# Patient Record
Sex: FEMALE | Race: WHITE | NOT HISPANIC OR LATINO | Employment: OTHER | URBAN - METROPOLITAN AREA
[De-identification: names, ages, dates, MRNs, and addresses within clinical notes are randomized per-mention and may not be internally consistent; named-entity substitution may affect disease eponyms.]

---

## 2017-01-26 DIAGNOSIS — F41.9 ANXIETY: Primary | ICD-10-CM

## 2017-01-26 DIAGNOSIS — F33.1 MAJOR DEPRESSIVE DISORDER, RECURRENT EPISODE, MODERATE (H): ICD-10-CM

## 2017-01-26 NOTE — TELEPHONE ENCOUNTER
venlafaxine (EFFEXOR-XR) 150 MG 24 hr capsule    Last Written Prescription Date: 10/21/16  Last Fill Quantity: 90, # refills: 0  Last Office Visit with [facility], [facility] or [facility] prescribing provider: 6/27/16        BP Readings from Last 3 Encounters:   08/26/16 110/60   07/20/16 132/90   06/27/16 106/70     Pulse: (for Fetzima)  CREATININE   Date Value Ref Range Status   02/18/2016 0.84 0.52 - 1.04 mg/dL Final   ]    Last PHQ-9 score on record=   PHQ-9 SCORE 6/27/2016   Total Score -   Total Score MyChart -   Total Score 2

## 2017-01-31 ENCOUNTER — MYC MEDICAL ADVICE (OUTPATIENT)
Dept: FAMILY MEDICINE | Facility: CLINIC | Age: 57
End: 2017-01-31

## 2017-01-31 DIAGNOSIS — F41.9 ANXIETY: ICD-10-CM

## 2017-02-01 RX ORDER — VENLAFAXINE HYDROCHLORIDE 150 MG/1
150 CAPSULE, EXTENDED RELEASE ORAL DAILY
Qty: 30 CAPSULE | Refills: 0 | Status: SHIPPED | OUTPATIENT
Start: 2017-02-01 | End: 2017-02-01

## 2017-02-01 RX ORDER — VENLAFAXINE HYDROCHLORIDE 150 MG/1
150 CAPSULE, EXTENDED RELEASE ORAL DAILY
Qty: 90 CAPSULE | Refills: 0 | Status: SHIPPED | OUTPATIENT
Start: 2017-02-01 | End: 2017-02-06

## 2017-02-01 NOTE — TELEPHONE ENCOUNTER
See 1/31/17 MyChart encounter.    Short supply requested.    Routing refill request to provider for review/approval because:  -overdue for PHQ-9 and depression/anxiety follow up visit.      Patient does have appt scheduled on 2/6/17 with PCP.  Dr. Solis-Please sign if agree.  Thank you!  YEHUDA BarajasN, RN

## 2017-02-01 NOTE — TELEPHONE ENCOUNTER
Routing refill request to provider for review/approval because:  -overdue for PHQ-9 and depression/anxiety follow up visit    Patient requesting short supply while awaiting mail order delivery.  Routing to PCP.    Dr. Solis-Please sign if agree.    Thank you!  YEHUDA BarajasN, RN      venlafaxine (EFFEXOR-XR) 150 MG 24 hr capsule    Last Written Prescription Date: 10/21/16  Last Fill Quantity: 90, # refills: 0  Last Office Visit with [facility], [facility] or [facility] prescribing provider: 6/27/16 with Dr. Solis   Next 5 appointments (look out 90 days)     Feb 06, 2017  5:40 PM   MyChart Long with Veronica Solis MD   [facility]    [address] [place] [id number] [phone]                   BP Readings from Last 3 Encounters:   08/26/16 110/60   07/20/16 132/90   06/27/16 106/70     Pulse: (for Fetzima)  CREATININE   Date Value Ref Range Status   02/18/2016 0.84 0.52 - 1.04 mg/dL Final   ]    Last PHQ-9 score on record=   PHQ-9 SCORE 6/27/2016   Total Score -   Total Score MyChart -   Total Score 2

## 2017-02-06 ENCOUNTER — OFFICE VISIT (OUTPATIENT)
Dept: FAMILY MEDICINE | Facility: CLINIC | Age: 57
End: 2017-02-06
Payer: COMMERCIAL

## 2017-02-06 ENCOUNTER — RADIANT APPOINTMENT (OUTPATIENT)
Dept: GENERAL RADIOLOGY | Facility: CLINIC | Age: 57
End: 2017-02-06
Attending: FAMILY MEDICINE
Payer: COMMERCIAL

## 2017-02-06 VITALS
DIASTOLIC BLOOD PRESSURE: 88 MMHG | SYSTOLIC BLOOD PRESSURE: 126 MMHG | OXYGEN SATURATION: 100 % | BODY MASS INDEX: 30.54 KG/M2 | HEART RATE: 84 BPM | TEMPERATURE: 98.6 F | WEIGHT: 167 LBS | RESPIRATION RATE: 16 BRPM

## 2017-02-06 DIAGNOSIS — R05.9 COUGH: ICD-10-CM

## 2017-02-06 DIAGNOSIS — F41.9 ANXIETY: ICD-10-CM

## 2017-02-06 DIAGNOSIS — Z12.11 SCREEN FOR COLON CANCER: ICD-10-CM

## 2017-02-06 DIAGNOSIS — F33.41 RECURRENT MAJOR DEPRESSIVE DISORDER, IN PARTIAL REMISSION (H): Primary | ICD-10-CM

## 2017-02-06 DIAGNOSIS — N95.0 POSTMENOPAUSAL BLEEDING: ICD-10-CM

## 2017-02-06 PROCEDURE — 99214 OFFICE O/P EST MOD 30 MIN: CPT | Performed by: FAMILY MEDICINE

## 2017-02-06 PROCEDURE — 71020 XR CHEST 2 VW: CPT

## 2017-02-06 RX ORDER — BUPROPION HYDROCHLORIDE 150 MG/1
150 TABLET ORAL EVERY MORNING
Qty: 90 TABLET | Refills: 1 | Status: SHIPPED | OUTPATIENT
Start: 2017-02-06 | End: 2017-04-24

## 2017-02-06 RX ORDER — VENLAFAXINE HYDROCHLORIDE 150 MG/1
150 CAPSULE, EXTENDED RELEASE ORAL DAILY
Qty: 90 CAPSULE | Refills: 1 | Status: SHIPPED | OUTPATIENT
Start: 2017-02-06 | End: 2017-04-24

## 2017-02-06 ASSESSMENT — ANXIETY QUESTIONNAIRES
GAD7 TOTAL SCORE: 1
1. FEELING NERVOUS, ANXIOUS, OR ON EDGE: NOT AT ALL
2. NOT BEING ABLE TO STOP OR CONTROL WORRYING: NOT AT ALL
7. FEELING AFRAID AS IF SOMETHING AWFUL MIGHT HAPPEN: NOT AT ALL
3. WORRYING TOO MUCH ABOUT DIFFERENT THINGS: NOT AT ALL
5. BEING SO RESTLESS THAT IT IS HARD TO SIT STILL: NOT AT ALL
6. BECOMING EASILY ANNOYED OR IRRITABLE: SEVERAL DAYS

## 2017-02-06 ASSESSMENT — PATIENT HEALTH QUESTIONNAIRE - PHQ9: 5. POOR APPETITE OR OVEREATING: NOT AT ALL

## 2017-02-06 NOTE — MR AVS SNAPSHOT
After Visit Summary   2/6/2017    Karlee Emery    MRN: [id number]           Patient Information     Date Of Birth          1960        Visit Information        Provider Department      2/6/2017 5:40 PM Veronica Solis MD [facility]        Today's Diagnoses     Recurrent major depressive disorder, in partial remission (H)    -  1     Anxiety         Cough         Screen for colon cancer           Care Instructions    Schedule pelvic ultrasound.  Call [facility] at [phone] ([facility] [address] [address]    Call [facility] appointment line [phone] to schedule mammogram.            Follow-ups after your visit        Future tests that were ordered for you today     Open Future Orders        Priority Expected Expires Ordered    Fecal colorectal cancer screen (FIT) Routine 2/27/2017 5/1/2017 2/6/2017            Who to contact     If you have questions or need follow up information about today's clinic visit or your schedule please contact [facility] directly at [phone].  Normal or non-critical lab and imaging results will be communicated to you by "[employer]"hart, letter or phone within 4 business days after the clinic has received the results. If you do not hear from us within 7 days, please contact the clinic through [employer] or phone. If you have a critical or abnormal lab result, we will notify you by phone as soon as possible.  Submit refill requests through [employer] or call your pharmacy and they will forward the refill request to us. Please allow 3 business days for your refill to be completed.          Additional Information About Your Visit        "[employer]hart Information     [employer] gives you secure access to your electronic health record. If you see a primary care provider, you can also send messages to your care team and make appointments. If you have questions, please call your  primary care clinic.  If you do not have a primary care provider, please call [phone] and they will assist you.        Care EveryWhere ID     This is your Care EveryWhere ID. This could be used by other organizations to access your [facility] medical records  [id number]        Your Vitals Were     Pulse Temperature Respirations Pulse Oximetry Last Period       84 98.6  F (37  C) (Oral) 16 100% 03/15/2013 (Exact Date)        Blood Pressure from Last 3 Encounters:   02/06/17 126/88   08/26/16 110/60   07/20/16 132/90    Weight from Last 3 Encounters:   02/06/17 167 lb (75.751 kg)   08/26/16 160 lb (72.576 kg)   07/20/16 160 lb (72.576 kg)                 Where to get your medicines      These medications were sent to [facility] - [place] - [address] [facility] [address]  [address] [facility] [address]     Phone:  [phone]    - buPROPion 150 MG 24 hr tablet  - venlafaxine 150 MG 24 hr capsule       Primary Care Provider Office Phone # Fax #    Veronica Solis [phone]       [facility] [address]  [facility] [address]        Thank you!     Thank you for choosing [facility]  for your care. Our goal is always to provide you with excellent care. Hearing back from our patients is one way we can continue to improve our services. Please take a few minutes to complete the written survey that you may receive in the mail after your visit with us. Thank you!             Your Updated Medication List - Protect others around you: Learn how to safely use, store and throw away your medicines at www.disposemymeds.org.          This list is accurate as of: 2/6/17  6:38 PM.  Always use your most recent med list.                   Brand Name Dispense Instructions for use    acetaminophen 325 MG tablet    TYLENOL    100 tablet    Take 2 tablets by mouth every 4 hours as needed for other (mild pain).       aspirin 81 MG tablet      Take  by  mouth daily. ([facility])       buPROPion 150 MG 24 hr tablet    WELLBUTRIN XL    90 tablet    Take 1 tablet (150 mg) by mouth every morning       CALCIUM + D PO      Take 1 tablet by mouth daily.       loratadine 10 MG tablet    CLARITIN    90 tablet    Take 1 tablet by mouth daily.       MULTIVITAMIN TABS   OR      1 TABLET DAILY       * valACYclovir 500 MG tablet    VALTREX    90 tablet    Take 1 tablet (500 mg) by mouth daily       * valACYclovir 1000 mg tablet    VALTREX    21 tablet    Take 1 tablet (1,000 mg) by mouth 3 times daily       venlafaxine 150 MG 24 hr capsule    EFFEXOR-XR    90 capsule    Take 1 capsule (150 mg) by mouth daily       * Notice:  This list has 2 medication(s) that are the same as other medications prescribed for you. Read the directions carefully, and ask your doctor or other care provider to review them with you.

## 2017-02-06 NOTE — PROGRESS NOTES
SUBJECTIVE:                                                    Karlee Emery is a 56 year old female who presents to clinic today for the following health issues:     Depression and Anxiety Follow-Up    Status since last visit: Improved     Other associated symptoms: dry mouth, cough x months - side effects?    Complicating factors:     Significant life event: No     Current substance abuse: None    PHQ-9 SCORE 5/23/2016 6/27/2016 2/6/2017   Total Score - - -   Total Score MyChart - - Incomplete   Total Score 12 2 8     DANIEL-7 SCORE 5/4/2016 5/23/2016 6/27/2016   Total Score - - -   Total Score 1 2 0        PHQ-9  English      PHQ-9   Any Language     GAD7     Vaginal Spotting  Had vaginal spotting x 3 days.  Associated with breast tenderness, pelvic cramping.  Had endometrial cells on pap last summer with subsequent pelvic ultrasound and endometrial biopsy.    6.6 mm stripe on ultrasound.  Endo biopsy was normal.  Ultrasound did also show right ovarian simple cyst; she was advised to repeat ultrasound in 1-2 cycles but did not.    Cough  Dry, nagging, chronic cough since at least last summer.  Worse when she's talking a lot.    No associated symptoms.      Amount of exercise or physical activity: 3 days/week for an average of 60 minutes    Problems taking medications regularly: No    Medication side effects: dry mouth, cough x months, night sweats    Diet: regular (no restrictions)        Problem list and histories reviewed & adjusted, as indicated.  Additional history: as documented    BP Readings from Last 3 Encounters:   02/06/17 144/90   08/26/16 110/60   07/20/16 132/90    Wt Readings from Last 3 Encounters:   02/06/17 167 lb (75.751 kg)   08/26/16 160 lb (72.576 kg)   07/20/16 160 lb (72.576 kg)             ROS:  RESP:  NEGATIVE for shortness of breath  CV:  NEGATIVE for chest pain    GI: NEGATIVE for heartburn  ENT: NEGATIVE for post-nasal drainage     OBJECTIVE:                                                     /88 mmHg  Pulse 84  Temp(Src) 98.6  F (37  C) (Oral)  Resp 16  Wt 167 lb (75.751 kg)  SpO2 100%  LMP 03/15/2013 (Exact Date)  Body mass index is 30.54 kg/(m^2).  GEN:  no apparent distress  LUNGS:  normal respiratory effort, and lungs clear to auscultation bilaterally - no rales, rhonchi or wheezes  CV: regular rate and rhythm, normal S1 S2, no S3 or S4 and no murmur, click or rub  PSYCH:  Appearance/Behavior: patient is appropriately and casually dressed.  Speech:  normal  rate, rhythm, and tone.  Thought Form: organized.  Mood/Affect: slightly depressed/congruent.  Insight: good.     CXR: within normal limits by my interpretation      ASSESSMENT/PLAN:                                                        1. Recurrent major depressive disorder, in partial remission (H)  Continue present management.  She plans to join [facility] family support group.  - venlafaxine (EFFEXOR-XR) 150 MG 24 hr capsule; Take 1 capsule (150 mg) by mouth daily  Dispense: 90 capsule; Refill: 1  - buPROPion (WELLBUTRIN XL) 150 MG 24 hr tablet; Take 1 tablet (150 mg) by mouth every morning  Dispense: 90 tablet; Refill: 1    2. Anxiety  - venlafaxine (EFFEXOR-XR) 150 MG 24 hr capsule; Take 1 capsule (150 mg) by mouth daily  Dispense: 90 capsule; Refill: 1    3. Cough  Unclear etiology/diagnosis with uncertain prognosis requiring further workup with CXR.  No obvious etiology by history.    - XR Chest 2 Views; Future    4. Screen for colon cancer  - Fecal colorectal cancer screen (FIT); Future    5. Postmenopausal bleeding  Recommended pelvic ultrasound.  She already has standing order for this.    Patient Instructions   Schedule pelvic ultrasound.  Call [facility] at [phone] ([facility] - [address]).    Call [facility] appointment line [phone] to schedule mammogram.        FUTURE APPOINTMENTS:       - Follow-up visit in 6  months.    Veronica Solis MD  [facility]

## 2017-02-06 NOTE — NURSING NOTE
"Chief Complaint   Patient presents with     Depression     Anxiety       Initial /90 mmHg  Pulse 84  Temp(Src) 98.6  F (37  C) (Oral)  Resp 16  Wt 167 lb (75.751 kg)  SpO2 100%  LMP 03/15/2013 (Exact Date) Estimated body mass index is 30.54 kg/(m^2) as calculated from the following:    Height as of 8/26/16: 5' 2\" (1.575 m).    Weight as of this encounter: 167 lb (75.751 kg).  Medication Reconciliation: complete       Nikki Abbott MA      "

## 2017-02-07 ASSESSMENT — ANXIETY QUESTIONNAIRES: GAD7 TOTAL SCORE: 1

## 2017-02-07 ASSESSMENT — PATIENT HEALTH QUESTIONNAIRE - PHQ9: SUM OF ALL RESPONSES TO PHQ QUESTIONS 1-9: 8

## 2017-02-07 NOTE — PATIENT INSTRUCTIONS
Schedule pelvic ultrasound.  Call [facility] at [phone] ([facility] - [address]).    Call [facility] appointment line [phone] to schedule mammogram.

## 2017-02-15 ENCOUNTER — RADIANT APPOINTMENT (OUTPATIENT)
Dept: ULTRASOUND IMAGING | Facility: CLINIC | Age: 57
End: 2017-02-15
Attending: OBSTETRICS & GYNECOLOGY
Payer: COMMERCIAL

## 2017-02-15 ENCOUNTER — RADIANT APPOINTMENT (OUTPATIENT)
Dept: MAMMOGRAPHY | Facility: CLINIC | Age: 57
End: 2017-02-15
Attending: FAMILY MEDICINE
Payer: COMMERCIAL

## 2017-02-15 DIAGNOSIS — Z12.31 VISIT FOR SCREENING MAMMOGRAM: ICD-10-CM

## 2017-02-15 DIAGNOSIS — N83.201 CYST OF RIGHT OVARY: ICD-10-CM

## 2017-02-15 PROCEDURE — 76830 TRANSVAGINAL US NON-OB: CPT | Performed by: OBSTETRICS & GYNECOLOGY

## 2017-02-15 PROCEDURE — G0202 SCR MAMMO BI INCL CAD: HCPCS

## 2017-03-01 DIAGNOSIS — A60.00 HERPES SIMPLEX INFECTION OF GENITOURINARY SYSTEM: Primary | ICD-10-CM

## 2017-03-01 NOTE — TELEPHONE ENCOUNTER
VALACYCLOVIR HCL 500MG TABS       Last Written Prescription Date: 2/29/16  Last Fill Quantity: 90, # refills: 3  Last Office Visit with [facility] or [facility] prescribing provider: 2/6/17        Creatinine   Date Value Ref Range Status   02/18/2016 0.84 0.52 - 1.04 mg/dL Final

## 2017-03-03 RX ORDER — VALACYCLOVIR HYDROCHLORIDE 500 MG/1
TABLET, FILM COATED ORAL
Qty: 90 TABLET | Refills: 0 | Status: SHIPPED | OUTPATIENT
Start: 2017-03-03 | End: 2017-04-24

## 2017-03-03 NOTE — TELEPHONE ENCOUNTER
Routing refill request to provider for review/approval because:  Labs not current:  creatinine    Lab ordered.    Routing to PCP and Reception.    Dr. Solis-Please sign if agree.    Reception-Please call patient to schedule non-fasting lab appt for monitoring lab for Valtrex medication.    Thank you!  GABRIELLA Duarte, YEHUDAN, RN

## 2017-03-06 DIAGNOSIS — A60.00 HERPES SIMPLEX INFECTION OF GENITOURINARY SYSTEM: ICD-10-CM

## 2017-03-06 LAB
CREAT SERPL-MCNC: 0.94 MG/DL (ref 0.52–1.04)
GFR SERPL CREATININE-BSD FRML MDRD: 61 ML/MIN/1.7M2

## 2017-03-06 PROCEDURE — 82565 ASSAY OF CREATININE: CPT | Performed by: FAMILY MEDICINE

## 2017-03-06 PROCEDURE — 36415 COLL VENOUS BLD VENIPUNCTURE: CPT | Performed by: FAMILY MEDICINE

## 2017-03-12 PROCEDURE — 82274 ASSAY TEST FOR BLOOD FECAL: CPT | Performed by: FAMILY MEDICINE

## 2017-03-14 DIAGNOSIS — Z12.11 SCREEN FOR COLON CANCER: ICD-10-CM

## 2017-03-14 LAB — HEMOCCULT STL QL IA: NEGATIVE

## 2017-03-15 NOTE — PROGRESS NOTES
Job Desir,  Thanks for completing the FIT stool test for colon cancer screening!  Your stool test is negative for microscopic (hidden) blood.  This test should be repeated annually.     Veronica Solis MD

## 2017-04-24 ENCOUNTER — MYC MEDICAL ADVICE (OUTPATIENT)
Dept: FAMILY MEDICINE | Facility: CLINIC | Age: 57
End: 2017-04-24

## 2017-04-24 DIAGNOSIS — F33.41 RECURRENT MAJOR DEPRESSIVE DISORDER, IN PARTIAL REMISSION (H): ICD-10-CM

## 2017-04-24 DIAGNOSIS — A60.00 HERPES SIMPLEX INFECTION OF GENITOURINARY SYSTEM: ICD-10-CM

## 2017-04-24 DIAGNOSIS — F41.9 ANXIETY: ICD-10-CM

## 2017-04-24 RX ORDER — BUPROPION HYDROCHLORIDE 150 MG/1
150 TABLET ORAL EVERY MORNING
Qty: 14 TABLET | Refills: 0 | Status: SHIPPED | OUTPATIENT
Start: 2017-04-24 | End: 2017-07-21

## 2017-04-24 RX ORDER — VENLAFAXINE HYDROCHLORIDE 150 MG/1
150 CAPSULE, EXTENDED RELEASE ORAL DAILY
Qty: 14 CAPSULE | Refills: 0 | Status: SHIPPED | OUTPATIENT
Start: 2017-04-24 | End: 2017-07-21

## 2017-04-24 RX ORDER — VALACYCLOVIR HYDROCHLORIDE 500 MG/1
500 TABLET, FILM COATED ORAL DAILY
Qty: 14 TABLET | Refills: 0 | Status: SHIPPED | OUTPATIENT
Start: 2017-04-24 | End: 2017-05-03
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
## 2017-04-24 NOTE — TELEPHONE ENCOUNTER
Routing to PCP.    Dr. Solis-Please review.  Will update when patient clarifies pharmacy.    Thank you!  YEHUDA BarajasN, RN
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
## 2017-05-03 DIAGNOSIS — A60.00 HERPES SIMPLEX INFECTION OF GENITOURINARY SYSTEM: ICD-10-CM

## 2017-05-03 RX ORDER — VALACYCLOVIR HYDROCHLORIDE 500 MG/1
TABLET, FILM COATED ORAL
Qty: 90 TABLET | Refills: 2 | Status: SHIPPED | OUTPATIENT
Start: 2017-05-03 | End: 2018-01-09

## 2017-05-03 NOTE — TELEPHONE ENCOUNTER
Medication Detail      Disp Refills Start End ELADIO   valACYclovir (VALTREX) 500 MG tablet 14 tablet 0 4/24/2017  No   Sig: Take 1 tablet (500 mg) by mouth daily       Last Office Visit with [facility] or [facility] prescribing provider: 2/6/17        Creatinine   Date Value Ref Range Status   03/06/2017 0.94 0.52 - 1.04 mg/dL Final

## 2017-05-04 NOTE — TELEPHONE ENCOUNTER
Prescription approved per [facility] Refill Protocol.    Signed Prescriptions:                        Disp   Refills    valACYclovir (VALTREX) 500 MG tablet       90 tab*2        Sig: TAKE ONE TABLET BY MOUTH EVERY DAY  Authorizing Provider: HUMA WELSH  Ordering User: MARK PRATT      Closing encounter - no further actions needed at this time    Mark Pratt RN

## 2017-07-19 DIAGNOSIS — F33.41 RECURRENT MAJOR DEPRESSIVE DISORDER, IN PARTIAL REMISSION (H): ICD-10-CM

## 2017-07-19 DIAGNOSIS — F41.9 ANXIETY: ICD-10-CM

## 2017-07-19 NOTE — TELEPHONE ENCOUNTER
Medication Detail      Disp Refills Start End ELADIO   buPROPion (WELLBUTRIN XL) 150 MG 24 hr tablet 14 tablet 0 4/24/2017  No   Sig: Take 1 tablet (150 mg) by mouth every morning       Last Office Visit with [facility], [facility] or [facility] prescribing provider:  2/6/17        Last PHQ-9 score on record=   PHQ-9 SCORE 2/6/2017   Total Score -   Total Score MyChart Incomplete   Total Score 8       Lab Results   Component Value Date    AST 28 11/21/2011     Lab Results   Component Value Date    ALT 24 11/21/2011

## 2017-07-21 ENCOUNTER — MYC MEDICAL ADVICE (OUTPATIENT)
Dept: FAMILY MEDICINE | Facility: CLINIC | Age: 57
End: 2017-07-21

## 2017-07-21 RX ORDER — BUPROPION HYDROCHLORIDE 150 MG/1
TABLET ORAL
Qty: 90 TABLET | Refills: 1 | Status: SHIPPED | OUTPATIENT
Start: 2017-07-21 | End: 2018-01-09

## 2017-07-21 RX ORDER — VENLAFAXINE HYDROCHLORIDE 150 MG/1
150 CAPSULE, EXTENDED RELEASE ORAL DAILY
Qty: 90 CAPSULE | Refills: 1 | Status: SHIPPED | OUTPATIENT
Start: 2017-07-21 | End: 2018-04-01

## 2017-07-21 ASSESSMENT — ANXIETY QUESTIONNAIRES
1. FEELING NERVOUS, ANXIOUS, OR ON EDGE: NOT AT ALL
7. FEELING AFRAID AS IF SOMETHING AWFUL MIGHT HAPPEN: NOT AT ALL
3. WORRYING TOO MUCH ABOUT DIFFERENT THINGS: NOT AT ALL
2. NOT BEING ABLE TO STOP OR CONTROL WORRYING: NOT AT ALL
7. FEELING AFRAID AS IF SOMETHING AWFUL MIGHT HAPPEN: NOT AT ALL
GAD7 TOTAL SCORE: 0
4. TROUBLE RELAXING: NOT AT ALL
5. BEING SO RESTLESS THAT IT IS HARD TO SIT STILL: NOT AT ALL
GAD7 TOTAL SCORE: 0
6. BECOMING EASILY ANNOYED OR IRRITABLE: NOT AT ALL
GAD7 TOTAL SCORE: 0

## 2017-07-21 ASSESSMENT — PATIENT HEALTH QUESTIONNAIRE - PHQ9
SUM OF ALL RESPONSES TO PHQ QUESTIONS 1-9: 4
SUM OF ALL RESPONSES TO PHQ QUESTIONS 1-9: 4
10. IF YOU CHECKED OFF ANY PROBLEMS, HOW DIFFICULT HAVE THESE PROBLEMS MADE IT FOR YOU TO DO YOUR WORK, TAKE CARE OF THINGS AT HOME, OR GET ALONG WITH OTHER PEOPLE: SOMEWHAT DIFFICULT

## 2017-07-22 ASSESSMENT — ANXIETY QUESTIONNAIRES: GAD7 TOTAL SCORE: 0

## 2017-07-22 ASSESSMENT — PATIENT HEALTH QUESTIONNAIRE - PHQ9: SUM OF ALL RESPONSES TO PHQ QUESTIONS 1-9: 4

## 2017-07-24 NOTE — TELEPHONE ENCOUNTER
Dr. Solis-Please review.    Thank you!  YEHUDA BarajasN, RN      DANIEL-7 SCORE 6/27/2016 2/6/2017 7/21/2017   Total Score - - -   Total Score - - 0 (minimal anxiety)   Total Score 0 1 0       PHQ-9 SCORE 2/6/2017 2/6/2017 7/21/2017   Total Score - - -   Total Score MyChart - Incomplete 4 (Minimal depression)   Total Score 8 8 4

## 2017-10-24 ENCOUNTER — MYC MEDICAL ADVICE (OUTPATIENT)
Dept: FAMILY MEDICINE | Facility: CLINIC | Age: 57
End: 2017-10-24

## 2017-10-24 DIAGNOSIS — R32 URINARY INCONTINENCE, UNSPECIFIED TYPE: Primary | ICD-10-CM

## 2017-10-24 NOTE — TELEPHONE ENCOUNTER
Per muzu tv message below, pt is requesting an Order to see a female Urologist for urinary incontinence.    Violet Dobbs, HEMANT Referral Rep

## 2017-10-24 NOTE — TELEPHONE ENCOUNTER
Do you know of a female urologist in the system?  Not sure what office to pend for a female provider as requested.  Meli Garcia RN

## 2017-11-02 ENCOUNTER — PRE VISIT (OUTPATIENT)
Dept: UROLOGY | Facility: CLINIC | Age: 57
End: 2017-11-02

## 2017-11-02 NOTE — TELEPHONE ENCOUNTER
Called patient for any outside records no options to leave VM.. Some records are in [facility].. cdk

## 2018-01-03 ENCOUNTER — PRE VISIT (OUTPATIENT)
Dept: UROLOGY | Facility: CLINIC | Age: 58
End: 2018-01-03

## 2018-01-03 NOTE — TELEPHONE ENCOUNTER
Patient with incontinence coming in for a consult. Chart reviewed and pt was referred by Dr. Solis. Pt will need a dip/pvr.

## 2018-01-04 ASSESSMENT — ENCOUNTER SYMPTOMS
ALTERED TEMPERATURE REGULATION: 1
SORE THROAT: 1
INCREASED ENERGY: 0
WEAKNESS: 0
COUGH DISTURBING SLEEP: 1
WHEEZING: 0
SPEECH CHANGE: 0
STIFFNESS: 1
SINUS PAIN: 1
MYALGIAS: 1
MUSCLE WEAKNESS: 0
HEMATURIA: 0
HOARSE VOICE: 1
SPUTUM PRODUCTION: 1
EYE IRRITATION: 1
HOT FLASHES: 1
POSTURAL DYSPNEA: 0
DYSURIA: 0
LOSS OF CONSCIOUSNESS: 0
POLYPHAGIA: 0
FEVER: 0
SINUS CONGESTION: 1
EYE WATERING: 1
DYSPNEA ON EXERTION: 0
TINGLING: 1
WEIGHT LOSS: 0
ARTHRALGIAS: 1
BACK PAIN: 1
SEIZURES: 0
TROUBLE SWALLOWING: 0
NECK PAIN: 1
HEMOPTYSIS: 0
HALLUCINATIONS: 0
NECK MASS: 0
CHILLS: 0
SMELL DISTURBANCE: 0
DISTURBANCES IN COORDINATION: 0
HEADACHES: 1
NUMBNESS: 1
JOINT SWELLING: 0
EYE REDNESS: 0
WEIGHT GAIN: 1
SHORTNESS OF BREATH: 0
FLANK PAIN: 0
MUSCLE CRAMPS: 0
FATIGUE: 1
COUGH: 1
DIZZINESS: 1
DOUBLE VISION: 0
SNORES LOUDLY: 1
PARALYSIS: 0
POLYDIPSIA: 0
NIGHT SWEATS: 1
DIFFICULTY URINATING: 1
DECREASED LIBIDO: 0
EYE PAIN: 0
MEMORY LOSS: 0
TREMORS: 0
DECREASED APPETITE: 0
TASTE DISTURBANCE: 0

## 2018-01-07 ENCOUNTER — HEALTH MAINTENANCE LETTER (OUTPATIENT)
Age: 58
End: 2018-01-07

## 2018-01-09 DIAGNOSIS — A60.00 HERPES SIMPLEX INFECTION OF GENITOURINARY SYSTEM: ICD-10-CM

## 2018-01-09 DIAGNOSIS — F33.41 RECURRENT MAJOR DEPRESSIVE DISORDER, IN PARTIAL REMISSION (H): ICD-10-CM

## 2018-01-10 NOTE — TELEPHONE ENCOUNTER
"Requested Prescriptions   Pending Prescriptions Disp Refills     buPROPion (WELLBUTRIN XL) 150 MG 24 hr tablet [Pharmacy Med Name: BUPROPION HCL ER (XL) 150MG TB24]  Last Written Prescription Date:  7/21/2017  Last Fill Quantity: 90 tablet,  # refills: 1   Last Office Visit with [facility] or [facility] prescribing provider:  2/6/2017   Future Office Visit:      90 tablet 1     Sig: TAKE ONE TABLET BY MOUTH EVERY MORNING    SSRIs Protocol Failed    1/9/2018  7:38 PM       Failed - Recent (6 mo) or future visit with authorizing provider's specialty    Patient had office visit in the last 6 months or has a visit in the next 30 days with authorizing provider.  See \"Patient Info\" tab in inbasket, or \"Choose Columns\" in Meds & Orders section of the refill encounter.          Passed - PHQ-9 score less than 5 in past 6 months    The PHQ-9 criteria is meant to fail. It requires a PHQ-9 score review     PHQ-9 SCORE 2/6/2017 2/6/2017 7/21/2017   Total Score - - -   Total Score MyChart - Incomplete 4 (Minimal depression)   Total Score 8 8 4     DANIEL-7 SCORE 6/27/2016 2/6/2017 7/21/2017   Total Score - - -   Total Score - - 0 (minimal anxiety)   Total Score 0 1 0       Passed - Medication is Bupropion    If the medication is Bupropion (Wellbutrin), and the patient is taking for smoking cessation; OK to refill.         Passed - Patient is age 18 or older       Passed - No active pregnancy on record       Passed - No positive pregnancy test in last 12 months     ________________________________________________________________________________       valACYclovir (VALTREX) 500 MG tablet [Pharmacy Med Name: VALACYCLOVIR HCL 500MG TABS]  Last Written Prescription Date:  5/3/2017  Last Fill Quantity: 90 tablet,  # refills: 2   Last Office Visit with [facility] or [facility] prescribing provider:  2/6/2017   Future Office Visit:    90 tablet 2     Sig: TAKE ONE TABLET BY MOUTH EVERY DAY    Antivirals for Herpes Protocol Passed    1/9/2018  7:38 " "PM       Passed - Patient is age 12 or older       Passed - Recent or future visit with authorizing provider's specialty    Patient had office visit in the last year or has a visit in the next 30 days with authorizing provider.  See \"Patient Info\" tab in inbasket, or \"Choose Columns\" in Meds & Orders section of the refill encounter.          Passed - Normal serum creatinine on file in past 12 months    Recent Labs   Lab Test  03/06/17   0835   CR  0.94               "

## 2018-01-11 ENCOUNTER — OFFICE VISIT (OUTPATIENT)
Dept: UROLOGY | Facility: CLINIC | Age: 58
End: 2018-01-11
Payer: COMMERCIAL

## 2018-01-11 VITALS
SYSTOLIC BLOOD PRESSURE: 129 MMHG | DIASTOLIC BLOOD PRESSURE: 79 MMHG | WEIGHT: 175.3 LBS | BODY MASS INDEX: 32.26 KG/M2 | HEIGHT: 62 IN | HEART RATE: 82 BPM

## 2018-01-11 DIAGNOSIS — M62.89 PELVIC FLOOR DYSFUNCTION: ICD-10-CM

## 2018-01-11 DIAGNOSIS — M79.18 MYALGIA OF PELVIC FLOOR: ICD-10-CM

## 2018-01-11 DIAGNOSIS — N39.46 MIXED INCONTINENCE: Primary | ICD-10-CM

## 2018-01-11 LAB
APPEARANCE UR: CLEAR
BILIRUB UR QL: NORMAL
COLOR UR: YELLOW
GLUCOSE URINE: NORMAL MG/DL
HGB UR QL: NORMAL
KETONES UR QL: NORMAL MG/DL
LEUKOCYTE ESTERASE URINE: NORMAL
NITRITE UR QL STRIP: NORMAL
PH UR STRIP: 5 PH (ref 5–7)
PROTEIN ALBUMIN URINE: NORMAL MG/DL
SOURCE: NORMAL
SP GR UR STRIP: 1.03 (ref 1–1.03)
UROBILINOGEN UR QL STRIP: 0.2 EU/DL (ref 0.2–1)

## 2018-01-11 RX ORDER — BUPROPION HYDROCHLORIDE 150 MG/1
TABLET ORAL
Qty: 90 TABLET | Refills: 0 | Status: SHIPPED | OUTPATIENT
Start: 2018-01-11 | End: 2018-04-01

## 2018-01-11 RX ORDER — VALACYCLOVIR HYDROCHLORIDE 500 MG/1
TABLET, FILM COATED ORAL
Qty: 90 TABLET | Refills: 0 | Status: SHIPPED | OUTPATIENT
Start: 2018-01-11 | End: 2018-04-01

## 2018-01-11 ASSESSMENT — PAIN SCALES - GENERAL: PAINLEVEL: NO PAIN (0)

## 2018-01-11 NOTE — NURSING NOTE
"Chief Complaint   Patient presents with     Incontinence     consult       Blood pressure 129/79, pulse 82, height 1.575 m (5' 2\"), weight 79.5 kg (175 lb 4.8 oz), last menstrual period 03/15/2013, not currently breastfeeding. Body mass index is 32.06 kg/(m^2).    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       loratadine (CLARITIN) 10 MG tablet Take 1 tablet by mouth daily. 90 tablet 0     aspirin 81 MG tablet Take  by mouth daily. ([facility])  3     acetaminophen (TYLENOL) 325 MG tablet Take 2 tablets by mouth every 4 hours as needed for other (mild pain). 100 tablet 0     MULTIVITAMIN TABS   OR 1 TABLET DAILY         Social History   Substance Use Topics     Smoking status: Former Smoker     Quit date: 1/1/1980     Smokeless tobacco: Not on file     Alcohol use 0.0 oz/week     0 Standard drinks or equivalent per week      Comment: 2 per week PRICSILA Grimaldo  1/11/2018  9:26 AM       "

## 2018-01-11 NOTE — MR AVS SNAPSHOT
After Visit Summary   1/11/2018    Karlee Emery    MRN: [id number]           Patient Information     Date Of Birth          1960        Visit Information        Provider Department      1/11/2018 9:15 AM Miryam Herrera MD [facility]        Care Instructions    Please return to see me for us to look in your bladder (cystoscopy)    Websites with free information:    American Urogynecologic Society patient website: www.[email or web address]    Total Control Program: www.totalcontrolprogram.com    Please see one of the dedicated pelvic floor physical therapist ([employer] [facility] [phone])    It was a pleasure meeting with you today.  Thank you for allowing me and my team the privilege of caring for you today.  YOU are the reason we are here, and I truly hope we provided you with the excellent service you deserve.  Please let us know if there is anything else we can do for you so that we can be sure you are leaving completely satisfied with your care experience.              Follow-ups after your visit        Your next 10 appointments already scheduled     Feb 15, 2018  7:30 AM CST   (Arrive by 7:15 AM)   Cystoscopy with Miryam Herrera MD   [facility] ([facility])    [address] [place] [id number] [phone]              Who to contact     Please call your clinic at [phone] to:    Ask questions about your health    Make or cancel appointments    Discuss your medicines    Learn about your test results    Speak to your doctor   If you have compliments or concerns about an experience at your clinic, or if you wish to file a complaint, please contact [facility] Patient Relations at [phone] or email us at [email or web address]         Additional Information  "About Your Visit        [employer] Information     [employer] gives you secure access to your electronic health record. If you see a primary care provider, you can also send messages to your care team and make appointments. If you have questions, please call your primary care clinic.  If you do not have a primary care provider, please call [phone] and they will assist you.      [employer] is an electronic gateway that provides easy, online access to your medical records. With [employer], you can request a clinic appointment, read your test results, renew a prescription or communicate with your care team.     To access your existing account, please contact your [facility] or call [phone] for assistance.        Care EveryWhere ID     This is your Care EveryWhere ID. This could be used by other organizations to access your [facility] medical records  [id number]        Your Vitals Were     Pulse Height Last Period BMI (Body Mass Index)          82 1.575 m (5' 2\") 03/15/2013 (Exact Date) 32.06 kg/m2         Blood Pressure from Last 3 Encounters:   01/11/18 129/79   02/06/17 126/88   08/26/16 110/60    Weight from Last 3 Encounters:   01/11/18 79.5 kg (175 lb 4.8 oz)   02/06/17 75.8 kg (167 lb)   08/26/16 72.6 kg (160 lb)              We Performed the Following     Urinalysis chemical screen POCT        Primary Care Provider Office Phone # Fax #    Veronica Solis [phone]       [address]  [facility] [address]        Equal Access to Services     NICOLA BILLY : Hadii jose roberto ku hadasho Soomaali, waaxda luqadaha, qaybta kaalmada adeegyada, jose zacarias. So [facility] [phone].    ATENCIÓN: Si habla español, tiene a lopez disposición servicios gratuitos de asistencia lingüística. Llame al [phone].    We comply with applicable federal civil rights laws and [place] laws. We do not discriminate on the basis of race, color, national origin, age, " disability, sex, sexual orientation, or gender identity.            Thank you!     Thank you for choosing [facility] UROLOGY AND [facility] FOR PROSTATE AND UROLOGIC CANCERS  for your care. Our goal is always to provide you with excellent care. Hearing back from our patients is one way we can continue to improve our services. Please take a few minutes to complete the written survey that you may receive in the mail after your visit with us. Thank you!             Your Updated Medication List - Protect others around you: Learn how to safely use, store and throw away your medicines at www.disposemymeds.org.          This list is accurate as of: 1/11/18 10:18 AM.  Always use your most recent med list.                   Brand Name Dispense Instructions for use Diagnosis    acetaminophen 325 MG tablet    TYLENOL    100 tablet    Take 2 tablets by mouth every 4 hours as needed for other (mild pain).    Hyperlipidemia LDL goal <100, Type 2 diabetes, HbA1C goal < 8% (H), Moderate major depression (H), Hypertension goal BP (blood pressure) < 140/90, Other genital herpes       aspirin 81 MG tablet      Take  by mouth daily. (FMG)    Type 2 diabetes, HbA1C goal < 8% (H)       buPROPion 150 MG 24 hr tablet    WELLBUTRIN XL    90 tablet    TAKE ONE TABLET BY MOUTH EVERY MORNING    Recurrent major depressive disorder, in partial remission (H)       CALCIUM + D PO      Take 1 tablet by mouth daily.        loratadine 10 MG tablet    CLARITIN    90 tablet    Take 1 tablet by mouth daily.    Seasonal allergic rhinitis       MULTIVITAMIN TABS   OR      1 TABLET DAILY        valACYclovir 500 MG tablet    VALTREX    90 tablet    TAKE ONE TABLET BY MOUTH EVERY DAY    Herpes simplex infection of genitourinary system       venlafaxine 150 MG 24 hr capsule    EFFEXOR-XR    90 capsule    Take 1 capsule (150 mg) by mouth daily    Anxiety, Recurrent major depressive disorder, in partial remission (H)

## 2018-01-11 NOTE — PROGRESS NOTES
Urology Consult    Referring provider/PCP  Veronica Solis MD  [address] [place] [address]    Name:  Karlee Emery  MRN:  [id number]  Age/: 57 year old, 1960    CC: Mixed urinary incontinence    HPI: Karlee Emery is a(n) 57 year old FEMALE with a past medical history notable for hypertension, hyperlipidemia, diabetes, depression, as well as genital herpes who presents for evaluation of urinary incontinence.  Patient notes that her incontinence has been present for 24 years.  She notes that her symptoms started after the birth of her daughter.  Her daughter was 10 pounds.  She endorses a separation of her pubic symphysis as well as her SI joints.  Subsequent to this she has had a rectocele and cystocele.  Her daughter was her second of 2 children.  Both were delivered vaginally    Since that time she has had a combination of both stress and urge urinary incontinence.  She underwent a pubovaginal sling with the advantage polypropylene sling system at [facility] in 2010.  She notes that for 1 year she had complete resolution of symptoms, but then her symptoms again started worsening.  She does endorse some significant weight loss since this surgery.  She notes that she has times where she is unable to make it to the bathroom without leaking urine.  She also notes a history of stress urinary incontinence.  An example she gives is exercising.  She does exercise, she will completely saturate a heavy duty pad.  At other times when she is more sedentary, she will use a single pad throughout the day.  Additionally, she endorses some post void urination while standing or wiping.  She notes that this is more than simply dribbling she will have a lot of urine running down her leg.    She mentions that she hardly notices her cystocele.  She does notice her rectocele, and has to take fiber supplements.  Occasionally she has to reduce her rectocele manually to have bowel movements.  She  has had no other  surgeries.  She has no history of urinary tract infections and has had no hematuria.    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    Screen annually for signs and symptoms of Adult T cell leukemia-lymphoma (BRANDEN) or USUJ-I-diknjzdacn myelopathy (HAM), also known as tropical spastic paraparesis (TSP): body aches, fevers, night sweats, loss of appetite or weight      Hyperlipidemia      Hypertension      Other abnormal glucose      Other genital herpes     On suppressive therapy       Past Surgical History:  Past Surgical History:   Procedure Laterality Date     C APPENDECTOMY  1997     C NONSPECIFIC PROCEDURE  1986    TMJ     C TMJ RECONSTRUCTION       EXCISE MASS LOWER EXTREMITY  3/28/2012    Procedure:EXCISE MASS LOWER EXTREMITY; Left Ankle Mass Excision ; Surgeon:KATTY HENSLEY; Location:[facility]     SLING BLADDER SUSPENSION WITH FASCIA ROSALEE         Allergies:     Allergies   Allergen Reactions     Sulfa Drugs Nausea and Vomiting and Hives       Medications:    Current Outpatient Prescriptions on File Prior to Visit:  buPROPion (WELLBUTRIN XL) 150 MG 24 hr tablet TAKE ONE TABLET BY MOUTH EVERY MORNING   venlafaxine (EFFEXOR-XR) 150 MG 24 hr capsule Take 1 capsule (150 mg) by mouth daily   valACYclovir (VALTREX) 500 MG tablet TAKE ONE TABLET BY MOUTH EVERY DAY   Calcium Carbonate-Vitamin D (CALCIUM + D PO) Take 1 tablet by mouth daily.   loratadine (CLARITIN) 10 MG tablet Take 1 tablet by mouth daily.   aspirin 81 MG tablet Take  by mouth daily. (FMG)   acetaminophen (TYLENOL) 325 MG tablet Take 2 tablets by mouth every 4  hours as needed for other (mild pain).   MULTIVITAMIN TABS   OR 1 TABLET DAILY     No current facility-administered medications on file prior to visit.     Social History:  Social History     Social History     Marital status: Single     Spouse name: N/A     Number of children: N/A     Years of education: N/A     Occupational History     Not on file.     Social History Main Topics     Smoking status: Former Smoker     Quit date: 1/1/1980     Smokeless tobacco: Not on file     Alcohol use 0.0 oz/week     0 Standard drinks or equivalent per week      Comment: 2 per week avg     Drug use: No     Sexual activity: Yes     Partners: Female     Other Topics Concern      Service No     Blood Transfusions Yes     1984     Caffeine Concern No     Sleep Concern No     Stress Concern No     Weight Concern Yes     trying to loose     Exercise Yes     3 x wk     Seat Belt Yes     Self-Exams No     Parent/Sibling W/ Cabg, Mi Or Angioplasty Before 65f 55m? No     Social History Narrative    Caffeine intake/servings daily - 4    Calcium intake/servings daily - 2    Exercise 2 times weekly - describe walking, yoga, wii fit    Sunscreen used - Yes    Seatbelts used - Yes    Guns stored in the home - No    Self Breast Exam - No    Pap test up to date -  Yes    Eye exam up to date -  No    Dental exam up to date -  Yes    DEXA scan up to date -  No    Flex Sig/Colonoscopy up to date -  No    Mammography up to date -  No    Immunizations reviewed and up to date - Yes and TD today    Abuse: Current or Past (Physical, Sexual or Emotional) - No    Do you feel safe in your environment - Yes    Do you cope well with stress - Yes    Do you suffer from insomnia - No    Last updated by: Jacqui Hastings  2/25/2009    Jacqui Hastings M.A.                       Family History:  Family History   Problem Relation Age of Onset     HEART DISEASE Father      blockage     Lipids Father      Neurologic Disorder Father      migraines      GASTROINTESTINAL DISEASE Father      ulcers     DIABETES Father      CANCER Father      lung     CEREBROVASCULAR DISEASE Maternal Grandmother      DIABETES Maternal Grandmother      CEREBROVASCULAR DISEASE Maternal Grandfather      CEREBROVASCULAR DISEASE Paternal Grandmother      CEREBROVASCULAR DISEASE Paternal Grandfather      Thyroid Disease Sister      Thyroid Disease Sister      DIABETES Mother      DIABETES Sister      Schizophrenia Son        ROS:  Answers for HPI/ROS submitted by the patient on 1/4/2018   General Symptoms: Yes  Skin Symptoms: No  HENT Symptoms: Yes  EYE SYMPTOMS: Yes  HEART SYMPTOMS: No  LUNG SYMPTOMS: Yes  INTESTINAL SYMPTOMS: No  URINARY SYMPTOMS: Yes  GYNECOLOGIC SYMPTOMS: Yes  BREAST SYMPTOMS: No  SKELETAL SYMPTOMS: Yes  BLOOD SYMPTOMS: No  NERVOUS SYSTEM SYMPTOMS: Yes  MENTAL HEALTH SYMPTOMS: No  Fever: No  Loss of appetite: No  Weight loss: No  Weight gain: Yes  Fatigue: Yes  Night sweats: Yes  Chills: No  Increased stress: No  Excessive hunger: No  Excessive thirst: No  Feeling hot or cold when others believe the temperature is normal: Yes  Loss of height: No  Post-operative complications: No  Surgical site pain: No  Hallucinations: No  Change in or Loss of Energy: No  Hyperactivity: No  Confusion: No  Ear pain: Yes  Ear discharge: Yes  Hearing loss: No  Tinnitus: No  Nosebleeds: No  Congestion: Yes  Sinus pain: Yes  Trouble swallowing: No   Voice hoarseness: Yes  Mouth sores: No  Sore throat: Yes  Tooth pain: No  Gum tenderness: No  Bleeding gums: No  Change in taste: No  Change in sense of smell: No  Dry mouth: No  Hearing aid used: No  Neck lump: No  Eye pain: No  Vision loss: No  Dry eyes: Yes  Watery eyes: Yes  Eye bulging: No  Double vision: No  Flashing of lights: No  Spots: Yes  Floaters: Yes  Redness: No  Crossed eyes: No  Tunnel Vision: No  Yellowing of eyes: No  Eye irritation: Yes  Cough: Yes  Sputum or phlegm: Yes  Coughing up blood: No  Difficulty breating or shortness  "of breath: No  Snoring: Yes  Wheezing: No  Difficulty breathing on exertion: No  Nighttime Cough: Yes  Difficulty breathing when lying flat: No  Trouble holding urine or incontinence: Yes  Pain or burning: No  Trouble starting or stopping: Yes  Increased frequency of urination: No  Blood in urine: No  Decreased frequency of urination: No  Frequent nighttime urination: No  Flank pain: No  Difficulty emptying bladder: Yes  Back pain: Yes  Muscle aches: Yes  Neck pain: Yes  Swollen joints: No  Joint pain: Yes  Bone pain: No  Muscle cramps: No  Muscle weakness: No  Joint stiffness: Yes  Bone fracture: No  Trouble with coordination: No  Dizziness or trouble with balance: Yes  Fainting or black-out spells: No  Memory loss: No  Headache: Yes  Seizures: No  Speech problems: No  Tingling: Yes  Tremor: No  Weakness: No  Difficulty walking: No  Paralysis: No  Numbness: Yes  Bleeding or spotting between periods: No  Heavy or painful periods: No  Irregular periods: No  Vaginal discharge: No  Hot flashes: Yes  Vaginal dryness: No  Genital ulcers: No  Reduced libido: No  Painful intercourse: No  Difficulty with sexual arousal: No  Post-menopausal bleeding: No    Exam:  /79  Pulse 82  Ht 1.575 m (5' 2\")  Wt 79.5 kg (175 lb 4.8 oz)  LMP 03/15/2013 (Exact Date)  BMI 32.06 kg/m2  General: Alert, interactive, & in NAD; pleasant  Resp: Breathing is non-labored on room air   Cardiac: Extremities warm  Abdomen: Soft, non-tender, nondistended.  : Normal external genitalia.  Negative ESST.  She has stage I support on supine strain.  She has myofascial tenderness of the pelvic floor.  Extremities: No LE edema or obvious joint abnormalities  Skin: Warm and dry, no jaundice or rash    Assessment and Plan: Karlee Emery is a(n) 57 year old female with myofascial tenderness of the pelvic floor, pelvic floor dysfunction as well as mixed urinary incontinence s/p pubovaginal sling done in 2010.     - Return to clinic for " cystourethroscopy to evaluate for any mesh erosion next available   - Discussed the benefits of physical therapy for her pelvic floor dysfunction; patient would like to proceed   - Return to clinic in four months to evaluate results following dedicated pelvic floor physical therapy   - Would consider urodynamics prior to consideration of any surgical intervention    This patient's exam findings, labs, and imaging discussed with urology staff surgeon Dr. Herrera, who developed the treatment plan and who saw the patient with me.    Kuldeep Balderas PGY4    Addendum:    The patient was seen and evaluated with the resident.  The plan was formulated in conjunction with me and I agree with the above note with changes made as necessary.    Mixed incontinence after pubovaginal sling so need cystoscopy to evaluate for any mesh erosion.  Pelvic floor dysfunction, recommend pelvic floor therapy.  Discussed how this works and she is agreeable.  If desires more invasive intervention would need to do urodynamics prior.    35 minutes were spent with the patient today, >50% in counseling and coordination of care    Miryam Herrera MD MPH   of Urology

## 2018-01-11 NOTE — PATIENT INSTRUCTIONS
Please return to see me for us to look in your bladder (cystoscopy)    Websites with free information:    American Urogynecologic Society patient website: www.[email or web address]    Total Control Program: www.totalcontrolprogram.com    Please see one of the dedicated pelvic floor physical therapist ([facility] [phone])    It was a pleasure meeting with you today.  Thank you for allowing me and my team the privilege of caring for you today.  YOU are the reason we are here, and I truly hope we provided you with the excellent service you deserve.  Please let us know if there is anything else we can do for you so that we can be sure you are leaving completely satisfied with your care experience.

## 2018-01-11 NOTE — LETTER
2018       RE: Karlee Emery  [address]  [facility] [address]     Dear Colleague,    Thank you for referring your patient, Karlee Emery, to the [facility] UROLOGY AND INST FOR PROSTATE AND UROLOGIC CANCERS at [facility]. Please see a copy of my visit note below.    Urology Consult    Referring provider/PCP  Veronica Solis MD  [address] [place] [address]    Name:  Karlee Emery  MRN:  [id number]  Age/: 57 year old, 1960    CC: Mixed urinary incontinence    HPI: Karlee Emery is a(n) 57 year old FEMALE with a past medical history notable for hypertension, hyperlipidemia, diabetes, depression, as well as genital herpes who presents for evaluation of urinary incontinence.  Patient notes that her incontinence has been present for 24 years.  She notes that her symptoms started after the birth of her daughter.  Her daughter was 10 pounds.  She endorses a separation of her pubic symphysis as well as her SI joints.  Subsequent to this she has had a rectocele and cystocele.  Her daughter was her second of 2 children.  Both were delivered vaginally    Since that time she has had a combination of both stress and urge urinary incontinence.  She underwent a pubovaginal sling with the advantage polypropylene sling system at [facility] in 2010.  She notes that for 1 year she had complete resolution of symptoms, but then her symptoms again started worsening.  She does endorse some significant weight loss since this surgery.  She notes that she has times where she is unable to make it to the bathroom without leaking urine.  She also notes a history of stress urinary incontinence.  An example she gives is exercising.  She does exercise, she will completely saturate a heavy duty pad.  At other times when she is more sedentary, she will use a single pad throughout the day.  Additionally, she endorses some post void urination while  standing or wiping.  She notes that this is more than simply dribbling she will have a lot of urine running down her leg.    She mentions that she hardly notices her cystocele.  She does notice her rectocele, and has to take fiber supplements.  Occasionally she has to reduce her rectocele manually to have bowel movements.  She has had no other  surgeries.  She has no history of urinary tract infections and has had no hematuria.    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    Screen annually for signs and symptoms of Adult T cell leukemia-lymphoma (BRANDEN) or MENL-F-npoctcwpck myelopathy (HAM), also known as tropical spastic paraparesis (TSP): body aches, fevers, night sweats, loss of appetite or weight      Hyperlipidemia      Hypertension      Other abnormal glucose      Other genital herpes     On suppressive therapy       Past Surgical History:  Past Surgical History:   Procedure Laterality Date     C APPENDECTOMY  1997     C NONSPECIFIC PROCEDURE  1986    TMJ     C TMJ RECONSTRUCTION       EXCISE MASS LOWER EXTREMITY  3/28/2012    Procedure:EXCISE MASS LOWER EXTREMITY; Left Ankle Mass Excision ; Surgeon:KATTY HENSLEY; Location: OR     SLING BLADDER SUSPENSION WITH FASCIA ROSALEE         Allergies:     Allergies   Allergen Reactions     Sulfa Drugs Nausea and Vomiting and Hives       Medications:    Current Outpatient Prescriptions on File Prior to Visit:  buPROPion (WELLBUTRIN XL) 150 MG 24 hr tablet TAKE ONE TABLET BY MOUTH EVERY MORNING   venlafaxine (EFFEXOR-XR) 150 MG 24 hr capsule Take 1 capsule (150 mg) by mouth daily    valACYclovir (VALTREX) 500 MG tablet TAKE ONE TABLET BY MOUTH EVERY DAY   Calcium Carbonate-Vitamin D (CALCIUM + D PO) Take 1 tablet by mouth daily.   loratadine (CLARITIN) 10 MG tablet Take 1 tablet by mouth daily.   aspirin 81 MG tablet Take  by mouth daily. (FMG)   acetaminophen (TYLENOL) 325 MG tablet Take 2 tablets by mouth every 4 hours as needed for other (mild pain).   MULTIVITAMIN TABS   OR 1 TABLET DAILY     No current facility-administered medications on file prior to visit.     Social History:  Social History     Social History     Marital status: Single     Spouse name: N/A     Number of children: N/A     Years of education: N/A     Occupational History     Not on file.     Social History Main Topics     Smoking status: Former Smoker     Quit date: 1/1/1980     Smokeless tobacco: Not on file     Alcohol use 0.0 oz/week     0 Standard drinks or equivalent per week      Comment: 2 per week avg     Drug use: No     Sexual activity: Yes     Partners: Female     Other Topics Concern      Service No     Blood Transfusions Yes     1984     Caffeine Concern No     Sleep Concern No     Stress Concern No     Weight Concern Yes     trying to loose     Exercise Yes     3 x wk     Seat Belt Yes     Self-Exams No     Parent/Sibling W/ Cabg, Mi Or Angioplasty Before 65f 55m? No     Social History Narrative    Caffeine intake/servings daily - 4    Calcium intake/servings daily - 2    Exercise 2 times weekly - describe walking, yoga, wii fit    Sunscreen used - Yes    Seatbelts used - Yes    Guns stored in the home - No    Self Breast Exam - No    Pap test up to date -  Yes    Eye exam up to date -  No    Dental exam up to date -  Yes    DEXA scan up to date -  No    Flex Sig/Colonoscopy up to date -  No    Mammography up to date -  No    Immunizations reviewed and up to date - Yes and TD today    Abuse: Current or Past (Physical, Sexual or Emotional) - No    Do you feel safe in your environment - Yes    Do  "you cope well with stress - Yes    Do you suffer from insomnia - No    Last updated by: Jacqui Hastings  2/25/2009    Jacqui Hastings M.A.                       Family History:  Family History   Problem Relation Age of Onset     HEART DISEASE Father      blockage     Lipids Father      Neurologic Disorder Father      migraines     GASTROINTESTINAL DISEASE Father      ulcers     DIABETES Father      CANCER Father      lung     CEREBROVASCULAR DISEASE Maternal Grandmother      DIABETES Maternal Grandmother      CEREBROVASCULAR DISEASE Maternal Grandfather      CEREBROVASCULAR DISEASE Paternal Grandmother      CEREBROVASCULAR DISEASE Paternal Grandfather      Thyroid Disease Sister      Thyroid Disease Sister      DIABETES Mother      DIABETES Sister      Schizophrenia Son        ROS:    Exam:  /79  Pulse 82  Ht 1.575 m (5' 2\")  Wt 79.5 kg (175 lb 4.8 oz)  LMP 03/15/2013 (Exact Date)  BMI 32.06 kg/m2  General: Alert, interactive, & in NAD; pleasant  Resp: Breathing is non-labored on room air   Cardiac: Extremities warm  Abdomen: Soft, non-tender, nondistended.  : Normal external genitalia.  Negative ESST.  She has stage I support on supine strain.  She has myofascial tenderness of the pelvic floor.  Extremities: No LE edema or obvious joint abnormalities  Skin: Warm and dry, no jaundice or rash    Assessment and Plan: Karlee Emery is a(n) 57 year old female with myofascial tenderness of the pelvic floor, pelvic floor dysfunction as well as mixed urinary incontinence s/p pubovaginal sling done in 2010.     - Return to clinic for cystourethroscopy to evaluate for any mesh erosion next available   - Discussed the benefits of physical therapy for her pelvic floor dysfunction; patient would like to proceed   - Return to clinic in four months to evaluate results following dedicated pelvic floor physical therapy   - Would consider urodynamics prior to consideration of any surgical intervention    This " patient's exam findings, labs, and imaging discussed with urology staff surgeon Dr. Herrera, who developed the treatment plan and who saw the patient with me.    Kuldeep Balderas PGY4    Addendum:    The patient was seen and evaluated with the resident.  The plan was formulated in conjunction with me and I agree with the above note with changes made as necessary.    Mixed incontinence after pubovaginal sling so need cystoscopy to evaluate for any mesh erosion.  Pelvic floor dysfunction, recommend pelvic floor therapy.  Discussed how this works and she is agreeable.  If desires more invasive intervention would need to do urodynamics prior.    35 minutes were spent with the patient today, >50% in counseling and coordination of care      Again, thank you for allowing me to participate in the care of your patient.      Sincerely,    Miryam Herrera MD

## 2018-01-12 NOTE — TELEPHONE ENCOUNTER
Prescription approved per [facility] [facility].    Signed Prescriptions:                        Disp   Refills    buPROPion (WELLBUTRIN XL) 150 MG 24 hr tab*90 tab*0        Sig: TAKE ONE TABLET BY MOUTH EVERY MORNING  Authorizing Provider: HUMA WELSH  Ordering User: MARK PRATT    valACYclovir (VALTREX) 500 MG tablet       90 tab*0        Sig: TAKE ONE TABLET BY MOUTH EVERY DAY  Authorizing Provider: HUMA WELSH  Ordering User: MARK PRATT      Closing encounter - no further actions needed at this time    Mark Pratt RN

## 2018-01-23 ENCOUNTER — THERAPY VISIT (OUTPATIENT)
Dept: PHYSICAL THERAPY | Facility: CLINIC | Age: 58
End: 2018-01-23
Payer: COMMERCIAL

## 2018-01-23 DIAGNOSIS — M79.18 MYALGIA OF PELVIC FLOOR: ICD-10-CM

## 2018-01-23 DIAGNOSIS — N39.46 MIXED INCONTINENCE: Primary | ICD-10-CM

## 2018-01-23 DIAGNOSIS — M99.05 SOMATIC DYSFUNCTION OF PELVIC REGION: ICD-10-CM

## 2018-01-23 PROCEDURE — 97530 THERAPEUTIC ACTIVITIES: CPT | Mod: GP | Performed by: PHYSICAL THERAPIST

## 2018-01-23 PROCEDURE — 97112 NEUROMUSCULAR REEDUCATION: CPT | Mod: GP | Performed by: PHYSICAL THERAPIST

## 2018-01-23 PROCEDURE — 97161 PT EVAL LOW COMPLEX 20 MIN: CPT | Mod: GP | Performed by: PHYSICAL THERAPIST

## 2018-01-23 NOTE — PROGRESS NOTES
[facility]  System  Physical Exam  General   ROS        Physical Therapy Initial Examination/Evaluation January 23, 2018   Karlee Emery is a 57 year old female referred to physical therapy by Dr. Herrera for treatment of Mixed incontinence, myalgia of pelvic floor, pelvic floor dysfunction with Precautions/Restrictions/MD instructions No bri. Core strenghthening   Therapist Assessment:   Clinical Impression: Pt presents to [facility] with primary complaint of urinary incontinence.  Per clinical examination, pt demonstrates weakness in core and proximal muscles.  Pt demonstrates good strength and neuromuscular control in pelvic floor.  Pt will benefit from skilled physical therapy for core stabilization program as well as education on proper body mechanics for elimination to eliminate strain with dx of prolapse.      Subjective: Pt's youngest child is 24 years old and she  had complications during delivery. Pt has rectocele & cystocele dx as well.  Pt had mesh placed in 2010. She feels this worked for awhile and then gradually worsened. Incontinence has been present since delivery of daughter, but recently worsening with both stress and urge incontinence.  Pt also reports there are times she does not feel like she empties her bladder. Other times, she does feel like she empties but when she gets up from the toilet she has urine running down her legs.   DOI/onset: 1/11/2018   Mechanism of injury: Childbirth    DOS NA   Related PMH: menopausal, numbness/tingling, R hip pain, broken tailbone  Previous treatment: PT   Imaging: cystoscopy scheduled for 2/15/2018   Chief Complaint: Incontinence   Pain: Sometimes does have pain on R side; was more when she was ovulating; unable to rate pain   Described as: dull, tender to touch Alleviated by: deep massage  Exacerbated by: a lot of walking  Progression of symptoms since initial onset: worsening (more the urge incontinence) Time of day when pain is  worse: if drinks diet coke (activity related)  Sleepin-1 times to urinate at night   Social history: engaged to female parner; 2 kids daughter is 24 and son is 28   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 Yes    Do you leak with cough,sneeze, jumping, running?Yes   Any other activities that cause leaking? Yes; getting out of bed, lifting  Do you have triggers that make you feel you can't wait to go to the bathroom? Yes   what are they: getting home, feels strong urge to void.  Type of pad and number used per day?  2; will use thicker pad during exercise classes   When you leak what is the amount? Small-Large; large during Donny    How long can you delay the need to urinate? Depends.   How many times do you get up to urinate at night? 0-1    Can you stop the flow of urine when on the toilet? Sometimes  Is the volume of urine passed usually: average. (8sec rule=  250ml with average bladder storing  400-600ml)    Do you strain to pass urine? No  Do you have a slow or hesitant urinary stream? No  Do you have difficulty initiating the urine stream? No  Is urination painful?  No    How many bladder infections have you had in last 12 months? 0    Fluid intake(one glass is 8oz or one cup) 6 glasses/day, 1.5 cups caffinated glasses/day  0 alcohol glasses/day (issues with alcohol problem in the past)    Bowel habits:  Frequency of bowel movements? 1 times a week  Consistancy of stool? Soft formed-hard  Do you ignore the urge to defecate? No  Do you strain to pass stool? Yes, sometimes; feels rectocele contributes to this    Pelvic Pain:  Do you have any  pelvic pain with intercourse, exams, use of tampons? No  Is initial penetration during intercourse painful? No  Is deeper penetration painful? No  Do you use lubricant?   No       Given birth? Yes Any complications? 2nd child 10#; fractured pubic bone, damaged sacroiliac ligaments, cystocele, rectocele  # of vaginal delieveries?2   # of C-sections? 0  # of episiotomies? 0  Are you sexually active?Yes  Have you ever been worried for your physical safety? No  Any abdominal or pelvic surgeries? Yes; mesh sling procedure, apendicitis  Are you having any regular exercise? Yes  Have you practiced the PF(kegel) exercises for 4 or more weeks? No, not recently  Changed diet lately? No    OBSERVATION  Lumbar Posture: Slight anterior pelvic tilt  Introitus: loose      ROM  Single leg standing: Able to hold 30s bilat  Lumbar ROM: WNL in all directions    MUSCLE PERFORMANCE    Baseline PF tone:WNL; started hyper but hypo following some time given for relaxation   PF Tone with cough: bulge; more cystocele than rectocele  Valsalva: bulge  PF Response quality: normal  PF Power: Center: 4   Endurance: Maximum contraction in seconds: >10 seconds  # of endurance contractions before fatigue: NT  Quick contraction repetitions prior to fatigue: >10.  Specificity/accessory muscles: abdominals, glutes  Prolapse: Cyctocele/Rectocele/Uterine grade: 2-3    MMT 1/23/2018 Left Right   Hip Flexion  4/5 4/5   Hip Abduction 4/5 3+/5   Hip Extension  4+/5 4+/5         PALPATION: 1.5 finger-width diastasis above umbilicus; 1.0 finger-width below  Some reproduction of back pain on the R with levator ani palpation on the R side    Therapeutic Activity (15 min): Today's session consisted of education regarding pelvic floor muscle anatomy, normal bladder function, urge suppression techniques and/or relaxation techniques as indicated, and instruction in how to complete a bladder diary for assessment next visit.  Pt was instructed in the pathoanatomy of  the pelvic floor utilizing pelvic model.  We discussed what pelvic floor physical therapy is, components of exam, and typical patient progression.  Pt was told that she was in control of exam progression, and if at any time was uncomfortable and wished to discontinue we could. Discussed toileting techniques to limit strain with use of squatty potty. Also, initiated education on breathing mechanics.     NMR (25 mins)  Biofeedback unit used to provide visualization of PF activation in multiple body positions including supine, sidelying, and sitting. Education provided on the importance of pelvic floor tone and how decreased or increased tone can affect symptoms of pain or incontinence.. Initiated education on the importance of core strength. Discussed activation of TA and performed contraction in both supine and sitting working up to 20-30s holds. Discussed contraction of PF muscles prior to stressful activities. Also discussed knack while in the car upon return home to assess symptoms of urgency. Pt provided with HEP.        Assessment/Plan:    Patient is a 57 year old female with pelvic complaints.    Patient has the following significant findings with corresponding treatment plan.                Diagnosis 1:  Mixed Incontinence, Myalgia of Pelvic floor, Pelvic Floor Dysfunction   Pain -  manual therapy, education and home program  Decreased strength - therapeutic exercise, therapeutic activities and home program  Impaired muscle performance - biofeedback, neuro re-education and home program  Decreased function - therapeutic activities and home program    Therapy Evaluation Codes:   1) History comprised of:   Personal factors that impact the plan of care:      Age, Past/current experiences and Time since onset of symptoms.    Comorbidity factors that impact the plan of care are:      Depression, Menopausal, Numbness/tingling and Overweight.     Medications impacting care: Anti-depressant and Valtrex for  herpes.  2) Examination of Body Systems comprised of:   Body structures and functions that impact the plan of care:      Lumbar spine and Pelvis.   Activity limitations that impact the plan of care are:      Jumping, Lifting, Sports, Walking, Frequency, Stress incontinence, Urgency and Urge incontinence.  3) Clinical presentation characteristics are:   Stable/Uncomplicated.  4) Decision-Making    Low complexity using standardized patient assessment instrument and/or measureable assessment of functional outcome.  Cumulative Therapy Evaluation is: Low complexity.    Previous and current functional limitations:  (See Goal Flow Sheet for this information)    Short term and Long term goals: (See Goal Flow Sheet for this information)     Communication ability:  Patient appears to be able to clearly communicate and understand verbal and written communication and follow directions correctly.  Treatment Explanation - The following has been discussed with the patient:   RX ordered/plan of care  Anticipated outcomes  Possible risks and side effects  This patient would benefit from PT intervention to resume normal activities.   Rehab potential is good.    Frequency:  1 X week, once daily  Duration:  for 8 weeks  Discharge Plan:  Achieve all LTG.  Independent in home treatment program.  Reach maximal therapeutic benefit.    Please refer to the daily flowsheet for treatment today, total treatment time and time spent performing 1:1 timed codes.

## 2018-01-23 NOTE — MR AVS SNAPSHOT
After Visit Summary   1/23/2018    Karlee Emery    MRN: [id number]           Patient Information     Date Of Birth          1960        Visit Information        Provider Department      1/23/2018 8:50 AM Nisa Winston, PT [facility] [place]        Today's Diagnoses     Mixed incontinence    -  1    Somatic dysfunction of pelvic region        Myalgia of pelvic floor           Follow-ups after your visit        Your next 10 appointments already scheduled     Jan 30, 2018  8:50 AM CST   PABLO For Women Only with Nisa Winston PT   [facility])    [address] [place] [id number] [phone]            Feb 06, 2018  8:50 AM CST   PABLO For Women Only with Nisa Winston PT   [facility] ( [facility])    [address] [place] [id number] [phone]            Feb 15, 2018  7:30 AM CST   (Arrive by 7:15 AM)   Cystoscopy with Miryam Herrera MD   [facility] and Inst for Prostate and Urologic Cancers ([facility])    [address]  4th Floor  [facility] [id number] [phone]              Who to contact     If you have questions or need follow up information about today's clinic visit or your schedule please contact [facility] directly at [phone].  Normal or non-critical lab and imaging results will be communicated to you by MyChart, letter or phone within 4 business days after the clinic has received the results. If you do not hear from us within 7 days, please contact the clinic through MyChart or phone. If you have a critical or abnormal lab result, we will notify you by phone as soon as possible.  Submit refill requests through [employer] or call your pharmacy and they will forward the refill request to us. Please  allow 3 business days for your refill to be completed.          Additional Information About Your Visit        MyChart Information     "[employer]hart gives you secure access to your electronic health record. If you see a primary care provider, you can also send messages to your care team and make appointments. If you have questions, please call your primary care clinic.  If you do not have a primary care provider, please call [phone] and they will assist you.        Care EveryWhere ID     This is your Care EveryWhere ID. This could be used by other organizations to access your [place] medical records  [id number]        Your Vitals Were     Last Period                   03/15/2013 (Exact Date)            Blood Pressure from Last 3 Encounters:   01/11/18 129/79   02/06/17 126/88   08/26/16 110/60    Weight from Last 3 Encounters:   01/11/18 79.5 kg (175 lb 4.8 oz)   02/06/17 75.8 kg (167 lb)   08/26/16 72.6 kg (160 lb)              We Performed the Following     HC PT EVAL, LOW COMPLEXITY     PABLO INITIAL EVAL REPORT     NEUROMUSCULAR RE-EDUCATION     THERAPEUTIC ACTIVITIES        Primary Care Provider Office Phone # Fax #    Veronica Solis [phone]       [address]  [facility] [address]        Equal Access to Services     NICOLA BILLY : Hadii jose roberto ku hadasho Soivanaali, waaxda luqadaha, qaybta kaalmada adeegyada, jose zacarias. So [facility] [phone].    ATENCIÓN: Si habla español, tiene a lopez disposición servicios gratuitos de asistencia lingüística. Llame al [phone].    We comply with applicable federal civil rights laws and [place] laws. We do not discriminate on the basis of race, color, national origin, age, disability, sex, sexual orientation, or gender identity.            Thank you!     Thank you for choosing [facility] [place]  for your care. Our goal is always to provide you with excellent care. Hearing back from our  patients is one way we can continue to improve our services. Please take a few minutes to complete the written survey that you may receive in the mail after your visit with us. Thank you!             Your Updated Medication List - Protect others around you: Learn how to safely use, store and throw away your medicines at www.disposemymeds.org.          This list is accurate as of: 1/23/18 10:58 AM.  Always use your most recent med list.                   Brand Name Dispense Instructions for use Diagnosis    acetaminophen 325 MG tablet    TYLENOL    100 tablet    Take 2 tablets by mouth every 4 hours as needed for other (mild pain).    Hyperlipidemia LDL goal <100, Type 2 diabetes, HbA1C goal < 8% (H), Moderate major depression (H), Hypertension goal BP (blood pressure) < 140/90, Other genital herpes       aspirin 81 MG tablet      Take  by mouth daily. (FMG)    Type 2 diabetes, HbA1C goal < 8% (H)       buPROPion 150 MG 24 hr tablet    WELLBUTRIN XL    90 tablet    TAKE ONE TABLET BY MOUTH EVERY MORNING    Recurrent major depressive disorder, in partial remission (H)       CALCIUM + D PO      Take 1 tablet by mouth daily.        loratadine 10 MG tablet    CLARITIN    90 tablet    Take 1 tablet by mouth daily.    Seasonal allergic rhinitis       MULTIVITAMIN TABS   OR      1 TABLET DAILY        valACYclovir 500 MG tablet    VALTREX    90 tablet    TAKE ONE TABLET BY MOUTH EVERY DAY    Herpes simplex infection of genitourinary system       venlafaxine 150 MG 24 hr capsule    EFFEXOR-XR    90 capsule    Take 1 capsule (150 mg) by mouth daily    Anxiety, Recurrent major depressive disorder, in partial remission (H)

## 2018-01-30 ENCOUNTER — PRE VISIT (OUTPATIENT)
Dept: UROLOGY | Facility: CLINIC | Age: 58
End: 2018-01-30

## 2018-01-30 ENCOUNTER — THERAPY VISIT (OUTPATIENT)
Dept: PHYSICAL THERAPY | Facility: CLINIC | Age: 58
End: 2018-01-30
Payer: COMMERCIAL

## 2018-01-30 DIAGNOSIS — M79.18 MYALGIA OF PELVIC FLOOR: ICD-10-CM

## 2018-01-30 DIAGNOSIS — N39.46 MIXED INCONTINENCE: Primary | ICD-10-CM

## 2018-01-30 PROCEDURE — 97112 NEUROMUSCULAR REEDUCATION: CPT | Mod: GP | Performed by: PHYSICAL THERAPIST

## 2018-01-30 PROCEDURE — 97110 THERAPEUTIC EXERCISES: CPT | Mod: GP | Performed by: PHYSICAL THERAPIST

## 2018-01-30 NOTE — TELEPHONE ENCOUNTER
Pt coming in for a cystoscopy to evaluate the bladder for mesh erosion. Chart reviewed. All records available. No need for a call.

## 2018-02-06 ENCOUNTER — THERAPY VISIT (OUTPATIENT)
Dept: PHYSICAL THERAPY | Facility: CLINIC | Age: 58
End: 2018-02-06
Payer: COMMERCIAL

## 2018-02-06 DIAGNOSIS — M99.05 SOMATIC DYSFUNCTION OF PELVIC REGION: ICD-10-CM

## 2018-02-06 DIAGNOSIS — N39.46 MIXED INCONTINENCE: Primary | ICD-10-CM

## 2018-02-06 DIAGNOSIS — M79.18 MYALGIA OF PELVIC FLOOR: ICD-10-CM

## 2018-02-06 PROCEDURE — 97110 THERAPEUTIC EXERCISES: CPT | Mod: GP | Performed by: PHYSICAL THERAPIST

## 2018-02-06 PROCEDURE — 97140 MANUAL THERAPY 1/> REGIONS: CPT | Mod: GP | Performed by: PHYSICAL THERAPIST

## 2018-02-13 ENCOUNTER — THERAPY VISIT (OUTPATIENT)
Dept: PHYSICAL THERAPY | Facility: CLINIC | Age: 58
End: 2018-02-13
Payer: COMMERCIAL

## 2018-02-13 DIAGNOSIS — N39.46 MIXED INCONTINENCE: ICD-10-CM

## 2018-02-13 DIAGNOSIS — M79.18 MYALGIA OF PELVIC FLOOR: ICD-10-CM

## 2018-02-13 DIAGNOSIS — M99.05 SOMATIC DYSFUNCTION OF PELVIC REGION: Primary | ICD-10-CM

## 2018-02-13 PROCEDURE — 97140 MANUAL THERAPY 1/> REGIONS: CPT | Mod: GP | Performed by: PHYSICAL THERAPIST

## 2018-02-13 PROCEDURE — 97112 NEUROMUSCULAR REEDUCATION: CPT | Mod: GP | Performed by: PHYSICAL THERAPIST

## 2018-02-13 PROCEDURE — 97530 THERAPEUTIC ACTIVITIES: CPT | Mod: GP | Performed by: PHYSICAL THERAPIST

## 2018-02-13 PROCEDURE — 97035 APP MDLTY 1+ULTRASOUND EA 15: CPT | Mod: GP | Performed by: PHYSICAL THERAPIST

## 2018-02-16 ENCOUNTER — OFFICE VISIT (OUTPATIENT)
Dept: UROLOGY | Facility: CLINIC | Age: 58
End: 2018-02-16
Payer: COMMERCIAL

## 2018-02-16 VITALS
SYSTOLIC BLOOD PRESSURE: 130 MMHG | HEIGHT: 62 IN | BODY MASS INDEX: 31.28 KG/M2 | DIASTOLIC BLOOD PRESSURE: 85 MMHG | WEIGHT: 170 LBS | HEART RATE: 65 BPM

## 2018-02-16 DIAGNOSIS — N32.9 LESION OF BLADDER: ICD-10-CM

## 2018-02-16 DIAGNOSIS — Z96.0 HISTORY OF PUBOVAGINAL SLING: Primary | ICD-10-CM

## 2018-02-16 DIAGNOSIS — N39.46 MIXED INCONTINENCE: ICD-10-CM

## 2018-02-16 DIAGNOSIS — M62.89 PELVIC FLOOR DYSFUNCTION: ICD-10-CM

## 2018-02-16 DIAGNOSIS — M79.18 MYALGIA OF PELVIC FLOOR: ICD-10-CM

## 2018-02-16 LAB
APPEARANCE UR: CLEAR
BILIRUB UR QL: NORMAL
COLOR UR: YELLOW
GLUCOSE URINE: NORMAL MG/DL
HGB UR QL: NORMAL
KETONES UR QL: NORMAL MG/DL
LEUKOCYTE ESTERASE URINE: NORMAL
NITRITE UR QL STRIP: NORMAL
PH UR STRIP: 5.5 PH (ref 5–7)
PROTEIN ALBUMIN URINE: NORMAL MG/DL
SOURCE: NORMAL
SP GR UR STRIP: 1.02 (ref 1–1.03)
UROBILINOGEN UR QL STRIP: 0.2 EU/DL (ref 0.2–1)

## 2018-02-16 ASSESSMENT — PAIN SCALES - GENERAL
PAINLEVEL: NO PAIN (0)
PAINLEVEL: NO PAIN (0)

## 2018-02-16 NOTE — NURSING NOTE
"Chief Complaint   Patient presents with     RECHECK     Cystoscopy to check for mesh erosion       Initial Ht 1.575 m (5' 2\")  Wt 77.1 kg (170 lb)  LMP 03/15/2013 (Exact Date)  BMI 31.09 kg/m2 Estimated body mass index is 31.09 kg/(m^2) as calculated from the following:    Height as of this encounter: 1.575 m (5' 2\").    Weight as of this encounter: 77.1 kg (170 lb).  Medication Reconciliation: complete     PRISCILA Larios    "

## 2018-02-16 NOTE — PROGRESS NOTES
"February 16, 2018    Return visit    Patient returns today for follow up for cystoscopy to assess for mesh erosion as she has mixed incontinence in setting of prior pubovaginal sling. She denies any changes in her health since last visit aside from starting pelvic floor therapy which she feels is helping    /85  Pulse 65  Ht 1.575 m (5' 2\")  Wt 77.1 kg (170 lb)  LMP 03/15/2013 (Exact Date)  BMI 31.09 kg/m2  She is comfortable, in no distress, non-labored breathing.  Abdomen is soft, non-tender, non-distended.  Normal external female genitalia.  Negative CST.      Cystoscopy Note: After informed consent was obtained patient was prepped and draped in the standard fashion.  The flexible cystoscope was inserted into a normal appearing urethral meatus.  The urothelium was carefully examined and there were was some evidence of small areas of what appeared to be squamous metaplasia in the trigone near her right UO.  There were no obvious tumors, masses, stones, foreign bodies, or other urothelial abnormalities noted.  Bilateral ureteral orifices were noted in the normal orthotopic position and both effluxed clear urine.  The cystoscope was retroflexed and the bladder neck was unremarkable.  The urethra was carefully examined upon removing the cystoscope and was unermarkable.  Patient tolerated the procedure without complications noted.      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

## 2018-02-16 NOTE — LETTER
"2/16/2018       RE: Karlee Emery  [address]  [facility] [address]     Dear Colleague,    Thank you for referring your patient, Karlee Emery, to the [facility] UROLOGY AND INST FOR PROSTATE AND UROLOGIC CANCERS at [facility]. Please see a copy of my visit note below.    February 16, 2018    Return visit    Patient returns today for follow up for cystoscopy to assess for mesh erosion as she has mixed incontinence in setting of prior pubovaginal sling. She denies any changes in her health since last visit aside from starting pelvic floor therapy which she feels is helping    /85  Pulse 65  Ht 1.575 m (5' 2\")  Wt 77.1 kg (170 lb)  LMP 03/15/2013 (Exact Date)  BMI 31.09 kg/m2  She is comfortable, in no distress, non-labored breathing.  Abdomen is soft, non-tender, non-distended.  Normal external female genitalia.  Negative CST.      Cystoscopy Note: After informed consent was obtained patient was prepped and draped in the standard fashion.  The flexible cystoscope was inserted into a normal appearing urethral meatus.  The urothelium was carefully examined and there were was some evidence of small areas of what appeared to be squamous metaplasia in the trigone near her right UO.  There were no obvious tumors, masses, stones, foreign bodies, or other urothelial abnormalities noted.  Bilateral ureteral orifices were noted in the normal orthotopic position and both effluxed clear urine.  The cystoscope was retroflexed and the bladder neck was unremarkable.  The urethra was carefully examined upon removing the cystoscope and was unermarkable.  Patient tolerated the procedure without complications noted.      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

## 2018-02-16 NOTE — MR AVS SNAPSHOT
"              After Visit Summary   2/16/2018    Karlee Emery    MRN: [id number]           Patient Information     Date Of Birth          1960        Visit Information        Provider Department      2/16/2018 7:30 AM Miryam Herrera MD [facility] for Prostate and Urologic Cancers        Today's Diagnoses     History of pubovaginal sling    -  1    Mixed incontinence        Lesion of bladder          Care Instructions    Continue pelvic floor therapy    We will let you know the results of your urine test    Please return to see us in 3-4 months, possible cystoscopy, sooner if needed    It was a pleasure meeting with you today.  Thank you for allowing me and my team the privilege of caring for you today.  YOU are the reason we are here, and I truly hope we provided you with the excellent service you deserve.  Please let us know if there is anything else we can do for you so that we can be sure you are leaving completely satisfied with your care experience.      AFTER YOUR CYSTOSCOPY        You have just completed a cystoscopy, or \"cysto\", which allowed your physician to learn more about your bladder (or to remove a stent placed after surgery). We suggest that you continue to avoid caffeine, fruit juice, and alcohol for the next 24 hours, however, you are encouraged to return to your normal activities.         A few things that are considered normal after your cystoscopy:     * Small amount of bleeding (or spotting) that clears within the next 24 hours     * Slight burning sensation with urination     * Sensation to of needing to avoid more frequently     * The feeling of \"air\" in your urine     * Mild discomfort that is relieved with Tylenol        Please contact our office promptly if you:     * Develop a fever above 101 degrees     * Are unable to urinate     * Develop bright red blood that does not stop     * Severe pain or swelling         Please contact our office with any " concerns or questions @ [phone]          Follow-ups after your visit        Your next 10 appointments already scheduled     Feb 20, 2018  8:50 AM CST   PABLO For Women Only with Nisa Winston PT   [facility] ([facility])    [address]  [address]  [facility] [id number] [phone]            Mar 06, 2018  8:50 AM CST   PABLO For Women Only with Nisa Winston PT   [facility] ([facility])    [address] [place] [id number] [phone]            May 17, 2018  8:45 AM CDT   (Arrive by 8:30 AM)   Cystoscopy with Miryam Herrera MD   [facility] for Prostate and Urologic Cancers ([facility])    [address]  [address] [facility] [address] [phone]              Who to contact     Please call your clinic at [phone] to:    Ask questions about your health    Make or cancel appointments    Discuss your medicines    Learn about your test results    Speak to your doctor            Additional Information About Your Visit        [employer] Information     [employer] gives you secure access to your electronic health record. If you see a primary care provider, you can also send messages to your care team and make appointments. If you have questions, please call your primary care clinic.  If you do not have a primary care provider, please call [phone] and they will assist you.      [employer] is an electronic gateway that provides easy, online access to your medical records. With [employer], you can request a clinic appointment, read your test results, renew a prescription or communicate with your care team.     To access your existing account, please contact your [facility] or call [phone] for assistance.        Care EveryWhere ID     This is your Care EveryWhere ID. This could be used by other  "organizations to access your [place] medical records  [id number]        Your Vitals Were     Pulse Height Last Period BMI (Body Mass Index)          65 1.575 m (5' 2\") 03/15/2013 (Exact Date) 31.09 kg/m2         Blood Pressure from Last 3 Encounters:   02/16/18 130/85   01/11/18 129/79   02/06/17 126/88    Weight from Last 3 Encounters:   02/16/18 77.1 kg (170 lb)   01/11/18 79.5 kg (175 lb 4.8 oz)   02/06/17 75.8 kg (167 lb)              We Performed the Following     CYSTOURETHROSCOPY     Cytology non gyn     Urinalysis chemical screen POCT        Primary Care Provider Office Phone # Fax #    Veronica Solis [phone] [address]  [facility] [address]        Equal Access to Services     [facility]: Hadii jose roberto kiran hadasho Sokalin, waaxda luqadaha, qaybta kaalmada adeegyada, jose craig . So [facility] [phone].    ATENCIÓN: Si habla español, tiene a lopez disposición servicios gratuitos de asistencia lingüística. Llame al [phone].    We comply with applicable federal civil rights laws and [place] laws. We do not discriminate on the basis of race, color, national origin, age, disability, sex, sexual orientation, or gender identity.            Thank you!     Thank you for choosing [facility] UROLOGY AND [facility] FOR PROSTATE AND UROLOGIC CANCERS  for your care. Our goal is always to provide you with excellent care. Hearing back from our patients is one way we can continue to improve our services. Please take a few minutes to complete the written survey that you may receive in the mail after your visit with us. Thank you!             Your Updated Medication List - Protect others around you: Learn how to safely use, store and throw away your medicines at www.disposemymeds.org.          This list is accurate as of 2/16/18  8:14 AM.  Always use your most recent med list.                   Brand Name Dispense Instructions for use Diagnosis    acetaminophen 325 " MG tablet    TYLENOL    100 tablet    Take 2 tablets by mouth every 4 hours as needed for other (mild pain).    Hyperlipidemia LDL goal <100, Type 2 diabetes, HbA1C goal < 8% (H), Moderate major depression (H), Hypertension goal BP (blood pressure) < 140/90, Other genital herpes       aspirin 81 MG tablet      Take  by mouth daily. (FMG)    Type 2 diabetes, HbA1C goal < 8% (H)       buPROPion 150 MG 24 hr tablet    WELLBUTRIN XL    90 tablet    TAKE ONE TABLET BY MOUTH EVERY MORNING    Recurrent major depressive disorder, in partial remission (H)       CALCIUM + D PO      Take 1 tablet by mouth daily.        loratadine 10 MG tablet    CLARITIN    90 tablet    Take 1 tablet by mouth daily.    Seasonal allergic rhinitis       MULTIVITAMIN TABS   OR      1 TABLET DAILY        valACYclovir 500 MG tablet    VALTREX    90 tablet    TAKE ONE TABLET BY MOUTH EVERY DAY    Herpes simplex infection of genitourinary system       venlafaxine 150 MG 24 hr capsule    EFFEXOR-XR    90 capsule    Take 1 capsule (150 mg) by mouth daily    Anxiety, Recurrent major depressive disorder, in partial remission (H)

## 2018-02-16 NOTE — NURSING NOTE
Invasive Procedure Safety Checklist:    Procedure: Cystoscopy    Action: Complete sections and checkboxes as appropriate.    Pre-procedure:  1. Patient ID Verified with 2 identifiers ([place] and  or MRN) : YES    2. Procedure and site verified with patient/designee (when able) : YES    3. Accurate consent documentation in medical record : YES    4. H&P (or appropriate assessment) documented in medical record : NO  H&P must be up to 30 days prior to procedure an updated within 24 hours of                 Procedure as applicable.     5. Relevant diagnostic and radiology test results appropriately labeled and displayed as applicable : NO    6. Blood products, implants, devices, and/or special equipment available for the procedure as applicable : NO    7. Procedure site(s) marked with provider initials [Exclusions: ] : NO    8. Marking not required. Reason : Yes  Procedure does not require site marking    Time Out:     Time-Out performed immediately prior to starting procedure, including verbal and active participation of all team members addressing: YES    1. Correct patient identity.  2. Confirmed that the correct side and site are marked.  3. An accurate procedure to be done.  4. Agreement on the procedure to be done.  5. Correct patient position.  6. Relevant images and results are properly labeled and appropriately displayed.  7. The need to administer antibiotics or fluids for irrigation purposes during the procedure as applicable.  8. Safety precautions based on patient history or medication use.    During Procedure: Verification of correct person, site, and procedure occurs any time the responsibility for care of the patient is transferred to another member of the care team.    PRISCILA Larios

## 2018-02-16 NOTE — PATIENT INSTRUCTIONS
"Continue pelvic floor therapy    We will let you know the results of your urine test    Please return to see us in 3-4 months, possible cystoscopy, sooner if needed    It was a pleasure meeting with you today.  Thank you for allowing me and my team the privilege of caring for you today.  YOU are the reason we are here, and I truly hope we provided you with the excellent service you deserve.  Please let us know if there is anything else we can do for you so that we can be sure you are leaving completely satisfied with your care experience.      AFTER YOUR CYSTOSCOPY        You have just completed a cystoscopy, or \"cysto\", which allowed your physician to learn more about your bladder (or to remove a stent placed after surgery). We suggest that you continue to avoid caffeine, fruit juice, and alcohol for the next 24 hours, however, you are encouraged to return to your normal activities.         A few things that are considered normal after your cystoscopy:     * Small amount of bleeding (or spotting) that clears within the next 24 hours     * Slight burning sensation with urination     * Sensation to of needing to avoid more frequently     * The feeling of \"air\" in your urine     * Mild discomfort that is relieved with Tylenol        Please contact our office promptly if you:     * Develop a fever above 101 degrees     * Are unable to urinate     * Develop bright red blood that does not stop     * Severe pain or swelling         Please contact our office with any concerns or questions @ [phone]  "

## 2018-02-19 LAB — COPATH REPORT: NORMAL

## 2018-03-06 ENCOUNTER — THERAPY VISIT (OUTPATIENT)
Dept: PHYSICAL THERAPY | Facility: CLINIC | Age: 58
End: 2018-03-06
Payer: COMMERCIAL

## 2018-03-06 DIAGNOSIS — N39.46 MIXED INCONTINENCE: ICD-10-CM

## 2018-03-06 DIAGNOSIS — M79.18 MYALGIA OF PELVIC FLOOR: ICD-10-CM

## 2018-03-06 DIAGNOSIS — M99.05 SOMATIC DYSFUNCTION OF PELVIC REGION: ICD-10-CM

## 2018-03-06 PROCEDURE — 97112 NEUROMUSCULAR REEDUCATION: CPT | Mod: GP | Performed by: PHYSICAL THERAPIST

## 2018-03-06 NOTE — PROGRESS NOTES
hospitals  System  Physical Exam  General   ROS    PROGRESS  REPORT    Progress reporting period is from 1/23/2018 to 3/6/2018.       SUBJECTIVE  Subjective: Pt reports progress has plateaued.Exercising is still the biggest issue with incontinence. She feels that urgency with getting to the bathroom has improved. US did help hip pain for a couple of days but pt is planning to return to [place] to have this looked at.     Current pain level is: 0/10    Initial Pain level: 0/10.   Changes in function:  Yes (See Goal flowsheet attached for changes in current functional level)  Adverse reaction to treatment or activity: None    OBJECTIVE    Objective: Improved control of PF noted this session. Initiated PF contractions and pt trialled in standing/squatting positions. Dimitriack during periods of urgency     ASSESSMENT/PLAN  Updated problem list and treatment plan: Diagnosis 1:  Mixed Incontinence  Decreased strength - therapeutic exercise, therapeutic activities and home program  Impaired muscle performance - biofeedback, neuro re-education and home program  Decreased function - therapeutic activities and home program  STG/LTGs have been met or progress has been made towards goals:  Yes (See Goal flow sheet completed today.)  Assessment of Progress: The patient's condition is improving.  Self Management Plans:  Patient has been instructed in a home treatment program.  I have re-evaluated this patient and find that the nature, scope, duration and intensity of the therapy is appropriate for the medical condition of the patient.  Karlee continues to require the following intervention to meet STG and LTG's:  PT    Recommendations:  This patient would benefit from continued therapy.     Frequency:  1 X/2 weeks, once daily  Duration:  for 6 weeks        Please refer to the daily flowsheet for treatment today, total treatment time and time spent performing 1:1 timed codes.

## 2018-03-06 NOTE — MR AVS SNAPSHOT
After Visit Summary   3/6/2018    Karlee Emery    MRN: [id number]           Patient Information     Date Of Birth          1960        Visit Information        Provider Department      3/6/2018 8:50 AM Nisa Winston PT [facility]        Today's Diagnoses     Myalgia of pelvic floor        Mixed incontinence        Somatic dysfunction of pelvic region           Follow-ups after your visit        Your next 10 appointments already scheduled     Mar 20, 2018 11:30 AM CDT   PABLO For Women Only with Nisa Winston PT   [facility] ([facility])    [address] [place] [id number] [phone]            May 17, 2018  8:45 AM CDT   (Arrive by 8:30 AM)   Cystoscopy with Miryam Herrera MD   [facility] ([facility] [place])    [address]  [address] [facility] [address] [phone]              Who to contact     If you have questions or need follow up information about today's clinic visit or your schedule please contact [facility] directly at [phone].  Normal or non-critical lab and imaging results will be communicated to you by MyChart, letter or phone within 4 business days after the clinic has received the results. If you do not hear from us within 7 days, please contact the clinic through [employer] or phone. If you have a critical or abnormal lab result, we will notify you by phone as soon as possible.  Submit refill requests through [employer] or call your pharmacy and they will forward the refill request to us. Please allow 3 business days for your refill to be completed.          Additional Information About Your Visit        [employer] Information     [employer] gives you secure access to your electronic health record. If you see a primary care provider, you can also send messages  to your care team and make appointments. If you have questions, please call your primary care clinic.  If you do not have a primary care provider, please call [phone] and they will assist you.        Care EveryWhere ID     This is your Care EveryWhere ID. This could be used by other organizations to access your [facility] medical records  [id number]        Your Vitals Were     Last Period                   03/15/2013 (Exact Date)            Blood Pressure from Last 3 Encounters:   02/16/18 130/85   01/11/18 129/79   02/06/17 126/88    Weight from Last 3 Encounters:   02/16/18 77.1 kg (170 lb)   01/11/18 79.5 kg (175 lb 4.8 oz)   02/06/17 75.8 kg (167 lb)              We Performed the Following     PABLO PROGRESS NOTES REPORT     NEUROMUSCULAR RE-EDUCATION        Primary Care Provider Office Phone # Fax #    Veronica Solis [phone]       [address]  [facility] [address]        Equal Access to Services     NICOLA BILLY : Hadii aad ku hadasho Soomaali, waaxda luqadaha, qaybta kaalmada adeegyada, waxay idiin hayjorgen roxana craig . [facility] [phone].    ATENCIÓN: Si habla español, tiene a lopez disposición servicios gratuitos de asistencia lingüística. [facility] [phone].    We comply with applicable federal civil rights laws and [place] laws. We do not discriminate on the basis of race, color, national origin, age, disability, sex, sexual orientation, or gender identity.            Thank you!     Thank you for choosing [facility] [place]  for your care. Our goal is always to provide you with excellent care. Hearing back from our patients is one way we can continue to improve our services. Please take a few minutes to complete the written survey that you may receive in the mail after your visit with us. Thank you!             Your Updated Medication List - Protect others around you: Learn how to safely use, store and throw away your medicines at  www.disposemymeds.org.          This list is accurate as of 3/6/18  9:50 AM.  Always use your most recent med list.                   Brand Name Dispense Instructions for use Diagnosis    acetaminophen 325 MG tablet    TYLENOL    100 tablet    Take 2 tablets by mouth every 4 hours as needed for other (mild pain).    Hyperlipidemia LDL goal <100, Type 2 diabetes, HbA1C goal < 8% (H), Moderate major depression (H), Hypertension goal BP (blood pressure) < 140/90, Other genital herpes       aspirin 81 MG tablet      Take  by mouth daily. (FMG)    Type 2 diabetes, HbA1C goal < 8% (H)       buPROPion 150 MG 24 hr tablet    WELLBUTRIN XL    90 tablet    TAKE ONE TABLET BY MOUTH EVERY MORNING    Recurrent major depressive disorder, in partial remission (H)       CALCIUM + D PO      Take 1 tablet by mouth daily.        loratadine 10 MG tablet    CLARITIN    90 tablet    Take 1 tablet by mouth daily.    Seasonal allergic rhinitis       MULTIVITAMIN TABS   OR      1 TABLET DAILY        valACYclovir 500 MG tablet    VALTREX    90 tablet    TAKE ONE TABLET BY MOUTH EVERY DAY    Herpes simplex infection of genitourinary system       venlafaxine 150 MG 24 hr capsule    EFFEXOR-XR    90 capsule    Take 1 capsule (150 mg) by mouth daily    Anxiety, Recurrent major depressive disorder, in partial remission (H)

## 2018-04-01 DIAGNOSIS — F33.41 RECURRENT MAJOR DEPRESSIVE DISORDER, IN PARTIAL REMISSION (H): ICD-10-CM

## 2018-04-01 DIAGNOSIS — F41.9 ANXIETY: ICD-10-CM

## 2018-04-01 DIAGNOSIS — A60.00 HERPES SIMPLEX INFECTION OF GENITOURINARY SYSTEM: ICD-10-CM

## 2018-04-02 NOTE — TELEPHONE ENCOUNTER
"Requested Prescriptions   Pending Prescriptions Disp Refills     valACYclovir (VALTREX) 500 MG tablet [Pharmacy Med Name: VALACYCLOVIR HCL 500MG TABS]  Last Written Prescription Date:  1/11/2018  Last Fill Quantity: 90 tablet,  # refills: 0   Last Office Visit: 2/6/2017   Future Office Visit:      90 tablet 0     Sig: TAKE ONE TABLET BY MOUTH EVERY DAY    Antivirals for Herpes Protocol Failed    4/1/2018 12:04 PM       Failed - Recent (12 mo) or future (30 days) visit within the authorizing provider's specialty    Patient had office visit in the last 12 months or has a visit in the next 30 days with authorizing provider or within the authorizing provider's specialty.  See \"Patient Info\" tab in inbasket, or \"Choose Columns\" in Meds & Orders section of the refill encounter.           Failed - Normal serum creatinine on file in past 12 months    Recent Labs   Lab Test  03/06/17   0835   CR  0.94          Passed - Patient is age 12 or older     _________________________________________________________________________________________________________________________       venlafaxine (EFFEXOR-XR) 150 MG 24 hr capsule [Pharmacy Med Name: VENLAFAXINE HCL ER 150MG CP24]  Last Written Prescription Date:  7/21/17  Last Fill Quantity: 90 capsule,  # refills: 1   Last Office Visit: 2/6/2017   Future Office Visit:      90 capsule 1     Sig: TAKE ONE CAPSULE BY MOUTH EVERY DAY    Serotonin-Norepinephrine Reuptake Inhibitors  Failed    4/1/2018 12:04 PM       Failed - PHQ-9 score of less than 5 in past 6 months    Please review last PHQ-9 score.   PHQ-9 SCORE 2/6/2017 2/6/2017 7/21/2017   Total Score - - -   Total Score MyChart - Incomplete 4 (Minimal depression)   Total Score 8 8 4     DANIEL-7 SCORE 6/27/201627/2016 2/6/2017 7/21/2017   Total Score - - -   Total Score - - 0 (minimal anxiety)   Total Score 0 1 0          Failed - Normal serum creatinine on file in past 12 months    Recent Labs   Lab Test  03/06/17   0835   CR  0.94          " "Failed - Recent (6 mo) or future (30 days) visit within the authorizing provider's specialty    Patient had office visit in the last 6 months or has a visit in the next 30 days with authorizing provider or within the authorizing provider's specialty.  See \"Patient Info\" tab in inbasket, or \"Choose Columns\" in Meds & Orders section of the refill encounter.           Passed - Blood pressure under 140/90 in past 12 months    BP Readings from Last 3 Encounters:   02/16/18 130/85   01/11/18 129/79   02/06/17 126/88          Passed - Patient is age 18 or older       Passed - No active pregnancy on record       Passed - No positive pregnancy test in past 12 months     _________________________________________________________________________________________________________________________       buPROPion (WELLBUTRIN XL) 150 MG 24 hr tablet [Pharmacy Med Name: BUPROPION HCL ER (XL) 150MG TB24]  Last Written Prescription Date:  1/11/2018  Last Fill Quantity: 90 tablet,  # refills: 0   Last Office Visit: 2/6/2017   Future Office Visit:      90 tablet 0     Sig: TAKE ONE TABLET BY MOUTH EVERY MORNING    SSRIs Protocol Failed    4/1/2018 12:04 PM       Failed - PHQ-9 score less than 5 in past 6 months    Please review last PHQ-9 score.   PHQ-9 SCORE 2/6/2017 2/6/2017 7/21/2017   Total Score - - -   Total Score MyChart - Incomplete 4 (Minimal depression)   Total Score 8 8 4     DANIEL-7 SCORE 6/27/2016 2/6/2017 7/21/2017   Total Score - - -   Total Score - - 0 (minimal anxiety)   Total Score 0 1 0          Failed - Recent (6 mo) or future (30 days) visit within the authorizing provider's specialty    Patient had office visit in the last 6 months or has a visit in the next 30 days with authorizing provider or within the authorizing provider's specialty.  See \"Patient Info\" tab in inbasket, or \"Choose Columns\" in Meds & Orders section of the refill encounter.           Passed - Medication is Bupropion    If the medication is Bupropion " (Wellbutrin), and the patient is taking for smoking cessation; OK to refill.         Passed - Patient is age 18 or older       Passed - No active pregnancy on record       Passed - No positive pregnancy test in last 12 months

## 2018-04-05 RX ORDER — VALACYCLOVIR HYDROCHLORIDE 500 MG/1
TABLET, FILM COATED ORAL
Qty: 90 TABLET | Refills: 0 | Status: SHIPPED | OUTPATIENT
Start: 2018-04-05 | End: 2018-07-04

## 2018-04-05 RX ORDER — BUPROPION HYDROCHLORIDE 150 MG/1
TABLET ORAL
Qty: 90 TABLET | Refills: 0 | Status: SHIPPED | OUTPATIENT
Start: 2018-04-05 | End: 2018-07-04

## 2018-04-05 RX ORDER — VENLAFAXINE HYDROCHLORIDE 150 MG/1
CAPSULE, EXTENDED RELEASE ORAL
Qty: 90 CAPSULE | Refills: 1 | Status: SHIPPED | OUTPATIENT
Start: 2018-04-05 | End: 2018-07-18

## 2018-04-05 NOTE — TELEPHONE ENCOUNTER
Routing refill request to provider for review/approval because:  Labs not current:  Creat  A break in medication - Effexor- (3 months)   Patient needs to be seen because it has been more than 1 year since last office visit.

## 2018-05-31 ENCOUNTER — TRANSFERRED RECORDS (OUTPATIENT)
Dept: HEALTH INFORMATION MANAGEMENT | Facility: CLINIC | Age: 58
End: 2018-05-31

## 2018-07-11 ENCOUNTER — MYC MEDICAL ADVICE (OUTPATIENT)
Dept: FAMILY MEDICINE | Facility: CLINIC | Age: 58
End: 2018-07-11

## 2018-07-11 DIAGNOSIS — F33.41 RECURRENT MAJOR DEPRESSIVE DISORDER, IN PARTIAL REMISSION (H): ICD-10-CM

## 2018-07-11 DIAGNOSIS — A60.00 HERPES SIMPLEX INFECTION OF GENITOURINARY SYSTEM: ICD-10-CM

## 2018-07-11 ASSESSMENT — PATIENT HEALTH QUESTIONNAIRE - PHQ9
SUM OF ALL RESPONSES TO PHQ QUESTIONS 1-9: 7
SUM OF ALL RESPONSES TO PHQ QUESTIONS 1-9: 7
10. IF YOU CHECKED OFF ANY PROBLEMS, HOW DIFFICULT HAVE THESE PROBLEMS MADE IT FOR YOU TO DO YOUR WORK, TAKE CARE OF THINGS AT HOME, OR GET ALONG WITH OTHER PEOPLE: VERY DIFFICULT

## 2018-07-12 ASSESSMENT — PATIENT HEALTH QUESTIONNAIRE - PHQ9: SUM OF ALL RESPONSES TO PHQ QUESTIONS 1-9: 7

## 2018-07-14 NOTE — TELEPHONE ENCOUNTER
"Requested Prescriptions   Pending Prescriptions Disp Refills     valACYclovir (VALTREX) 500 MG tablet  Last Written Prescription Date:  7/11/18  Last Fill Quantity: 30 tablet,  # refills: 0   Last Office Visit: 2/6/2017   Future Office Visit:    Next 5 appointments (look out 90 days)     Jul 18, 2018 11:40 AM CDT   Office Visit with Veronica Solis MD   [facility] ([facility])    [address] [place] [address] [phone]                  90 tablet 0     Sig: Take 1 tablet (500 mg) by mouth daily    Antivirals for Herpes Protocol Failed    7/11/2018  3:49 PM       Failed - Recent (12 mo) or future (30 days) visit within the authorizing provider's specialty    Patient had office visit in the last 12 months or has a visit in the next 30 days with authorizing provider or within the authorizing provider's specialty.  See \"Patient Info\" tab in inbasket, or \"Choose Columns\" in Meds & Orders section of the refill encounter.           Failed - Normal serum creatinine on file in past 12 months    Recent Labs   Lab Test  03/06/17   0835   CR  0.94            Passed - Patient is age 12 or older   _________________________________________________________________________________________________________________________       buPROPion (WELLBUTRIN XL) 150 MG 24 hr tablet  Last Written Prescription Date:  7/11/18  Last Fill Quantity: 30 tablet,  # refills: 0   Last Office Visit: 2/6/2017   Future Office Visit:    Next 5 appointments (look out 90 days)     Jul 18, 2018 11:40 AM CDT   Office Visit with Veronica Solis MD   [facility] ([facility])    [address] [place] [address] [phone]                  90 tablet 0     Sig: Take 1 tablet (150 mg) by mouth every morning    SSRIs Protocol Failed    7/11/2018  3:49 PM       Failed - PHQ-9 score less than 5 in past 6 months    Please review last PHQ-9 score.   PHQ-9 SCORE " "2/6/2017 7/21/2017 7/11/2018   Total Score - - -   Total Score MyChart Incomplete 4 (Minimal depression) 7 (Mild depression)   Total Score 8 4 7     DANIEL-7 SCORE 6/27/2016 2/6/2017 7/21/2017   Total Score - - -   Total Score - - 0 (minimal anxiety)   Total Score 0 1 0          Failed - Recent (6 mo) or future (30 days) visit within the authorizing provider's specialty    Patient had office visit in the last 6 months or has a visit in the next 30 days with authorizing provider or within the authorizing provider's specialty.  See \"Patient Info\" tab in inbasket, or \"Choose Columns\" in Meds & Orders section of the refill encounter.           Passed - Medication is Bupropion    If the medication is Bupropion (Wellbutrin), and the patient is taking for smoking cessation; OK to refill.         Passed - Patient is age 18 or older       Passed - No active pregnancy on record       Passed - No positive pregnancy test in last 12 months          "

## 2018-07-15 ENCOUNTER — TELEPHONE (OUTPATIENT)
Dept: FAMILY MEDICINE | Facility: CLINIC | Age: 58
End: 2018-07-15

## 2018-07-15 NOTE — PROGRESS NOTES
SUBJECTIVE:  Karlee Emery, a 57 year old female, is here to discuss the following issues:     Depression and Anxiety Follow-Up    She continues to take Effexor and wellbutrin with no problems or noted side effects.    Status since last visit: Worsened a bit but feels this is situational.    Other associated symptoms:None    Significant life event: Yes-  Got  recently (in [place]), adult son still lives with her due to mental health issues (psychosis) - he fell and broke his hip which triggered psychotic episode.  Her new wife is supportive.  She's also dealing with plantar fasciitis which has limited her ability to exercise ([place]).    PHQ-9 2/6/2017 7/21/2017 7/11/2018   Total Score 8 4 7   Q9: Suicide Ideation Not at all Not at all Not at all     DANIEL-7 SCORE 6/27/2016 2/6/2017 7/21/2017   Total Score - - -   Total Score - - 0 (minimal anxiety)   Total Score 0 1 0       Problem list and histories reviewed & updated, as indicated.  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)     Ovarian cyst, right     Endometrial thickening on ultra sound     Mixed incontinence     Somatic dysfunction of pelvic region     Myalgia of pelvic floor       BP Readings from Last 3 Encounters:   07/18/18 134/78   02/16/18 130/85   01/11/18 129/79    Wt Readings from Last 3 Encounters:   07/18/18 178 lb 8 oz (81 kg)   02/16/18 170 lb (77.1 kg)   01/11/18 175 lb 4.8 oz (79.5 kg)                  Recent Labs   Lab Test  07/18/18   1147  03/06/17   0835  06/27/16   1905  06/27/16   0959  02/18/16   1553  10/24/14   1249  02/11/14   0941  05/07/13   1111   03/08/12   0958  11/21/11   0948   10/22/10   1013   08/06/10   0802   A1C  6.1*   --   5.8   --    --   6.0  " 5.9   --    < >  6.0  6.1*   --    --    < >   --    LDL   --    --    --   174*   --    --   145*   --    --   93  101   < >   --    --   120   HDL   --    --    --   73   --    --   73   --    --   77  81   --    --    --   62   TRIG   --    --    --   60   --    --   41   --    --   70  79   --    --    --   110   ALT   --    --    --    --    --    --    --    --    --    --   24   --   34   --   25   CR   --   0.94   --    --   0.84  0.92  0.85   --    --   0.84  0.92   --   0.94   < >   --    GFRESTIMATED   --   61   --    --   69  63  69   --    --   71  64   --   63   < >   --    GFRESTBLACK   --   74   --    --   84  76  84   --    --   86  78   --   76   < >   --    POTASSIUM   --    --    --    --   4.7  4.3  4.6   --    --   4.6  4.8   < >  4.5   < >   --    TSH   --    --    --    --    --    --    --   1.73   --    --   4.18   --    --    < >   --     < > = values in this interval not displayed.        ROS:  CONST: NEGATIVE for fevers, chills, fatigue  NEURO: NEGATIVE for headaches, dizziness  EYES: NEGATIVE for change in vision   ENT/M: NEGATIVE for change in hearing, dental problems   RESP: NEGATIVE for shortness of breath, cough   CV: NEGATIVE for chest pain, palpitations, peripheral edema  GI: NEGATIVE for nausea, abdominal pain, change in bowel habits  : NEGATIVE for change in urinary habits  INTEG/SKIN: NEGATIVE for rashes, new or changing moles  MS: NEGATIVE for significant arthralgias or myalgias  ENDO: NEGATIVE for change in weight or appetite  PSYCH: NEGATIVE for change in mood    OBJECTIVE:    /78  Pulse 68  Temp 98.7  F (37.1  C) (Oral)  Resp 14  Ht 5' 1\" (1.549 m)  Wt 178 lb 8 oz (81 kg)  LMP 03/15/2013 (Exact Date)  SpO2 99%  BMI 33.73 kg/m2  GEN:  no apparent distress  EYES: PERRL, conjunctivae and sclerae clear  LUNGS:  normal respiratory effort, and lungs clear to auscultation bilaterally - no rales, rhonchi or wheezes  CV: regular rate and rhythm, normal S1 S2, no S3 " or S4, no murmur, click or rub and bilateral lower extremities without edema  PSYCH:  Appearance/Behavior: patient is appropriately and casually dressed.  Speech:  normal  rate, rhythm, and tone.  Mood/Affect: Bright/congruent.  Insight: good       Results for orders placed or performed in visit on 07/18/18   Hemoglobin A1c   Result Value Ref Range    Hemoglobin A1C 6.1 (H) 0 - 5.6 %       ASSESSMENT/PLAN:  1. Recurrent major depressive disorder, in partial remission (H)  2. Anxiety  Overall quite stable given her life stressors.  Discussed that we could consider increasing the wellbutrin dose but she prefers to monitor for now.    - buPROPion (WELLBUTRIN XL) 150 MG 24 hr tablet; Take 1 tablet (150 mg) by mouth every morning  Dispense: 90 tablet; Refill: 3  - venlafaxine (EFFEXOR-XR) 150 MG 24 hr capsule; Take 1 capsule (150 mg) by mouth daily  Dispense: 90 capsule; Refill: 3    3. Herpes simplex infection of genitourinary system  Stable on daily prophylactic anti-viral.  - valACYclovir (VALTREX) 500 MG tablet; Take 1 tablet (500 mg) by mouth daily  Dispense: 90 tablet; Refill: 3    4. Elevated fasting glucose  history of mild T2DM that resolved with weight loss.  Last A1c was 2 years ago.  Today's A1c is stable.    - Hemoglobin A1c  - Basic metabolic panel    5. Screen for colon cancer  - Fecal colorectal cancer screen (FIT); Future    6. Lipid screening  - Lipid panel reflex to direct LDL Fasting    7. Medication monitoring encounter  - ALT    Return to Clinic in 1 year      Veronica Solis MD   [facility]

## 2018-07-15 NOTE — TELEPHONE ENCOUNTER
Patient is scheduled to see me on Wednesday.  She is due for fasting lipid panel.  Please ask her to come fasting to that appointment: Fast for 10 hours prior to labs: nothing to eat or drink except plain water and your pills for ten hours prior to appointment.  In addition, avoid fatty foods and alcohol for 24 hours prior to appointment.     Veronica Solis MD

## 2018-07-16 RX ORDER — VALACYCLOVIR HYDROCHLORIDE 500 MG/1
500 TABLET, FILM COATED ORAL DAILY
Qty: 90 TABLET | Refills: 0 | OUTPATIENT
Start: 2018-07-16

## 2018-07-16 RX ORDER — BUPROPION HYDROCHLORIDE 150 MG/1
150 TABLET ORAL EVERY MORNING
Qty: 90 TABLET | Refills: 0 | OUTPATIENT
Start: 2018-07-16

## 2018-07-16 NOTE — TELEPHONE ENCOUNTER
These requests came in on 7/11/18 and [facility] sent order 7/11/18 so these are duplicate request.    Shi SMITH

## 2018-07-18 ENCOUNTER — OFFICE VISIT (OUTPATIENT)
Dept: FAMILY MEDICINE | Facility: CLINIC | Age: 58
End: 2018-07-18
Payer: COMMERCIAL

## 2018-07-18 VITALS
SYSTOLIC BLOOD PRESSURE: 134 MMHG | DIASTOLIC BLOOD PRESSURE: 78 MMHG | WEIGHT: 178.5 LBS | BODY MASS INDEX: 33.7 KG/M2 | OXYGEN SATURATION: 99 % | HEIGHT: 61 IN | RESPIRATION RATE: 14 BRPM | TEMPERATURE: 98.7 F | HEART RATE: 68 BPM

## 2018-07-18 DIAGNOSIS — F41.9 ANXIETY: ICD-10-CM

## 2018-07-18 DIAGNOSIS — Z12.11 SCREEN FOR COLON CANCER: ICD-10-CM

## 2018-07-18 DIAGNOSIS — A60.00 HERPES SIMPLEX INFECTION OF GENITOURINARY SYSTEM: ICD-10-CM

## 2018-07-18 DIAGNOSIS — Z51.81 MEDICATION MONITORING ENCOUNTER: ICD-10-CM

## 2018-07-18 DIAGNOSIS — F33.41 RECURRENT MAJOR DEPRESSIVE DISORDER, IN PARTIAL REMISSION (H): Primary | ICD-10-CM

## 2018-07-18 DIAGNOSIS — R73.01 ELEVATED FASTING GLUCOSE: ICD-10-CM

## 2018-07-18 DIAGNOSIS — Z13.220 LIPID SCREENING: ICD-10-CM

## 2018-07-18 LAB — HBA1C MFR BLD: 6.1 % (ref 0–5.6)

## 2018-07-18 PROCEDURE — 84460 ALANINE AMINO (ALT) (SGPT): CPT | Performed by: FAMILY MEDICINE

## 2018-07-18 PROCEDURE — 80061 LIPID PANEL: CPT | Performed by: FAMILY MEDICINE

## 2018-07-18 PROCEDURE — 80048 BASIC METABOLIC PNL TOTAL CA: CPT | Performed by: FAMILY MEDICINE

## 2018-07-18 PROCEDURE — 83036 HEMOGLOBIN GLYCOSYLATED A1C: CPT | Performed by: FAMILY MEDICINE

## 2018-07-18 PROCEDURE — 99214 OFFICE O/P EST MOD 30 MIN: CPT | Performed by: FAMILY MEDICINE

## 2018-07-18 PROCEDURE — 36415 COLL VENOUS BLD VENIPUNCTURE: CPT | Performed by: FAMILY MEDICINE

## 2018-07-18 RX ORDER — BUPROPION HYDROCHLORIDE 150 MG/1
150 TABLET ORAL EVERY MORNING
Qty: 90 TABLET | Refills: 3 | Status: SHIPPED | OUTPATIENT
Start: 2018-07-18 | End: 2019-04-15

## 2018-07-18 RX ORDER — VENLAFAXINE HYDROCHLORIDE 150 MG/1
150 CAPSULE, EXTENDED RELEASE ORAL DAILY
Qty: 90 CAPSULE | Refills: 3 | Status: SHIPPED | OUTPATIENT
Start: 2018-07-18 | End: 2019-04-15

## 2018-07-18 RX ORDER — VALACYCLOVIR HYDROCHLORIDE 500 MG/1
500 TABLET, FILM COATED ORAL DAILY
Qty: 90 TABLET | Refills: 3 | Status: SHIPPED | OUTPATIENT
Start: 2018-07-18 | End: 2019-04-15

## 2018-07-18 NOTE — LETTER
My Depression Action Plan  Name: Karlee Emery   Date of Birth 1960  Date: 7/18/2018    My doctor: Veronica Solis   My clinic: [address] [address] [phone]          GREEN    ZONE   Good Control    What it looks like:     Things are going generally well. You have normal up s and down s. You may even feel depressed from time to time, but bad moods usually last less than a day.   What you need to do:  1. Continue to care for yourself (see self care plan)  2. Check your depression survival kit and update it as needed  3. Follow your physician s recommendations including any medication.  4. Do not stop taking medication unless you consult with your physician first.           YELLOW         ZONE Getting Worse    What it looks like:     Depression is starting to interfere with your life.     It may be hard to get out of bed; you may be starting to isolate yourself from others.    Symptoms of depression are starting to last most all day and this has happened for several days.     You may have suicidal thoughts but they are not constant.   What you need to do:     1. Call your care team, your response to treatment will improve if you keep your care team informed of your progress. Yellow periods are signs an adjustment may need to be made.     2. Continue your self-care, even if you have to fake it!    3. Talk to someone in your support network    4. Open up your depression survival kit           RED    ZONE Medical Alert - Get Help    What it looks like:     Depression is seriously interfering with your life.     You may experience these or other symptoms: You can t get out of bed most days, can t work or engage in other necessary activities, you have trouble taking care of basic hygiene, or basic responsibilities, thoughts of suicide or death that will not go away, self-injurious behavior.     What you need to do:  1. Call your care team  and request a same-day appointment. If they are not available (weekends or after hours) call your local crisis line, emergency room or 911.            Depression Self Care Plan / Survival Kit    Self-Care for Depression  Here s the deal. Your body and mind are really not as separate as most people think.  What you do and think affects how you feel and how you feel influences what you do and think. This means if you do things that people who feel good do, it will help you feel better.  Sometimes this is all it takes.  There is also a place for medication and therapy depending on how severe your depression is, so be sure to consult with your medical provider and/ or Behavioral Health Consultant if your symptoms are worsening or not improving.     In order to better manage my stress, I will:    Exercise  Get some form of exercise, every day. This will help reduce pain and release endorphins, the  feel good  chemicals in your brain. This is almost as good as taking antidepressants!  This is not the same as joining a gym and then never going! (they count on that by the way ) It can be as simple as just going for a walk or doing some gardening, anything that will get you moving.      Hygiene   Maintain good hygiene (Get out of bed in the morning, Make your bed, Brush your teeth, Take a shower, and Get dressed like you were going to work, even if you are unemployed).  If your clothes don't fit try to get ones that do.    Diet  I will strive to eat foods that are good for me, drink plenty of water, and avoid excessive sugar, caffeine, alcohol, and other mood-altering substances.  Some foods that are helpful in depression are: complex carbohydrates, B vitamins, flaxseed, fish or fish oil, fresh fruits and vegetables.    Psychotherapy  I agree to participate in Individual Therapy (if recommended).    Medication  If prescribed medications, I agree to take them.  Missing doses can result in serious side effects.  I understand  that drinking alcohol, or other illicit drug use, may cause potential side effects.  I will not stop my medication abruptly without first discussing it with my provider.    Staying Connected With Others  I will stay in touch with my friends, family members, and my primary care provider/team.    Use your imagination  Be creative.  We all have a creative side; it doesn t matter if it s oil painting, sand castles, or mud pies! This will also kick up the endorphins.    Witness Beauty  (AKA stop and smell the roses) Take a look outside, even in mid-winter. Notice colors, textures. Watch the squirrels and birds.     Service to others  Be of service to others.  There is always someone else in need.  By helping others we can  get out of ourselves  and remember the really important things.  This also provides opportunities for practicing all the other parts of the program.    Humor  Laugh and be silly!  Adjust your TV habits for less news and crime-drama and more comedy.    Control your stress  Try breathing deep, massage therapy, biofeedback, and meditation. Find time to relax each day.     My support system    Clinic Contact:  Phone number:    Contact 1:  Phone number:    Contact 2:  Phone number:    Zoroastrian/:  Phone number:    Therapist:  Phone number:    Local crisis center:    Phone number:    Other community support:  Phone number:

## 2018-07-18 NOTE — MR AVS SNAPSHOT
After Visit Summary   7/18/2018    Karlee Emery    MRN: [id number]           Patient Information     Date Of Birth          1960        Visit Information        Provider Department      7/18/2018 11:40 AM Veronica Solis MD [facility]        Today's Diagnoses     Elevated fasting glucose    -  1    Screen for colon cancer        Lipid screening        Recurrent major depressive disorder, in partial remission (H)        Anxiety        Herpes simplex infection of genitourinary system        Medication monitoring encounter           Follow-ups after your visit        Your next 10 appointments already scheduled     Jul 26, 2018  8:30 AM CDT   MA SCREENING DIGITAL BILATERAL with [id number]   [facility] ([facility])    [address] [place] [address], [address]  [facility] [address] [phone]           Do not use any powder, lotion or deodorant under your arms or on your breast. If you do, we will ask you to remove it before your exam.  Wear comfortable, two-piece clothing.  If you have any allergies, tell your care team.  Bring any previous mammograms from other facilities or have them mailed to the breast center.              Future tests that were ordered for you today     Open Future Orders        Priority Expected Expires Ordered    Fecal colorectal cancer screen (FIT) Routine 8/5/2018 10/7/2018 7/15/2018            Who to contact     If you have questions or need follow up information about today's clinic visit or your schedule please contact [facility] directly at [phone].  Normal or non-critical lab and imaging results will be communicated to you by MyChart, letter or phone within 4 business days after the clinic has received the results. If you do not hear from us within 7 days, please contact the clinic through MyChart or phone. If you have a critical or abnormal lab result, we will notify you by phone as soon as possible.  Submit  refill requests through mention or call your pharmacy and they will forward the refill request to us. Please allow 3 business days for your refill to be completed.          Additional Information About Your Visit        [employer] Information     mention gives you secure access to your electronic health record. If you see a primary care provider, you can also send messages to your care team and make appointments. If you have questions, please call your primary care clinic.  If you do not have a primary care provider, please call [phone] and they will assist you.        Care EveryWhere ID     This is your Care EveryWhere ID. This could be used by other organizations to access your [facility] medical records  [id number]        Your Vitals Were     Pulse Temperature Respirations Last Period Pulse Oximetry BMI (Body Mass Index)    68 98.7  F (37.1  C) (Oral) 14 03/15/2013 (Exact Date) 99% 32.65 kg/m2       Blood Pressure from Last 3 Encounters:   07/18/18 134/78   02/16/18 130/85   01/11/18 129/79    Weight from Last 3 Encounters:   07/18/18 178 lb 8 oz (81 kg)   02/16/18 170 lb (77.1 kg)   01/11/18 175 lb 4.8 oz (79.5 kg)              We Performed the Following     ALT     Basic metabolic panel     DEPRESSION ACTION PLAN (DAP)     Hemoglobin A1c     Lipid panel reflex to direct LDL Fasting          Today's Medication Changes          These changes are accurate as of 7/18/18 12:23 PM.  If you have any questions, ask your nurse or doctor.               These medicines have changed or have updated prescriptions.        Dose/Directions    buPROPion 150 MG 24 hr tablet   Commonly known as:  WELLBUTRIN XL   This may have changed:  See the new instructions.   Used for:  Recurrent major depressive disorder, in partial remission (H)   Changed by:  Veronica Solis MD        Dose:  150 mg   Take 1 tablet (150 mg) by mouth every morning   Quantity:  90 tablet   Refills:  3       valACYclovir 500 MG tablet   Commonly known as:   VALTREX   This may have changed:  See the new instructions.   Used for:  Herpes simplex infection of genitourinary system   Changed by:  Veronica Solis MD        Dose:  500 mg   Take 1 tablet (500 mg) by mouth daily   Quantity:  90 tablet   Refills:  3       venlafaxine 150 MG 24 hr capsule   Commonly known as:  EFFEXOR-XR   This may have changed:  See the new instructions.   Used for:  Anxiety, Recurrent major depressive disorder, in partial remission (H)   Changed by:  Veronica Solis MD        Dose:  150 mg   Take 1 capsule (150 mg) by mouth daily   Quantity:  90 capsule   Refills:  3            Where to get your medicines      These medications were sent to [facility] - [place], [place] - [address] [place] [address]  [address] [facility] [address], [facility] [address]     Phone:  [phone]     buPROPion 150 MG 24 hr tablet    valACYclovir 500 MG tablet    venlafaxine 150 MG 24 hr capsule                Primary Care Provider Office Phone # Fax #    Veronica Solis [phone] [address] [facility] [address]        Equal Access to Services     [facility]: Hadii jose roberto andres Sokalin, waaxda lumyrnaadaha, qaybta kaalmada adesilvino, jose craig . So [facility] [phone].    ATENCIÓN: Si habla español, tiene a lopez disposición servicios gratuitos de asistencia lingüística. [facility] [phone].    We comply with applicable federal civil rights laws and [place] laws. We do not discriminate on the basis of race, color, national origin, age, disability, sex, sexual orientation, or gender identity.            Thank you!     Thank you for choosing [facility][note 3]  for your care. Our goal is always to provide you with excellent care. Hearing back from our patients is one way we can continue to improve our services. Please take a few minutes to complete the written survey that you may receive in the mail after your visit with us.  Thank you!             Your Updated Medication List - Protect others around you: Learn how to safely use, store and throw away your medicines at www.disposemymeds.org.          This list is accurate as of 7/18/18 12:23 PM.  Always use your most recent med list.                   Brand Name Dispense Instructions for use Diagnosis    acetaminophen 325 MG tablet    TYLENOL    100 tablet    Take 2 tablets by mouth every 4 hours as needed for other (mild pain).    Hyperlipidemia LDL goal <100, Type 2 diabetes, HbA1C goal < 8% (H), Moderate major depression (H), Hypertension goal BP (blood pressure) < 140/90, Other genital herpes       aspirin 81 MG tablet      Take  by mouth daily. (FMG)    Type 2 diabetes, HbA1C goal < 8% (H)       buPROPion 150 MG 24 hr tablet    WELLBUTRIN XL    90 tablet    Take 1 tablet (150 mg) by mouth every morning    Recurrent major depressive disorder, in partial remission (H)       CALCIUM + D PO      Take 1 tablet by mouth daily.        loratadine 10 MG tablet    CLARITIN    90 tablet    Take 1 tablet by mouth daily.    Seasonal allergic rhinitis       MULTIVITAMIN TABS   OR      1 TABLET DAILY        valACYclovir 500 MG tablet    VALTREX    90 tablet    Take 1 tablet (500 mg) by mouth daily    Herpes simplex infection of genitourinary system       venlafaxine 150 MG 24 hr capsule    EFFEXOR-XR    90 capsule    Take 1 capsule (150 mg) by mouth daily    Anxiety, Recurrent major depressive disorder, in partial remission (H)

## 2018-07-19 ENCOUNTER — TELEPHONE (OUTPATIENT)
Dept: FAMILY MEDICINE | Facility: CLINIC | Age: 58
End: 2018-07-19

## 2018-07-19 LAB
ALT SERPL W P-5'-P-CCNC: 27 U/L (ref 0–50)
ANION GAP SERPL CALCULATED.3IONS-SCNC: 6 MMOL/L (ref 3–14)
BUN SERPL-MCNC: 16 MG/DL (ref 7–30)
CALCIUM SERPL-MCNC: 8.7 MG/DL (ref 8.5–10.1)
CHLORIDE SERPL-SCNC: 106 MMOL/L (ref 94–109)
CHOLEST SERPL-MCNC: 241 MG/DL
CO2 SERPL-SCNC: 28 MMOL/L (ref 20–32)
CREAT SERPL-MCNC: 0.86 MG/DL (ref 0.52–1.04)
GFR SERPL CREATININE-BSD FRML MDRD: 68 ML/MIN/1.7M2
GLUCOSE SERPL-MCNC: 104 MG/DL (ref 70–99)
HDLC SERPL-MCNC: 75 MG/DL
LDLC SERPL CALC-MCNC: 154 MG/DL
NONHDLC SERPL-MCNC: 166 MG/DL
POTASSIUM SERPL-SCNC: 4.5 MMOL/L (ref 3.4–5.3)
SODIUM SERPL-SCNC: 140 MMOL/L (ref 133–144)
TRIGL SERPL-MCNC: 59 MG/DL

## 2018-07-19 NOTE — PROGRESS NOTES
Job Malcolm,  Your basic metabolic panel results (blood salts, blood sugar, and kidney function), ALT (liver test), and Hemoglobin A1C (a test assessing overall blood sugar control for the last 3 months) all look good.    Your lipid panel (cholesterol) results are somewhat elevated - but better than when we last checked (in 2016).  As you may know, an elevated cholesterol is one factor that increases your risk for heart disease and stroke.  You can improve your cholesterol by controlling the amount and type of fat you eat, and by increasing your daily activity level.    Here are some ways to improve your nutrition:      *   Eat less fat (such as butter) and no trans fats (such as margarine and Crisco).  Use polyunsaturated fats (such as olive oil) instead.      *   Buy lean cuts of meat and replace red meat with fish and seafood.      *   Eat more fruits and vegetables.      *   Eat a handful of tree nuts (such as almonds, walnuts, or cashews) every day.       *   Avoid fried or fast foods that are high in fat      *   Replace processed starches with whole grains (such as brown rice or whole grain breads, pastas, cereals) and legumes.    I filled out your forms and we'll fax them over to [employer].    Veronica Solis MD

## 2018-07-20 PROCEDURE — 82274 ASSAY TEST FOR BLOOD FECAL: CPT | Performed by: FAMILY MEDICINE

## 2018-07-22 LAB — HEMOCCULT STL QL IA: NEGATIVE

## 2018-07-22 NOTE — PROGRESS NOTES
Current Home Program: Modified and Progressed: 12/21/17:    Proper sit to/from sequence avoiding flexion in the lumbar region but instead hinging at the hips. Patient instructed in moving sitting to side-lying and then rolling using a log roll technique. Patient cued in of centralization of pain bringing the pain out of the left lower extremity and centralizing to the spine as tolerated by way of positioning and checked in movement patterns at this time. Patient instructed in the use of ice/heat for the low back region with the goal of centralizing pain.  The patient wanted to be given exercises as she feels she has tried all the positioning techniques. The patient was reviewed in positioning techniques with the goal of a neutral spine at this time the patient was verbally cued on that knee fall out motion in gentle ranges, abdominal bracing.    -PT HAS HELD: pt to try hands and knees: gentle arch/sag and partial bracing as long as she does not have a referral of pain into the right lower extremity    Calf stretches x 10 seconds 1-1.5 inch book; or runner's stretch     Ankle range of motion    Standing Hamstring muscle stretch (start here) then add gentle ankle pumps x 5-10 repetitions for nerve flossing only if the shin does not become painful.  2 repetitions    Seated hip hinge vs bending at your back (don't let shoulders fall forward) and bend at your hips; practice 10 repetitions seated 1-2 times a day.    Mini squats in standing sliding hands down thighs to knees x 10 repetitions; (bending at the hips, knees and ankles without movement in the back; \"blinds\"     ADD: Seated buttocks stretch with one foot propped up on opposite knee pulling (hugging) knee toward opposite shoulder, 2-3 repetitions 2 times a day.    Job Desir,  Thanks for completing the FIT stool test for colon cancer screening!  Your stool test is negative for microscopic (hidden) blood.  This test should be repeated annually.     Veronica Solis MD

## 2018-07-26 ENCOUNTER — RADIANT APPOINTMENT (OUTPATIENT)
Dept: MAMMOGRAPHY | Facility: CLINIC | Age: 58
End: 2018-07-26
Attending: FAMILY MEDICINE
Payer: COMMERCIAL

## 2018-07-26 DIAGNOSIS — Z12.31 VISIT FOR SCREENING MAMMOGRAM: ICD-10-CM

## 2018-07-26 PROCEDURE — 77067 SCR MAMMO BI INCL CAD: CPT

## 2018-10-23 ENCOUNTER — MYC MEDICAL ADVICE (OUTPATIENT)
Dept: FAMILY MEDICINE | Facility: CLINIC | Age: 58
End: 2018-10-23

## 2018-10-24 ENCOUNTER — MEDICAL CORRESPONDENCE (OUTPATIENT)
Dept: HEALTH INFORMATION MANAGEMENT | Facility: CLINIC | Age: 58
End: 2018-10-24

## 2018-10-24 NOTE — TELEPHONE ENCOUNTER
Dr. Solis-Please review and sign if agree.    Form completed to the best of writer's ability.  Form located in Dr. Solis's forms folder.    Letter pended.    Thank you!  YEHUDA WardN, RN

## 2018-10-25 NOTE — TELEPHONE ENCOUNTER
I signed.  We did receive office visit note from outside podiatrist this summer documenting her flare of plantar fasciitis.    In Concha MORALES inbox (in east physician room).  Veronica Solis MD

## 2018-10-25 NOTE — TELEPHONE ENCOUNTER
Writer faxed form, mailed copy to pt, put copy for abstraction & contacted patient to inform her all is done

## 2019-04-12 RX ORDER — BUPROPION HYDROCHLORIDE 150 MG/1
150 TABLET ORAL EVERY MORNING
Qty: 90 TABLET | Refills: 3 | Status: CANCELLED | OUTPATIENT
Start: 2019-04-12

## 2019-04-12 RX ORDER — VALACYCLOVIR HYDROCHLORIDE 500 MG/1
500 TABLET, FILM COATED ORAL DAILY
Qty: 90 TABLET | Refills: 3 | Status: CANCELLED | OUTPATIENT
Start: 2019-04-12

## 2019-04-12 RX ORDER — VENLAFAXINE HYDROCHLORIDE 150 MG/1
150 CAPSULE, EXTENDED RELEASE ORAL DAILY
Qty: 90 CAPSULE | Refills: 3 | Status: CANCELLED | OUTPATIENT
Start: 2019-04-12

## 2019-04-15 ENCOUNTER — OFFICE VISIT (OUTPATIENT)
Dept: FAMILY MEDICINE | Facility: CLINIC | Age: 59
End: 2019-04-15
Payer: COMMERCIAL

## 2019-04-15 VITALS
RESPIRATION RATE: 15 BRPM | DIASTOLIC BLOOD PRESSURE: 88 MMHG | OXYGEN SATURATION: 97 % | WEIGHT: 190 LBS | TEMPERATURE: 97.9 F | HEIGHT: 61 IN | BODY MASS INDEX: 35.87 KG/M2 | HEART RATE: 80 BPM | SYSTOLIC BLOOD PRESSURE: 137 MMHG

## 2019-04-15 DIAGNOSIS — Z23 NEED FOR PROPHYLACTIC VACCINATION WITH TETANUS-DIPHTHERIA (TD): ICD-10-CM

## 2019-04-15 DIAGNOSIS — E66.01 SEVERE OBESITY (BMI 35.0-39.9) WITH COMORBIDITY (H): ICD-10-CM

## 2019-04-15 DIAGNOSIS — Z23 ENCOUNTER FOR HERPES ZOSTER VACCINATION: ICD-10-CM

## 2019-04-15 DIAGNOSIS — G47.19 EXCESSIVE DAYTIME SLEEPINESS: ICD-10-CM

## 2019-04-15 DIAGNOSIS — R53.83 FATIGUE, UNSPECIFIED TYPE: ICD-10-CM

## 2019-04-15 DIAGNOSIS — Z13.220 LIPID SCREENING: ICD-10-CM

## 2019-04-15 DIAGNOSIS — A60.00 HERPES SIMPLEX INFECTION OF GENITOURINARY SYSTEM: ICD-10-CM

## 2019-04-15 DIAGNOSIS — F41.9 ANXIETY: ICD-10-CM

## 2019-04-15 DIAGNOSIS — Z00.00 ROUTINE GENERAL MEDICAL EXAMINATION AT A HEALTH CARE FACILITY: Primary | ICD-10-CM

## 2019-04-15 DIAGNOSIS — R73.01 ELEVATED FASTING GLUCOSE: ICD-10-CM

## 2019-04-15 DIAGNOSIS — F33.41 RECURRENT MAJOR DEPRESSIVE DISORDER, IN PARTIAL REMISSION (H): ICD-10-CM

## 2019-04-15 LAB
BASOPHILS # BLD AUTO: 0.1 10E9/L (ref 0–0.2)
BASOPHILS NFR BLD AUTO: 1.1 %
DIFFERENTIAL METHOD BLD: NORMAL
EOSINOPHIL # BLD AUTO: 0.2 10E9/L (ref 0–0.7)
EOSINOPHIL NFR BLD AUTO: 3.8 %
ERYTHROCYTE [DISTWIDTH] IN BLOOD BY AUTOMATED COUNT: 12.3 % (ref 10–15)
HBA1C MFR BLD: 6.7 % (ref 0–5.6)
HCT VFR BLD AUTO: 42.9 % (ref 35–47)
HGB BLD-MCNC: 14.3 G/DL (ref 11.7–15.7)
LYMPHOCYTES # BLD AUTO: 1.9 10E9/L (ref 0.8–5.3)
LYMPHOCYTES NFR BLD AUTO: 34.9 %
MCH RBC QN AUTO: 32.3 PG (ref 26.5–33)
MCHC RBC AUTO-ENTMCNC: 33.3 G/DL (ref 31.5–36.5)
MCV RBC AUTO: 97 FL (ref 78–100)
MONOCYTES # BLD AUTO: 0.4 10E9/L (ref 0–1.3)
MONOCYTES NFR BLD AUTO: 7.1 %
NEUTROPHILS # BLD AUTO: 2.9 10E9/L (ref 1.6–8.3)
NEUTROPHILS NFR BLD AUTO: 53.1 %
PLATELET # BLD AUTO: 256 10E9/L (ref 150–450)
RBC # BLD AUTO: 4.43 10E12/L (ref 3.8–5.2)
WBC # BLD AUTO: 5.5 10E9/L (ref 4–11)

## 2019-04-15 PROCEDURE — 99396 PREV VISIT EST AGE 40-64: CPT | Mod: 25 | Performed by: FAMILY MEDICINE

## 2019-04-15 PROCEDURE — 90471 IMMUNIZATION ADMIN: CPT | Performed by: FAMILY MEDICINE

## 2019-04-15 PROCEDURE — 80048 BASIC METABOLIC PNL TOTAL CA: CPT | Performed by: FAMILY MEDICINE

## 2019-04-15 PROCEDURE — 99213 OFFICE O/P EST LOW 20 MIN: CPT | Mod: 25 | Performed by: FAMILY MEDICINE

## 2019-04-15 PROCEDURE — 84443 ASSAY THYROID STIM HORMONE: CPT | Performed by: FAMILY MEDICINE

## 2019-04-15 PROCEDURE — 87624 HPV HI-RISK TYP POOLED RSLT: CPT | Performed by: FAMILY MEDICINE

## 2019-04-15 PROCEDURE — G0145 SCR C/V CYTO,THINLAYER,RESCR: HCPCS | Performed by: FAMILY MEDICINE

## 2019-04-15 PROCEDURE — 83036 HEMOGLOBIN GLYCOSYLATED A1C: CPT | Performed by: FAMILY MEDICINE

## 2019-04-15 PROCEDURE — 80061 LIPID PANEL: CPT | Performed by: FAMILY MEDICINE

## 2019-04-15 PROCEDURE — 90472 IMMUNIZATION ADMIN EACH ADD: CPT | Performed by: FAMILY MEDICINE

## 2019-04-15 PROCEDURE — 85025 COMPLETE CBC W/AUTO DIFF WBC: CPT | Performed by: FAMILY MEDICINE

## 2019-04-15 PROCEDURE — 90750 HZV VACC RECOMBINANT IM: CPT | Performed by: FAMILY MEDICINE

## 2019-04-15 PROCEDURE — 36415 COLL VENOUS BLD VENIPUNCTURE: CPT | Performed by: FAMILY MEDICINE

## 2019-04-15 PROCEDURE — 90715 TDAP VACCINE 7 YRS/> IM: CPT | Performed by: FAMILY MEDICINE

## 2019-04-15 RX ORDER — VENLAFAXINE HYDROCHLORIDE 150 MG/1
150 CAPSULE, EXTENDED RELEASE ORAL DAILY
Qty: 90 CAPSULE | Refills: 3 | Status: SHIPPED | OUTPATIENT
Start: 2019-04-15 | End: 2020-03-22

## 2019-04-15 RX ORDER — BUPROPION HYDROCHLORIDE 150 MG/1
150 TABLET ORAL EVERY MORNING
Qty: 90 TABLET | Refills: 3 | Status: SHIPPED | OUTPATIENT
Start: 2019-04-15 | End: 2020-07-06

## 2019-04-15 RX ORDER — VALACYCLOVIR HYDROCHLORIDE 500 MG/1
500 TABLET, FILM COATED ORAL DAILY
Qty: 90 TABLET | Refills: 3 | Status: SHIPPED | OUTPATIENT
Start: 2019-04-15 | End: 2020-04-15

## 2019-04-15 ASSESSMENT — ENCOUNTER SYMPTOMS
DIZZINESS: 1
ABDOMINAL PAIN: 0
CONSTIPATION: 0
EYE PAIN: 0
WEAKNESS: 1
HEMATURIA: 0
FEVER: 0
PARESTHESIAS: 0
HEMATOCHEZIA: 0
NERVOUS/ANXIOUS: 0
BREAST MASS: 0
FREQUENCY: 0
PALPITATIONS: 0
NAUSEA: 1
DIARRHEA: 0
HEARTBURN: 0
CHILLS: 0
MYALGIAS: 0
JOINT SWELLING: 0
SHORTNESS OF BREATH: 0
COUGH: 0
SORE THROAT: 0
HEADACHES: 1
DYSURIA: 0
ARTHRALGIAS: 1

## 2019-04-15 ASSESSMENT — PATIENT HEALTH QUESTIONNAIRE - PHQ9
10. IF YOU CHECKED OFF ANY PROBLEMS, HOW DIFFICULT HAVE THESE PROBLEMS MADE IT FOR YOU TO DO YOUR WORK, TAKE CARE OF THINGS AT HOME, OR GET ALONG WITH OTHER PEOPLE: VERY DIFFICULT
SUM OF ALL RESPONSES TO PHQ QUESTIONS 1-9: 19
SUM OF ALL RESPONSES TO PHQ QUESTIONS 1-9: 19

## 2019-04-15 ASSESSMENT — MIFFLIN-ST. JEOR: SCORE: 1369.21

## 2019-04-15 NOTE — PROGRESS NOTES
"   SUBJECTIVE:   CC: Karlee Emeyr is an 58 year old woman who presents for preventive health visit.     Healthy Habits:     Getting at least 3 servings of Calcium per day:  NO    Bi-annual eye exam:  Yes    Dental care twice a year:  Yes    Sleep apnea or symptoms of sleep apnea:  Daytime drowsiness    Diet:  Regular (no restrictions)    Frequency of exercise:  2-3 days/week    Duration of exercise:  30-45 minutes    Taking medications regularly:  Yes    Medication side effects:  None    PHQ-2 Total Score: 6    Additional concerns today:  Yes        Today's PHQ-2 Score:   PHQ-2 ( 1999 [employer]) 4/15/2019   Q1: Little interest or pleasure in doing things 3   Q2: Feeling down, depressed or hopeless 3   PHQ-2 Score 6   Q1: Little interest or pleasure in doing things Nearly every day   Q2: Feeling down, depressed or hopeless Nearly every day   PHQ-2 Score 6     Other non-preventive concerns to address:  1) Fatigue.  Feels \"fuzzy, lethargic\".  More headaches.  Excessive daytime sleepiness.  No problems falling asleep - around 11 pm.  Wakes up a few times in the night.  Sometimes awakens to snoring or gasping.  Gets up around 7:30 am.  Never awakens feeling rested.  Hard time getting out of bed.  More headaches - sometimes in the morning when she wakes up - usually in the later afternoon. Son has sleep apnea.    2) Depression and Anxiety Follow-Up   She continues to take Effexor and wellbutrin with no noted side effects.    She describes her mood as worse.  Has been struggling more all winter.    Son with schizophrenia still living with her.      Sleep:  frequent awakening, excessive daytime somnolence, snoring, fatigue, irritability, lack of concentration, morning headache and recent weight gain      PHQ-9 SCORE 7/21/2017 7/11/2018 4/15/2019   PHQ-9 Total Score - - -   PHQ-9 Total Score MyChart 4 (Minimal depression) 7 (Mild depression) 19 (Moderately severe depression)   PHQ-9 Total Score 4 7 19     [facility] Follow-up to " PHQ 2017 2018 4/15/2019   PHQ-9 9. Suicide Ideation past 2 weeks Not at all Not at all Not at all     DANIEL-7 SCORE 2016   Total Score - - -   Total Score - - 0 (minimal anxiety)   Total Score 0 1 0           Abuse: Current or Past(Physical, Sexual or Emotional)- No  Do you feel safe in your environment? Yes    Social History     Tobacco Use     Smoking status: Former Smoker     Last attempt to quit: 1980     Years since quittin.3     Smokeless tobacco: Never Used   Substance Use Topics     Alcohol use: Yes     Alcohol/week: 1.2 oz     Types: 2 Standard drinks or equivalent per week       Alcohol Use 4/15/2019   Prescreen: >3 drinks/day or >7 drinks/week? No   Prescreen: >3 drinks/day or >7 drinks/week? -       Reviewed orders with patient.  Reviewed health maintenance and updated orders accordingly - Yes  BP Readings from Last 3 Encounters:   04/15/19 137/88   18 134/78   18 130/85    Wt Readings from Last 3 Encounters:   04/15/19 86.2 kg (190 lb)   18 81 kg (178 lb 8 oz)   18 77.1 kg (170 lb)              Pertinent mammograms are reviewed under the imaging tab.  History of abnormal Pap smear: NO - age 30-65 PAP every 5 years with negative HPV co-testing recommended  PAP / HPV 2016   PAP OTHER-NIL, See Result NIL NIL     Reviewed and updated as needed this visit by clinical staff  Tobacco  Allergies  Meds  Med Hx  Surg Hx  Fam Hx  Soc Hx        Reviewed and updated as needed this visit by Provider        Past Medical History:   Diagnosis Date     Chronic depressive personality disorder      Depression, major      Diabetes mellitus (H)     TYPE 2- DIET, resolved with weight loss     Elevated cholesterol      FCU (flexor carpi ulnaris) tenosynovitis 2013     Fracture of ulnar styloid 2013     Genital herpes      Headache(784.0)      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    Screen annually for signs  and symptoms of Adult T cell leukemia-lymphoma (BRANDEN) or SWYE-K-oiqmsdolnj myelopathy (HAM), also known as tropical spastic paraparesis (TSP): body aches, fevers, night sweats, loss of appetite or weight      Hyperlipidemia      Hypertension      Other abnormal glucose      Other genital herpes     On suppressive therapy      Past Surgical History:   Procedure Laterality Date     C APPENDECTOMY       C TMJ RECONSTRUCTION       EXCISE MASS LOWER EXTREMITY  3/28/2012    Procedure:EXCISE MASS LOWER EXTREMITY; Left Ankle Mass Excision ; Surgeon:KATTY HENSLEY; Location:[facility]     SLING BLADDER SUSPENSION WITH FASCIA ROSALEE       OB History    Para Term  AB Living   2 2 2 0 0 2   SAB TAB Ectopic Multiple Live Births   0 0 0 0 2      # Outcome Date GA Lbr Ajit/2nd Weight Sex Delivery Anes PTL Lv   2 Term 94 40w0d       SULEIMAN   1 Term 90 40w0d       SULEIMAN       Review of Systems   Constitutional: Negative for chills and fever.   HENT: Positive for ear pain and hearing loss. Negative for congestion and sore throat.    Eyes: Negative for pain and visual disturbance.   Respiratory: Negative for cough and shortness of breath.    Cardiovascular: Negative for chest pain, palpitations and peripheral edema.   Gastrointestinal: Positive for nausea. Negative for abdominal pain, constipation, diarrhea, heartburn and hematochezia.   Breasts:  Negative for tenderness, breast mass and discharge.   Genitourinary: Negative for dysuria, frequency, genital sores, hematuria, pelvic pain, urgency, vaginal bleeding and vaginal discharge.   Musculoskeletal: Positive for arthralgias. Negative for joint swelling and myalgias.   Skin: Negative for rash.   Neurological: Positive for dizziness, weakness and headaches. Negative for paresthesias.   Psychiatric/Behavioral: Positive for mood changes. The patient is not nervous/anxious.         OBJECTIVE:   /88 (BP Location: Left arm, Patient Position:  "Sitting, Cuff Size: Adult Large)   Pulse 80   Temp 97.9  F (36.6  C) (Oral)   Resp 15   Ht 1.549 m (5' 1\")   Wt 86.2 kg (190 lb)   LMP 03/15/2013 (Exact Date)   SpO2 97%   Breastfeeding? No   BMI 35.90 kg/m    Physical Exam  GENERAL APPEARANCE: healthy, alert and no distress  EYES: Eyes grossly normal to inspection, PERRL and conjunctivae and sclerae normal  HENT: ear canals and TM's normal, nose and mouth without ulcers or lesions, oropharynx clear and oral mucous membranes moist  NECK: no adenopathy, no asymmetry, masses, or scars and thyroid normal to palpation  RESP: lungs clear to auscultation - no rales, rhonchi or wheezes  BREAST: normal without masses, tenderness or nipple discharge and no palpable axillary masses or adenopathy  CV: regular rate and rhythm, normal S1 S2, no S3 or S4, no murmur, click or rub, no peripheral edema and peripheral pulses strong  ABDOMEN: soft, nontender, no hepatosplenomegaly, no masses and bowel sounds normal  MS: no musculoskeletal defects are noted and gait is age appropriate without ataxia  SKIN: no suspicious lesions or rashes  NEURO: Normal strength and tone, sensory exam grossly normal, mentation intact and speech normal  PSYCH:    Appearance: appropriately and casually dressed    Eye Contact: good  Behavior: calm  Attitude: cooperative    Speech:  slow, quiet, hesitant    Thought Form: organized    Thought Content: no evidence of psychotic thought    Mood: depressed    Affect: intensity is flat    Insight: good       ASSESSMENT/PLAN:     1. Routine general medical examination at a health care facility  - Pap imaged thin layer screen with HPV - recommended age 30 - 65 years (select HPV order below)  - HPV High Risk Types DNA Cervical    2. Recurrent major depressive disorder, in partial remission (H)  3. Anxiety  Uncontrolled but I'm concerned this may be secondary to untreated JAMES.  We'll address the fatigue and probable JAMES first.   - venlafaxine (EFFEXOR-XR) " "150 MG 24 hr capsule; Take 1 capsule (150 mg) by mouth daily  Dispense: 90 capsule; Refill: 3  - buPROPion (WELLBUTRIN XL) 150 MG 24 hr tablet; Take 1 tablet (150 mg) by mouth every morning  Dispense: 90 tablet; Refill: 3      4. Severe obesity (BMI 35.0-39.9) with comorbidity (H)  She has gained 20# in the past year.  Discussed that losing weight will be very difficult if she has untreated JAMES.     5. Elevated fasting glucose  - HEMOGLOBIN A1C  - Basic metabolic panel    6. Fatigue, unspecified type  We'll initiate lab workup but her history is most consistent with JAMES.  - TSH with free T4 reflex  - CBC with platelets differential    7. Excessive daytime sleepiness  I strongly recommended sleep eval ASAP.  - SLEEP EVALUATION & MANAGEMENT REFERRAL - ADULT -[facility] [place] [facility]  [phone] (Age 2 and up); Future    8. Herpes simplex infection of genitourinary system  - valACYclovir (VALTREX) 500 MG tablet; Take 1 tablet (500 mg) by mouth daily  Dispense: 90 tablet; Refill: 3    9. Lipid screening  - Lipid panel reflex to direct LDL Fasting    10. Need for prophylactic vaccination with tetanus-diphtheria (Td)  - TDAP, IM (10 - 64 YRS) - Adacel  - ADMIN 1st VACCINE    11. Encounter for herpes zoster vaccination  - ZOSTER VACCINE RECOMBINANT ADJUVANTED IM NJX  - EA ADD'L VACCINE     COUNSELING:  Reviewed preventive health counseling, as reflected in patient instructions      Estimated body mass index is 35.9 kg/m  as calculated from the following:    Height as of this encounter: 1.549 m (5' 1\").    Weight as of this encounter: 86.2 kg (190 lb).    BP Screening:   Last 3 BP Readings:    BP Readings from Last 3 Encounters:   04/15/19 137/88   07/18/18 134/78   02/16/18 130/85       The following was recommended to the patient:  Re-screen BP within a year and recommended lifestyle modifications  Weight management plan: Discussed healthy diet and exercise " guidelines        Counseling Resources:  ATP IV Guidelines  Pooled Cohorts Equation Calculator  Breast Cancer Risk Calculator  FRAX Risk Assessment  ICSI Preventive Guidelines  Dietary Guidelines for Americans, 2010  USDA's MyPlate  ASA Prophylaxis  Lung CA Screening    Veronica Solis MD  [facility]

## 2019-04-15 NOTE — NURSING NOTE
Screening Questionnaire for Adult Immunization    Are you sick today?   No   Do you have allergies to medications, food, a vaccine component or latex?   Yes   Have you ever had a serious reaction after receiving a vaccination?   No   Do you have a long-term health problem with heart disease, lung disease, asthma, kidney disease, metabolic disease (e.g. diabetes), anemia, or other blood disorder?   No   Do you have cancer, leukemia, HIV/AIDS, or any other immune system problem?   No   In the past 3 months, have you taken medications that affect  your immune system, such as prednisone, other steroids, or anticancer drugs; drugs for the treatment of rheumatoid arthritis, Crohn s disease, or psoriasis; or have you had radiation treatments?   No   Have you had a seizure, or a brain or other nervous system problem?   No   During the past year, have you received a transfusion of blood or blood     products, or been given immune (gamma) globulin or antiviral drug?   No   For women: Are you pregnant or is there a chance you could become        pregnant during the next month?   No   Have you received any vaccinations in the past 4 weeks?   No     Immunization questionnaire was positive for at least one answer.  Notified Dr. Solis.        Per orders of Dr. Solis, injection of Adacel and Shingrix given by Karine Guo. Patient instructed to remain in clinic for 15 minutes afterwards, and to report any adverse reaction to me immediately.       Screening performed by Karine Guo on 4/15/2019 at 11:53 AM.    Prior to injection, verified patient identity using patient's name and date of birth.  Due to injection administration, patient instructed to remain in clinic for 15 minutes  afterwards, and to report any adverse reaction to me immediately.    Adacel    Drug Amount Wasted:  None.  Vial/Syringe: Syringe    Shingrix    Drug Amount Wasted:  None.  Vial/Syringe: Single dose vial    The following medication was given:      MEDICATION: Adacel  ROUTE: IM  SITE: Deltoid - Left  DOSE: 0.5mL  LOT #: [id number]  :  [employer]  EXPIRATION DATE:  12/15/2020  NDC#: 08650-553-39       MEDICATION: Shingrix  ROUTE: IM  SITE: Deltoid - Left  DOSE: 0.5mL  LOT #: [id number]  [email or web address]  EXPIRATION DATE:  07/12/2021 06/01/2021  NDC#: 62062-840-34     [id number]     Karine Guo MA on 4/15/2019 at 11:53 AM

## 2019-04-16 LAB
ANION GAP SERPL CALCULATED.3IONS-SCNC: 4 MMOL/L (ref 3–14)
BUN SERPL-MCNC: 13 MG/DL (ref 7–30)
CALCIUM SERPL-MCNC: 9.1 MG/DL (ref 8.5–10.1)
CHLORIDE SERPL-SCNC: 106 MMOL/L (ref 94–109)
CHOLEST SERPL-MCNC: 275 MG/DL
CO2 SERPL-SCNC: 32 MMOL/L (ref 20–32)
CREAT SERPL-MCNC: 0.96 MG/DL (ref 0.52–1.04)
GFR SERPL CREATININE-BSD FRML MDRD: 64 ML/MIN/{1.73_M2}
GLUCOSE SERPL-MCNC: 135 MG/DL (ref 70–99)
HDLC SERPL-MCNC: 73 MG/DL
LDLC SERPL CALC-MCNC: 187 MG/DL
NONHDLC SERPL-MCNC: 202 MG/DL
POTASSIUM SERPL-SCNC: 4.6 MMOL/L (ref 3.4–5.3)
SODIUM SERPL-SCNC: 142 MMOL/L (ref 133–144)
TRIGL SERPL-MCNC: 73 MG/DL
TSH SERPL DL<=0.005 MIU/L-ACNC: 2.06 MU/L (ref 0.4–4)

## 2019-04-16 ASSESSMENT — PATIENT HEALTH QUESTIONNAIRE - PHQ9: SUM OF ALL RESPONSES TO PHQ QUESTIONS 1-9: 19

## 2019-04-16 NOTE — RESULT ENCOUNTER NOTE
Job Malcolm,  Your TSH (thyroid function test) and complete blood counts are normal.  Your lipid panel (cholesterol) results are worse than last year and your blood sugar and Hemoglobin A1C (a test assessing overall blood sugar control for the last 3 months) are both up - consistent with a diagnosis of early diabetes.  I'm sorry to be the bearer of that news.  Let's talk further about this - please schedule a follow-up with me.  Also, I think this makes a sleep eval more imperative so do schedule that as well.    Veronica Solis MD

## 2019-04-17 LAB
COPATH REPORT: NORMAL
PAP: NORMAL

## 2019-04-19 LAB
FINAL DIAGNOSIS: NORMAL
HPV HR 12 DNA CVX QL NAA+PROBE: NEGATIVE
HPV16 DNA SPEC QL NAA+PROBE: NEGATIVE
HPV18 DNA SPEC QL NAA+PROBE: NEGATIVE
SPECIMEN DESCRIPTION: NORMAL
SPECIMEN SOURCE CVX/VAG CYTO: NORMAL

## 2019-05-20 ENCOUNTER — PRE VISIT (OUTPATIENT)
Dept: SLEEP MEDICINE | Facility: CLINIC | Age: 59
End: 2019-05-20

## 2019-05-20 NOTE — TELEPHONE ENCOUNTER
"  1.  Reason for the visit:   Fatigue.  Feels \"fuzzy, lethargic\".  More headaches.  Excessive daytime sleepiness.  No problems falling asleep - around 11 pm.  Wakes up a few times in the night.  Sometimes awakens to snoring or gasping.  Gets up around 7:30 am.  Never awakens feeling rested.  Hard time getting out of bed.  More headaches - sometimes in the morning when she wakes up - usually in the later afternoon.   2.  [facility]  3.  Previous Sleep Doctor or Pulmonlogist (clinic name)?  None   4.  Records, Procedures, Imaging, and Labs (see below)  No records to obtain        All NOTES from previous office visits that pertain to why they are being seen in the Sleep Center    Previous Sleep Studies, Chest CT, Echos and reports that pertain to why they are seeing Sleep Center    All Sleep records that have been done in the last 2 years that pertain to why they are seeing Sleep Center            Are they being seen for continuation of care for Cpap/Bipap/Avap/Trilogy/Dental Device? none    If yes to above Who and Where was Device issued/currently getting supplies from? na    Are you currently on \"Supplemental Oxygen\" during the day or night?   na                                                                                                                                                      Please remind pt to bring Cpap machine and ask to arrive 15 minutes early to appointment due traffic and congestion                                                 5. Pt Sleep Center Packet received Message left asking pt to arrive 30 minutes early to appointment if no packet received.        Yes: \"please make sure that you bring this to your appointment completed, either the doctor will not see you until this completed or you may be asked to reschedule your appointment.\"     No: mail or email to the pt and explain, \"please make sure that you bring this to your " "appointment completed, either the doctor will not see you until this completed or you may be asked to reschedule your appointment.\"     ~If pt coming early to fill packet out, ask that they come 30 minutes prior to their appointment~     6. Has the pt's medication list been updated and preferred pharmacy added?     7. Has the allergy list been reviewed?    \"Thank you for choosing [facility] and we look forward to seeing you at your upcoming appointment\"     "

## 2019-05-28 ENCOUNTER — OFFICE VISIT (OUTPATIENT)
Dept: SLEEP MEDICINE | Facility: CLINIC | Age: 59
End: 2019-05-28
Attending: FAMILY MEDICINE
Payer: COMMERCIAL

## 2019-05-28 VITALS
WEIGHT: 192 LBS | SYSTOLIC BLOOD PRESSURE: 146 MMHG | OXYGEN SATURATION: 96 % | HEART RATE: 86 BPM | HEIGHT: 61 IN | RESPIRATION RATE: 16 BRPM | DIASTOLIC BLOOD PRESSURE: 86 MMHG | BODY MASS INDEX: 36.25 KG/M2

## 2019-05-28 DIAGNOSIS — G47.19 EXCESSIVE DAYTIME SLEEPINESS: ICD-10-CM

## 2019-05-28 DIAGNOSIS — E83.19 OTHER DISORDERS OF IRON METABOLISM: Primary | ICD-10-CM

## 2019-05-28 PROCEDURE — 99205 OFFICE O/P NEW HI 60 MIN: CPT | Performed by: INTERNAL MEDICINE

## 2019-05-28 ASSESSMENT — MIFFLIN-ST. JEOR: SCORE: 1378.29

## 2019-05-28 NOTE — PROGRESS NOTES
"Sleep Consultation Note:    Date on this visit: 5/28/2019    Karlee Emery is sent by Veronica Solis for a sleep consultation regarding evaluation for possible sleep apnea    Primary Physician: Veronica Solis     Chief complaint: \" I am experiencing chronic fatigue.  I am tired all the time.  My doctor recommended a sleep study.\"    Karlee Emery 58 year old with PMH of HTLVII, depression, diabetes,HTN,  hyperlipidemia, who reports chronic fatigue. This has been going on for t one year.  She has not had a previous sleep study.    She reports snoring, almost on a nightly basis, loud in intensity and witnessed apneas during sleep.  She reports occasional snort arousals and awakenings due to gasping for air but not due to choking.  She reports morning headaches and dry mouth.  She sleeps in all body positions.    During work days her bedtime is 10 PM and she wakes up with an alarm at 7:30 AM.  She had she hits the snooze several times before she is out of the bed.  During nonworking days her bedtime is 11:30 PM and she wakes up spontaneously between 930 to 10 AM.  It takes 5 to 10 minutes for her to fall asleep.  She reports 4-5 nocturnal awakenings either due to uncomfortable position or snoring or dreams or noises(parents, kid).  She  is able to resume sleep within 5 to 10 minutes after the awakening.  She does not feel rested upon awakening from sleep.  She is tired as the day goes by and also reports excessive daytime sleepiness.  She endorses an [place] sleepiness score of 18 out of 24.  She reports drowsiness while driving and has dozed off at stoplights sometimes, but fortunately has not encountered any motor vehicle accidents because of drowsy driving.  She has occasionally dozed off while at work.  She naps 5 out of 7 days a week, for 15 minutes and feels better after she wakes up from the nap.  She eats in the bed, watches television in the bed and uses electronics in bed.    She reports symptoms " of restless legs syndrome.  She initially had the symptoms during both her pregnancies.  The symptoms had resolved for several years since then until recently she has been menopausal.  Her legs want to get up and run. She has an urge to move the legs,  moving the legs and stretching sometimes  helps.  The symptoms occasionally affect her ability to fall asleep.  She has tried Tylenol over-the-counter the sometimes which helps. Symptoms occur about 2-3 times per week.    She reports nightmares 2-3 times per week.  She used to sleep walk  during childhood but not as an adult.  She reports dream enactment behavior-punching in the air, lashing her arms in the air, hitting, kicking and screaming. She gave an example of stomping on somebody trying to kill the person and while doing that she started kicing her legs.  There has not been any trauma injury to self or her partner with these behaviors.  She denies any changes in the sense of olfaction.    She reports bruxism and she wears retainers.    She denies symptoms of cataplexy or sleep paralysis or hallucinations, though sometimes she cannot remember if something was a dream or really happened.    Allergies:    Allergies   Allergen Reactions     Sulfa Drugs Nausea and Vomiting and Hives       Medications:    Current Outpatient Medications   Medication Sig Dispense Refill     acetaminophen (TYLENOL) 325 MG tablet Take 2 tablets by mouth every 4 hours as needed for other (mild pain). 100 tablet 0     aspirin 81 MG tablet Take  by mouth daily. (FMG)  3     buPROPion (WELLBUTRIN XL) 150 MG 24 hr tablet Take 1 tablet (150 mg) by mouth every morning 90 tablet 3     Calcium Carbonate-Vitamin D (CALCIUM + D PO) Take 1 tablet by mouth daily.       loratadine (CLARITIN) 10 MG tablet Take 1 tablet by mouth daily. 90 tablet 0     MULTIVITAMIN TABS   OR 1 TABLET DAILY       valACYclovir (VALTREX) 500 MG tablet Take 1 tablet (500 mg) by mouth daily 90 tablet 3     venlafaxine  (EFFEXOR-XR) 150 MG 24 hr capsule Take 1 capsule (150 mg) by mouth daily 90 capsule 3       Problem List:  Patient Active Problem List    Diagnosis Date Noted     Mixed incontinence 01/23/2018     Priority: Medium     Somatic dysfunction of pelvic region 01/23/2018     Priority: Medium     Myalgia of pelvic floor 01/23/2018     Priority: Medium     Ovarian cyst, right 07/18/2016     Priority: Medium     Simple cyst seen on pelvic US 7/2016 in post-menopausal woman       Endometrial thickening on ultra sound 07/18/2016     Priority: Medium     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       Mixed stress and urge urinary incontinence 01/07/2013     Priority: Medium     Sling procedure 2011  Recurrence of symptoms 2012.  Re-referred to urology.       Ankle joint pain 05/24/2012     Priority: Medium     Pure hypercholesterolemia 11/21/2011     Priority: Medium     Moderate major depression (H) 12/09/2010     Priority: Medium     Severe, hard to find right medication and dose.  Has been on 200 mg of zoloft with Dr. Canchola since 2004       Herpes simplex infection of genitourinary system 08/16/2004     Priority: Medium     On suppressive therapy daily.          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    Screen annually for signs and symptoms of Adult T cell leukemia-lymphoma (BRANDEN) or WCEU-B-zgljbwugfa myelopathy (HAM), also known as tropical spastic paraparesis (TSP): body aches, fevers, night sweats, loss of appetite or weight      Hyperlipidemia      Hypertension      Other abnormal glucose      Other genital herpes     On suppressive therapy     Past Surgical History:   Procedure Laterality Date     C APPENDECTOMY  1997     C TMJ RECONSTRUCTION  1986     EXCISE MASS LOWER EXTREMITY  3/28/2012    Procedure:EXCISE MASS LOWER EXTREMITY; Left Ankle Mass Excision ; Surgeon:KATTY HENSLEY; Location:[facility]     SLING BLADDER SUSPENSION WITH FASCIA ROSALEE         Social History:  Karlee, lives with her wife Marbella and her adult son Blue.  She currently works as a community .  She does drink caffeine, about one 16 ounce cup of coffee/day.  Occasionally a can of soda pop at lunch. Last caffeine intake is  not within 6 hours of bed time.  She drinks alcohol, 1-2 drinks/day, before bed and she reports she sleeps  better with it, but she does not use alcohol as a sleep aid.  Patient is a  former smoker, quit since in 20s  Patient has smoked marijuana during her 20s and has had a few times in the last 7 years, but she did not really like it. She does not use any other illicit drugs.    Social History     Socioeconomic History     Marital status:      Spouse name: Marbella     Number of children: 2     Years of education: Not on file     Highest education level: Not on file   Occupational History     Occupation: Admin     Employer: [employer]   Social Needs     Financial resource strain: Not on file     Food insecurity:     Worry: Not on file     Inability: Not on file     Transportation needs:     Medical: Not on file     Non-medical: Not on file   Tobacco Use     Smoking status: Former Smoker     Last  attempt to quit: 1980     Years since quittin.4     Smokeless tobacco: Never Used   Substance and Sexual Activity     Alcohol use: Yes     Alcohol/week: 1.2 oz     Types: 2 Standard drinks or equivalent per week     Drug use: No     Sexual activity: Yes     Partners: Female   Lifestyle     Physical activity:     Days per week: Not on file     Minutes per session: Not on file     Stress: Not on file   Relationships     Social connections:     Talks on phone: Not on file     Gets together: Not on file     Attends Islam service: Not on file     Active member of club or organization: Not on file     Attends meetings of clubs or organizations: Not on file     Relationship status: Not on file     Intimate partner violence:     Fear of current or ex partner: Not on file     Emotionally abused: Not on file     Physically abused: Not on file     Forced sexual activity: Not on file   Other Topics Concern      Service No     Blood Transfusions Yes     Comment: 1984     Caffeine Concern No     Occupational Exposure Not Asked     Hobby Hazards Not Asked     Sleep Concern No     Stress Concern No     Weight Concern Yes     Comment: trying to loose     Special Diet Not Asked     Back Care Not Asked     Exercise Yes     Comment: 3 x wk     Bike Helmet Not Asked     Seat Belt Yes     Self-Exams No     Parent/sibling w/ CABG, MI or angioplasty before 65F 55M? No   Social History Narrative    Caffeine intake/servings daily - 4    Calcium intake/servings daily - 2    Exercise 2 times weekly - describe walking, yoga, wii fit    Sunscreen used - Yes    Seatbelts used - Yes    Guns stored in the home - No    Self Breast Exam - No    Pap test up to date -  Yes    Eye exam up to date -  No    Dental exam up to date -  Yes    DEXA scan up to date -  No    Flex Sig/Colonoscopy up to date -  No    Mammography up to date -  No    Immunizations reviewed and up to date - Yes and TD today    Abuse: Current or Past (Physical,  Sexual or Emotional) - No    Do you feel safe in your environment - Yes    Do you cope well with stress - Yes    Do you suffer from insomnia - No    Last updated by: Jacqui Hastings  2/25/2009    Jacqui Hastings M.A.                   Family History:  Family history pertinent sleep disorders: Her son sndavid  Family History   Problem Relation Age of Onset     Heart Disease Father         blockage     Lipids Father      Neurologic Disorder Father         migraines     Gastrointestinal Disease Father         ulcers     Diabetes Father      Cancer Father         lung     Cerebrovascular Disease Maternal Grandmother      Diabetes Maternal Grandmother      Cerebrovascular Disease Maternal Grandfather      Cerebrovascular Disease Paternal Grandmother      Cerebrovascular Disease Paternal Grandfather      Diabetes Mother      Graves' disease Sister      Graves' disease Sister      Schizophrenia Son      Thyroid Disease Sister      Thyroid Disease Sister      Diabetes Sister        Review of Systems:  A complete review of systems reviewed by me is negative with the exeption of what has been mentioned in the history of present illness and symptoms listed below, highlighted in red:   CONSTITUTIONAL: weight gain, 30 pounds in the last year , night sweats   EYES: Ocular migraines; NEGATIVE for changes in vision, blind spots, double vision.  ENT:  tinnitus, sinus pain, post-nasal drip, NEGATIVE for ear pain, sore throat, runny nose, bloody nose  CARDIAC: Occasional fast heartbeats or fluttering in chest, she has even noticed that sometimes when she is laying down at night . NEGATIVE for chest pain or pressure, breathlessness when lying flat, swollen legs or swollen feet.  NEUROLOGIC: headaches, numbness in the hands.  DERMATOLOGIC: NEGATIVE for rashes, new moles or change in mole(s)  PULMONARY:  SOB with activity, dry cough, NEGATIVE SOB at rest, productive cough, coughing up blood, wheezing or whistling when breathing.   "  GASTROINTESTINAL: NEGATIVE for nausea or vomitting, loose or watery stools, fat or grease in stools, constipation, abdominal pain, bowel movements black in color or blood noted.  GENITOURINARY: NEGATIVE for pain during urination, blood in urine, urinating more frequently than usual  MUSCULOSKELETAL: Right hip pain, left hand and thumb pain, both knees , plantar fasciitis.  ENDOCRINE:  Diabetes; NEGATIVE for increased thirst or urination,  LYMPHATIC: NEGATIVE for swollen lymph nodes, lumps or bumps in the breasts or nipple discharge.  PSYCHE:  Depression, controlled with medications denies suicidal ideations or thoughts of harming others; NEGATIVE for anxiety    Physical Examination:  Vitals: /86   Pulse 86   Resp 16   Ht 1.549 m (5' 1\")   Wt 87.1 kg (192 lb)   LMP 03/15/2013 (Exact Date)   SpO2 96%   BMI 36.28 kg/m    BMI= Body mass index is 36.28 kg/m .    Neck Cir (cm): 35 cm    [place] Total Score 5/28/2019   Total score - [place] 18       General: No apparent distress, appropriately groomed  Head: Normocephalic, atraumatic  Eyes: no icterus, PERRL  Nose: Hypertrophied inferior nasal turbinates bilaterally but both nares patent.  No septal deviation noted.  Mouth: op pink and moist, Orophraynx: Opening is narrowed, uvula: Thick   Mallampati Class: IV.   Tonsillar Stage: 1+  Neck: Supple, Circumference: 13.7 inches  Cardiac: Regular S1,S2; no gallops or murmurs  Chest: Symmetric air movement, lungs clear to auscultation bilaterally; no rales or rhonchi  Musculoskeletal: No edema noted  Skin: Warm, dry, intact  Psych: Mood pleasant, affect congruent  Neuro:  Mental status: Awake, alert, attentive, oriented.  Motor: Tone within normal limit  Gait: Normal width, stride length   No focal neurological deficit    Impression/Plan:  --Snoring, observed apneas, nonrestorative sleep with fatigue and excessive daytime sleepiness.  Possible obstructive sleep apnea.  The diagnosis is suggested by the patient's " history, body habitus, oropharyngeal anatomy and postmenopausal status and she has several cardiovascular risk factors, including obesity, hypertension, hyperlipidemia and diabetes.  STOP BANG score is 6/8.   Patient also reports parasomnias- dream enactment behavior.   Recommend in-lab sleep study  to evaluate the muscle tone  during REM sleep, since this evaluation is not feasible during HST.  However patient was not interested in pursuing the supervised sleep study, and  wanted to go with the option of HST.   We discussed that if the HST is negative for JAMES we may consider supervised polysomnography.    Diagnosis and treatment for JAMES have been discussed. Complications of untreated JAMES have also been discussed.    --Patient also reports parasomnias- dream enactment behavior. This could be due to untreated JAMES or the SNRI medication.  We discussed the safety measures of the sleep environment to prevent trauma to self/partner.  We discussed about REM sleep behavior disorder and the association of RBD with neurodegenerative conditions such as Parkinson's disease.    --For restless legs syndrome, will check ferritin level.  Will consider iron supplementation if less than 75ng/mL. Encouraged to try non-pharmacological treatments as well - hot bath/shower, stretching, heating pads, avoiding caffeine/alcohol/chocolate prior to bed.    --She has a tendency towards delayed sleep phase.  She was encouraged to follow a regular sleep schedule  and good sleep hygiene/behavioral techniques.    Patient  has been encouraged to continue to not smoke.    She was instructed to avoid drinking alcohol within 3 hours before bed and discussed about the effect of alcohol on sleep.    We discussed weight management with  healthy diet, and exercise.    Patient was strongly advised to avoid driving, operating any heavy machinery or other hazardous situations while drowsy or sleepy.  Patient was counseled on the importance of driving while  "alert, to pull over if drowsy, or nap before getting into the vehicle if sleepy.        She will follow up with me in approximately 10-14 days after her home sleep study has been competed to review the results and discuss plan of care.      CC: Veronica Solis    The above note was dictated using voice recognition software. Although reviewed after completion, some word and grammatical error may remain . Please contact the author for any clarifications.    \"I spent a total of 60 minutes face to face with Karlee Emery during today's office visit. Over 50% of this time was spent counseling the patient and  coordinating care regarding sleep apnea, dream enactment behavior/RBD, sleep hygiene,  regularizing sleep schedule, RLS.\"       Kt Clay MD   of Medicine,  Division of Pulmonary/Sleep Medicine  [facility].                    "

## 2019-05-28 NOTE — PATIENT INSTRUCTIONS
Your BMI is Body mass index is 36.28 kg/m .  Weight management is a personal decision.  If you are interested in exploring weight loss strategies, the following discussion covers the approaches that may be successful. Body mass index (BMI) is one way to tell whether you are at a healthy weight, overweight, or obese. It measures your weight in relation to your height.  A BMI of 18.5 to 24.9 is in the healthy range. A person with a BMI of 25 to 29.9 is considered overweight, and someone with a BMI of 30 or greater is considered obese. More than two-thirds of American adults are considered overweight or obese.  Being overweight or obese increases the risk for further weight gain. Excess weight may lead to heart disease and diabetes.  Creating and following plans for healthy eating and physical activity may help you improve your health.  Weight control is part of healthy lifestyle and includes exercise, emotional health, and healthy eating habits. Careful eating habits lifelong are the mainstay of weight control. Though there are significant health benefits from weight loss, long-term weight loss with diet alone may be very difficult to achieve- studies show long-term success with dietary management in less than 10% of people. Attaining a healthy weight may be especially difficult to achieve in those with severe obesity. In some cases, medications, devices and surgical management might be considered.  What can you do?  If you are overweight or obese and are interested in methods for weight loss, you should discuss this with your provider.     Consider reducing daily calorie intake by 500 calories.     Keep a food journal.     Avoiding skipping meals, consider cutting portions instead.    Diet combined with exercise helps maintain muscle while optimizing fat loss. Strength training is particularly important for building and maintaining muscle mass. Exercise helps reduce stress, increase energy, and improves fitness.  "Increasing exercise without diet control, however, may not burn enough calories to loose weight.       Start walking three days a week 10-20 minutes at a time    Work towards walking thirty minutes five days a week     Eventually, increase the speed of your walking for 1-2 minutes at time    In addition, we recommend that you review healthy lifestyles and methods for weight loss available through the National Institutes of Health patient information sites:  http://win.niddk.nih.gov/publications/index.htm    And look into health and wellness programs that may be available through your health insurance provider, employer, local community center, or polly club.    Weight management plan: Patient was referred to their PCP to discuss a diet and exercise plan.    MY TREATMENT INFORMATION FOR SLEEP APNEA-  Karlee Emery    [facility]  SLEEP CENTER :      MY CONTACT NUMBER:     Am I having a sleep study at a sleep center?  Make sure you have an appointment for the study before you leave!    Am I having a home sleep study?  Watch this video:  https://www.ITelagen.com/watch?v=CteI_GhyP9g&[email or web address]  Please verify your insurance coverage with your insurance carrier    Frequently asked questions:  1. What is Obstructive Sleep Apnea (JAMES)? JAMES is the most common type of sleep apnea. Apnea means, \"without breath.\"  Apnea is most often caused by narrowing or collapse of the upper airway as muscles relax during sleep.   Almost everyone has occasional apneas. Most people with sleep apnea have had brief interruptions at night frequently for many years.  The severity of sleep apnea is related to how frequent and severe the events are.   2. What are the consequences of JAMES? Symptoms include: feeling sleepy during the day, snoring loudly, gasping or stopping of breathing, trouble sleeping, and occasionally morning headaches or heartburn at night.  Sleepiness can be " serious and even increase the risk of falling asleep while driving. Other health consequences may include development of high blood pressure and other cardiovascular disease in persons who are susceptible. Untreated JAMES  can contribute to heart disease, stroke and diabetes.   3. What are the treatment options? In most situations, sleep apnea is a lifelong disease that must be managed with daily therapy. Medications are not effective for sleep apnea and surgery is generally not considered until other therapies have been tried. Your treatment is your choice . Continuous Positive Airway (CPAP) works right away and is the therapy that is effective in nearly everyone. An oral device to hold your jaw forward is usually the next most reliable option. Other options include postioning devices (to keep you off your back), weight loss, and surgery including a tongue pacing device. There is more detail about some of these options below.    Important tips for using CPAP and similar devices   Know your equipment:  CPAP is continuous positive airway pressure that prevents obstructive sleep apnea by keeping the throat from collapsing while you are sleeping. In most cases, the device is  smart  and can slowly self-adjusts if your throat collapses and keeps a record every day of how well you are treated-this information is available to you and your care team.  BPAP is bilevel positive airway pressure that keeps your throat open and also assists each breath with a pressure boost to maintain adequate breathing.  Special kinds of BPAP are used in patients who have inadequate breathing from lung or heart disease. In most cases, the device is  smart  and can slowly self-adjusts to assist breathing. Like CPAP, the device keeps a record of how well you are treated.  Your mask is your connection to the device. You get to choose what feels most comfortable and the staff will help to make sure if fits. Here: are some examples of the different  masks that are available:       Key points to remember on your journey with sleep apnea:  1. Sleep study.  PAP devices often need to be adjusted during a sleep study to show that they are effective and adjusted right.  2. Good tips to remember: Try wearing just the mask during a quiet time during the day so your body adapts to wearing it. A humidifier is recommended for comfort in most cases to prevent drying of your nose and throat. Allergy medication from your provider may help you if you are having nasal congestion.  3. Getting settled-in. It takes more than one night for most of us to get used to wearing a mask. Try wearing just the mask during a quiet time during the day so your body adapts to wearing it. A humidifier is recommended for comfort in most cases. Our team will work with you carefully on the first day and will be in contact within 4 days and again at 2 and 4 weeks for advice and remote device adjustments. Your therapy is evaluated by the device each day.   4. Use it every night. The more you are able to sleep naturally for 7-8 hours, the more likely you will have good sleep and to prevent health risks or symptoms from sleep apnea. Even if you use it 4 hours it helps. Occasionally all of us are unable to use a medical therapy, in sleep apnea, it is not dangerous to miss one night.   5. Communicate. Call our skilled team on the number provided on the first day if your visit for problems that make it difficult to wear the device. Over 2 out of 3 patients can learn to wear the device long-term with help from our team. Remember to call our team or your sleep providers if you are unable to wear the device as we may have other solutions for those who cannot adapt to mask CPAP therapy. It is recommended that you sleep your sleep provider within the first 3 months and yearly after that if you are not having problems.   6. Use it for your health. We encourage use of CPAP masks during daytime quiet periods to  allow your face and brain to adapt to the sensation of CPAP so that it will be a more natural sensation to awaken to at night or during naps. This can be very useful during the first few weeks or months of adapting to CPAP though it does not help medically to wear CPAP during wakefulness and  should not be used as a strategy just to meet guidelines.  7. Take care of your equipment. Make sure you clean your mask and tubing using directions every day and that your filter and mask are replaced as recommended or if they are not working.     BESIDES CPAP, WHAT OTHER THERAPIES ARE THERE?    Positioning Device  Positioning devices are generally used when sleep apnea is mild and only occurs on your back.This example shows a pillow that straps around the waist. It may be appropriate for those whose sleep study shows milder sleep apnea that occurs primarily when lying flat on one's back. Preliminary studies have shown benefit but effectiveness at home may need to be verified by a home sleep test. These devices are generally not covered by medical insurance.  Examples of devices that maintain sleeping on the back to prevent snoring and mild sleep apnea.    Belt type body positioner  Http://SonarMed/    Electronic reminder  Http://nightshifttherapy.com/  Http://www.Badongo.com.[employer] ([employer]au/      Oral Appliance  What is oral appliance therapy?  An oral appliance device fits on your teeth at night like a retainer used after having braces. The device is made by a specialized dentist and requires several visits over 1-2 months before a manufactured device is made to fit your teeth and is adjusted to prevent your sleep apnea. Once an oral device is working properly, snoring should be improved. A home sleep test may be recommended at that time if to determine whether the sleep apnea is adequately treated.       Some things to remember:  -Oral devices are often, but not always, covered by your medical insurance. Be sure to check with your  insurance provider.   -If you are referred for oral therapy, you will be given a list of specialized dentists to consider or you may choose to visit the Web site of the American Academy of Dental Sleep Medicine  -Oral devices are less likely to work if you have severe sleep apnea or are extremely overweight.     More detailed information  An oral appliance is a small acrylic device that fits over the upper and lower teeth  (similar to a retainer or a mouth guard). This device slightly moves jaw forward, which moves the base of the tongue forward, opens the airway, improves breathing for effective treat snoring and obstructive sleep apnea in perhaps 7 out of 10 people .  The best working devices are custom-made by a dental device  after a mold is made of the teeth 1, 2, 3.  When is an oral appliance indicated?  Oral appliance therapy is recommended as a first-line treatment for patients with primary snoring, mild sleep apnea, and for patients with moderate sleep apnea who prefer appliance therapy to use of CPAP4, 5. Severity of sleep apnea is determined by sleep testing and is based on the number of respiratory events per hour of sleep.   How successful is oral appliance therapy?  The success rate of oral appliance therapy in patients with mild sleep apnea is 75-80% while in patients with moderate sleep apnea it is 50-70%. The chance of success in patients with severe sleep apnea is 40-50%. The research also shows that oral appliances have a beneficial effect on the cardiovascular health of JAMES patients at the same magnitude as CPAP therapy7.  Oral appliances should be a second-line treatment in cases of severe sleep apnea, but if not completely successful then a combination therapy utilizing CPAP plus oral appliance therapy may be effective. Oral appliances tend to be effective in a broad range of patients although studies show that the patients who have the highest success are females, younger patients,  those with milder disease, and less severe obesity. 3, 6.   Finding a dentist that practices dental sleep medicine  Specific training is available through the American Academy of Dental Sleep Medicine for dentists interested in working in the field of sleep. To find a dentist who is educated in the field of sleep and the use of oral appliances, near you, visit the Web site of the American Academy of Dental Sleep Medicine.    References  1. Aminta, et al. Objectively measured vs self-reported compliance during oral appliance therapy for sleep-disordered breathing. Chest 2013; 144(5): 1275-3369.  2. Kandice et al. Objective measurement of compliance during oral appliance therapy for sleep-disordered breathing. Thorax 2013; 68(1): 91-96.  3. Coby et al. Mandibular advancement devices in 620 men and women with JAMES and snoring: tolerability and predictors of treatment success. Chest 2004; 125: 9278-3918.  4. Jim, et al. Oral appliances for snoring and JAMES: a review. Sleep 2006; 29: 244-262.  5. Patricia et al. Oral appliance treatment for JAMES: an update. J Clin Sleep Med 2014; 10(2): 215-227.  6. Maryuri, et al. Predictors of OSAH treatment outcome. J Dent Res 2007; 86: 9186-4871.      Weight Loss:    Weight loss is a long-term strategy that may improve sleep apnea in some patients.    Weight management is a personal decision and the decision should be based on your interest and the potential benefits.  If you are interested in exploring weight loss strategies, the following discussion covers the impact on weight loss on sleep apnea and the approaches that may be successful.    Being overweight does not necessarily mean you will have health consequences.  Those who have BMI over 35 or over 27 with existing medical conditions carries greater risk.   Weight loss decreases severity of sleep apnea in most people with obesity. For those with mild obesity who have developed snoring with weight gain, even  15-30 pound weight loss can improve and occasionally eliminate sleep apnea.  Structured and life-long dietary and health habits are necessary to lose weight and keep healthier weight levels.     Though there may be significant health benefits from weight loss, long-term weight loss is very difficult to achieve- studies show success with dietary management in less than 10% of people. In addition, substantial weight loss may require years of dietary control and may be difficult if patients have severe obesity. In these cases, surgical management may be considered.  Finally, older individuals who have tolerated obesity without health complications may be less likely to benefit from weight loss strategies.        Your BMI is Body mass index is 36.28 kg/m .  Weight management is a personal decision.  If you are interested in exploring weight loss strategies, the following discussion covers the approaches that may be successful. Body mass index (BMI) is one way to tell whether you are at a healthy weight, overweight, or obese. It measures your weight in relation to your height.  A BMI of 18.5 to 24.9 is in the healthy range. A person with a BMI of 25 to 29.9 is considered overweight, and someone with a BMI of 30 or greater is considered obese. More than two-thirds of American adults are considered overweight or obese.  Being overweight or obese increases the risk for further weight gain. Excess weight may lead to heart disease and diabetes.  Creating and following plans for healthy eating and physical activity may help you improve your health.  Weight control is part of healthy lifestyle and includes exercise, emotional health, and healthy eating habits. Careful eating habits lifelong are the mainstay of weight control. Though there are significant health benefits from weight loss, long-term weight loss with diet alone may be very difficult to achieve- studies show long-term success with dietary management in less than  10% of people. Attaining a healthy weight may be especially difficult to achieve in those with severe obesity. In some cases, medications, devices and surgical management might be considered.  What can you do?  If you are overweight or obese and are interested in methods for weight loss, you should discuss this with your provider.     Consider reducing daily calorie intake by 500 calories.     Keep a food journal.     Avoiding skipping meals, consider cutting portions instead.    Diet combined with exercise helps maintain muscle while optimizing fat loss. Strength training is particularly important for building and maintaining muscle mass. Exercise helps reduce stress, increase energy, and improves fitness. Increasing exercise without diet control, however, may not burn enough calories to loose weight.       Start walking three days a week 10-20 minutes at a time    Work towards walking thirty minutes five days a week     Eventually, increase the speed of your walking for 1-2 minutes at time    In addition, we recommend that you review healthy lifestyles and methods for weight loss available through the National Institutes of Health patient information sites:  http://win.niddk.nih.gov/publications/index.htm    And look into health and wellness programs that may be available through your health insurance provider, employer, local community center, or polly club.          Surgery:    Surgery for obstructive sleep apnea is considered generally only when other therapies fail to work. Surgery may be discussed with you if you are having a difficult time tolerating CPAP and or when there is an abnormal structure that requires surgical correction.  Nose and throat surgeries often enlarge the airway to prevent collapse.  Most of these surgeries create pain for 1-2 weeks and up to half of the most common surgeries are not effective throughout life.  You should carefully discuss the benefits and drawbacks to surgery with your  sleep provider and surgeon to determine if it is the best solution for you.   More information  Surgery for JAMES is directed at areas that are responsible for narrowing or complete obstruction of the airway during sleep.  There are a wide range of procedures available to enlarge and/or stabilize the airway to prevent blockage of breathing in the three major areas where it can occur: the palate, tongue, and nasal regions.  Successful surgical treatment depends on the accurate identification of the factors responsible for obstructive sleep apnea in each person.  A personalized approach is required because there is no single treatment that works well for everyone.  Because of anatomic variation, consultation with an examination by a sleep surgeon is a critical first step in determining what surgical options are best for each patient.  In some cases, examination during sedation may be recommended in order to guide the selection of procedures.  Patients will be counseled about risks and benefits as well as the typical recovery course after surgery. Surgery is typically not a cure for a person s JAMES.  However, surgery will often significantly improve one s JAMES severity (termed  success rate ).  Even in the absence of a cure, surgery will decrease the cardiovascular risk associated with OSA7; improve overall quality of life8 (sleepiness, functionality, sleep quality, etc).      Palate Procedures:  Patients with JAMES often have narrowing of their airway in the region of their tonsils and uvula.  The goals of palate procedures are to widen the airway in this region as well as to help the tissues resist collapse.  Modern palate procedure techniques focus on tissue conservation and soft tissue rearrangement, rather than tissue removal.  Often the uvula is preserved in this procedure. Residual sleep apnea is common in patient after pharyngoplasty with an average reduction in sleep apnea events of 33%2.      Tongue  Procedures:  ExamWhile patients are awake, the muscles that surround the throat are active and keep this region open for breathing. These muscles relax during sleep, allowing the tongue and other structures to collapse and block breathing.  There are several different tongue procedures available.  Selection of a tongue base procedure depends on characteristics seen on physical exam.  Generally, procedures are aimed at removing bulky tissues in this area or preventing the back of the tongue from falling back during sleep.  Success rates for tongue surgery range from 50-62%3.    Hypoglossal Nerve Stimulation:  Hypoglossal nerve stimulation has recently received approval from the [place] Food and Drug Administration for the treatment of obstructive sleep apnea.  This is based on research showing that the system was safe and effective in treating sleep apnea6.  Results showed that the median AHI score decreased 68%, from 29.3 to 9.0. This therapy uses an implant system that senses breathing patterns and delivers mild stimulation to airway muscles, which keeps the airway open during sleep.  The system consists of three fully implanted components: a small generator (similar in size to a pacemaker), a breathing sensor, and a stimulation lead.  Using a small handheld remote, a patient turns the therapy on before bed and off upon awakening.    Candidates for this device must be greater than 22 years of age, have moderate to severe JAMES (AHI between 20-65), BMI less than 32, have tried CPAP/oral appliance without success, and have appropriate upper airway anatomy (determined by a sleep endoscopy performed by Dr. Beck).    Hypoglossal Nerve Stimulation Pathway:    The sleep surgeon s office will work with the patient through the insurance prior-authorization process (including communications and appeals).    Nasal Procedures:  Nasal obstruction can interfere with nasal breathing during the day and night.  Studies have  shown that relief of nasal obstruction can improve the ability of some patients to tolerate positive airway pressure therapy for obstructive sleep apnea1.  Treatment options include medications such as nasal saline, topical corticosteroid and antihistamine sprays, and oral medications such as antihistamines or decongestants. Non-surgical treatments can include external nasal dilators for selected patients. If these are not successful by themselves, surgery can improve the nasal airway either alone or in combination with these other options.      Combination Procedures:  Combination of surgical procedures and other treatments may be recommended, particularly if patients have more than one area of narrowing or persistent positional disease.  The success rate of combination surgery ranges from 66-80%2,3.    References  1. Shanti CORONEL. The Role of the Nose in Snoring and Obstructive Sleep Apnoea: An Update.  Eur Arch Otorhinolaryngol. 2011; 268: 1365-73.  2.  Elayne SM; Mc JA; Smyone JR; Pallanch JF; Magy MB; Jennifer SG; Chantale VALDIVIA. Surgical modifications of the upper airway for obstructive sleep apnea in adults: a systematic review and meta-analysis. SLEEP 2010;33(10):5041-5241. Angel FRANKLIN. Hypopharyngeal surgery in obstructive sleep apnea: an evidence-based medicine review.  Arch Otolaryngol Head Neck Surg. 2006 Feb;132(2):206-13.  3. Serge YH1, Smitha Y, Tripp SENG. The efficacy of anatomically based multilevel surgery for obstructive sleep apnea. Otolaryngol Head Neck Surg. 2003 Oct;129(4):327-35.  4. Angel FRANKLIN, Goldberg A. Hypopharyngeal Surgery in Obstructive Sleep Apnea: An Evidence-Based Medicine Review. Arch Otolaryngol Head Neck Surg. 2006 Feb;132(2):206-13.  5. Miguel PJ et al. Upper-Airway Stimulation for Obstructive Sleep Apnea.  N Engl J Med. 2014 Jan 9;370(2):139-49.  6. Antonia Y et al. Increased Incidence of Cardiovascular Disease in Middle-aged Men with Obstructive Sleep Apnea. Am J Respir Crit Care  Med; 2002 166: 159-165  7. Son SAUER et al. Studying Life Effects and Effectiveness of Palatopharyngoplasty (SLEEP) study: Subjective Outcomes of Isolated Uvulopalatopharyngoplasty. Otolaryngol Head Neck Surg. 2011; 144: 623-631.    Simple lifestyle changes can play an important role in alleviating symptoms of Restless legs symptoms (RLS) These steps may help reduce the extra activity in your legs:   Take pain relievers. For very mild symptoms, taking an over-the-counter pain reliever such as ibuprofen (Advil, Motrin, others) when symptoms begin may relieve the twitching and the sensations.   Try baths and massages. Soaking in a warm bath and massaging your legs can relax your muscles.   Apply warm or cool packs. You may find that the use of heat or cold, or alternating use of the two, lessens the sensations in your limbs.   Try relaxation techniques, such as meditation or yoga. Stress can aggravate RLS. Learn to relax, especially before going to bed at night.   Establish good sleep hygiene. Fatigue tends to worsen symptoms of RLS, so it's important that you practice good sleep hygiene. Ideally, sleep hygiene involves having a cool, quiet and comfortable sleeping environment, going to bed at the same time, rising at the same time, and getting enough sleep to feel well rested. Some people with RLS find that going to bed later and rising later in the day helps in getting enough sleep.   Exercise. Getting moderate, regular exercise may relieve symptoms of RLS, but overdoing it at the gym or working out too late in the day may intensify symptoms.   Avoid caffeine. Sometimes cutting back on caffeine may help restless legs. It's worth trying to avoid caffeine-containing products, including chocolate and caffeinated beverages, such as coffee, tea and soft drinks, for a few weeks to see if this helps.   Cut back on alcohol and tobacco. These substances also may aggravate or trigger symptoms of RLS. Test to see whether  avoiding them helps.     Follow regular sleep schedule    Please keep  Your bedroom environment safe - no weapons in the bedroom, padding on the floor, keep sharp objects away from the bed, etc.      Please do not drive/operate heavy machinery,  if drowsy or sleepy;  pull over if drowsy.

## 2019-07-01 ENCOUNTER — DOCUMENTATION ONLY (OUTPATIENT)
Dept: SLEEP MEDICINE | Facility: CLINIC | Age: 59
End: 2019-07-01

## 2019-07-01 ENCOUNTER — OFFICE VISIT (OUTPATIENT)
Dept: SLEEP MEDICINE | Facility: CLINIC | Age: 59
End: 2019-07-01
Payer: COMMERCIAL

## 2019-07-01 DIAGNOSIS — G47.19 EXCESSIVE DAYTIME SLEEPINESS: ICD-10-CM

## 2019-07-01 PROCEDURE — G0399 HOME SLEEP TEST/TYPE 3 PORTA: HCPCS | Performed by: INTERNAL MEDICINE

## 2019-07-01 NOTE — PROGRESS NOTES
Pt is completing a home sleep test. Pt was instructed on how to put on the Noxturnal T3 device and associated equipment before going to bed and given the opportunity to practice putting it on before leaving the sleep center. Pt was reminded to bring the home sleep test kit back to the center tomorrow, at agreed upon time for download and reporting.     Hugo Wade  Sleep Clinic Specialist - [place]

## 2019-07-02 ENCOUNTER — DOCUMENTATION ONLY (OUTPATIENT)
Dept: SLEEP MEDICINE | Facility: CLINIC | Age: 59
End: 2019-07-02
Payer: COMMERCIAL

## 2019-07-02 NOTE — PROGRESS NOTES
Pt returned HST device. It was downloaded and forwarded data to the clinical specialist for scoring.    Hugo Wade  Sleep Clinic Specialist - [place]

## 2019-07-02 NOTE — PROGRESS NOTES
This HSAT was performed using a Noxturnal T3 device which recorded snore, sound, movement activity, body position, nasal pressure, oronasal thermal airflow, pulse, oximetry and both chest and abdominal respiratory effort. HSAT data was restricted to the time patient states they were in bed.     HSAT was scored using 1B 4% hypopnea rule.     AHI: 13.6. Snoring was reported as loud.  Time with SpO2 below 89% was 8.0 minutes.   Overall signal quality was good     Pt will follow up with sleep provider to determine appropriate therapy.     Ordering Danna ROGERS Snigdhasmrithi Sagar, MD Charles O. BA, [facility], RST System Clinical Specialist 07/2/2019

## 2019-07-08 NOTE — PROCEDURES
"HOME SLEEP STUDY INTERPRETATION    Patient: Karlee Emery  MRN: [id number]  YOB: 1960  Study Date: 2019  Referring Provider: Veronica Solis  Ordering Provider:Kt Clay MD      Indications for Home Study: Karlee Emery is a 58 year old female with history of HTLVII, depression, diabetes,HTN,  hyperlipidemia, who reports chronic fatigue. She reports snoring, almost on a nightly basis, loud in intensity and witnessed apneas during sleep. Home sleep study was obtained to evaluate for possible obstructive sleep apnea.    Estimated body mass index is 36.28 kg/m  as calculated from the following:    Height as of 19: 1.549 m (5' 1\").    Weight as of 19: 87.1 kg (192 lb).  [place] Sleepiness Scale:   STOP-BAN/8    Data: A full night home sleep study was performed recording the standard physiologic parameters including body position, movement, sound, nasal pressure, thermal oral airflow, chest and abdominal movements with respiratory inductance plethysmography, and oxygen saturation by pulse oximetry. Pulse rate was estimated by oximetry recording. This study was considered adequate based on > 4 hours of quality oximetry and respiratory recording. As specified by the AASM Manual for the Scoring of Sleep and Associated events, version 2.3, Rule VIII.D 1B, 4% oxygen desaturation scoring for hypopneas is used as a standard of care on all home sleep apnea testing.    Analysis Time:  582.8 minutes    Respiration:   Sleep Associated Hypoxemia: sustained hypoxemia was present. Baseline oxygen saturation was 93%.  Time with saturation less than or equal to 88% was 8.0 minutes. The lowest oxygen saturation was 81%.   Snoring: Snoring was present.  Respiratory events: The home study revealed a presence of 25 obstructive apneas and 2 mixed and central apneas. There were 105 hypopneas resulting in a combined apnea/hypopnea index [AHI] of 13.6 events per hour. "  AHI was 24.4 per hour supine, - per hour prone, 20.2 per hour on left side, and 11.4 per hour on right side.   Pattern: Excluding events noted above, respiratory rate and pattern was Normal.    Position: Percent of time spent: supine - 3.8%, prone - 0%, on left - 19.3%, on right - 76.8%.    Heart Rate: By pulse oximetry normal rate was noted.     Assessment:   Mild obstructive sleep apnea.  Sleep associated hypoxemia was present.    Recommendations:  Consider auto-CPAP at 5-15 cmH2O, oral appliance therapy or surgical options.  Suggest optimizing sleep hygiene and avoiding sleep deprivation.  Weight management.    Diagnosis Code(s): Obstructive Sleep Apnea G47.33, Hypoxemia G47.36    Kt Clay MD, July 7, 2019   Diplomate, American Board of Internal Medicine, Sleep Medicine

## 2019-07-15 ENCOUNTER — OFFICE VISIT (OUTPATIENT)
Dept: SLEEP MEDICINE | Facility: CLINIC | Age: 59
End: 2019-07-15
Payer: COMMERCIAL

## 2019-07-15 VITALS
DIASTOLIC BLOOD PRESSURE: 83 MMHG | WEIGHT: 189 LBS | BODY MASS INDEX: 35.68 KG/M2 | HEIGHT: 61 IN | SYSTOLIC BLOOD PRESSURE: 133 MMHG | RESPIRATION RATE: 16 BRPM | OXYGEN SATURATION: 96 % | HEART RATE: 91 BPM

## 2019-07-15 DIAGNOSIS — G47.33 OSA (OBSTRUCTIVE SLEEP APNEA): Primary | ICD-10-CM

## 2019-07-15 PROCEDURE — 99214 OFFICE O/P EST MOD 30 MIN: CPT | Performed by: INTERNAL MEDICINE

## 2019-07-15 ASSESSMENT — MIFFLIN-ST. JEOR: SCORE: 1364.68

## 2019-07-15 NOTE — PATIENT INSTRUCTIONS
Your BMI is Body mass index is 35.71 kg/m .  Weight management is a personal decision.  If you are interested in exploring weight loss strategies, the following discussion covers the approaches that may be successful. Body mass index (BMI) is one way to tell whether you are at a healthy weight, overweight, or obese. It measures your weight in relation to your height.  A BMI of 18.5 to 24.9 is in the healthy range. A person with a BMI of 25 to 29.9 is considered overweight, and someone with a BMI of 30 or greater is considered obese. More than two-thirds of American adults are considered overweight or obese.  Being overweight or obese increases the risk for further weight gain. Excess weight may lead to heart disease and diabetes.  Creating and following plans for healthy eating and physical activity may help you improve your health.  Weight control is part of healthy lifestyle and includes exercise, emotional health, and healthy eating habits. Careful eating habits lifelong are the mainstay of weight control. Though there are significant health benefits from weight loss, long-term weight loss with diet alone may be very difficult to achieve- studies show long-term success with dietary management in less than 10% of people. Attaining a healthy weight may be especially difficult to achieve in those with severe obesity. In some cases, medications, devices and surgical management might be considered.  What can you do?  If you are overweight or obese and are interested in methods for weight loss, you should discuss this with your provider.     Consider reducing daily calorie intake by 500 calories.     Keep a food journal.     Avoiding skipping meals, consider cutting portions instead.    Diet combined with exercise helps maintain muscle while optimizing fat loss. Strength training is particularly important for building and maintaining muscle mass. Exercise helps reduce stress, increase energy, and improves fitness.  Increasing exercise without diet control, however, may not burn enough calories to loose weight.       Start walking three days a week 10-20 minutes at a time    Work towards walking thirty minutes five days a week     Eventually, increase the speed of your walking for 1-2 minutes at time    In addition, we recommend that you review healthy lifestyles and methods for weight loss available through the National Institutes of Health patient information sites:  http://win.niddk.nih.gov/publications/index.htm    And look into health and wellness programs that may be available through your health insurance provider, employer, local community center, or polly club.    Weight management plan: Patient was referred to their PCP to discuss a diet and exercise plan.

## 2019-07-15 NOTE — PROGRESS NOTES
Sleep clinic follow-up visit  note    Date of visit: July 15, 2019    Purpose of visit: Review results of home sleep study     History of present illness: Karlee Emery is a 58 year old female with history of HTLVII, depression, diabetes,HTN,  hyperlipidemia, who reports chronic fatigue. She reports snoring, almost on a nightly basis, loud in intensity and witnessed apneas during sleep. Home sleep study was obtained on 7/1/19 to evaluate for possible obstructive sleep apnea.  Patient presents to sleep clinic today to review the test results and discuss plan of care.    HST RESULTS:  Analysis Time:  582.8 minutes     Respiration:   Sleep Associated Hypoxemia: sustained hypoxemia was present. Baseline oxygen saturation was 93%.  Time with saturation less than or equal to 88% was 8.0 minutes. The lowest oxygen saturation was 81%.   Snoring: Snoring was present.  Respiratory events: The home study revealed a presence of 25 obstructive apneas and 2 mixed and central apneas. There were 105 hypopneas resulting in a combined apnea/hypopnea index [AHI] of 13.6 events per hour.  AHI was 24.4 per hour supine, - per hour prone, 20.2 per hour on left side, and 11.4 per hour on right side.   Pattern: Excluding events noted above, respiratory rate and pattern was Normal.     Position: Percent of time spent: supine - 3.8%, prone - 0%, on left - 19.3%, on right - 76.8%.     Heart Rate: By pulse oximetry normal rate was noted.      Assessment:   Mild obstructive sleep apnea.  Sleep associated hypoxemia was present.      The test results were discussed with the patient in detail.        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   Speech is spontaneous with regular rate and volume.   Mood: euthymic; affect congruent with full range and intensity.   Sensorium: awake, alert and oriented to person, place, time, and situation.    Assessment/plan:   Mild obstructive sleep apnea with sleep associated hypoxemia.  We discussed the  treatment options for sleep apnea and patient was interested in the CPAP treatment.  Prescription was provided for  auto CPAP device: min pressure between 5-8 cm H2O based on patient comfort; max pressure=15 cm H2O. Recommended her to use the device regularly during sleep and she was instructed get back to me if in case she has any interface problems.  She will follow-up through sleep therapy management program.  Follow-up visit  will be scheduled in 6 to 7 weeks after she starts the CPAP treatment.  Recommend obtaining an overnight oximetry with the auto CPAP along with parallel compliance download 1 or 2 days before the f/u visit  to check for resolution of the hypoxemia that was observed during the home study.    We discussed weight management with diet and exercise.    Patient was strongly advised to avoid driving, operating any heavy machinery or other hazardous situations while drowsy or sleepy.  Patient was counseled on the importance of driving while alert, to pull over if drowsy, or nap before getting into the vehicle if sleepy.     The above note was dictated using voice recognition software. Although " "reviewed after completion, some word and grammatical error may remain . Please contact the author for any clarifications.    \"I spent a total of 25 minutes face to face with Karlee Emery during today's office visit. Over 50% of this time was spent counseling the patient and  coordinating care regarding sleep-related breathing disorder, CPAP treatment.\"       Kt Clay MD   of Medicine,  Division of Pulmonary/Sleep Medicine  [facility].    "

## 2019-07-15 NOTE — NURSING NOTE
Patient scheduled for MAIRA and cpap orders sent to [facility]

## 2019-07-29 ENCOUNTER — DOCUMENTATION ONLY (OUTPATIENT)
Dept: SLEEP MEDICINE | Facility: CLINIC | Age: 59
End: 2019-07-29
Payer: COMMERCIAL

## 2019-07-29 NOTE — PROGRESS NOTES
Patient was offered choice of vendor and chose [facility].  Patient Karlee Emery was set up at [place] on July 29, 2019. Patient received a Resmed AirSense 10 Auto. Pressures were set at 5-15 cm H2O.   Patient s ramp is 5 cm H2O for Off and FLEX/EPR is EPR, 2.  Patient received a Resmed Mask name: Airfit N20  Nasal mask size Medium, heated tubing and heated humidifier.  Patient is not enrolled in the STM Program and does not need to meet compliance. Patient has a follow up on 09/17/19 with Dr. Clay.    Violet Vanec

## 2019-08-02 ENCOUNTER — TRANSFERRED RECORDS (OUTPATIENT)
Dept: HEALTH INFORMATION MANAGEMENT | Facility: CLINIC | Age: 59
End: 2019-08-02

## 2019-08-26 NOTE — PROGRESS NOTES
Subjective     Karlee Emery is a 58 year old female who presents to clinic today for the following health issues:    HPI   Diabetes - New Diagnosis  She was diagnosed at her annual preventive visit in April.  Since then she has also been evaluated by the sleep clinic and diagnosed with JAMES.  She is now on CPAP and has follow-up sleep study in a couple weeks.  She states she was diagnosed with T2DM once before (years ago) but made lifestyle changes and the diabetes seemed to go into remission.  She did have diabetic education at that time and had a glucometer.  She does have a very strong FHX of T2DM with multiple primary family members requiring insulin.      BP Readings from Last 2 Encounters:   09/05/19 136/88   07/15/19 133/83     Hemoglobin A1C (%)   Date Value   09/05/2019 6.8 (H)   04/15/2019 6.7 (H)     LDL Cholesterol Calculated (mg/dL)   Date Value   04/15/2019 187 (H)   07/18/2018 154 (H)       Diabetes Management Resources      How many servings of fruits and vegetables do you eat daily?  0-1    On average, how many sweetened beverages do you drink each day (soda, juice, sweet tea, etc)?   1    How many days per week do you miss taking your medication? 0    BP Readings from Last 3 Encounters:   09/05/19 136/88   07/15/19 133/83   05/28/19 146/86    Wt Readings from Last 3 Encounters:   09/05/19 86.9 kg (191 lb 8 oz)   07/15/19 85.7 kg (189 lb)   05/28/19 87.1 kg (192 lb)              Reviewed and updated as needed this visit by Provider  Meds  Problems         Review of Systems         Objective    /88 (BP Location: Left arm, Patient Position: Chair, Cuff Size: Adult Large)   Pulse 79   Temp 98.3  F (36.8  C) (Oral)   Resp 16   Wt 86.9 kg (191 lb 8 oz)   LMP 03/15/2013 (Exact Date)   SpO2 96%   BMI 36.18 kg/m    Body mass index is 36.18 kg/m .  Physical Exam   GEN:  no apparent distress     Diagnostic Test Results:  Labs reviewed in Epic        Assessment & Plan     30 minutes time  spent face-to-face, with over 50% spent in counseling/coordination of care regarding:       ICD-10-CM    1. Type 2 diabetes mellitus without complication, without long-term current use of insulin (H) E11.9 Hemoglobin A1c     Albumin Random Urine Quantitative with Creat Ratio     blood glucose (NO BRAND SPECIFIED) test strip     blood glucose (NO BRAND SPECIFIED) lancets standard     blood glucose monitoring (NO BRAND SPECIFIED) meter device kit     atorvastatin (LIPITOR) 40 MG tablet     DIABETES EDUCATOR REFERRAL     DISCONTINUED: blood glucose monitoring (NO BRAND SPECIFIED) meter device kit   2. JAMES (obstructive sleep apnea) G47.33    3. Morbid obesity (H) E66.01    4. Screen for colon cancer Z12.11 Fecal colorectal cancer screen (FIT)   5. Need for vaccination Z23 ZOSTER VACCINE RECOMBINANT ADJUVANTED IM NJX     ADMIN 1st VACCINE     I had a long discussion with the patient regarding the pathophysiology and natural course of diabetes, role of laboratory monitoring, and the importance of blood sugar control for preventing complications of diabetes mellitus.  We covered the following topics:    Natural course of diabetes and the importance of blood sugar control for preventing complications of diabetes mellitus.     Importance of diet and exercise for improved diabetic control.    Sequelae of uncontrolled diabetes, including microvascular and macrovascular complications.    Lab monitoring, including A1c testing and goals, and ACR.    Medication options for diabetes.  Discussed that if A1C < 7 the only medication I would consider would be metformin - discussed rationale for starting this early - preventing progression, weight-neutral, and no hypoglycemia.  She will consider if she wants to start that.    Statin guidelines for diabetes.  Reviewed that current guidelines recommend high-intensity statin and explained what that is and the rationale for the recommendation.  She was agreeable to starting atorvastatin.   "    Immunizations.  PPSV-23 and Hep B.  However, she is due for Shingrix #2 and had a bad reaction to Shingrix #1 so prefers only one shot today.      Dilated eye exam annually.    Nutrition - limiting carbs and balancing carbs with protein and fats at every meal.  Referred to RD/CDE for further education and glucometer teaching.  Discussed that with A1c < 7.0 she does not need to check BG daily but I do want her to know how to check and to check occasionally.        BMI:   Estimated body mass index is 35.71 kg/m  as calculated from the following:    Height as of 7/15/19: 1.549 m (5' 1\").    Weight as of 7/15/19: 85.7 kg (189 lb).   Weight management plan: Discussed healthy diet and exercise guidelines    See Patient Instructions    Return in about 3 months (around 12/5/2019) for Follow-up medical condition(s) - fasting for labs.    Veronica Solis MD  [facility]        "

## 2019-09-03 PROBLEM — E11.9 DIABETES MELLITUS, TYPE 2 (H): Status: ACTIVE | Noted: 2019-09-03

## 2019-09-03 NOTE — PATIENT INSTRUCTIONS
Patient Education     Understanding Type 2 Diabetes  When your body is working normally, the food you eat is digested and used as fuel. This fuel supplies energy to the body s cells. When you have diabetes, the fuel can t enter the cells. Without treatment, diabetes can cause serious long-term health problems.     Your body breaks down the food you eat into glucose.   How the body gets energy  The digestive system breaks down food, resulting in a sugar called glucose. Some of this glucose is stored in the liver. But most of it enters the bloodstream and travels to the cells to be used as fuel. Glucose needs the help of a hormone called insulin to enter the cells. Insulin is made in the pancreas. It is released into the bloodstream in response to the presence of glucose in the blood. Think of insulin as a key. When insulin reaches a cell, it attaches to the cell wall. This signals the cell to create an opening that allows glucose to enter the cell.      When you have type 2 diabetes  Early in type 2 diabetes, your cells don t respond properly to insulin. Because of this, less glucose than normal moves into cells. This is called insulin resistance. In response, the pancreas makes more insulin. But eventually, the pancreas can t produce enough insulin to overcome insulin resistance. As less and less glucose enters cells, it builds up to a harmful level in the bloodstream. This is known as high blood sugar or hyperglycemia. The result is type 2 diabetes. The cells become starved for energy, which can leave you feeling tired and rundown.  Why high blood sugar is a problem  If high blood sugar is not controlled, blood vessels throughout the body become damaged. Prolonged high blood sugar affects organs, blood vessels, and nerves. As a result, the risks of damage to the heart, kidneys, eyes, and limbs increase. Diabetes also makes other problems, such as high blood pressure and high cholesterol, more dangerous. Over  time, people with uncontrolled high blood sugar have an increase in risk of dying of, or being disabled by, heart attack, heart failure,  or stroke.  Date Last Reviewed: 7/1/2016 2000-2018 [employer]. [address] [place] [address], [place] [address]. All rights reserved. This information is not intended as a substitute for professional medical care. Always follow your healthcare professional's instructions.           Patient Education   Goals for Your Diabetes Care  A1c   Goal:  Less than 7 percent--ask your doctor if this is right for you  Check it: Every 3 to 6 months  Why:  Shows how well your blood glucose was controlled over the past 3 months  Blood pressure   Goal: Under 140/90  Check it:  At every clinic check up for diabetes  Why:  May prevent stroke, heart disease and eye and kidney diseases  Cholesterol   Goal:  Discuss cholesterol management with your doctor, and consider taking a statin medicine  Check it:  Every year  Why:  Helps prevent heart attacks and stroke  Kidney test (urine microalbumin)  Goal:  Under 30  Do it:  Every year  Why:  Finds kidney problems before they get worse  Foot exam   Goal:  No cuts or sores, normal sensation  Do it: Remove shoes and socks at every visit; have a monofilament test every year  Why: Finds foot problems before they get worse  Eye exam   Goal:  No changes in your eyes  Do it: Every year  Why:  Finds eye problems before they get worse  Exercise  Goal:  150 minutes of aerobic exercises and 2 to 3 sessions of strength training  Do it: Every week  Why:  Helps manage diabetes and improve health  Aspirin  Goal:  If you are age 50 or older, ask your doctor if you should take aspirin  Take it: Every day, or as prescribed  Why: Lowers the risk of heart attack and stroke  Smoking and tobacco  Goal: No tobacco use  Why: Tobacco is a major risk for heart disease  Diabetes education  Goal: Learn how to manage your diabetes  Do it: At least once a year--ask your  doctor for a referral  Why: The more you know, the better you can manage your diabetes  Your goals may be different from what you see here. Your care team will tell you what your goals should be.   For informational purposes only. Not to replace the advice of your health care provider.   Copyright   2009 [employer]. All rights reserved. [employer] [id number] - Rev 01/16.

## 2019-09-05 ENCOUNTER — OFFICE VISIT (OUTPATIENT)
Dept: FAMILY MEDICINE | Facility: CLINIC | Age: 59
End: 2019-09-05
Payer: COMMERCIAL

## 2019-09-05 VITALS
RESPIRATION RATE: 16 BRPM | TEMPERATURE: 98.3 F | SYSTOLIC BLOOD PRESSURE: 136 MMHG | WEIGHT: 191.5 LBS | OXYGEN SATURATION: 96 % | BODY MASS INDEX: 36.18 KG/M2 | HEART RATE: 79 BPM | DIASTOLIC BLOOD PRESSURE: 88 MMHG

## 2019-09-05 DIAGNOSIS — E11.9 TYPE 2 DIABETES MELLITUS WITHOUT COMPLICATION, WITHOUT LONG-TERM CURRENT USE OF INSULIN (H): Primary | ICD-10-CM

## 2019-09-05 DIAGNOSIS — G47.33 OSA (OBSTRUCTIVE SLEEP APNEA): ICD-10-CM

## 2019-09-05 DIAGNOSIS — E66.01 MORBID OBESITY (H): ICD-10-CM

## 2019-09-05 DIAGNOSIS — Z23 NEED FOR VACCINATION: ICD-10-CM

## 2019-09-05 DIAGNOSIS — Z12.11 SCREEN FOR COLON CANCER: ICD-10-CM

## 2019-09-05 LAB — HBA1C MFR BLD: 6.8 % (ref 0–5.6)

## 2019-09-05 PROCEDURE — 99214 OFFICE O/P EST MOD 30 MIN: CPT | Mod: 25 | Performed by: FAMILY MEDICINE

## 2019-09-05 PROCEDURE — 90750 HZV VACC RECOMBINANT IM: CPT | Performed by: FAMILY MEDICINE

## 2019-09-05 PROCEDURE — 82043 UR ALBUMIN QUANTITATIVE: CPT | Performed by: FAMILY MEDICINE

## 2019-09-05 PROCEDURE — 36415 COLL VENOUS BLD VENIPUNCTURE: CPT | Performed by: FAMILY MEDICINE

## 2019-09-05 PROCEDURE — 83036 HEMOGLOBIN GLYCOSYLATED A1C: CPT | Performed by: FAMILY MEDICINE

## 2019-09-05 PROCEDURE — 90471 IMMUNIZATION ADMIN: CPT | Performed by: FAMILY MEDICINE

## 2019-09-05 RX ORDER — ATORVASTATIN CALCIUM 40 MG/1
40 TABLET, FILM COATED ORAL DAILY
Qty: 90 TABLET | Refills: 1 | Status: SHIPPED | OUTPATIENT
Start: 2019-09-05 | End: 2020-02-27

## 2019-09-05 ASSESSMENT — PATIENT HEALTH QUESTIONNAIRE - PHQ9
10. IF YOU CHECKED OFF ANY PROBLEMS, HOW DIFFICULT HAVE THESE PROBLEMS MADE IT FOR YOU TO DO YOUR WORK, TAKE CARE OF THINGS AT HOME, OR GET ALONG WITH OTHER PEOPLE: SOMEWHAT DIFFICULT
SUM OF ALL RESPONSES TO PHQ QUESTIONS 1-9: 10
SUM OF ALL RESPONSES TO PHQ QUESTIONS 1-9: 10

## 2019-09-05 ASSESSMENT — ANXIETY QUESTIONNAIRES
5. BEING SO RESTLESS THAT IT IS HARD TO SIT STILL: NOT AT ALL
GAD7 TOTAL SCORE: 2
7. FEELING AFRAID AS IF SOMETHING AWFUL MIGHT HAPPEN: NOT AT ALL
4. TROUBLE RELAXING: NOT AT ALL
GAD7 TOTAL SCORE: 2
3. WORRYING TOO MUCH ABOUT DIFFERENT THINGS: NOT AT ALL
6. BECOMING EASILY ANNOYED OR IRRITABLE: MORE THAN HALF THE DAYS
7. FEELING AFRAID AS IF SOMETHING AWFUL MIGHT HAPPEN: NOT AT ALL
2. NOT BEING ABLE TO STOP OR CONTROL WORRYING: NOT AT ALL
GAD7 TOTAL SCORE: 2
1. FEELING NERVOUS, ANXIOUS, OR ON EDGE: NOT AT ALL

## 2019-09-05 NOTE — NURSING NOTE
Screening Questionnaire for Adult Immunization     Are you sick today?   No    Do you have allergies to medications, food or any vaccine?   No    Have you ever had a serious reaction after receiving a vaccination?   No    Do you have a long-term health problem with heart disease, lung disease,  asthma, kidney disease, diabetes, anemia, metabolic or blood disease?   No    Do you have cancer, leukemia, AIDS, or any immune system problem?   No    Do you take cortisone, prednisone, other steroids, or anticancer drugs, or  have you had any x-ray (radiation) treatments?   No    Have you had a seizure, brain, or other nervous system problem?   No    During the past year, have you received a transfusion of blood or blood       products, or been given a medicine called immune (gamma) globulin?   No    For women: Are you pregnant or is there a chance you could become         pregnant during the next month?   No    Have you received any vaccinations in the past 4 weeks?   No     Immunization questionnaire answers were all negative.      MNVFC doesn't apply on this patient    Per orders of Dr Solis, injection of Shingrix given by Gabi Oliva CMA. Patient instructed to remain in clinic for 20 minutes afterwards, and to report any adverse reaction to me immediately.    Prior to injection verified patient identity using patient's name and date of birth.       Screening performed by Gabi Oliva CMA

## 2019-09-06 LAB
CREAT UR-MCNC: 203 MG/DL
MICROALBUMIN UR-MCNC: 15 MG/L
MICROALBUMIN/CREAT UR: 7.39 MG/G CR (ref 0–25)

## 2019-09-06 ASSESSMENT — PATIENT HEALTH QUESTIONNAIRE - PHQ9: SUM OF ALL RESPONSES TO PHQ QUESTIONS 1-9: 10

## 2019-09-06 ASSESSMENT — ANXIETY QUESTIONNAIRES: GAD7 TOTAL SCORE: 2

## 2019-09-06 NOTE — RESULT ENCOUNTER NOTE
"Job Malcolm,  Urine albumin (also known as albumin creatinine ratio or \"ACR\") is a test for early kidney damage from diabetes that detects microscopic levels of protein in the urine.  This shows that your kidneys are NOT spilling protein, which is good.     Veronica Solis MD"

## 2019-09-16 ENCOUNTER — DOCUMENTATION ONLY (OUTPATIENT)
Dept: SLEEP MEDICINE | Facility: CLINIC | Age: 59
End: 2019-09-16

## 2019-09-16 ENCOUNTER — OFFICE VISIT (OUTPATIENT)
Dept: SLEEP MEDICINE | Facility: CLINIC | Age: 59
End: 2019-09-16
Payer: COMMERCIAL

## 2019-09-16 DIAGNOSIS — G47.33 OSA (OBSTRUCTIVE SLEEP APNEA): ICD-10-CM

## 2019-09-16 NOTE — PROGRESS NOTES
Patient presented to clinic for  and demonstration of the MAIRA. Patient was set up and instructed use. Patient verbalized understanding and demonstrated set up back to me. Patient will be returning device by 9/17/2019.    Patient was given sleep logs and written instructions for use.

## 2019-09-17 ENCOUNTER — DOCUMENTATION ONLY (OUTPATIENT)
Dept: SLEEP MEDICINE | Facility: CLINIC | Age: 59
End: 2019-09-17
Payer: COMMERCIAL

## 2019-09-17 ENCOUNTER — OFFICE VISIT (OUTPATIENT)
Dept: SLEEP MEDICINE | Facility: CLINIC | Age: 59
End: 2019-09-17
Payer: COMMERCIAL

## 2019-09-17 VITALS
WEIGHT: 195 LBS | DIASTOLIC BLOOD PRESSURE: 78 MMHG | BODY MASS INDEX: 36.82 KG/M2 | HEART RATE: 82 BPM | SYSTOLIC BLOOD PRESSURE: 124 MMHG | HEIGHT: 61 IN | RESPIRATION RATE: 18 BRPM | OXYGEN SATURATION: 95 %

## 2019-09-17 DIAGNOSIS — G47.33 OSA ON CPAP: Primary | ICD-10-CM

## 2019-09-17 PROCEDURE — 99214 OFFICE O/P EST MOD 30 MIN: CPT | Performed by: INTERNAL MEDICINE

## 2019-09-17 ASSESSMENT — MIFFLIN-ST. JEOR: SCORE: 1391.89

## 2019-09-17 NOTE — NURSING NOTE
Cpap pressure changed from original settings of 5 Min - 15 Max to new settings of 7 Min - 17 Max.      PRISCILA Pope

## 2019-09-17 NOTE — PATIENT INSTRUCTIONS
Your BMI is Body mass index is 36.84 kg/m .  Weight management is a personal decision.  If you are interested in exploring weight loss strategies, the following discussion covers the approaches that may be successful. Body mass index (BMI) is one way to tell whether you are at a healthy weight, overweight, or obese. It measures your weight in relation to your height.  A BMI of 18.5 to 24.9 is in the healthy range. A person with a BMI of 25 to 29.9 is considered overweight, and someone with a BMI of 30 or greater is considered obese. More than two-thirds of American adults are considered overweight or obese.  Being overweight or obese increases the risk for further weight gain. Excess weight may lead to heart disease and diabetes.  Creating and following plans for healthy eating and physical activity may help you improve your health.  Weight control is part of healthy lifestyle and includes exercise, emotional health, and healthy eating habits. Careful eating habits lifelong are the mainstay of weight control. Though there are significant health benefits from weight loss, long-term weight loss with diet alone may be very difficult to achieve- studies show long-term success with dietary management in less than 10% of people. Attaining a healthy weight may be especially difficult to achieve in those with severe obesity. In some cases, medications, devices and surgical management might be considered.  What can you do?  If you are overweight or obese and are interested in methods for weight loss, you should discuss this with your provider.     Consider reducing daily calorie intake by 500 calories.     Keep a food journal.     Avoiding skipping meals, consider cutting portions instead.    Diet combined with exercise helps maintain muscle while optimizing fat loss. Strength training is particularly important for building and maintaining muscle mass. Exercise helps reduce stress, increase energy, and improves fitness.  Increasing exercise without diet control, however, may not burn enough calories to loose weight.       Start walking three days a week 10-20 minutes at a time    Work towards walking thirty minutes five days a week     Eventually, increase the speed of your walking for 1-2 minutes at time    In addition, we recommend that you review healthy lifestyles and methods for weight loss available through the National Institutes of Health patient information sites:  http://win.niddk.nih.gov/publications/index.htm    And look into health and wellness programs that may be available through your health insurance provider, employer, local community center, or polly club.    Weight management plan: Patient was referred to their PCP to discuss a diet and exercise plan.    Please follow up with your primary care provider to obtain further workup for fatigue including vitamin D levels and for optimizing management for depression.    Please obtain chin strap from [facility] and use it along with nasal  Mask.

## 2019-09-17 NOTE — PROGRESS NOTES
Sleep clinic follow-up visit  note     Date of visit:  September 17, 2019     Purpose of visit: Follow-up of obstructive sleep apnea, review CPAP compliance, review results of overnight oximetry     History of present illness: Karlee Emery is a 58 year old female with history of HTLVII, depression, diabetes,HTN,  hyperlipidemia, who reports chronic fatigue.  She presents to sleep clinic today for follow-up of obstructive sleep apnea and to  review CPAP compliance and review results of overnight oximetry.  She was diagnosed with mild obstructive sleep apnea and sleep associated hypoxemia during home sleep study  obtained on 7/1/19.    Patient obtained auto CPAP device with pressures set at 5-15 cm H2O on July 29, 2019. She reports using the device regularly during sleep.  She has been using a nasal mask.  She has reported occasionally that her mouth pops open with the nasal mask . There are no reports of snoring or awakenings due to gasping for air or choking with the CPAP.  Though she reports  some improvement in her energy levels with the CPAP treatment, she still reports some fatigue.  ESS PRE- CPAP treatment: 18/24  ESS TODAY:12/24  She denies drowsiness while driving.    An overnight oximetry was recommend with the auto CPAP along with parallel compliance download  to check for resolution of the hypoxemia that was observed during the home study. The oximetry was essentially normal. Baseline oxygen saturation was 93.8%, lowest oxygen saturation was 87%. Time  with oxygen saturations below 88% was 0.3 minutes. (The results were discussed with the patient)    HST RESULTS(7/1/19):  Analysis Time:  582.8 minutes  Respiration:   Sleep Associated Hypoxemia: sustained hypoxemia was present. Baseline oxygen saturation was 93%.  Time with saturation less than or equal to 88% was 8.0 minutes. The lowest oxygen saturation was 81%.   Snoring: Snoring was present.  Respiratory events: The home study revealed a presence  of 25 obstructive apneas and 2 mixed and central apneas. There were 105 hypopneas resulting in a combined apnea/hypopnea index [AHI] of 13.6 events per hour.  AHI was 24.4 per hour supine, - per hour prone, 20.2 per hour on left side, and 11.4 per hour on right side.   Assessment: Mild obstructive sleep apnea; Sleep associated hypoxemia was present.    OVERNIGHT OXIMETRY RESULTS(9/16/2019):   Overnight oximetry was obtained with Auto CPAP settings at 5-15 cms of water. (valid recording time: 8 hours and 38 minutes)  The oximetry was essentially normal. Baseline oxygen saturation was 93.8%, lowest oxygen saturation was 87%. Time  with oxygen saturations below 88% was 0.3 minutes.   Parallel  DOWNLOADABLE COMPLIANCE DATA on 9/16/2019: reveals that she used the device for  8 hrs and 35 minutes on the days used.  Residual AHI was 2.0 per hour. Mean pressure settings:9.0  ; 95th percentile: 12.8  and max pressure: 14.8 cm water.  Assessment: Based on the current APAP setting and overnight oximetry test results, the JAMES is well controlled in the sleep associated hypoxemia that was observed during the HST resolved with CPAP treatment.      Current meds:  Current Outpatient Medications   Medication Sig Dispense Refill     acetaminophen (TYLENOL) 325 MG tablet Take 2 tablets by mouth every 4 hours as needed for other (mild pain). 100 tablet 0     aspirin 81 MG tablet Take  by mouth daily. (FMG)  3     atorvastatin (LIPITOR) 40 MG tablet Take 1 tablet (40 mg) by mouth daily 90 tablet 1     blood glucose (NO BRAND SPECIFIED) lancets standard Use to test blood sugar 1 time daily or as directed. 50 each 3     blood glucose (NO BRAND SPECIFIED) test strip Use to test blood sugar 1 time daily or as directed. To accompany: Blood Glucose Monitor Brands: per insurance. 100 strip 6     blood glucose monitoring (NO BRAND SPECIFIED) meter device kit Use to test blood sugar 1 time daily or as directed. Blood Glucose Monitor Brands: per  "insurance formulary. 1 kit 0     buPROPion (WELLBUTRIN XL) 150 MG 24 hr tablet Take 1 tablet (150 mg) by mouth every morning 90 tablet 3     Calcium Carbonate-Vitamin D (CALCIUM + D PO) Take 1 tablet by mouth daily.       loratadine (CLARITIN) 10 MG tablet Take 1 tablet by mouth daily. 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 rate 18, height 1.549 m (5' 1\"), weight 88.5 kg (195 lb), last menstrual period 03/15/2013, SpO2 95 %, not currently breastfeeding.  General appearance:  in no apparent distress  Pt is dressed casually, cooperative with good eye contact.   Speech is spontaneous with regular rate and volume.   Mood: euthymic; affect congruent with full range and intensity.   Sensorium: awake, alert and oriented to person, place, time, and situation.      Assessment/plan:  Mild obstructive sleep apnea with  sleep associated hypoxemia: Patient reports good compliance with CPAP treatment. Based on " "the compliance measures JAMES is well controlled with CPAP at the current pressure settings and based on the oximetry test results the sleep associated hypoxemia  resolved with CPAP treatment.  Since she reports mild air hunger and also based on the compliance download,  recommended revising the pressure settings on the auto CPAP as follows: min=7 and max pressure=17 cm water.  She was instructed to obtain a chinstrap through [employer].  Recommended her to continue using the device regularly during sleep, and instructed her to get the supplies for the device replaced regularly.  Recommended obtaining at least 7 to 8 hours of sleep per night and avoiding sleep deprivation.    History of depression: Recommended her to follow-up with the primary care provider for optimizing the management of the mood disorder and to obtain further evaluation of  fatigue including getting vitamin D levels checked.    We discussed weight management with diet and exercise.     Patient was strongly advised to avoid driving, operating any heavy machinery or other hazardous situations while drowsy or sleepy.  Patient was counseled on the importance of driving while alert, to pull over if drowsy, or nap before getting into the vehicle if sleepy.      She will follow-up at sleep clinic in 1 year or sooner if any concerns.      The above note was dictated using voice recognition software. Although reviewed after completion, some word and grammatical error may remain . Please contact the author for any clarifications.     \"I spent a total of 25 minutes face to face with Karlee Emery during today's office visit. Over 50% of this time was spent counseling the patient and  coordinating care regarding sleep-related breathing disorder, CPAP treatment.\"         Kt Clay MD   of Medicine,  Division of Pulmonary/Sleep Medicine  [facility].  "

## 2019-09-19 ENCOUNTER — ALLIED HEALTH/NURSE VISIT (OUTPATIENT)
Dept: EDUCATION SERVICES | Facility: CLINIC | Age: 59
End: 2019-09-19
Payer: COMMERCIAL

## 2019-09-19 DIAGNOSIS — E11.9 TYPE 2 DIABETES MELLITUS WITHOUT COMPLICATION, WITHOUT LONG-TERM CURRENT USE OF INSULIN (H): Primary | ICD-10-CM

## 2019-09-19 NOTE — PATIENT INSTRUCTIONS
Diabetes Instructions:    BEFORE YOUR NEXT VISIT:     Check BG before breakfast and again 2 hours after the largest meal of the day.      Goals for BG are  before meals, and less than 180 two hours after eating.      Try some structured testing over about a week:    Fasting and 2 hours after the beginning of a meal.  Before and about one hour after stopping activity.   2 hours after eating a meal out.   During periods of stress.     See handout on monitoring and sharps disposal.    Activity:  Goal is 150 minutes per week of aerobic activity, like walking, bike-riding, treadmill, eliptical, or swimming.   Try to work more activity into your everyday routine.  See exercise handout for more ideas.     Keep food records for 3-4 days prior to your next visit.  Please write down not only what you eat, but the amounts.  Try to use measuring cups to measure your food so you can get an idea of the portion size.    This is also a place to track your activity level as well.    Next appointment:  October 22 at 2:30pm with MELA NovakE and 3:30pm with RADHA SolisE

## 2019-09-27 DIAGNOSIS — Z12.11 SCREEN FOR COLON CANCER: ICD-10-CM

## 2019-09-27 PROCEDURE — 82274 ASSAY TEST FOR BLOOD FECAL: CPT | Performed by: FAMILY MEDICINE

## 2019-09-29 NOTE — PROCEDURES
OVERNIGHT OXIMETRY RESULTS(9/16/2019):   Overnight oximetry was obtained with Auto CPAP settings at 5-15 cms of water. (valid recording time: 8 hours and 38 minutes)  The oximetry was essentially normal. Baseline oxygen saturation was 93.8%, lowest oxygen saturation was 87%. Time  with oxygen saturations below 88% was 0.3 minutes.   Parallel  DOWNLOADABLE COMPLIANCE DATA on 9/16/2019: reveals that she used the device for  8 hrs and 35 minutes on the days used.  Residual AHI was 2.0 per hour. Mean pressure settings:9.0  ; 95th percentile: 12.8  and max pressure: 14.8 cm water.  Assessment: Based on the current APAP setting and overnight oximetry test results, the JAMES is well controlled in the sleep associated hypoxemia that was observed during the HST resolved with CPAP treatment.  Results and plan of care were discussed with patient at sleep clinic visit dated 9/17/19.    Kt Clay MD   of Medicine,  Division of Pulmonary/Sleep Medicine  [facility].

## 2019-09-30 LAB — HEMOCCULT STL QL IA: NEGATIVE

## 2019-09-30 NOTE — RESULT ENCOUNTER NOTE
Chiki Ms. Emery,  Your results came back and are within acceptable limits. -FIT test (screening test for colon cancer) was normal. ADVISE: rechecking this test in 1 year.. If you have any further concerns please do not hesitate to contact us by message, phone or making an appointment.  Have a good day   Dr Vicente ROGERS

## 2019-09-30 NOTE — PROGRESS NOTES
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   , lives with spouse.   States she was diagnosed with Type 2 diabetes several years ago, made some lifestyle adjustments, lost weight, and diabetes was in remission until recently (Sept 2019) when her A1C milly to 6.8.  Has had DE in the past.  Has glucometer but has not been checking.      Diabetes Symptoms & Complications     Patient Problem List and Family Medical History reviewed for relevant medical history, current medical status, and diabetes risk factors.    Vitals:  LMP 03/15/2013 (Exact Date)   Estimated body mass index is 36.84 kg/m  as calculated from the following:    Height as of 9/17/19: 1.549 m (5' 1\").    Weight as of 9/17/19: 88.5 kg (195 lb).   Last 3 BP:   BP Readings from Last 3 Encounters:   09/17/19 124/78   09/05/19 136/88   07/15/19 133/83       History   Smoking Status     Former Smoker     Quit date: 1/1/1980   Smokeless Tobacco     Never Used       Labs:  Lab Results   Component Value Date    A1C 6.8 09/05/2019     Lab Results   Component Value Date     04/15/2019     Lab Results   Component Value Date     04/15/2019     HDL Cholesterol   Date Value Ref Range Status   04/15/2019 73 >49 mg/dL Final   ]  GFR Estimate   Date Value Ref Range Status   04/15/2019 64 >60 mL/min/[1.73_m2] Final     Comment:     Non  GFR Calc  Starting 12/18/2018, serum creatinine based estimated GFR (eGFR) will be   calculated using the Chronic Kidney Disease Epidemiology Collaboration   (CKD-EPI) equation.       GFR Estimate If Black   Date Value Ref Range Status   04/15/2019 74 >60 mL/min/[1.73_m2] Final     Comment:      GFR Calc  Starting 12/18/2018, serum creatinine based estimated GFR (eGFR) will be   calculated " using the Chronic Kidney Disease Epidemiology Collaboration   (CKD-EPI) equation.       Lab Results   Component Value Date    CR 0.96 04/15/2019     No results found for: MICROALBUMIN    Healthy Eating:  Diet is largely healthy food choices, however she has identified weight gain over the past couple of years, after an initial drop in weight.  In Jan 2018 she weight 175 and her weight today was 194.2.  She is interested in weight loss.      Being Active:  Was active doing [employer] in the past couple of years, however she developed plantar fasciitis and is unable to be as active as she would like.  She attributes her decrease in activity to her increase in weight.  She has a membership to the [employer] but hasn't been attending recently.  She would like to find an activity that doesn't make her plantar fasciitis worse.  Has considered swimming.  Can walk for limited periods of time.  She has a small dog that she walks, but this is for brief times.       Monitoring:  Not monitoring at all currently.       Taking Medications:  No meds for diabetes at this time.     Problem Solving:  Not evaluated today.     Reducing Risks:   Sees her physician on a regular basis.  Taking statin, baby ASA, non-smoker.      Healthy Coping:  Motivated to make changes.       Patient Activation Measure Survey Score:  ROXIE Score (Last Two) 12/9/2010   ROXIE Raw Score 47   Activation Score 77.5   ROXIE Level 4     ASSESSMENT:  Patient's most recent   Lab Results   Component Value Date    A1C 6.8 09/05/2019    is meeting goal of <7.0    INTERVENTION:   Diabetes knowledge and skills assessment:     Patient is knowledgeable in diabetes management concepts related to:  Needs review and support of all diabetes self management skills.   Based on learning assessment above, most appropriate setting for further diabetes education would be: Individual setting.    Education provided today on:  AADE Self-Care Behaviors:  SMBG:  Instructed in using the [employer] Guide  meter  Reviewed BG goals, and discussed monitoring strategies to ascertain BG patterns.     Discussed healthy food choices.  Discussed some of the various weight loss strategies she has employed, and discussed calorie restriction and increasing activity as a strategy that would encourage slower, steady weight loss that may be easier to maintain.  She is willing to start walking on a more regular basis to increase activity and will consider some other activities, like strength training, which would enhance glucose metabolism.     Opportunities for ongoing education and support in diabetes-self management were discussed.    Pt verbalized understanding of concepts discussed and recommendations provided today.       Education Materials Provided:  [facility] Understanding Diabetes Booklet     PLAN:  See Patient Instructions for co-developed, patient-stated behavior change goals.  AVS printed and provided to patient today. See Follow-Up section for recommended follow-up.    Follow up with RD-CDE recommended.     Time Spent: 60 minutes  Encounter Type: Individual    Any diabetes medication dose changes were made via the CDE Protocol and Collaborative Practice Agreement with the patient's referring provider. A copy of this encounter was shared with the provider.

## 2019-10-01 ENCOUNTER — HEALTH MAINTENANCE LETTER (OUTPATIENT)
Age: 59
End: 2019-10-01

## 2019-10-22 ENCOUNTER — ALLIED HEALTH/NURSE VISIT (OUTPATIENT)
Dept: EDUCATION SERVICES | Facility: CLINIC | Age: 59
End: 2019-10-22
Payer: COMMERCIAL

## 2019-10-22 DIAGNOSIS — E11.9 TYPE 2 DIABETES MELLITUS WITHOUT COMPLICATION, WITHOUT LONG-TERM CURRENT USE OF INSULIN (H): Primary | ICD-10-CM

## 2019-10-23 VITALS — WEIGHT: 195 LBS | BODY MASS INDEX: 36.84 KG/M2

## 2019-10-23 NOTE — PROGRESS NOTES
"Diabetes Self-Management Education & Support    Diabetes Education Self Management & Training    SUBJECTIVE/OBJECTIVE:  Accompanied by: Self  Diabetes education in the past 24mo: Yes  Focus of Visit: Healthy Eating  Diabetes type: Type 2  Other concerns:: None  Cultural Influences/Ethnic Background:  American    Diabetes Symptoms & Complications  Weight trend: Stable    Patient Problem List and Family Medical History reviewed for relevant medical history, current medical status, and diabetes risk factors.    Vitals:  Wt 88.5 kg (195 lb)   LMP 03/15/2013 (Exact Date)   BMI 36.84 kg/m    Estimated body mass index is 36.84 kg/m  as calculated from the following:    Height as of 9/17/19: 1.549 m (5' 1\").    Weight as of this encounter: 88.5 kg (195 lb).   Last 3 BP:   BP Readings from Last 3 Encounters:   09/17/19 124/78   09/05/19 136/88   07/15/19 133/83       History   Smoking Status     Former Smoker     Quit date: 1/1/1980   Smokeless Tobacco     Never Used       Labs:  Lab Results   Component Value Date    A1C 6.8 09/05/2019     Lab Results   Component Value Date     04/15/2019     Lab Results   Component Value Date     04/15/2019     HDL Cholesterol   Date Value Ref Range Status   04/15/2019 73 >49 mg/dL Final   ]  GFR Estimate   Date Value Ref Range Status   04/15/2019 64 >60 mL/min/[1.73_m2] Final     Comment:     Non  GFR Calc  Starting 12/18/2018, serum creatinine based estimated GFR (eGFR) will be   calculated using the Chronic Kidney Disease Epidemiology Collaboration   (CKD-EPI) equation.       GFR Estimate If Black   Date Value Ref Range Status   04/15/2019 74 >60 mL/min/[1.73_m2] Final     Comment:      GFR Calc  Starting 12/18/2018, serum creatinine based estimated GFR (eGFR) will be   calculated using the Chronic Kidney Disease Epidemiology Collaboration   (CKD-EPI) equation.       Lab Results   Component Value Date    CR 0.96 04/15/2019     No results " found for: MICROALBUMIN    Healthy Eating  Patient on a regular basis: Eats 3 meals a day  She is eating out more often than she'd like to be. Would like some ideas for healthy meals.  Patient brought three days of food records with her today. Records show low intake for those three days - 1100-1200kcal, 98g carb, low protein - which is not healthy or sustainable for patient.    Recommended intake based on weight/height/activty/age:  1500kcal per day for weight loss of 1 lb per week  180-190 grams CHO per day or 45-60 grams CHO at meals and 0-30 grams CHO at snacks    Patient states her initial goals when it comes to her diet are to eat more vegetables and to do more planning. Materials were provided today to assist with those goals.     Being Active  Limited due to foot pain. Considering getting a desk cycle for under her desk at work.     Monitoring  Fasting glucose 150-180s  Post prandial readings 144, 138, 172, 153  Patient stopped checking glucose the past couple weeks because she didn't like what she was seeing.  She would like to avoid taking diabetes medications  Glucose today was 152 in clinic three hours after a meal.    Taking Medications  No     Healthy Coping  Emotional response to diabetes: Ready to learn  Stage of change: ACTION (Actively working towards change)  Patient Activation Measure Survey Score:  ROXIE Score (Last Two) 12/9/2010   ROXIE Raw Score 47   Activation Score 77.5   ROXIE Level 4       ASSESSMENT:    Patient's most recent   Lab Results   Component Value Date    A1C 6.8 09/05/2019    is meeting goal of <7.0    INTERVENTION:   Diabetes knowledge and skills assessment:     Patient is knowledgeable in diabetes management concepts related to: Healthy Eating    Patient needs further education on the following diabetes management concepts: Healthy Eating    Based on learning assessment above, most appropriate setting for further diabetes education would be: Individual setting.    Education  provided today on:  AADE Self-Care Behaviors:  Healthy Eating: consistency in amount, composition, and timing of food intake, weight reduction, heart healthy diet, plate planning method and label reading  Reviewed nutritional needs with patient today. Discussed meal planning to aid in meeting those needs and to aid in weight loss and glucose management.  Being Active: relationship to blood glucose  Healthy Coping: benefits of making appropriate lifestyle changes    Opportunities for ongoing education and support in diabetes-self management were discussed.    Pt verbalized understanding of concepts discussed and recommendations provided today.       Education Materials Provided:  Carbohydrate Counting and Snack Ideas for Your Meal Plan and Meal Ideas  Online resources for recipes    Time Spent: 60 minutes  Encounter Type: Individual    Any diabetes medication dose changes were made via the CDE Protocol and Collaborative Practice Agreement with the patient's referring provider. A copy of this encounter was shared with the provider.

## 2020-02-10 ENCOUNTER — OFFICE VISIT (OUTPATIENT)
Dept: URGENT CARE | Facility: URGENT CARE | Age: 60
End: 2020-02-10
Payer: COMMERCIAL

## 2020-02-10 VITALS
HEART RATE: 92 BPM | OXYGEN SATURATION: 96 % | TEMPERATURE: 99 F | BODY MASS INDEX: 35.87 KG/M2 | HEIGHT: 61 IN | WEIGHT: 190 LBS | DIASTOLIC BLOOD PRESSURE: 84 MMHG | RESPIRATION RATE: 16 BRPM | SYSTOLIC BLOOD PRESSURE: 156 MMHG

## 2020-02-10 DIAGNOSIS — M54.50 ACUTE LEFT-SIDED LOW BACK PAIN WITHOUT SCIATICA: Primary | ICD-10-CM

## 2020-02-10 PROCEDURE — 99214 OFFICE O/P EST MOD 30 MIN: CPT | Performed by: INTERNAL MEDICINE

## 2020-02-10 RX ORDER — IBUPROFEN 200 MG
200 TABLET ORAL EVERY 4 HOURS PRN
COMMUNITY
End: 2023-10-05

## 2020-02-10 RX ORDER — CYCLOBENZAPRINE HCL 10 MG
5-10 TABLET ORAL 3 TIMES DAILY PRN
Qty: 15 TABLET | Refills: 0 | Status: SHIPPED | OUTPATIENT
Start: 2020-02-10 | End: 2020-02-27

## 2020-02-10 RX ORDER — NAPROXEN 500 MG/1
500 TABLET ORAL 2 TIMES DAILY WITH MEALS
Qty: 30 TABLET | Refills: 0 | Status: SHIPPED | OUTPATIENT
Start: 2020-02-10 | End: 2020-02-27

## 2020-02-10 ASSESSMENT — MIFFLIN-ST. JEOR: SCORE: 1364.21

## 2020-02-10 NOTE — PROGRESS NOTES
SUBJECTIVE:   Karlee Emery is a 59 year old female presenting with a chief complaint of   Chief Complaint   Patient presents with     Urgent Care     Back Pain     back pain x 2 weeks but sudden turn for the worse last night. low back. No known injury but happened after leveling new stove.        She is an established patient of [facility].    Back Pain    Onset of symptoms was few week(s) ago.  Location: left low back  Radiation: radiates into the left buttocks  Context:       The injury happened while at home      Mechanism: may be related to trying to level stove, on floor,  Few days ago, light lifting  Few weeks       Patient experienced delayed pain  Course of symptoms is worsening.      Current and Associated symptoms: pain  Denies: fecal incontinence, urinary incontinence, lower extremity numbness and lower extremity weakness    Aggravating Factors: changing position and moving  Therapies to improve symptoms include: ice, ibuprofen and chiropractor    Past history: recurrent self limited episodes of low back pain in the past      Review of Systems    Past Medical History:   Diagnosis Date     Chronic depressive personality disorder      Depression, major      Diabetes mellitus (H)     TYPE 2- DIET, resolved with weight loss     Elevated cholesterol      FCU (flexor carpi ulnaris) tenosynovitis 8/7/2013     Fracture of ulnar styloid 8/7/2013     Genital herpes      Headache(784.0)      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    Screen annually for signs and symptoms of Adult T cell leukemia-lymphoma (BRANDEN) or LIXW-H-fqjwtovgci myelopathy (HAM), also known as tropical spastic paraparesis (TSP): body aches, fevers, night sweats, loss of appetite or weight      Hyperlipidemia      Hypertension      Other abnormal glucose      Other genital herpes     On suppressive therapy     Family History   Problem Relation Age of Onset     Heart Disease Father         blockage     Lipids Father      Neurologic  Disorder Father         migraines     Gastrointestinal Disease Father         ulcers     Diabetes Father      Cancer Father         lung     Cerebrovascular Disease Maternal Grandmother      Diabetes Maternal Grandmother      Cerebrovascular Disease Maternal Grandfather      Cerebrovascular Disease Paternal Grandmother      Cerebrovascular Disease Paternal Grandfather      Diabetes Mother      Graves' disease Sister      Graves' disease Sister      Schizophrenia Son      Thyroid Disease Sister      Thyroid Disease Sister      Diabetes Sister      Current Outpatient Medications   Medication Sig Dispense Refill     buPROPion (WELLBUTRIN XL) 150 MG 24 hr tablet Take 1 tablet (150 mg) by mouth every morning 90 tablet 3     cyclobenzaprine (FLEXERIL) 10 MG tablet Take 0.5-1 tablets (5-10 mg) by mouth 3 times daily as needed for muscle spasms 15 tablet 0     ibuprofen (ADVIL/MOTRIN) 200 MG tablet Take 200 mg by mouth every 4 hours as needed for mild pain       MULTIVITAMIN TABS   OR 1 TABLET DAILY       naproxen (NAPROSYN) 500 MG tablet Take 1 tablet (500 mg) by mouth 2 times daily (with meals) for 15 days 30 tablet 0     valACYclovir (VALTREX) 500 MG tablet Take 1 tablet (500 mg) by mouth daily 90 tablet 3     venlafaxine (EFFEXOR-XR) 150 MG 24 hr capsule Take 1 capsule (150 mg) by mouth daily 90 capsule 3     acetaminophen (TYLENOL) 325 MG tablet Take 2 tablets by mouth every 4 hours as needed for other (mild pain). 100 tablet 0     aspirin 81 MG tablet Take  by mouth daily. (FMG)  3     atorvastatin (LIPITOR) 40 MG tablet Take 1 tablet (40 mg) by mouth daily 90 tablet 1     blood glucose (NO BRAND SPECIFIED) lancets standard Use to test blood sugar 1 time daily or as directed. 50 each 3     blood glucose (NO BRAND SPECIFIED) test strip Use to test blood sugar 1 time daily or as directed. To accompany: Blood Glucose Monitor Brands: per insurance. 100 strip 6     blood glucose monitoring (NO BRAND SPECIFIED) meter device  "kit Use to test blood sugar 1 time daily or as directed. Blood Glucose Monitor Brands: per insurance formulary. 1 kit 0     Calcium Carbonate-Vitamin D (CALCIUM + D PO) Take 1 tablet by mouth daily.       loratadine (CLARITIN) 10 MG tablet Take 1 tablet by mouth daily. 90 tablet 0     Social History     Tobacco Use     Smoking status: Former Smoker     Last attempt to quit: 1980     Years since quittin.1     Smokeless tobacco: Never Used   Substance Use Topics     Alcohol use: Yes     Alcohol/week: 2.0 standard drinks     Types: 2 Standard drinks or equivalent per week       OBJECTIVE  BP (!) 156/84   Pulse 92   Temp 99  F (37.2  C) (Oral)   Resp 16   Ht 1.549 m (5' 1\")   Wt 86.2 kg (190 lb)   LMP 03/15/2013 (Exact Date)   SpO2 96%   BMI 35.90 kg/m      Physical Exam  Vitals signs reviewed.   Constitutional:       Appearance: Normal appearance.   Musculoskeletal:      Comments: BACK: Lumbosacral spine area reveals no local tenderness.  Pain left para lumbar muscles into buttock.  Painful and reduced LS ROM noted.   Straight leg raise is negative  at 45 degrees on bilateral .  DTR's, motor strength and sensation normal, including heel and toe gait.    Exam hip normal but causes left back pain   Neurological:      Mental Status: She is alert.         Labs:  No results found for this or any previous visit (from the past 24 hour(s)).    X-Ray was not done.    ASSESSMENT:      ICD-10-CM    1. Acute left-sided low back pain without sciatica M54.5 cyclobenzaprine (FLEXERIL) 10 MG tablet     naproxen (NAPROSYN) 500 MG tablet     [facility] PT, HAND, AND CHIROPRACTIC REFERRAL        Medical Decision Making:  Consider xray if not improved     PLAN:  CC primary    Patient Instructions   Physical Therapy   chiropractor     Naprosyn 500 mg 2 x day  Alternate ES tylenol 1000 mg 3 x day (max 3000 mg /day)  Flexeril 5-10 mg up to 3 x day as needed  Ice/ heat    Recheck 2 weeks.          Patient Education     General Neck " and Back Pain    Both neck and back pain are usually caused by injury to the muscles or ligaments of the spine. Sometimes the disks that separate each bone of the spine may cause pain by pressing on a nearby nerve. Back and neck pain may appear after a sudden twisting or bending force (such as in a car accident), or sometimes after a simple awkward movement. In either case, muscle spasm is often present and adds to the pain.  Acute neck and back pain usually gets better in 1 to 2 weeks. Pain related to disk disease, arthritis in the spinal joints or spinal stenosis (narrowing of the spinal canal) can become chronic and last for months or years.  Back and neck pain are common problems. Most people feel better in 1 or 2 weeks, and most of the rest in 1 to 2 months. Most people can remain active.  People have and describe pain differently.    Pain can be sharp, stabbing, shooting, aching, cramping, or burning    Movement, standing, bending, lifting, sitting, or walking may worsen the pain    Pain can be localized to one spot or area, or it can be more generalized    Pain can spread or radiate upwards, downwards, to the front, or go down your arms    Muscle spasm may occur.  Most of the time mechanical problems with the muscles or spine cause the pain. it is usually caused by an injury, whether known or not, to the muscles or ligaments. While illnesses can cause back pain, it is usually not caused by a serious illness. Pain is usually related to physical activity, whether sports, exercise, work, or normal activity. Sometimes it can occur without an identifiable cause. This can happen simply by stretching or moving wrong, without noting pain at the time. Other causes include:    Overexertion, lifting, pushing, pulling incorrectly or too aggressively.    Sudden twisting, bending or stretching from an accident (car or fall), or accidental movement.    Poor posture    Poor conditioning, lack of regular exercise    Spinal  disc disease or arthritis    Stress    Pregnancy, or illness like appendicitis, bladder or kidney infection, pelvic infections   Home care    For neck pain: Use a comfortable pillow that supports the head and keeps the spine in a neutral position. The position of the head should not be tilted forward or backward.    When in bed, try to find a position of comfort. A firm mattress is best. Try lying flat on your back with pillows under your knees. You can also try lying on your side with your knees bent up towards your chest and a pillow between your knees.    At first, do not try to stretch out the sore spots. If there is a strain, it is not like the good soreness you get after exercising without an injury. In this case, stretching may make it worse.    Don't sit for long periods, as in long car rides or other travel. This puts more stress on the lower back than standing or walking.    During the first 24 to 72 hours after an injury, apply an ice pack to the painful area for 20 minutes and then remove it for 20 minutes over a period of 60 to 90 minutes or several times a day.     You can alternate ice and heat therapies. Talk with your healthcare provider about the best treatment for your back or neck pain. As a safety precaution, do not use a heating pad at bedtime. Sleeping with a heating pad can lead to skin burns or tissue damage.    Therapeutic massage can help relax the back and neck muscles without stretching them.    Be aware of safe lifting methods and do not lift anything over 15 pounds until all the pain is gone.  Medicines  Talk to your healthcare provider before using medicine, especially if you have other medical problems or are taking other medicines.    You may use over-the-counter medicine to control pain, unless another pain medicine was prescribed. If you have chronic conditions like diabetes, liver or kidney disease, stomach ulcers,  gastrointestinal bleeding, or are taking blood thinner  medicines.    Be careful if you are given pain medicines, narcotics, or medicine for muscle spasm. They can cause drowsiness, and can affect your coordination, reflexes, and judgment. Do not drive or operate heavy machinery.  Follow-up care  Follow up with your healthcare provider, or as advised. Physical therapy or further tests may be needed.  If X-rays were taken, you will be notified of any new findings that may affect your care.  Call 911  Call 911 if any of the following occur:    Trouble breathing    Confusion    Very drowsy or trouble awakening    Fainting or loss of consciousness    Rapid or very slow heart rate    Loss of bowel or bladder control  When to seek medical advice  Call your healthcare provider right away if any of these occur:    Pain becomes worse or spreads into your arms or legs    Weakness, numbness or pain in one or both arms or legs    Numbness in the groin area    Difficulty walking    Fever of 100.4 F (38 C) or higher, or as directed by your healthcare provider  Date Last Reviewed: 7/1/2016 2000-2019 [email or web address]. [address] [place] [address], [place] [address]. All rights reserved. This information is not intended as a substitute for professional medical care. Always follow your healthcare professional's instructions.

## 2020-02-11 ENCOUNTER — THERAPY VISIT (OUTPATIENT)
Dept: PHYSICAL THERAPY | Facility: CLINIC | Age: 60
End: 2020-02-11
Attending: INTERNAL MEDICINE
Payer: COMMERCIAL

## 2020-02-11 DIAGNOSIS — M54.50 ACUTE LEFT-SIDED LOW BACK PAIN WITHOUT SCIATICA: ICD-10-CM

## 2020-02-11 PROCEDURE — 97110 THERAPEUTIC EXERCISES: CPT | Mod: GP | Performed by: PHYSICAL THERAPIST

## 2020-02-11 PROCEDURE — 97530 THERAPEUTIC ACTIVITIES: CPT | Mod: GP | Performed by: PHYSICAL THERAPIST

## 2020-02-11 PROCEDURE — 97161 PT EVAL LOW COMPLEX 20 MIN: CPT | Mod: GP | Performed by: PHYSICAL THERAPIST

## 2020-02-11 NOTE — PATIENT INSTRUCTIONS
Physical Therapy   chiropractor     Naprosyn 500 mg 2 x day  Alternate ES tylenol 1000 mg 3 x day (max 3000 mg /day)  Flexeril 5-10 mg up to 3 x day as needed  Ice/ heat    Recheck 2 weeks.          Patient Education     General Neck and Back Pain    Both neck and back pain are usually caused by injury to the muscles or ligaments of the spine. Sometimes the disks that separate each bone of the spine may cause pain by pressing on a nearby nerve. Back and neck pain may appear after a sudden twisting or bending force (such as in a car accident), or sometimes after a simple awkward movement. In either case, muscle spasm is often present and adds to the pain.  Acute neck and back pain usually gets better in 1 to 2 weeks. Pain related to disk disease, arthritis in the spinal joints or spinal stenosis (narrowing of the spinal canal) can become chronic and last for months or years.  Back and neck pain are common problems. Most people feel better in 1 or 2 weeks, and most of the rest in 1 to 2 months. Most people can remain active.  People have and describe pain differently.    Pain can be sharp, stabbing, shooting, aching, cramping, or burning    Movement, standing, bending, lifting, sitting, or walking may worsen the pain    Pain can be localized to one spot or area, or it can be more generalized    Pain can spread or radiate upwards, downwards, to the front, or go down your arms    Muscle spasm may occur.  Most of the time mechanical problems with the muscles or spine cause the pain. it is usually caused by an injury, whether known or not, to the muscles or ligaments. While illnesses can cause back pain, it is usually not caused by a serious illness. Pain is usually related to physical activity, whether sports, exercise, work, or normal activity. Sometimes it can occur without an identifiable cause. This can happen simply by stretching or moving wrong, without noting pain at the time. Other causes  include:    Overexertion, lifting, pushing, pulling incorrectly or too aggressively.    Sudden twisting, bending or stretching from an accident (car or fall), or accidental movement.    Poor posture    Poor conditioning, lack of regular exercise    Spinal disc disease or arthritis    Stress    Pregnancy, or illness like appendicitis, bladder or kidney infection, pelvic infections   Home care    For neck pain: Use a comfortable pillow that supports the head and keeps the spine in a neutral position. The position of the head should not be tilted forward or backward.    When in bed, try to find a position of comfort. A firm mattress is best. Try lying flat on your back with pillows under your knees. You can also try lying on your side with your knees bent up towards your chest and a pillow between your knees.    At first, do not try to stretch out the sore spots. If there is a strain, it is not like the good soreness you get after exercising without an injury. In this case, stretching may make it worse.    Don't sit for long periods, as in long car rides or other travel. This puts more stress on the lower back than standing or walking.    During the first 24 to 72 hours after an injury, apply an ice pack to the painful area for 20 minutes and then remove it for 20 minutes over a period of 60 to 90 minutes or several times a day.     You can alternate ice and heat therapies. Talk with your healthcare provider about the best treatment for your back or neck pain. As a safety precaution, do not use a heating pad at bedtime. Sleeping with a heating pad can lead to skin burns or tissue damage.    Therapeutic massage can help relax the back and neck muscles without stretching them.    Be aware of safe lifting methods and do not lift anything over 15 pounds until all the pain is gone.  Medicines  Talk to your healthcare provider before using medicine, especially if you have other medical problems or are taking other  medicines.    You may use over-the-counter medicine to control pain, unless another pain medicine was prescribed. If you have chronic conditions like diabetes, liver or kidney disease, stomach ulcers,  gastrointestinal bleeding, or are taking blood thinner medicines.    Be careful if you are given pain medicines, narcotics, or medicine for muscle spasm. They can cause drowsiness, and can affect your coordination, reflexes, and judgment. Do not drive or operate heavy machinery.  Follow-up care  Follow up with your healthcare provider, or as advised. Physical therapy or further tests may be needed.  If X-rays were taken, you will be notified of any new findings that may affect your care.  Call 911  Call 911 if any of the following occur:    Trouble breathing    Confusion    Very drowsy or trouble awakening    Fainting or loss of consciousness    Rapid or very slow heart rate    Loss of bowel or bladder control  When to seek medical advice  Call your healthcare provider right away if any of these occur:    Pain becomes worse or spreads into your arms or legs    Weakness, numbness or pain in one or both arms or legs    Numbness in the groin area    Difficulty walking    Fever of 100.4 F (38 C) or higher, or as directed by your healthcare provider  Date Last Reviewed: 7/1/2016 2000-2019 [employer]. [address] [place] [address], [place] [address]. All rights reserved. This information is not intended as a substitute for professional medical care. Always follow your healthcare professional's instructions.

## 2020-02-11 NOTE — PROGRESS NOTES
Answers for HPI/ROS submitted by the patient on 2/11/2020   History Reported by Patient  Where?: at home  Pain quality: burning, sharp, stabbing  Pain is: the same all the time  Progression since onset: rapidly worsening  Previous treatment: chiropractic  Improvement with previous treatment: none  [place] [facility] Initial Evaluation  Subjective:  The history is provided by the patient. No  was used.   Patient Entered Health History - See Therapist Evaluation below  Karlee Emery being seen for Back pain.     Problem began: 1/25/2020.   Problem occurred: Moving & leveling stove  and reported as 9/10 on pain scale.  General health as reported by patient is good.  Pertinent medical history includes: depression, diabetes, incontinence and sleep disorder/apnea.   Red flags:  Pain at rest/night (mechanical).  Medical allergies: other. Other medical allergies details: sulfa.       Current medications:  Anti-depressants, pain medication and muscle relaxants.    Current occupation is Community Ed Coordinator.                     Physical Exam                    Objective:  System    Physical Exam    General     [facility] Initial Evaluation -- Lumbar    Referral: MD 2/10/2020  Functional disability score (JERRY/STarT Back):  see Epic  VAS score (0-10): 9/10  Patient goals/expectations:  Reduce pain    HISTORY:    Present symptoms: Left LB/glut wraps around into groin  Pain quality (sharp/shooting/stabbing/aching/burning/cramping):  ache   Paresthesia (yes/no):  no    Present since (onset date): 1/25/20 started day after lifting something, to the extent she needed to stay home from work.  A couple chiro visits (estim, manipulations) seemed to help initially, then she reports significant increase in her pain requiring urgent care visit.  No imaging    Symptoms at onset (back/thigh/leg): back  Constant symptoms (back/thigh/leg): back/glut/anterior  thigh  Intermittent symptoms (back/thigh/leg): NA    Symptoms are made worse with the following: sitting, transitions, AM, anything requiring left hip flexion  Symptoms are made better with the following: lying flat    Disturbed sleep (yes/no):  yes     Previous episodes (0/1-5/6-10/11+): none     Previous history: no reported back history    Medications (nil/NSAIDS/analg/steroids/anticoag/other): see Epic  General health (excellent/good/fair/poor):  good  Imaging (None/Xray/MRI/Other):  no  Recent or major surgery (yes/no):  no  Night pain (yes/no): no  Accidents (yes/no): no  Unexplained weight loss (yes/no): no  Barriers at home: no  Other red flags: no    EXAMINATION    Posture:   Sitting (good/fair/poor): poor- overguarding  Standing (good/fair/poor):poor- dec weight to the left  Lordosis (red/acc/normal): acc  Correction of posture (better/worse/no effect): better    Lateral Shift (right/left/nil): nil  Relevant (yes/no):  na  Other Observations: requires UE assist to move right LE with transfers    Neurological:      Movement Loss:   Mickey Mod Min Nil Pain   Flexion X    +/fear/<mid thigh   Extension    x    Side Gliding R    x    Side Gliding L    x      Test Movements:   During: produces, abolishes, increases, decreases, no effect, centralizing, peripheralizing   After: better, worse, no better, no worse, no effect, centralized, peripheralized    Pretest symptoms standin/10 LBP   Symptoms During Symptoms After ROM increased ROM decreased No Effect   FIS unable       Rep FIS        EIS NE       Rep EIS  better      Pretest symptoms lyin/10 LBP    Symptoms During Symptoms After ROM increased ROM decreased No Effect   SONIA        Rep SONIA NE- guarded, not to end range NE   NE   EIL        Rep EIL NE better FF to mid shin         Static Tests:    Other Tests: NT    Provisional Classification:  Derangement - Bilateral, symmetrical, symptoms above knee    Principle of Management:  Education: (Therapeutic  Exercise): Pt education regarding four stages of healing: pain reduction, maintenance through exercise, recovery of function, and prevention of re-occurrence. Utilized prescription dosage of exercise theory, cut finger analogy, and scientific method to help patient understand purpose of exercise specificity and importance of compliance with exercise.  Pt also educated in traffic light response to exercise, and self assessment to guide home exercise program.    Equipment provided:  Postural correction with instruct in use of lumbar roll and sitting posture when unsupported, w education provided re: impact of posture and body mechanics on symptom production. Pt encouraged to monitor this correlation as part of overall self management.  Mechanical therapy (Y/N):  y   Extension principle:  y  Lateral Principle:  no  Flexion principle:  no  Other:  Body mechanics    ASSESSMENT/PLAN:    Patient is a 59year old female with LBP complaints.    Patient has the following significant findings with corresponding treatment plan.                Diagnosis 1:  LBP  Pain -  manual therapy, self management, education, directional preference exercise and home program  Decreased ROM/flexibility - manual therapy and therapeutic exercise  Decreased joint mobility - manual therapy and therapeutic exercise  Decreased strength - therapeutic exercise and therapeutic activities  Decreased proprioception - neuro re-education and therapeutic activities  Impaired gait - gait training  Impaired muscle performance - neuro re-education  Decreased function - therapeutic activities  Impaired posture - neuro re-education    Previous and current functional limitations:  (See Goal Flow Sheet for this information)    Short term and Long term goals: (See Goal Flow Sheet for this information)     Communication ability:  Patient appears to be able to clearly communicate and understand verbal and written communication and follow directions  correctly.  Treatment Explanation - The following has been discussed with the patient:   RX ordered/plan of care  Anticipated outcomes  Possible risks and side effects  This patient would benefit from PT intervention to resume normal activities.   Rehab potential is good.    Frequency:  1X week, once daily  Duration:  for 4 weeks  Discharge Plan:  Achieve all LTG.  Independent in home treatment program.  Reach maximal therapeutic benefit.    Please refer to the daily flowsheet for treatment today, total treatment time and time spent performing 1:1 timed codes.

## 2020-02-26 NOTE — PROGRESS NOTES
Subjective     Karlee Emery is a 59 year old female who presents to clinic today for the following health issues:    HPI    Diabetes Follow-up      How often are you checking your blood sugar? Not at all    What concerns do you have today about your diabetes? None     Do you have any of these symptoms? (Select all that apply)  No numbness or tingling in feet.  No redness, sores or blisters on feet.  No complaints of excessive thirst.  No reports of blurry vision.  No significant changes to weight.       BP Readings from Last 2 Encounters:   20 138/84   02/10/20 (!) 156/84     Hemoglobin A1C (%)   Date Value   2020 7.6 (H)   2019 6.8 (H)     LDL Cholesterol Calculated (mg/dL)   Date Value   04/15/2019 187 (H)   2018 154 (H)            Depression and Anxiety Follow-Up    How are you doing with your depression since your last visit? Worsened     How are you doing with your anxiety since your last visit?  Worsened     Are you having other symptoms that might be associated with depression or anxiety? No    Have you had a significant life event? OTHER: lots of stress, with her adult son, her wife's sisters     Do you have any concerns with your use of alcohol or other drugs? No     Has previously use sertraline (through 2015) and citalopram (2015-2016).    She and her wife have started couples counseling - have had 2 sessions so far    Social History     Tobacco Use     Smoking status: Former Smoker     Last attempt to quit: 1980     Years since quittin.1     Smokeless tobacco: Never Used   Substance Use Topics     Alcohol use: Yes     Alcohol/week: 2.0 standard drinks     Types: 2 Standard drinks or equivalent per week     Drug use: No     PHQ 4/15/2019 2019 2020   PHQ-9 Total Score 19 10 15   Q9: Thoughts of better off dead/self-harm past 2 weeks Not at all Not at all Several days   F/U: Thoughts of suicide or self-harm - - Yes   F/U: Self harm-plan - - No   F/U:  Self-harm action - - No   F/U: Safety concerns - - No     DANIEL-7 SCORE 7/21/2017 9/5/2019 2/27/2020   Total Score - - -   Total Score 0 (minimal anxiety) 2 (minimal anxiety) 6 (mild anxiety)   Total Score 0 2 6     Last PHQ-9 2/27/2020   1.  Little interest or pleasure in doing things 2   2.  Feeling down, depressed, or hopeless 2   3.  Trouble falling or staying asleep, or sleeping too much 2   4.  Feeling tired or having little energy 2   5.  Poor appetite or overeating 2   6.  Feeling bad about yourself 2   7.  Trouble concentrating 2   8.  Moving slowly or restless 0   Q9: Thoughts of better off dead/self-harm past 2 weeks 1   PHQ-9 Total Score 15   Difficulty at work, home, or with people -   In the past two weeks have you had thoughts of suicide or self harm? Yes   Do you have concerns about your personal safety or the safety of others? No   In the past 2 weeks have you thought about a plan or had intention to harm yourself? No   In the past 2 weeks have you acted on these thoughts in any way? No     DANIEL-7  2/27/2020   1. Feeling nervous, anxious, or on edge 1   2. Not being able to stop or control worrying 1   3. Worrying too much about different things 0   4. Trouble relaxing 1   5. Being so restless that it is hard to sit still 0   6. Becoming easily annoyed or irritable 2   7. Feeling afraid, as if something awful might happen 1   DANIEL-7 Total Score 6   If you checked any problems, how difficult have they made it for you to do your work, take care of things at home, or get along with other people? -     Answers for HPI/ROS submitted by the patient on 2/27/2020   If you checked off any problems, how difficult have these problems made it for you to do your work, take care of things at home, or get along with other people?: Very difficult  PHQ9 TOTAL SCORE: 15  DANIEL 7 TOTAL SCORE: 6        How many servings of fruits and vegetables do you eat daily?  2-3    On average, how many sweetened beverages do you drink  each day (Examples: soda, juice, sweet tea, etc.  Do NOT count diet or artificially sweetened beverages)?   0    How many days per week do you exercise enough to make your heart beat faster? 3 or less    How many minutes a day do you exercise enough to make your heart beat faster? 9 or less    How many days per week do you miss taking your medication? 0      Hyperlipidemia Follow-Up  Stopped taking cholesterol medication    Are you regularly taking any medication or supplement to lower your cholesterol?   No - stopped atorvastatin due to concern about side effects     Are you having muscle aches or other side effects that you think could be caused by your cholesterol lowering medication?  Yes- felt shaky - like hypoglemic but didn't check BG      Recent Labs   Lab Test 02/27/20  0843 09/05/19  0949 04/15/19  1148 07/18/18  1229 07/18/18  1147  06/27/16  0959  05/07/13  1111   A1C 7.6* 6.8* 6.7*  --  6.1*   < >  --    < >  --    LDL  --   --  187*  --  154*  --  174*   < >  --    HDL  --   --  73  --  75  --  73   < >  --    TRIG  --   --  73  --  59  --  60   < >  --    ALT  --   --   --  27  --   --   --   --   --    CR  --   --  0.96  --  0.86   < >  --    < >  --    GFRESTIMATED  --   --  64  --  68   < >  --    < >  --    GFRESTBLACK  --   --  74  --  82   < >  --    < >  --    POTASSIUM  --   --  4.6  --  4.5  --   --    < >  --    TSH  --   --  2.06  --   --   --   --   --  1.73    < > = values in this interval not displayed.      BP Readings from Last 3 Encounters:   02/27/20 138/84   02/10/20 (!) 156/84   09/17/19 124/78    Wt Readings from Last 3 Encounters:   02/27/20 86.6 kg (191 lb)   02/10/20 86.2 kg (190 lb)   10/23/19 88.5 kg (195 lb)            Reviewed and updated as needed this visit by Provider  Meds  Problems         Review of Systems   RESP:  NEGATIVE for shortness of breath  CV:  NEGATIVE for chest pain        Objective    /84 (BP Location: Left arm, Patient Position: Sitting, Cuff  "Size: Adult Large)   Pulse 84   Temp 98.9  F (37.2  C) (Oral)   Resp 14   Ht 1.556 m (5' 1.25\")   Wt 86.6 kg (191 lb)   LMP 03/15/2013 (Exact Date)   SpO2 98%   Breastfeeding No   BMI 35.80 kg/m    Body mass index is 35.8 kg/m .  Physical Exam   GEN:  no apparent distress  EYES: sclerae and conjunctivae clear with no discharge  LUNGS:  normal respiratory effort, and lungs clear to auscultation bilaterally - no rales, rhonchi or wheezes  CV: regular rate and rhythm, normal S1 S2, no S3 or S4 and no murmur, click or rub  PSYCH:  Appearance/Behavior: patient is appropriately and casually dressed.  Speech:  normal  rate, rhythm, and tone.  Mood/Affect: anxious/congruent.  Insight: good     Diagnostic Test Results:  Labs reviewed in Epic        Assessment & Plan     1. Type 2 diabetes mellitus without complication, without long-term current use of insulin (H)  Worsening control which she attributes to worsened diet.  She'll work on improving diet.  We discussed metformin but deferred that for now.  She'll try atorvastatin again at lower dose.  I advised her to check blood sugar any time she feels she may be having hypoglycemia symptoms   - Hemoglobin A1c  - atorvastatin (LIPITOR) 40 MG tablet; Take 0.5 tablets (20 mg) by mouth daily    2. Moderate major depression (H)  3. Anxiety  We'll increase venlafaxine dose.  She'll continue with couples therapy.  - TSH with free T4 reflex  - Vitamin D Deficiency  - venlafaxine (EFFEXOR-XR) 75 MG 24 hr capsule; Take 1 capsule (75 mg) by mouth daily Take with 150 mg capsule for total daily dose of 225 mg  Dispense: 30 capsule; Refill: 0      4. Obesity (BMI 35.0-39.9) with comorbidity (H)  Weight is stable despite increase in comfort eating.  Encouraged more physical activity    5. Screening for endocrine, nutritional, metabolic and immunity disorder  - Vitamin D Deficiency    6. Other disorders of iron metabolism  - Ferritin    7. Need for vaccination  - PPSV23, IM/SUBQ (2+ " YRS) - [id number]  - ADMIN 1st VACCINE        Patient Instructions   Increase the venlafaxine dose to 225 mg daily by taking a 75 mg capsule with the 150 mg capsule you have.    Try taking 1/2 tab (20 mg) of atorvastatin daily.      If you feel hypoglycemic and have your glucometer check your blood sugar.     Get outside and walk every day - even if for only 10 minutes.        Return in about 4 weeks (around 3/26/2020) for e-visit depression follow-up.    Veronica Solis MD  [facility]

## 2020-02-27 ENCOUNTER — OFFICE VISIT (OUTPATIENT)
Dept: FAMILY MEDICINE | Facility: CLINIC | Age: 60
End: 2020-02-27
Payer: COMMERCIAL

## 2020-02-27 ENCOUNTER — THERAPY VISIT (OUTPATIENT)
Dept: PHYSICAL THERAPY | Facility: CLINIC | Age: 60
End: 2020-02-27
Payer: COMMERCIAL

## 2020-02-27 VITALS
OXYGEN SATURATION: 98 % | DIASTOLIC BLOOD PRESSURE: 84 MMHG | RESPIRATION RATE: 14 BRPM | TEMPERATURE: 98.9 F | BODY MASS INDEX: 36.06 KG/M2 | HEART RATE: 84 BPM | HEIGHT: 61 IN | SYSTOLIC BLOOD PRESSURE: 138 MMHG | WEIGHT: 191 LBS

## 2020-02-27 DIAGNOSIS — E83.19 OTHER DISORDERS OF IRON METABOLISM: ICD-10-CM

## 2020-02-27 DIAGNOSIS — Z13.0 SCREENING FOR ENDOCRINE, NUTRITIONAL, METABOLIC AND IMMUNITY DISORDER: ICD-10-CM

## 2020-02-27 DIAGNOSIS — Z13.29 SCREENING FOR ENDOCRINE, NUTRITIONAL, METABOLIC AND IMMUNITY DISORDER: ICD-10-CM

## 2020-02-27 DIAGNOSIS — F32.1 MODERATE MAJOR DEPRESSION (H): ICD-10-CM

## 2020-02-27 DIAGNOSIS — Z13.228 SCREENING FOR ENDOCRINE, NUTRITIONAL, METABOLIC AND IMMUNITY DISORDER: ICD-10-CM

## 2020-02-27 DIAGNOSIS — E11.9 TYPE 2 DIABETES MELLITUS WITHOUT COMPLICATION, WITHOUT LONG-TERM CURRENT USE OF INSULIN (H): Primary | ICD-10-CM

## 2020-02-27 DIAGNOSIS — E66.01 MORBID OBESITY (H): ICD-10-CM

## 2020-02-27 DIAGNOSIS — M54.50 ACUTE LEFT-SIDED LOW BACK PAIN WITHOUT SCIATICA: Primary | ICD-10-CM

## 2020-02-27 DIAGNOSIS — Z23 NEED FOR VACCINATION: ICD-10-CM

## 2020-02-27 DIAGNOSIS — F41.9 ANXIETY: ICD-10-CM

## 2020-02-27 DIAGNOSIS — Z13.21 SCREENING FOR ENDOCRINE, NUTRITIONAL, METABOLIC AND IMMUNITY DISORDER: ICD-10-CM

## 2020-02-27 LAB
DEPRECATED CALCIDIOL+CALCIFEROL SERPL-MC: 101 UG/L (ref 20–75)
FERRITIN SERPL-MCNC: 63 NG/ML (ref 8–252)
HBA1C MFR BLD: 7.6 % (ref 0–5.6)
TSH SERPL DL<=0.005 MIU/L-ACNC: 2.74 MU/L (ref 0.4–4)

## 2020-02-27 PROCEDURE — 97140 MANUAL THERAPY 1/> REGIONS: CPT | Mod: GP | Performed by: PHYSICAL THERAPIST

## 2020-02-27 PROCEDURE — 90732 PPSV23 VACC 2 YRS+ SUBQ/IM: CPT | Performed by: FAMILY MEDICINE

## 2020-02-27 PROCEDURE — 36415 COLL VENOUS BLD VENIPUNCTURE: CPT | Performed by: FAMILY MEDICINE

## 2020-02-27 PROCEDURE — 82728 ASSAY OF FERRITIN: CPT | Performed by: INTERNAL MEDICINE

## 2020-02-27 PROCEDURE — 90471 IMMUNIZATION ADMIN: CPT | Performed by: FAMILY MEDICINE

## 2020-02-27 PROCEDURE — 96127 BRIEF EMOTIONAL/BEHAV ASSMT: CPT | Performed by: FAMILY MEDICINE

## 2020-02-27 PROCEDURE — 82306 VITAMIN D 25 HYDROXY: CPT | Performed by: FAMILY MEDICINE

## 2020-02-27 PROCEDURE — 84443 ASSAY THYROID STIM HORMONE: CPT | Performed by: FAMILY MEDICINE

## 2020-02-27 PROCEDURE — 97110 THERAPEUTIC EXERCISES: CPT | Mod: GP | Performed by: PHYSICAL THERAPIST

## 2020-02-27 PROCEDURE — 99214 OFFICE O/P EST MOD 30 MIN: CPT | Mod: 25 | Performed by: FAMILY MEDICINE

## 2020-02-27 PROCEDURE — 83036 HEMOGLOBIN GLYCOSYLATED A1C: CPT | Performed by: FAMILY MEDICINE

## 2020-02-27 RX ORDER — VENLAFAXINE HYDROCHLORIDE 75 MG/1
75 CAPSULE, EXTENDED RELEASE ORAL DAILY
Qty: 30 CAPSULE | Refills: 0 | Status: SHIPPED | OUTPATIENT
Start: 2020-02-27 | End: 2020-03-22

## 2020-02-27 RX ORDER — ATORVASTATIN CALCIUM 40 MG/1
20 TABLET, FILM COATED ORAL DAILY
Start: 2020-02-27 | End: 2020-06-29

## 2020-02-27 ASSESSMENT — ANXIETY QUESTIONNAIRES
3. WORRYING TOO MUCH ABOUT DIFFERENT THINGS: NOT AT ALL
7. FEELING AFRAID AS IF SOMETHING AWFUL MIGHT HAPPEN: SEVERAL DAYS
5. BEING SO RESTLESS THAT IT IS HARD TO SIT STILL: NOT AT ALL
GAD7 TOTAL SCORE: 6
2. NOT BEING ABLE TO STOP OR CONTROL WORRYING: SEVERAL DAYS
GAD7 TOTAL SCORE: 6
4. TROUBLE RELAXING: SEVERAL DAYS
GAD7 TOTAL SCORE: 6
6. BECOMING EASILY ANNOYED OR IRRITABLE: MORE THAN HALF THE DAYS
7. FEELING AFRAID AS IF SOMETHING AWFUL MIGHT HAPPEN: SEVERAL DAYS
1. FEELING NERVOUS, ANXIOUS, OR ON EDGE: SEVERAL DAYS

## 2020-02-27 ASSESSMENT — PATIENT HEALTH QUESTIONNAIRE - PHQ9
SUM OF ALL RESPONSES TO PHQ QUESTIONS 1-9: 15
SUM OF ALL RESPONSES TO PHQ QUESTIONS 1-9: 15
10. IF YOU CHECKED OFF ANY PROBLEMS, HOW DIFFICULT HAVE THESE PROBLEMS MADE IT FOR YOU TO DO YOUR WORK, TAKE CARE OF THINGS AT HOME, OR GET ALONG WITH OTHER PEOPLE: VERY DIFFICULT

## 2020-02-27 ASSESSMENT — MIFFLIN-ST. JEOR: SCORE: 1372.71

## 2020-02-27 NOTE — NURSING NOTE
Prior to immunization administration, verified patients identity using patient s name and date of birth. Please see Immunization Activity for additional information.     Screening Questionnaire for Adult Immunization    Are you sick today?   No   Do you have allergies to medications, food, a vaccine component or latex?   Yes   Have you ever had a serious reaction after receiving a vaccination?   No   Do you have a long-term health problem with heart, lung, kidney, or metabolic disease (e.g., diabetes), asthma, a blood disorder, no spleen, complement component deficiency, a cochlear implant, or a spinal fluid leak?  Are you on long-term aspirin therapy?   Yes   Do you have cancer, leukemia, HIV/AIDS, or any other immune system problem?   No   Do you have a parent, brother, or sister with an immune system problem?   No   In the past 3 months, have you taken medications that affect  your immune system, such as prednisone, other steroids, or anticancer drugs; drugs for the treatment of rheumatoid arthritis, Crohn s disease, or psoriasis; or have you had radiation treatments?   No   Have you had a seizure, or a brain or other nervous system problem?   No   During the past year, have you received a transfusion of blood or blood    products, or been given immune (gamma) globulin or antiviral drug?   No   For women: Are you pregnant or is there a chance you could become       pregnant during the next month?   No   Have you received any vaccinations in the past 4 weeks?   No     Immunization questionnaire was positive for at least one answer.  Notified Irma.        Per orders of Dr. Solis, injection of PPSV23 given by Karine Guo MA. Patient instructed to remain in clinic for 15 minutes afterwards, and to report any adverse reaction to me immediately.       Screening performed by Karine Guo MA on 2/27/2020 at 9:26 AM.  Clinic Administered Medication Documentation      Injectable Medication Documentation    Patient  was given PPSV23. Prior to medication administration, verified patients identity using patient s name and date of birth. Please see MAR and medication order for additional information. Patient instructed to remain in clinic for 15 minutes and report any adverse reaction to staff immediately .      Was entire vial of medication used? Yes  Vial/Syringe: Syringe  Expiration Date:  12/03/2020  Was this medication supplied by the patient? No     Karine Guo MA on 2/27/2020 at 9:27 AM

## 2020-02-27 NOTE — PATIENT INSTRUCTIONS
Increase the venlafaxine dose to 225 mg daily by taking a 75 mg capsule with the 150 mg capsule you have.    Try taking 1/2 tab (20 mg) of atorvastatin daily.      If you feel hypoglycemic and have your glucometer check your blood sugar.     Get outside and walk every day - even if for only 10 minutes.

## 2020-02-28 PROBLEM — E67.3 HYPERVITAMINOSIS D: Status: ACTIVE | Noted: 2020-02-28

## 2020-02-28 ASSESSMENT — ANXIETY QUESTIONNAIRES: GAD7 TOTAL SCORE: 6

## 2020-02-28 ASSESSMENT — PATIENT HEALTH QUESTIONNAIRE - PHQ9: SUM OF ALL RESPONSES TO PHQ QUESTIONS 1-9: 15

## 2020-02-28 NOTE — RESULT ENCOUNTER NOTE
Matt Spence smokes your Vitamin D level is high.  You must be taking a lot of Vitamin D!  I recommend you stop taking all Vitamin D supplements.  This is getting into the toxic range.  Vitamin D is a fat-soluble vitamin so it is not excreted out of the body in the urine.  If you stop taking Vitamin D now it should slowly come down to normal levels over time.    Veronica Solis MD

## 2020-03-21 ENCOUNTER — E-VISIT (OUTPATIENT)
Dept: FAMILY MEDICINE | Facility: CLINIC | Age: 60
End: 2020-03-21
Payer: COMMERCIAL

## 2020-03-21 DIAGNOSIS — F33.41 RECURRENT MAJOR DEPRESSION IN PARTIAL REMISSION (H): ICD-10-CM

## 2020-03-21 DIAGNOSIS — F41.9 ANXIETY: ICD-10-CM

## 2020-03-21 PROCEDURE — 99421 OL DIG E/M SVC 5-10 MIN: CPT | Performed by: FAMILY MEDICINE

## 2020-03-21 ASSESSMENT — PATIENT HEALTH QUESTIONNAIRE - PHQ9
10. IF YOU CHECKED OFF ANY PROBLEMS, HOW DIFFICULT HAVE THESE PROBLEMS MADE IT FOR YOU TO DO YOUR WORK, TAKE CARE OF THINGS AT HOME, OR GET ALONG WITH OTHER PEOPLE: SOMEWHAT DIFFICULT
SUM OF ALL RESPONSES TO PHQ QUESTIONS 1-9: 4
SUM OF ALL RESPONSES TO PHQ QUESTIONS 1-9: 4

## 2020-03-22 RX ORDER — VENLAFAXINE HYDROCHLORIDE 225 MG/1
225 TABLET, EXTENDED RELEASE ORAL DAILY
Qty: 90 TABLET | Refills: 1 | Status: SHIPPED | OUTPATIENT
Start: 2020-03-22 | End: 2020-06-04

## 2020-03-22 ASSESSMENT — PATIENT HEALTH QUESTIONNAIRE - PHQ9: SUM OF ALL RESPONSES TO PHQ QUESTIONS 1-9: 4

## 2020-03-22 NOTE — TELEPHONE ENCOUNTER
Provider E-Visit time total (minutes): 8  4:32 PM   4:40 PM     [facility] 9/5/2019 2/27/2020 3/21/2020   PHQ-9 Total Score 10 15 4   Q9: Thoughts of better off dead/self-harm past 2 weeks Not at all Several days Not at all   F/U: Thoughts of suicide or self-harm - Yes -   F/U: Self harm-plan - No -   F/U: Self-harm action - No -   F/U: Safety concerns - No -     [facility] Follow-up to PHQ 9/5/2019 2/27/2020 3/21/2020   PHQ-9 9. Suicide Ideation past 2 weeks Not at all Several days Not at all   Thoughts of suicide or self harm in past 2 weeks - Yes -   Thoughts of suicide or self harm in past 2 weeks - Yes -   PHQ-9 Self harm plan? - No -   PHQ-9 Self harm action? - No -   PHQ-9 Safety concerns? - No -   PHQ-9 Self harm plan? - No -   PHQ-9 Self harm action? - No -   PHQ-9 Safety concerns? - No -     DANIEL-7 SCORE 7/21/2017 9/5/2019 2/27/2020   Total Score - - -   Total Score 0 (minimal anxiety) 2 (minimal anxiety) 6 (mild anxiety)   Total Score 0 2 6

## 2020-04-14 DIAGNOSIS — A60.00 HERPES SIMPLEX INFECTION OF GENITOURINARY SYSTEM: ICD-10-CM

## 2020-04-14 NOTE — TELEPHONE ENCOUNTER
"Requested Prescriptions   Pending Prescriptions Disp Refills     valACYclovir (VALTREX) 500 MG tablet [Pharmacy Med Name: VALACYCLOVIR 500MG TABLET] 90 tablet 3     Sig: TAKE ONE TABLET BY MOUTH EVERY DAY  Last Written Prescription Date:  4/15/2019  Last Fill Quantity: 90 tablet,  # refills: 3   Last Office Visit: 2/27/2020   Future Office Visit:            Antivirals for Herpes Protocol Passed - 4/14/2020  5:06 AM        Passed - Patient is age 12 or older        Passed - Recent (12 mo) or future (30 days) visit within the authorizing provider's specialty     Patient has had an office visit with the authorizing provider or a provider within the authorizing providers department within the previous 12 mos or has a future within next 30 days. See \"Patient Info\" tab in inbasket, or \"Choose Columns\" in Meds & Orders section of the refill encounter.            Passed - Medication is active on med list        Passed - Normal serum creatinine on file in past 12 months     Recent Labs   Lab Test 04/15/19  1148   CR 0.96       Ok to refill medication if creatinine is low               "

## 2020-04-15 RX ORDER — VALACYCLOVIR HYDROCHLORIDE 500 MG/1
TABLET, FILM COATED ORAL
Qty: 90 TABLET | Refills: 0 | Status: SHIPPED | OUTPATIENT
Start: 2020-04-15 | End: 2020-10-14

## 2020-04-15 NOTE — TELEPHONE ENCOUNTER
Prescription approved per [facility] Refill Protocol.  Marii Najera RN

## 2020-04-22 NOTE — PROGRESS NOTES
Discharge Note    Progress reporting period is from last progress note on Feb 11  to Feb 27, 2020.    Karlee failed to follow up and current status is unknown.  Please see information below for last relevant information on current status.  Patient seen for 2 visits.    SUBJECTIVE  Subjective changes noted by patient:  Significantly better.  Intermittant pinch or left glut tightness with longer walking or sitting.  Most difficult movement is trying to put sock on.  No pain meds.  Exercise really helped; started stretching in other direction. (flexion)  .  Current pain level is 2/10.     Previous pain level was   .   Changes in function:  Yes (See Goal flowsheet attached for changes in current functional level)  Adverse reaction to treatment or activity: None    OBJECTIVE  Changes noted in objective findings: Baseline:mild tighness left LB; inc lordosis; SGIS + right; FF to floor w dec reversal.  Not perf REIL to end range.  Did trial movements spec to hip with inc in ant thigh symptom.  Desision making to cont sagittal plane ext to end range, inc walking tolerance and recheck in 2 weeks     ASSESSMENT/PLAN  Diagnosis: LBP   Updated problem list and treatment plan:   Pain - HEP  Decreased ROM/flexibility - HEP  Decreased strength - HEP  Impaired muscle performance - HEP  Impaired gait - HEP  Decreased proprioception - HEP  Impaired posture - HEP  Decreased joint mobility - HEP  STG/LTGs have been met or progress has been made towards goals:  Yes, please see goal flowsheet for most current information  Assessment of Progress: current status is unknown.    Last current status: Pt is progressing as expected   Self Management Plans:  HEP  I have re-evaluated this patient and find that the nature, scope, duration and intensity of the therapy is appropriate for the medical condition of the patient.  Karlee continues to require the following intervention to meet STG and LTG's:  HEP.    Recommendations:  Discharge with  current home program.  Patient to follow up with MD as needed.    Please refer to the daily flowsheet for treatment today, total treatment time and time spent performing 1:1 timed codes.    Nafisa Carroll, PT, OCS, Cert MDT #[id number]

## 2020-06-04 ENCOUNTER — MYC MEDICAL ADVICE (OUTPATIENT)
Dept: FAMILY MEDICINE | Facility: CLINIC | Age: 60
End: 2020-06-04

## 2020-06-04 DIAGNOSIS — F41.9 ANXIETY: ICD-10-CM

## 2020-06-04 DIAGNOSIS — F33.41 RECURRENT MAJOR DEPRESSION IN PARTIAL REMISSION (H): ICD-10-CM

## 2020-06-04 RX ORDER — VENLAFAXINE HYDROCHLORIDE 75 MG/1
75 CAPSULE, EXTENDED RELEASE ORAL DAILY
Qty: 90 CAPSULE | Refills: 0 | Status: SHIPPED | OUTPATIENT
Start: 2020-06-04 | End: 2020-09-23

## 2020-06-04 RX ORDER — VENLAFAXINE HYDROCHLORIDE 150 MG/1
150 CAPSULE, EXTENDED RELEASE ORAL DAILY
Qty: 90 CAPSULE | Refills: 0 | Status: SHIPPED | OUTPATIENT
Start: 2020-06-04 | End: 2020-09-23

## 2020-06-04 NOTE — TELEPHONE ENCOUNTER
Pt req effexor 150 and 75 vs 225 d/t cost  cecille'd up with pharmacy entered  Thanks!     Sada Corey RN

## 2020-06-25 NOTE — PROGRESS NOTES
"Karlee Emery is a 59 year old female who is being evaluated via a billable video visit.      The patient has been notified of following:     \"This video visit will be conducted via a call between you and your physician/provider. We have found that certain health care needs can be provided without the need for an in-person physical exam.  This service lets us provide the care you need with a video conversation.  If a prescription is necessary we can send it directly to your pharmacy.  If lab work is needed we can place an order for that and you can then stop by our lab to have the test done at a later time.    Video visits are billed at different rates depending on your insurance coverage.  Please reach out to your insurance provider with any questions.    If during the course of the call the physician/provider feels a video visit is not appropriate, you will not be charged for this service.\"    Patient has given verbal consent for Video visit? No    Will anyone else be joining your video visit? No      Subjective     Karlee Emery is a 59 year old female who presents today via video visit for the following health issues:    HPI  RESPIRATORY SYMPTOMS      Duration: x 2-3 months    Description  cough    Accompanying signs and symptoms: None    History (predisposing factors):  none    Precipitating or alleviating factors: None    Therapies tried and outcome:  Lozenges which helped somewhat  Tested twice for covid-19 and both negative    Developed a chronic cough in the summer of 2016 and it continued through early 2017.  She felt it got better at that time but never went away completely.   Her baseline mild cough worsened a couple months ago.  Current cough feels deeper - more in her chest.  She was tested for COVID twice this spring - first test was about 3 weeks ago and most recently last week.  Both negative.  No fever, fatigue.  Some diarrhea, mild sore throat - resolved.  Did not lose sense of taste or " "smell.  No wheezing.    Has chronic dry eyes and uses lubricating eye drops.  Takes claritin daily year-round for perennial allergies.  Has never had formal allergy testing.  Mild runny nose   No sinus pain or pressure.    Does have GERD symptoms.  That started recently - about a month ago.  Symptoms include acid brash and burping.  No heartburn.       Video Start Time: 10:09 AM    Hyperlipidemia Follow-Up      Are you regularly taking any medication or supplement to lower your cholesterol?   Yes- atorvastatin   Started at 20 mg (1/2 tab) but now is tolerating full dose (40 mg)    Are you having muscle aches or other side effects that you think could be caused by your cholesterol lowering medication?  No       Recent Labs   Lab Test 02/27/20  0843 09/05/19  0949 04/15/19  1148 07/18/18  1229 07/18/18  1147  06/27/16  0959   A1C 7.6* 6.8* 6.7*  --  6.1*   < >  --    LDL  --   --  187*  --  154*  --  174*   HDL  --   --  73  --  75  --  73   TRIG  --   --  73  --  59  --  60   ALT  --   --   --  27  --   --   --    CR  --   --  0.96  --  0.86   < >  --    GFRESTIMATED  --   --  64  --  68   < >  --    GFRESTBLACK  --   --  74  --  82   < >  --    POTASSIUM  --   --  4.6  --  4.5  --   --    TSH 2.74  --  2.06  --   --   --   --     < > = values in this interval not displayed.        BP Readings from Last 3 Encounters:   02/27/20 138/84   02/10/20 (!) 156/84   09/17/19 124/78    Wt Readings from Last 3 Encounters:   06/29/20 84.4 kg (186 lb)   02/27/20 86.6 kg (191 lb)   02/10/20 86.2 kg (190 lb)            Reviewed and updated as needed this visit by Provider  Meds  Problems         Review of Systems   Constitutional, HEENT, cardiovascular, pulmonary, gi and allergy systems are negative, except as otherwise noted.      Objective       Ht 1.562 m (5' 1.5\")   Wt 84.4 kg (186 lb)   LMP 03/15/2013 (Exact Date)   Breastfeeding No   BMI 34.58 kg/m     Physical Exam     GENERAL: Healthy, alert and no distress  EYES: " Eyes grossly normal to inspection.  No discharge or erythema, or obvious scleral/conjunctival abnormalities.  RESP: No audible wheeze or visible cyanosis.  Occasional cough. No visible retractions or increased work of breathing.    SKIN: Visible skin clear. No significant rash, abnormal pigmentation or lesions.  NEURO: Cranial nerves grossly intact.  Mentation and speech appropriate for age.  PSYCH: Mentation appears normal, affect normal/bright, judgement and insight intact, normal speech and appearance well-groomed.      Diagnostic Test Results:  Labs reviewed in Epic        Assessment & Plan     1. Cough  Ddx considered includes: post-nasal drainage from sinusitis or allergic vs nonallergic rhinitis, GERD, asthma, bronchitis, pneumonia, COVID, URI/post-viral, aspiration, COPD, TB, pertussis, drug-induced (e.g. ACE-inhibitor), ILD, sarcoidosis, CHF, laryngitis.  Most likely from post-nasal drainage/perennial allergies vs GERD.  I'd like to have her do a diagnostic/therapeutic one-month trial of PPI and monitor symptoms.  We'll also check CXR and COVID antibodies.  - COVID-19 Virus (Coronavirus) Antibody & Titer Reflex; Future  - XR Chest 2 Views; Future  - omeprazole (PRILOSEC) 20 MG DR capsule; Take 1 capsule (20 mg) by mouth daily  Dispense: 30 capsule; Refill: 0    2. Gastroesophageal reflux disease, esophagitis presence not specified  PPI x 1 month  - omeprazole (PRILOSEC) 20 MG DR capsule; Take 1 capsule (20 mg) by mouth daily  Dispense: 30 capsule; Refill: 0    3. Perennial allergic rhinitis  She can also try intensifying anti-histamine by switching claritin to zyrtec.  Consider trial of nasal steroid spray.     4. Type 2 diabetes mellitus without complication, without long-term current use of insulin (H)  Previously worsening control - due for monitoring labs   - **A1C FUTURE anytime; Future  - **Basic metabolic panel FUTURE anytime; Future    5. Pure hypercholesterolemia  We'll recheck lipids now that she  is taking and tolerating statin.  - Lipid panel reflex to direct LDL Fasting; Future            Patient Instructions   Schedule a fasting lab-only appointment and an x-ray appointment at [facility].  You can call the [place] phone number ([phone]) and they will schedule you at [place].  Fast for 10 hours prior to labs: nothing to eat or drink except plain water and your pills for ten hours prior to appointment.  In addition, avoid fatty foods and alcohol for 24 hours prior to appointment.    Start omeprazole (Prilosec) 20 mg once daily in the morning.  Take on an empty stomach.  Do this for a month and monitor your heartburn and cough symptoms for any change.    For allergies you may want to try switching from claritin to zyrtec (cetirizine) 10 mg daily.       Return in about 1 month (around 7/29/2020) for recheck as needed if symptoms persist or worsen.    Veronica Solis MD  [facility]      Video-Visit Details    Type of service:  Video Visit    Video End Time:10:26 AM    Originating Location (pt. Location): Home    Distant Location (provider location):  [facility]     Platform used for Video Visit: Doximity    Return in about 1 month (around 7/29/2020) for recheck as needed if symptoms persist or worsen.       Veronica Solis MD

## 2020-06-29 ENCOUNTER — VIRTUAL VISIT (OUTPATIENT)
Dept: FAMILY MEDICINE | Facility: CLINIC | Age: 60
End: 2020-06-29
Payer: COMMERCIAL

## 2020-06-29 VITALS — WEIGHT: 186 LBS | BODY MASS INDEX: 34.23 KG/M2 | HEIGHT: 62 IN

## 2020-06-29 DIAGNOSIS — K21.9 GASTROESOPHAGEAL REFLUX DISEASE, ESOPHAGITIS PRESENCE NOT SPECIFIED: ICD-10-CM

## 2020-06-29 DIAGNOSIS — R05.9 COUGH: Primary | ICD-10-CM

## 2020-06-29 DIAGNOSIS — J30.89 PERENNIAL ALLERGIC RHINITIS: ICD-10-CM

## 2020-06-29 DIAGNOSIS — E11.9 TYPE 2 DIABETES MELLITUS WITHOUT COMPLICATION, WITHOUT LONG-TERM CURRENT USE OF INSULIN (H): ICD-10-CM

## 2020-06-29 DIAGNOSIS — E78.00 PURE HYPERCHOLESTEROLEMIA: ICD-10-CM

## 2020-06-29 PROCEDURE — 99214 OFFICE O/P EST MOD 30 MIN: CPT | Mod: GT | Performed by: FAMILY MEDICINE

## 2020-06-29 RX ORDER — ATORVASTATIN CALCIUM 40 MG/1
40 TABLET, FILM COATED ORAL DAILY
Start: 2020-06-29 | End: 2020-11-17

## 2020-06-29 ASSESSMENT — MIFFLIN-ST. JEOR: SCORE: 1354

## 2020-06-29 NOTE — PATIENT INSTRUCTIONS
Schedule a fasting lab-only appointment and an x-ray appointment at [facility].  You can call the [facility] phone number ([phone]) and they will schedule you at [place].  Fast for 10 hours prior to labs: nothing to eat or drink except plain water and your pills for ten hours prior to appointment.  In addition, avoid fatty foods and alcohol for 24 hours prior to appointment.    Start omeprazole (Prilosec) 20 mg once daily in the morning.  Take on an empty stomach.  Do this for a month and monitor your heartburn and cough symptoms for any change.    For allergies you may want to try switching from claritin to zyrtec (cetirizine) 10 mg daily.

## 2020-06-30 DIAGNOSIS — R05.9 COUGH: ICD-10-CM

## 2020-06-30 NOTE — LETTER
July 3, 2020        Karlee Emery  [address]  [facility] [address]      COVID-19 Antibody Screen   Date Value Ref Range Status   06/30/2020 Negative  Final     Comment:     No COVID-19 antibodies detected.  Patients within 10 days of symptom onset for   COVID-19 may not produce sufficient levels of detectable antibodies.    Immunocompromised COVID-19 patients may take longer to develop antibodies.       COVID-19 Antibody, IgG Titer   Date Value Ref Range Status   06/30/2020 Not Applicable  Final     Comment:     Qualitative screen for total antibodies to COVID-19 (SARS-CoV-2) with   semi-quantitative measurement of IgG COVID-19 antibodies by endpoint titer.    COVID-19 antibodies may be elevated due to a past or current infection.  Negative results do not rule out COVID-19 infection.  Results from antibody   testing should not be used as the sole basis to diagnose or exclude SARS-CoV-2   infection or to inform infection status.  COVID-19 PCR test should be ordered   if current infection is suspected.  False positive results may occur in rare   cases due to cross-reacting antibodies.  This test was developed and its performance characteristics determined by the   [facility] ([facility]),   which is regulated under CLIA as qualified to perform high-complexity testing.    This test has not been reviewed by the FDA.  Testing performed by [facility], [facility], [address], [address], [place] [address]         You have tested NEGATIVE for COVID-19 antibodies. This suggests you have not had or been exposed to COVID-19. But it does not mean that for sure.     The test finds antibodies in most people 10 days after they get sick. For some people, it takes longer than 10 days for antibodies to show up. Others may never show antibodies against COVID-19, especially if they have weak immune  systems.    If you have COVID-19 symptoms now, please stay home and away from others.     What is antibody testing?    This is a kind of blood test. We take a small sample of your blood, and then test it for something called  antibodies.      Your body makes antibodies to fight infection. If your blood has antibodies for a certain germ, it means you ve been infected with that germ in the past.     Sometimes, antibodies stay in your body for years after you ve had the infection. They can be there even if the germ didn t make you sick. They are a sign that your body fought off the infection.    Will this test find antibodies in everyone who s had COVID-19?    No. The test finds antibodies in most people 10 days after they get sick. For some people, it takes longer than 10 days for antibodies to show up. Others may never show antibodies against COVID-19, especially if they have weak immune systems.    What does it mean if the test finds COVID-19 antibodies?    If we find these antibodies, it suggests:     This person has had the virus.     Their body s immune system fought the virus.     We don t know if this will help protect someone from getting COVID-19 again. Scientists are still learning about this.    What are the signs of COVID-19?    Signs of COVID-19 can appear from 2 to 14 days (up to 2 weeks) after you re infected. Some people have no symptoms or only mild symptoms. Others get very sick. The most common symptoms are:          Cough    Shortness of breath or trouble breathing  Or at least 2 of these symptoms:    Fever    Chills    Repeated shaking with chills    Muscle pain    Headache    Sore throat    Losing your sense of taste or smell    You may have other symptoms. Please contact your doctor or clinic for any symptoms that worry you.    Where can I get more information?     To learn the [place] s guidelines for staying home, please visit the [facility] website at  https://www.health.state.[place]/diseases/coronavirus/basics.html    To learn more about COVID-19 and how to care for yourself at home, please visit the CDC website at https://www.cdc.gov/coronavirus/2019-ncov/about/steps-when-sick.html    For more options for care at [facility], please visit our website at [email or web address]    [place] [facility] ([facility]) COVID-19 Hotline:  [phone]

## 2020-07-01 LAB
COVID-19 SPIKE RBD ABY TITER: NORMAL
COVID-19 SPIKE RBD ABY: NEGATIVE

## 2020-07-02 NOTE — RESULT ENCOUNTER NOTE
Job Malcolm,  Your COVID-19 antibody test is negative.  This means that you have not been infected with COVID-19 (including asymptomatic infection).  You are still vulnerable to acquiring COVID-19 and should continue to exercise safety precautions including wearing a mask when in public places, washing your hands frequently (with a 20-second soap lather), and maintaining at least a 6-foot distance from others.  Let us know if you develop symptoms consistent with COVID-19 - such as fever, cough, shortness of breath, body aches, or decreased sense of taste/smell.  Veronica Solis MD

## 2020-07-21 ENCOUNTER — ANCILLARY PROCEDURE (OUTPATIENT)
Dept: GENERAL RADIOLOGY | Facility: CLINIC | Age: 60
End: 2020-07-21
Attending: FAMILY MEDICINE
Payer: COMMERCIAL

## 2020-07-21 DIAGNOSIS — E11.9 TYPE 2 DIABETES MELLITUS WITHOUT COMPLICATION, WITHOUT LONG-TERM CURRENT USE OF INSULIN (H): ICD-10-CM

## 2020-07-21 DIAGNOSIS — E78.00 PURE HYPERCHOLESTEROLEMIA: ICD-10-CM

## 2020-07-21 DIAGNOSIS — R05.9 COUGH: ICD-10-CM

## 2020-07-21 LAB
ANION GAP SERPL CALCULATED.3IONS-SCNC: 7 MMOL/L (ref 3–14)
BUN SERPL-MCNC: 14 MG/DL (ref 7–30)
CALCIUM SERPL-MCNC: 9 MG/DL (ref 8.5–10.1)
CHLORIDE SERPL-SCNC: 105 MMOL/L (ref 94–109)
CHOLEST SERPL-MCNC: 190 MG/DL
CO2 SERPL-SCNC: 26 MMOL/L (ref 20–32)
CREAT SERPL-MCNC: 0.92 MG/DL (ref 0.52–1.04)
GFR SERPL CREATININE-BSD FRML MDRD: 67 ML/MIN/{1.73_M2}
GLUCOSE SERPL-MCNC: 224 MG/DL (ref 70–99)
HBA1C MFR BLD: 8.5 % (ref 0–5.6)
HDLC SERPL-MCNC: 59 MG/DL
LDLC SERPL CALC-MCNC: 105 MG/DL
NONHDLC SERPL-MCNC: 131 MG/DL
POTASSIUM SERPL-SCNC: 4.1 MMOL/L (ref 3.4–5.3)
SODIUM SERPL-SCNC: 138 MMOL/L (ref 133–144)
TRIGL SERPL-MCNC: 129 MG/DL

## 2020-07-21 PROCEDURE — 80048 BASIC METABOLIC PNL TOTAL CA: CPT | Performed by: FAMILY MEDICINE

## 2020-07-21 PROCEDURE — 80061 LIPID PANEL: CPT | Performed by: FAMILY MEDICINE

## 2020-07-21 PROCEDURE — 36415 COLL VENOUS BLD VENIPUNCTURE: CPT | Performed by: FAMILY MEDICINE

## 2020-07-21 PROCEDURE — 71046 X-RAY EXAM CHEST 2 VIEWS: CPT

## 2020-07-21 PROCEDURE — 83036 HEMOGLOBIN GLYCOSYLATED A1C: CPT | Performed by: FAMILY MEDICINE

## 2020-07-21 NOTE — RESULT ENCOUNTER NOTE
Job Malcolm,  Oh no - that A1c went up!  Let's talk about options.  Please schedule a telehealth visit (by video or phone) with me.      Veronica Solis MD

## 2020-07-23 NOTE — RESULT ENCOUNTER NOTE
Job Malcolm,  The basic metabolic panel confirms the elevated blood sugar and your cholesterol levels look good.  We could increase the atorvastatin further to get that LDL under 100.  (It's an option we can discuss.)      Please schedule a telehealth visit with me in August.  (I'm booked next week.)  We can do either a video visit or telephone visit.    Veronica Solis MD

## 2020-08-03 ENCOUNTER — VIRTUAL VISIT (OUTPATIENT)
Dept: FAMILY MEDICINE | Facility: CLINIC | Age: 60
End: 2020-08-03
Payer: COMMERCIAL

## 2020-08-03 DIAGNOSIS — E11.65 TYPE 2 DIABETES MELLITUS WITH HYPERGLYCEMIA, WITHOUT LONG-TERM CURRENT USE OF INSULIN (H): Primary | ICD-10-CM

## 2020-08-03 PROCEDURE — 99213 OFFICE O/P EST LOW 20 MIN: CPT | Mod: GT | Performed by: FAMILY MEDICINE

## 2020-08-03 RX ORDER — METFORMIN HCL 500 MG
1000 TABLET, EXTENDED RELEASE 24 HR ORAL
Qty: 180 TABLET | Refills: 1 | Status: SHIPPED | OUTPATIENT
Start: 2020-08-03 | End: 2021-02-26

## 2020-08-03 NOTE — PATIENT INSTRUCTIONS
Call [facility] scheduling number: [phone] to schedule your mammogram.    Schedule you annual eye exam.    Start metformin 500 mg once daily.  Take this with a meal.  Stay at that dose for a couple weeks; if you are tolerating it well you can then increase the dose to 1000 mg (2 tablets) once daily.  If you have any GI side effects (nausea, upset stomach, loose stools) you can hold the dose at 500 mg once daily.      Start checking your blood sugars periodically and look for patterns - like foods that make you blood sugar spike.    Stay physically active.    Schedule a lab-only appointment in about 3 months to repeat the A1c.

## 2020-08-03 NOTE — PROGRESS NOTES
"Karlee Emery is a 59 year old female who is being evaluated via a billable video visit.      The patient has been notified of following:     \"This video visit will be conducted via a call between you and your physician/provider. We have found that certain health care needs can be provided without the need for an in-person physical exam.  This service lets us provide the care you need with a video conversation.  If a prescription is necessary we can send it directly to your pharmacy.  If lab work is needed we can place an order for that and you can then stop by our lab to have the test done at a later time.    Video visits are billed at different rates depending on your insurance coverage.  Please reach out to your insurance provider with any questions.    If during the course of the call the physician/provider feels a video visit is not appropriate, you will not be charged for this service.\"    Patient has given verbal consent for Video visit? Yes  How would you like to obtain your AVS? MyChart  If you are dropped from the video visit, the video invite should be resent to: Text to cell phone: [phone]  Will anyone else be joining your video visit? No      Subjective     Karlee Emery is a 59 year old female who presents today via video visit for the following health issues:    HPI    Diabetes Follow-up      How often are you checking your blood sugar? Not at all    What concerns do you have today about your diabetes? Other: A1c heightened      Do you have any of these symptoms? (Select all that apply)  No numbness or tingling in feet.  No redness, sores or blisters on feet.  No complaints of excessive thirst.  No reports of blurry vision.  No significant changes to weight.    Have you had a diabetic eye exam in the last 12 months? No    Recent A1c was markedly elevated compared to prior.  She is baffled by this.  She has been building a deck this summer - very active.  Has lost 5#.  Appetite is down - " "doesn't feel she is overeating.  Eating smaller meals.    BP Readings from Last 2 Encounters:   02/27/20 138/84   02/10/20 (!) 156/84     Hemoglobin A1C (%)   Date Value   07/21/2020 8.5 (H)   02/27/2020 7.6 (H)     LDL Cholesterol Calculated (mg/dL)   Date Value   07/21/2020 105 (H)   04/15/2019 187 (H)                 How many servings of fruits and vegetables do you eat daily?  2-3    On average, how many sweetened beverages do you drink each day (Examples: soda, juice, sweet tea, etc.  Do NOT count diet or artificially sweetened beverages)?   0    How many days per week do you exercise enough to make your heart beat faster? 3 or less    How many minutes a day do you exercise enough to make your heart beat faster? 30 - 60    How many days per week do you miss taking your medication? 0         Video Start Time: 3:40 PM          Reviewed and updated as needed this visit by Provider  Meds  Problems         Review of Systems         Objective    Vitals - Patient Reported  Weight (Patient Reported): 83.9 kg (185 lb)  Height (Patient Reported): 157.5 cm (5' 2\")  BMI (Based on Pt Reported Ht/Wt): 33.84  Physical Exam   GENERAL: Healthy, alert and no distress  EYES: Eyes grossly normal to inspection.  No discharge or erythema, or obvious scleral/conjunctival abnormalities.  RESP: No audible wheeze, cough, or visible cyanosis.  No visible retractions or increased work of breathing.    SKIN: Visible skin clear. No significant rash, abnormal pigmentation or lesions.  NEURO: Cranial nerves grossly intact.  Mentation and speech appropriate for age.  PSYCH: Mentation appears normal, affect normal/bright, judgement and insight intact, normal speech and appearance well-groomed.      Diagnostic Test Results:  Labs reviewed in Epic        Assessment & Plan       ICD-10-CM    1. Type 2 diabetes mellitus with hyperglycemia, without long-term current use of insulin (H)  E11.65 metFORMIN (GLUCOPHAGE-XR) 500 MG 24 hr tablet     **A1C " "FUTURE 3mo     Discussed options including adding metformin.  Reviewed R/B/SE.  She was agreeable to starting this.  We'll start at low dose and increase very gradually.  She was instructed to not advance dose if she's having GI side effects.  I also encouraged her to start SMBG - at least a few times a week.  Be curious to see if there are patterns associated with eating certain foods.    BMI:   Estimated body mass index is 34.58 kg/m  as calculated from the following:    Height as of 6/29/20: 1.562 m (5' 1.5\").    Weight as of 6/29/20: 84.4 kg (186 lb).   Weight management plan: Discussed healthy diet and exercise guidelines        Patient Instructions   Call [facility] scheduling number: [phone] to schedule your mammogram.    Schedule you annual eye exam.    Start metformin 500 mg once daily.  Take this with a meal.  Stay at that dose for a couple weeks; if you are tolerating it well you can then increase the dose to 1000 mg (2 tablets) once daily.  If you have any GI side effects (nausea, upset stomach, loose stools) you can hold the dose at 500 mg once daily.      Start checking your blood sugars periodically and look for patterns - like foods that make you blood sugar spike.    Stay physically active.    Schedule a lab-only appointment in about 3 months to repeat the A1c.          Return in about 3 months (around 11/3/2020) for lab-only Appointment (no need to fast for this lab test).    Veronica Solis MD  [facility]      Video-Visit Details    Type of service:  Video Visit    Video End Time:3:57 PM    Originating Location (pt. Location): Home    Distant Location (provider location):  [facility]     Platform used for Video Visit: AmWell    Return in about 3 months (around 11/3/2020) for lab-only Appointment (no need to fast for this lab test).       Veronica Solis MD        "

## 2020-09-11 ENCOUNTER — TRANSFERRED RECORDS (OUTPATIENT)
Dept: HEALTH INFORMATION MANAGEMENT | Facility: CLINIC | Age: 60
End: 2020-09-11

## 2020-09-11 LAB — RETINOPATHY: NEGATIVE

## 2020-09-19 DIAGNOSIS — F41.9 ANXIETY: ICD-10-CM

## 2020-09-19 DIAGNOSIS — F33.41 RECURRENT MAJOR DEPRESSIVE DISORDER, IN PARTIAL REMISSION (H): ICD-10-CM

## 2020-09-19 DIAGNOSIS — F33.41 RECURRENT MAJOR DEPRESSION IN PARTIAL REMISSION (H): ICD-10-CM

## 2020-09-23 ENCOUNTER — MYC MEDICAL ADVICE (OUTPATIENT)
Dept: FAMILY MEDICINE | Facility: CLINIC | Age: 60
End: 2020-09-23

## 2020-09-23 RX ORDER — VENLAFAXINE HCL 150 MG
CAPSULE, EXT RELEASE 24 HR ORAL
Qty: 90 CAPSULE | Refills: 0 | Status: SHIPPED | OUTPATIENT
Start: 2020-09-23 | End: 2020-12-15

## 2020-09-23 RX ORDER — BUPROPION HYDROCHLORIDE 150 MG/1
150 TABLET ORAL EVERY MORNING
Qty: 90 TABLET | Refills: 0 | Status: SHIPPED | OUTPATIENT
Start: 2020-09-23 | End: 2021-01-15

## 2020-09-23 ASSESSMENT — ANXIETY QUESTIONNAIRES
1. FEELING NERVOUS, ANXIOUS, OR ON EDGE: NOT AT ALL
GAD7 TOTAL SCORE: 5
7. FEELING AFRAID AS IF SOMETHING AWFUL MIGHT HAPPEN: MORE THAN HALF THE DAYS
4. TROUBLE RELAXING: NOT AT ALL
3. WORRYING TOO MUCH ABOUT DIFFERENT THINGS: NOT AT ALL
7. FEELING AFRAID AS IF SOMETHING AWFUL MIGHT HAPPEN: MORE THAN HALF THE DAYS
6. BECOMING EASILY ANNOYED OR IRRITABLE: NEARLY EVERY DAY
2. NOT BEING ABLE TO STOP OR CONTROL WORRYING: NOT AT ALL
5. BEING SO RESTLESS THAT IT IS HARD TO SIT STILL: NOT AT ALL
GAD7 TOTAL SCORE: 5
GAD7 TOTAL SCORE: 5

## 2020-09-23 ASSESSMENT — PATIENT HEALTH QUESTIONNAIRE - PHQ9
SUM OF ALL RESPONSES TO PHQ QUESTIONS 1-9: 13
10. IF YOU CHECKED OFF ANY PROBLEMS, HOW DIFFICULT HAVE THESE PROBLEMS MADE IT FOR YOU TO DO YOUR WORK, TAKE CARE OF THINGS AT HOME, OR GET ALONG WITH OTHER PEOPLE: SOMEWHAT DIFFICULT
SUM OF ALL RESPONSES TO PHQ QUESTIONS 1-9: 13

## 2020-09-24 ASSESSMENT — PATIENT HEALTH QUESTIONNAIRE - PHQ9: SUM OF ALL RESPONSES TO PHQ QUESTIONS 1-9: 13

## 2020-09-24 ASSESSMENT — ANXIETY QUESTIONNAIRES: GAD7 TOTAL SCORE: 5

## 2020-10-15 ENCOUNTER — MYC REFILL (OUTPATIENT)
Dept: FAMILY MEDICINE | Facility: CLINIC | Age: 60
End: 2020-10-15

## 2020-10-15 DIAGNOSIS — E11.9 TYPE 2 DIABETES MELLITUS WITHOUT COMPLICATION, WITHOUT LONG-TERM CURRENT USE OF INSULIN (H): ICD-10-CM

## 2020-10-17 ENCOUNTER — ANCILLARY PROCEDURE (OUTPATIENT)
Dept: GENERAL RADIOLOGY | Facility: CLINIC | Age: 60
End: 2020-10-17
Attending: PHYSICIAN ASSISTANT
Payer: COMMERCIAL

## 2020-10-17 ENCOUNTER — OFFICE VISIT (OUTPATIENT)
Dept: URGENT CARE | Facility: URGENT CARE | Age: 60
End: 2020-10-17
Payer: COMMERCIAL

## 2020-10-17 VITALS
SYSTOLIC BLOOD PRESSURE: 136 MMHG | HEART RATE: 87 BPM | BODY MASS INDEX: 35.5 KG/M2 | OXYGEN SATURATION: 98 % | WEIGHT: 191 LBS | DIASTOLIC BLOOD PRESSURE: 85 MMHG | TEMPERATURE: 97.7 F

## 2020-10-17 DIAGNOSIS — M79.672 LEFT FOOT PAIN: ICD-10-CM

## 2020-10-17 DIAGNOSIS — M79.672 LEFT FOOT PAIN: Primary | ICD-10-CM

## 2020-10-17 PROCEDURE — 99214 OFFICE O/P EST MOD 30 MIN: CPT | Performed by: PHYSICIAN ASSISTANT

## 2020-10-17 PROCEDURE — 73630 X-RAY EXAM OF FOOT: CPT | Mod: LT | Performed by: RADIOLOGY

## 2020-10-17 NOTE — PROGRESS NOTES
SUBJECTIVE  Karlee Emery is a 59 year old female who presents today with left ankle pain that occurred 1 week(s) ago.    The mechanism of injury includes: twisted. Pain was still present and moderate  Therapies to improve symptoms include: ice and ibuprofen  History of recurrent ankle injuries: no  Pain is improved since onset.  Aggravating factors: walking and weight-bearing, Relieved byrest and ice.    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 100 tablet 0     aspirin 81 MG tablet Take  by mouth daily. (FMG)  3     atorvastatin (LIPITOR) 40 MG tablet Take 1 tablet (40 mg) by mouth daily       blood glucose (ACCU-CHEK GUIDE) test strip 1 strip by In Vitro route daily 100 each 0     blood glucose (NO BRAND SPECIFIED) lancets standard Use to test blood sugar 1 time daily or as directed. 50 each 3     blood glucose monitoring (NO BRAND SPECIFIED) meter device kit Use to test blood sugar 1 time daily or as directed. Blood  Glucose Monitor Brands: per insurance formulary. 1 kit 0     buPROPion (WELLBUTRIN XL) 150 MG 24 hr tablet Take 1 tablet (150 mg) by mouth every morning 90 tablet 0     Calcium Carbonate-Vitamin D (CALCIUM + D PO) Take 1 tablet by mouth daily.       EFFEXOR  MG 24 hr capsule TAKE 1 CAPSULE (150 MG) DAILY TAKE WITH 75 MG CAPSULE FOR TOTAL DAILY DOSE  MG. 90 capsule 0     EFFEXOR XR 75 MG 24 hr capsule TAKE 1 CAPSULE DAILY. TAKE WITH 150 MG CAPSULE FOR TOTAL DAILY DOSE  MG. 90 capsule 0     ibuprofen (ADVIL/MOTRIN) 200 MG tablet Take 200 mg by mouth every 4 hours as needed for mild pain       loratadine (CLARITIN) 10 MG tablet Take 1 tablet by mouth daily. 90 tablet 0     metFORMIN (GLUCOPHAGE-XR) 500 MG 24 hr tablet Take 2 tablets (1,000 mg) by mouth daily (with dinner) 180 tablet 1     MULTIVITAMIN TABS   OR 1 TABLET DAILY       omeprazole (PRILOSEC) 20 MG DR capsule Take 1 capsule (20 mg) by mouth daily 30 capsule 0     valACYclovir (VALTREX) 500 MG tablet TAKE 1 TABLET DAILY -DUE FOR MONITORING LABS FOR THIS MEDICATION AS SOON AS PANDEMIC RESTRICTIONS HAVE RESOLVED 90 tablet 0     Social History     Tobacco Use     Smoking status: Former Smoker     Quit date: 1980     Years since quittin.8     Smokeless tobacco: Never Used   Substance Use Topics     Alcohol use: Yes     Alcohol/week: 2.0 standard drinks     Types: 2 Standard drinks or equivalent per week       ROS:  10 point ROS negative except as listed above      OBJECTIVE:  /85   Pulse 87   Temp 97.7  F (36.5  C) (Tympanic)   Wt 86.6 kg (191 lb)   LMP 03/15/2013 (Exact Date)   SpO2 98%   BMI 35.50 kg/m    EXAM: Patient appears alert,no apparent distress.  Ankle Exam: left    Inspection: contusion seen migrated to inefrior aspects of foot    Palpation: non-tender throughout    Both doralis pedis and posterior tibial pulses intact   Neuro:Normal strength and tone, sensory exam grossly normal    X-Ray: offical reading  pending,normal mortise, no loose bodies, no fractures seen    ASSESSMENT  (M79.672) Left foot pain  (primary encounter diagnosis)  Comment: no evidence of fracture.   Plan: XR Foot Left G/E 3 Views, Ankle/Foot Bracing         Supplies Order for DME - ONLY FOR DME,         Orthopedic & Spine  Referral    Red flags and emergent follow up discussed, and understood by patient  Follow up with PCP if symptoms worsen or fail to improve      Patient Instructions     Patient Education     Foot Sprain    A sprain is a stretching or tearing of the ligaments that hold a joint together. There are usually no broken bones. Sprains generally take from 3 to 6 weeks to heal. A sprain may be treated with a splint, walking cast, or special boot. Mild sprains may not need any additional support.  Home care  The following guidelines will help you care for your injury at home:    Keep your leg elevated when sitting or lying down. This is very important during the first 48 hours to reduce swelling. Stay off the injured foot as much as possible until you can walk on it without pain. If needed, you may use crutches during the first week for this purpose. Crutches can be rented at many pharmacies or surgical/orthopedic supply stores.    You may be given a cast shoe to wear to prevent movement in your foot. If not, you can use a sandal or any shoe that does not put pressure on the injured area until the swelling and pain go away. If using a sandal, be careful not to hit your foot against anything, since another injury could make the sprain worse.    Apply an ice pack over the injured area for 15 to 20 minutes every 3 to 6 hours. You should do this for the first 24 to 48 hours. You can make an ice pack by filling a plastic bag that seals at the top with ice cubes and then wrapping it with a thin towel. Continue to use ice packs for relief of pain and swelling as needed. As the ice melts, try not to get the wrap, splint, or cast wet.  After 48 hours, apply heat from a warm shower or bath for 20 minutes several times daily. Alternating ice and heat may also be helpful.    You may use over-the-counter pain medicine to control pain, unless another medicine was prescribed. If you have chronic liver or kidney disease or ever had a stomach ulcer or gastrointestinal bleeding, talk with your healthcare provider before using these medicines.    If you were given a splint or cast, keep it dry. Bathe with your splint or cast well out of the water, protected with 2 large plastic bags, sealed with tape or rubber-bands at the top end. If a fiberglass splint or cast gets wet, you can dry it with a hair dryer on cool setting.    You may return to sports after healing, when you can run without pain.  Follow-up care  Follow up with your healthcare provider as directed. Sometimes fractures don t show up on the first X-ray. Bruises and sprains can sometimes hurt as much as a fracture. These injuries can take time to heal completely. If your symptoms don t improve or they get worse, talk with your healthcare provider. You may need a repeat X-ray or other tests.  When to seek medical advice  Call your healthcare provider right away if any of these occur:    The plaster cast or splint gets wet or soft    The fiberglass cast or splint gets wet and does not dry for 24 hours    Pain or swelling increases, or redness appears    A bad odor comes from within the cast    Fever of 100.4 F (38 C) or above lasting for 24 to 48 hours, or as advised    Chills    Toes on the injured foot become cold, blue, numb, or tingly  Date Last Reviewed: 5/1/2018 2000-2019 [employer] [address] [place] [address], [place] [address]. All rights reserved. This information is not intended as a substitute for professional medical care. Always follow your healthcare professional's instructions.

## 2020-10-17 NOTE — PATIENT INSTRUCTIONS
Patient Education     Foot Sprain    A sprain is a stretching or tearing of the ligaments that hold a joint together. There are usually no broken bones. Sprains generally take from 3 to 6 weeks to heal. A sprain may be treated with a splint, walking cast, or special boot. Mild sprains may not need any additional support.  Home care  The following guidelines will help you care for your injury at home:    Keep your leg elevated when sitting or lying down. This is very important during the first 48 hours to reduce swelling. Stay off the injured foot as much as possible until you can walk on it without pain. If needed, you may use crutches during the first week for this purpose. Crutches can be rented at many pharmacies or surgical/orthopedic supply stores.    You may be given a cast shoe to wear to prevent movement in your foot. If not, you can use a sandal or any shoe that does not put pressure on the injured area until the swelling and pain go away. If using a sandal, be careful not to hit your foot against anything, since another injury could make the sprain worse.    Apply an ice pack over the injured area for 15 to 20 minutes every 3 to 6 hours. You should do this for the first 24 to 48 hours. You can make an ice pack by filling a plastic bag that seals at the top with ice cubes and then wrapping it with a thin towel. Continue to use ice packs for relief of pain and swelling as needed. As the ice melts, try not to get the wrap, splint, or cast wet. After 48 hours, apply heat from a warm shower or bath for 20 minutes several times daily. Alternating ice and heat may also be helpful.    You may use over-the-counter pain medicine to control pain, unless another medicine was prescribed. If you have chronic liver or kidney disease or ever had a stomach ulcer or gastrointestinal bleeding, talk with your healthcare provider before using these medicines.    If you were given a splint or cast, keep it dry. Bathe with  your splint or cast well out of the water, protected with 2 large plastic bags, sealed with tape or rubber-bands at the top end. If a fiberglass splint or cast gets wet, you can dry it with a hair dryer on cool setting.    You may return to sports after healing, when you can run without pain.  Follow-up care  Follow up with your healthcare provider as directed. Sometimes fractures don t show up on the first X-ray. Bruises and sprains can sometimes hurt as much as a fracture. These injuries can take time to heal completely. If your symptoms don t improve or they get worse, talk with your healthcare provider. You may need a repeat X-ray or other tests.  When to seek medical advice  Call your healthcare provider right away if any of these occur:    The plaster cast or splint gets wet or soft    The fiberglass cast or splint gets wet and does not dry for 24 hours    Pain or swelling increases, or redness appears    A bad odor comes from within the cast    Fever of 100.4 F (38 C) or above lasting for 24 to 48 hours, or as advised    Chills    Toes on the injured foot become cold, blue, numb, or tingly  Date Last Reviewed: 5/1/2018 2000-2019 [employer]. [address] [place] [address], [place] [address]. All rights reserved. This information is not intended as a substitute for professional medical care. Always follow your healthcare professional's instructions.

## 2020-10-19 ENCOUNTER — MYC MEDICAL ADVICE (OUTPATIENT)
Dept: FAMILY MEDICINE | Facility: CLINIC | Age: 60
End: 2020-10-19

## 2020-10-21 ENCOUNTER — MYC MEDICAL ADVICE (OUTPATIENT)
Dept: FAMILY MEDICINE | Facility: CLINIC | Age: 60
End: 2020-10-21

## 2020-10-21 NOTE — TELEPHONE ENCOUNTER
I will bring it with me to clinic tomorrow for abstraction and we can send her a copy then.  I am currently working off-site.    Veronica Solis MD

## 2020-10-21 NOTE — TELEPHONE ENCOUNTER
Dr Solis-It looks like you sent forms this am.Pt would like a copy of the completed forms. Norma Virgen RN

## 2020-10-22 NOTE — TELEPHONE ENCOUNTER
Form re-faxed to [employer].    Copy of form placed in abstraction basket.    Original mailed to patient.    YEHUDA WardN, RN

## 2020-10-22 NOTE — TELEPHONE ENCOUNTER
Forms are in nurse basket in back provider office.  Please refax - my version was hard to read.  Please also make copy for abstraction and for patient.  Veronica Solis MD

## 2020-10-26 DIAGNOSIS — E11.65 TYPE 2 DIABETES MELLITUS WITH HYPERGLYCEMIA, WITHOUT LONG-TERM CURRENT USE OF INSULIN (H): ICD-10-CM

## 2020-10-26 LAB — HBA1C MFR BLD: 7.9 % (ref 0–5.6)

## 2020-10-26 PROCEDURE — 83036 HEMOGLOBIN GLYCOSYLATED A1C: CPT | Performed by: FAMILY MEDICINE

## 2020-10-26 PROCEDURE — 36415 COLL VENOUS BLD VENIPUNCTURE: CPT | Performed by: FAMILY MEDICINE

## 2020-10-27 NOTE — PROGRESS NOTES
"Karlee Emery is a 59 year old female who is being evaluated via a billable video visit.      The patient has been notified of following:     \"This video visit will be conducted via a call between you and your physician/provider. We have found that certain health care needs can be provided without the need for an in-person physical exam.  This service lets us provide the care you need with a video conversation.  If a prescription is necessary we can send it directly to your pharmacy.  If lab work is needed we can place an order for that and you can then stop by our lab to have the test done at a later time.    Video visits are billed at different rates depending on your insurance coverage.  Please reach out to your insurance provider with any questions.    If during the course of the call the physician/provider feels a video visit is not appropriate, you will not be charged for this service.\"    Patient has given verbal consent for Video visit? Yes  How would you like to obtain your AVS? MyChart  If you are dropped from the video visit, the video invite should be resent to: Text to cell phone: [phone]  Will anyone else be joining your video visit? No      Subjective     Karlee Emery is a 59 year old female who presents today via video visit for the following health issues:    HPI     Diabetes Follow-up    How often are you checking your blood sugar? A few times a month  What time of day are you checking your blood sugars (select all that apply)?  Before meals  Have you had any blood sugars above 200?  Yes  Have you had any blood sugars below 70?  No    What symptoms do you notice when your blood sugar is low?  None    What concerns do you have today about your diabetes? None     Do you have any of these symptoms? (Select all that apply)  No numbness or tingling in feet.  No redness, sores or blisters on feet.  No complaints of excessive thirst.  No reports of blurry vision.  No significant changes to " weight.  Had significant GI side effects with metformin and held dose at 500 mg daily for quite a while.  Side effects included nausea and diarrhea.  They are currently manageable on 1000 mg daily.      BP Readings from Last 2 Encounters:   10/17/20 136/85   20 138/84     Hemoglobin A1C (%)   Date Value   10/26/2020 7.9 (H)   2020 8.5 (H)     LDL Cholesterol Calculated (mg/dL)   Date Value   2020 105 (H)   04/15/2019 187 (H)                 Depression and Anxiety Follow-Up    How are you doing with your depression since your last visit? Improved     How are you doing with your anxiety since your last visit?  No change    Are you having other symptoms that might be associated with depression or anxiety? No    Have you had a significant life event? No     Do you have any concerns with your use of alcohol or other drugs? No     She plans to retire in January.  Feels good about this decision.    Social History     Tobacco Use     Smoking status: Former Smoker     Quit date: 1980     Years since quittin.8     Smokeless tobacco: Never Used   Substance Use Topics     Alcohol use: Yes     Alcohol/week: 2.0 standard drinks     Types: 2 Standard drinks or equivalent per week     Drug use: No     PHQ 2020 10/28/2020 10/28/2020   PHQ-9 Total Score 13 8 8   Q9: Thoughts of better off dead/self-harm past 2 weeks Not at all Not at all Not at all   F/U: Thoughts of suicide or self-harm - - -   F/U: Self harm-plan - - -   F/U: Self-harm action - - -   F/U: Safety concerns - - -     DANIEL-7 SCORE 2020 10/28/2020 10/28/2020   Total Score - - -   Total Score 5 (mild anxiety) - 3 (minimal anxiety)   Total Score 5 3 3     Last PHQ-9 10/28/2020   1.  Little interest or pleasure in doing things 1   2.  Feeling down, depressed, or hopeless 1   3.  Trouble falling or staying asleep, or sleeping too much 2   4.  Feeling tired or having little energy 1   5.  Poor appetite or overeating 1   6.  Feeling bad  about yourself 1   7.  Trouble concentrating 1   8.  Moving slowly or restless 0   Q9: Thoughts of better off dead/self-harm past 2 weeks 0   PHQ-9 Total Score 8   Difficulty at work, home, or with people -   In the past two weeks have you had thoughts of suicide or self harm? -   Do you have concerns about your personal safety or the safety of others? -   In the past 2 weeks have you thought about a plan or had intention to harm yourself? -   In the past 2 weeks have you acted on these thoughts in any way? -     DANIEL-7  10/28/2020   1. Feeling nervous, anxious, or on edge 0   2. Not being able to stop or control worrying 0   3. Worrying too much about different things 0   4. Trouble relaxing 0   5. Being so restless that it is hard to sit still 0   6. Becoming easily annoyed or irritable 2   7. Feeling afraid, as if something awful might happen 1   DANIEL-7 Total Score 3   If you checked any problems, how difficult have they made it for you to do your work, take care of things at home, or get along with other people? -       Suicide Assessment Five-step Evaluation and Treatment (SAFE-T)      How many servings of fruits and vegetables do you eat daily?  2-3    On average, how many sweetened beverages do you drink each day (Examples: soda, juice, sweet tea, etc.  Do NOT count diet or artificially sweetened beverages)?   1    How many days per week do you exercise enough to make your heart beat faster? 3 or less    How many minutes a day do you exercise enough to make your heart beat faster? 9 or less    How many days per week do you miss taking your medication? 0         Video Start Time: 9:48 AM        Review of Systems   RESP:  NEGATIVE for shortness of breath  CV:  NEGATIVE for chest pain        Objective           Vitals:  No vitals were obtained today due to virtual visit.    Physical Exam     GENERAL: Healthy, alert and no distress  EYES: Eyes grossly normal to inspection.  No discharge or erythema, or obvious  scleral/conjunctival abnormalities.  RESP: No audible wheeze, cough, or visible cyanosis.  No visible retractions or increased work of breathing.    SKIN: Visible skin clear. No significant rash, abnormal pigmentation or lesions.  NEURO: Cranial nerves grossly intact.  Mentation and speech appropriate for age.  PSYCH: Mentation appears normal, affect normal/bright, judgement and insight intact, normal speech and appearance well-groomed.      Orders Only on 10/26/2020   Component Date Value Ref Range Status     Hemoglobin A1C 10/26/2020 7.9* 0 - 5.6 % Final    Comment: Normal <5.7% Prediabetes 5.7-6.4%  Diabetes 6.5% or higher - adopted from ADA   consensus guidelines.               Assessment & Plan     Type 2 diabetes mellitus without complication, without long-term current use of insulin (H)  Improved but still with suboptimal control.  Discussed options including increasing metformin, adding SGLT2-inhibitor, or just focusing on improving diet/exercise.  Given the GI side effects she's had with metformin I did not recommend a GLP1-agonist.  She'd like to try SLGT2-inhibitor.  Discussed R/B/SE including cardiovascular benefits, blood pressure benefits, potential for weight loss, risk of UTI's and yeast vaginitis, and risk of ketoacidosis.  Instructed patient to hold this medication if she becomes acutely ill with nausea/vomiting.  She   - empagliflozin (JARDIANCE) 10 MG TABS tablet  Dispense: 30 tablet; Refill: 0  - **A1C FUTURE anytime  - **TSH with free T4 reflex FUTURE anytime  - Albumin Random Urine Quantitative with Creat Ratio    Screen for colon cancer  - Fecal colorectal cancer screen (FIT)          Patient Instructions   Start the Jardiance once daily.  Be sure to stay well-hydrated while on that.  If it is too expensive you can check your insurance drug formulary to see if there is a cheaper alternative.  The meds in this category are:    SGLT2 Inhibitors:  canagliflozin (INVOKANA), dapagliflozin  (FARXIGA), empagliflozin (JARDIANCE), and ertugliflozin (STEGLATRO).       Return in about 3 months (around 1/28/2021) for lab-only appointment (for blood and urine and to get stool test for colon cancer).    Veronica Solis MD  [facility]      Video-Visit Details    Type of service:  Video Visit    Video End Time:10:05 AM    Originating Location (pt. Location): Home    Distant Location (provider location):  [facility]     Platform used for Video Visit: [employer]

## 2020-10-28 ENCOUNTER — VIRTUAL VISIT (OUTPATIENT)
Dept: FAMILY MEDICINE | Facility: CLINIC | Age: 60
End: 2020-10-28
Payer: COMMERCIAL

## 2020-10-28 DIAGNOSIS — E11.9 TYPE 2 DIABETES MELLITUS WITHOUT COMPLICATION, WITHOUT LONG-TERM CURRENT USE OF INSULIN (H): Primary | ICD-10-CM

## 2020-10-28 DIAGNOSIS — Z12.11 SCREEN FOR COLON CANCER: ICD-10-CM

## 2020-10-28 PROCEDURE — 99213 OFFICE O/P EST LOW 20 MIN: CPT | Mod: GT | Performed by: FAMILY MEDICINE

## 2020-10-28 ASSESSMENT — ANXIETY QUESTIONNAIRES
7. FEELING AFRAID AS IF SOMETHING AWFUL MIGHT HAPPEN: SEVERAL DAYS
5. BEING SO RESTLESS THAT IT IS HARD TO SIT STILL: NOT AT ALL
7. FEELING AFRAID AS IF SOMETHING AWFUL MIGHT HAPPEN: SEVERAL DAYS
1. FEELING NERVOUS, ANXIOUS, OR ON EDGE: NOT AT ALL
5. BEING SO RESTLESS THAT IT IS HARD TO SIT STILL: NOT AT ALL
7. FEELING AFRAID AS IF SOMETHING AWFUL MIGHT HAPPEN: SEVERAL DAYS
6. BECOMING EASILY ANNOYED OR IRRITABLE: MORE THAN HALF THE DAYS
6. BECOMING EASILY ANNOYED OR IRRITABLE: MORE THAN HALF THE DAYS
3. WORRYING TOO MUCH ABOUT DIFFERENT THINGS: NOT AT ALL
3. WORRYING TOO MUCH ABOUT DIFFERENT THINGS: NOT AT ALL
GAD7 TOTAL SCORE: 3
GAD7 TOTAL SCORE: 3
2. NOT BEING ABLE TO STOP OR CONTROL WORRYING: NOT AT ALL
4. TROUBLE RELAXING: NOT AT ALL
GAD7 TOTAL SCORE: 3
GAD7 TOTAL SCORE: 3
2. NOT BEING ABLE TO STOP OR CONTROL WORRYING: NOT AT ALL
1. FEELING NERVOUS, ANXIOUS, OR ON EDGE: NOT AT ALL

## 2020-10-28 ASSESSMENT — PATIENT HEALTH QUESTIONNAIRE - PHQ9
SUM OF ALL RESPONSES TO PHQ QUESTIONS 1-9: 8
5. POOR APPETITE OR OVEREATING: NOT AT ALL
SUM OF ALL RESPONSES TO PHQ QUESTIONS 1-9: 8
10. IF YOU CHECKED OFF ANY PROBLEMS, HOW DIFFICULT HAVE THESE PROBLEMS MADE IT FOR YOU TO DO YOUR WORK, TAKE CARE OF THINGS AT HOME, OR GET ALONG WITH OTHER PEOPLE: SOMEWHAT DIFFICULT

## 2020-10-28 NOTE — PATIENT INSTRUCTIONS
Start the Jardiance once daily.  Be sure to stay well-hydrated while on that.  If it is too expensive you can check your insurance drug formulary to see if there is a cheaper alternative.  The meds in this category are:    SGLT2 Inhibitors:  canagliflozin (INVOKANA), dapagliflozin (FARXIGA), empagliflozin (JARDIANCE), and ertugliflozin (STEGLATRO).

## 2020-10-29 ASSESSMENT — ANXIETY QUESTIONNAIRES: GAD7 TOTAL SCORE: 3

## 2020-11-16 ENCOUNTER — MYC REFILL (OUTPATIENT)
Dept: FAMILY MEDICINE | Facility: CLINIC | Age: 60
End: 2020-11-16

## 2020-11-16 DIAGNOSIS — E11.9 TYPE 2 DIABETES MELLITUS WITHOUT COMPLICATION, WITHOUT LONG-TERM CURRENT USE OF INSULIN (H): ICD-10-CM

## 2020-11-17 RX ORDER — ATORVASTATIN CALCIUM 40 MG/1
TABLET, FILM COATED ORAL
Qty: 90 TABLET | Refills: 2 | Status: SHIPPED | OUTPATIENT
Start: 2020-11-17 | End: 2021-03-11

## 2020-12-12 DIAGNOSIS — F33.41 RECURRENT MAJOR DEPRESSION IN PARTIAL REMISSION (H): ICD-10-CM

## 2020-12-12 DIAGNOSIS — F41.9 ANXIETY: ICD-10-CM

## 2020-12-15 RX ORDER — VENLAFAXINE HCL 150 MG
CAPSULE, EXT RELEASE 24 HR ORAL
Qty: 90 CAPSULE | Refills: 0 | Status: SHIPPED | OUTPATIENT
Start: 2020-12-15 | End: 2021-03-11

## 2021-01-15 ENCOUNTER — HEALTH MAINTENANCE LETTER (OUTPATIENT)
Age: 61
End: 2021-01-15

## 2021-02-22 ENCOUNTER — TELEPHONE (OUTPATIENT)
Dept: FAMILY MEDICINE | Facility: CLINIC | Age: 61
End: 2021-02-22

## 2021-02-26 ENCOUNTER — TELEPHONE (OUTPATIENT)
Dept: FAMILY MEDICINE | Facility: CLINIC | Age: 61
End: 2021-02-26

## 2021-02-26 NOTE — TELEPHONE ENCOUNTER
Prior Authorization Retail Medication Request    Medication/Dose: empagliflozin (JARDIANCE) 10 MG TABS tablet  ICD code (if different than what is on RX):  Previously Tried and Failed:  Rationale:    Insurance Name:    Insurance ID: [id number]    Pharmacy Information (if different than what is on RX)  Name:  Phone:    Please include previous medications tried and failed.  Please ask insurance for medications on formulary.

## 2021-03-01 NOTE — TELEPHONE ENCOUNTER
Central Prior Authorization Team   Phone: [phone]      Prior Authorization Not Needed per Insurance    03/01/2021  Medication: empagliflozin (JARDIANCE) 10 MG TABS tablet - NOT NEEDED  Insurance Company: [employer] - Phone [phone] Fax [phone]  Expected CoPay:      Pharmacy Filling the Rx: [facility] - [place], [place] - [address]  Pharmacy Notified: Yes  Patient Notified: Yes (**Instructed pharmacy to notify patient when script is ready to /ship.**)    Pharmacy received a paid claim.

## 2021-03-03 ENCOUNTER — ANCILLARY PROCEDURE (OUTPATIENT)
Dept: MAMMOGRAPHY | Facility: CLINIC | Age: 61
End: 2021-03-03
Attending: FAMILY MEDICINE
Payer: COMMERCIAL

## 2021-03-03 DIAGNOSIS — Z00.00 PREVENTATIVE HEALTH CARE: ICD-10-CM

## 2021-03-03 PROCEDURE — 77067 SCR MAMMO BI INCL CAD: CPT

## 2021-03-03 PROCEDURE — 77067 SCR MAMMO BI INCL CAD: CPT | Mod: 26 | Performed by: RADIOLOGY

## 2021-03-05 ASSESSMENT — ENCOUNTER SYMPTOMS
CHILLS: 0
FEVER: 0
DIARRHEA: 0
DYSURIA: 0
PARESTHESIAS: 0
HEADACHES: 0
EYE PAIN: 0
PALPITATIONS: 0
HEARTBURN: 1
JOINT SWELLING: 0
HEMATOCHEZIA: 0
DIZZINESS: 0
COUGH: 0
HEMATURIA: 0
NERVOUS/ANXIOUS: 0
ABDOMINAL PAIN: 0
WEAKNESS: 0
CONSTIPATION: 0
BREAST MASS: 0
ARTHRALGIAS: 0
MYALGIAS: 0
SHORTNESS OF BREATH: 0
FREQUENCY: 0
SORE THROAT: 0
NAUSEA: 0

## 2021-03-10 ASSESSMENT — ENCOUNTER SYMPTOMS
FEVER: 0
WEAKNESS: 0
ARTHRALGIAS: 0
HEMATURIA: 0
COUGH: 0
JOINT SWELLING: 0
SORE THROAT: 0
EYE PAIN: 0
SHORTNESS OF BREATH: 0
FREQUENCY: 0
CHILLS: 0
PARESTHESIAS: 0
BREAST MASS: 0
HEARTBURN: 1
DIARRHEA: 0
DYSURIA: 0
MYALGIAS: 0
DIZZINESS: 0
PALPITATIONS: 0
HEADACHES: 0
ABDOMINAL PAIN: 0
NERVOUS/ANXIOUS: 0
HEMATOCHEZIA: 0
CONSTIPATION: 0
NAUSEA: 0

## 2021-03-10 ASSESSMENT — ANXIETY QUESTIONNAIRES
7. FEELING AFRAID AS IF SOMETHING AWFUL MIGHT HAPPEN: NOT AT ALL
GAD7 TOTAL SCORE: 1
3. WORRYING TOO MUCH ABOUT DIFFERENT THINGS: NOT AT ALL
7. FEELING AFRAID AS IF SOMETHING AWFUL MIGHT HAPPEN: NOT AT ALL
1. FEELING NERVOUS, ANXIOUS, OR ON EDGE: SEVERAL DAYS
6. BECOMING EASILY ANNOYED OR IRRITABLE: NOT AT ALL
5. BEING SO RESTLESS THAT IT IS HARD TO SIT STILL: NOT AT ALL
2. NOT BEING ABLE TO STOP OR CONTROL WORRYING: NOT AT ALL
GAD7 TOTAL SCORE: 1
4. TROUBLE RELAXING: NOT AT ALL

## 2021-03-10 ASSESSMENT — PATIENT HEALTH QUESTIONNAIRE - PHQ9
10. IF YOU CHECKED OFF ANY PROBLEMS, HOW DIFFICULT HAVE THESE PROBLEMS MADE IT FOR YOU TO DO YOUR WORK, TAKE CARE OF THINGS AT HOME, OR GET ALONG WITH OTHER PEOPLE: SOMEWHAT DIFFICULT
SUM OF ALL RESPONSES TO PHQ QUESTIONS 1-9: 7
SUM OF ALL RESPONSES TO PHQ QUESTIONS 1-9: 7

## 2021-03-10 NOTE — PATIENT INSTRUCTIONS
Did you know?   I do telehealth (video) visits exclusively on .  I still do in-person visits at [facility] ([phone]) on ,  and .  You can schedule a video visit for follow-up appointments as well as future appointments for certain conditions.  Please see the below link.  [email or web address]    If you have not already done so,  I encourage you to sign up for [email or web address] ([email or web address]).  This will allow you to review your results, securely communicate with your provider care team, and schedule virtual visits as well.    To schedule any ordered imaging studies you can call [facility] [phone].  If you are scheduling a DEXA (bone density) scan please do NOT schedule this at the [facility] [place].  Please ask that it be done at [facility] in [place].  Other preferred DEXA locations include [place] (at the [facility]), [place], or [place].        Check with your insurance to see if there is a preferred SGLT2 Inhibitors:  canagliflozin (INVOKANA), dapagliflozin (FARXIGA), empagliflozin (JARDIANCE), and ertugliflozin (STEGLATRO).      Preventive Health Recommendations  Female Ages 50 - 64    Yearly exam: See your health care provider every year in order to  o Review health changes.   o Discuss preventive care.    o Review your medicines if your doctor has prescribed any.      Get a Pap test every three years (unless you have an abnormal result and your provider advises testing more often).    If you get Pap tests with HPV test, you only need to test every 5 years, unless you have an abnormal result.     You do not need a Pap test if your uterus was removed (hysterectomy) and you have not had cancer.    You should be tested each year for STDs (sexually transmitted diseases) if you're at risk.     Have a mammogram every 1 to 2  years.    Have a colonoscopy at age 50, or have a yearly FIT test (stool test). These exams screen for colon cancer.      Have a cholesterol test every 5 years, or more often if advised.    Have a diabetes test (fasting glucose) every three years. If you are at risk for diabetes, you should have this test more often.     If you are at risk for osteoporosis (brittle bone disease), think about having a bone density scan (DEXA).    Shots: Get a flu shot each year. Get a tetanus shot every 10 years.    Nutrition:     Eat at least 5 servings of fruits and vegetables each day.    Eat whole-grain bread, whole-wheat pasta and brown rice instead of white grains and rice.    Get adequate Calcium and Vitamin D.     Lifestyle    Exercise at least 150 minutes a week (30 minutes a day, 5 days a week). This will help you control your weight and prevent disease.    Limit alcohol to one drink per day.    No smoking.     Wear sunscreen to prevent skin cancer.     See your dentist every six months for an exam and cleaning.    See your eye doctor every 1 to 2 years.

## 2021-03-10 NOTE — PROGRESS NOTES
SUBJECTIVE:   CC: Karlee Emery is an 60 year old woman who presents for preventive health visit.       Patient has been advised of split billing requirements and indicates understanding: Yes     Healthy Habits:     Getting at least 3 servings of Calcium per day:  Yes    Bi-annual eye exam:  Yes    Dental care twice a year:  Yes    Sleep apnea or symptoms of sleep apnea:  Sleep apnea    Diet:  Diabetic    Frequency of exercise:  2-3 days/week    Duration of exercise:  45-60 minutes    Taking medications regularly:  Yes    Medication side effects:  Other    PHQ-2 Total Score: 2    Additional concerns today:  Yes      Diabetes Follow-up  She is on metformin and empagliflozin.  The empagliflozin has become very expensive since she changed her insurance following her FDC in November.      How often are you checking your blood sugar? Not at all    What concerns do you have today about your diabetes? None     Do you have any of these symptoms? (Select all that apply)  No numbness or tingling in feet.  No redness, sores or blisters on feet.  No complaints of excessive thirst.  No reports of blurry vision.  No significant changes to weight. - a little weight loss due to medications       BP Readings from Last 2 Encounters:   03/11/21 138/78   10/17/20 136/85     Hemoglobin A1C (%)   Date Value   03/11/2021 7.0 (H)   10/26/2020 7.9 (H)     LDL Cholesterol Calculated (mg/dL)   Date Value   07/21/2020 105 (H)   04/15/2019 187 (H)         Depression and Anxiety Follow-Up    How are you doing with your depression since your last visit? Improved     How are you doing with your anxiety since your last visit?  Improved     Are you having other symptoms that might be associated with depression or anxiety? No    Have you had a significant life event? OTHER: FDC      Do you have any concerns with your use of alcohol or other drugs? No   She had been taking 225 mg of venlafaxine (150 + 75) but ran out of the 75 mg  capsules for a week and felt she did just as well on 150 mg daily.  Much less stress since retiring.    Social History     Tobacco Use     Smoking status: Former Smoker     Quit date: 1980     Years since quittin.2     Smokeless tobacco: Never Used   Substance Use Topics     Alcohol use: Yes     Alcohol/week: 2.0 standard drinks     Types: 2 Standard drinks or equivalent per week     Drug use: No     PHQ 10/28/2020 12/15/2020 3/10/2021   PHQ-9 Total Score 8 10 7   Q9: Thoughts of better off dead/self-harm past 2 weeks Not at all Not at all Not at all   F/U: Thoughts of suicide or self-harm - - -   F/U: Self harm-plan - - -   F/U: Self-harm action - - -   F/U: Safety concerns - - -     DANIEL-7 SCORE 10/28/2020 12/15/2020 3/10/2021   Total Score - - -   Total Score 3 (minimal anxiety) 4 (minimal anxiety) 1 (minimal anxiety)   Total Score 3 4 1     Today's PHQ-2 Score:   PHQ-2 (  [employer]) 3/5/2021   Q1: Little interest or pleasure in doing things 1   Q2: Feeling down, depressed or hopeless 1   PHQ-2 Score 2   Q1: Little interest or pleasure in doing things Several days   Q2: Feeling down, depressed or hopeless Several days   PHQ-2 Score 2       Abuse: Current or Past (Physical, Sexual or Emotional) - No  Do you feel safe in your environment? Yes    Have you ever done Advance Care Planning? (For example, a Health Directive, POLST, or a discussion with a medical provider or your loved ones about your wishes): No, advance care planning information given to patient to review.  Patient plans to discuss their wishes with loved ones or provider.      Social History     Tobacco Use     Smoking status: Former Smoker     Quit date: 1980     Years since quittin.2     Smokeless tobacco: Never Used   Substance Use Topics     Alcohol use: Yes     Alcohol/week: 2.0 standard drinks     Types: 2 Standard drinks or equivalent per week     If you drink alcohol do you typically have >3 drinks per day or >7 drinks per  week? No    Alcohol Use 3/11/2021   Prescreen: >3 drinks/day or >7 drinks/week? -   Prescreen: >3 drinks/day or >7 drinks/week? No         Reviewed orders with patient.  Reviewed health maintenance and updated orders accordingly - Yes  BP Readings from Last 3 Encounters:   03/11/21 138/78   10/17/20 136/85   02/27/20 138/84    Wt Readings from Last 3 Encounters:   03/11/21 79.7 kg (175 lb 9.6 oz)   10/17/20 86.6 kg (191 lb)   06/29/20 84.4 kg (186 lb)           Breast CA Risk Screening:  Breast CA Risk Assessment (FHS-7) 3/5/2021   Do you have a family history of breast, colon, or ovarian cancer? No / Unknown         Mammogram Screening: Recommended mammography every 1-2 years with patient discussion and risk factor consideration  Pertinent mammograms are reviewed under the imaging tab.    History of abnormal Pap smear: NO - age 30-65 PAP every 5 years with negative HPV co-testing recommended  PAP / HPV Latest Ref Rng & Units 4/15/2019 5/23/2016 1/7/2013   PAP - NIL OTHER-NIL, See Result NIL   HPV 16 DNA NEG:Negative Negative - -   HPV 18 DNA NEG:Negative Negative - -   OTHER HR HPV NEG:Negative Negative - -     Reviewed and updated as needed this visit by clinical staff  Tobacco  Allergies  Meds   Med Hx  Surg Hx  Fam Hx  Soc Hx        Reviewed and updated as needed this visit by Provider                Past Medical History:   Diagnosis Date     Ankle joint pain 05/24/2012     Depression      Diabetes mellitus (H)     TYPE 2- DIET, resolved with weight loss     Endometrial thickening on ultra sound 07/18/2016    Normal endo bx 7/2016     FCU (flexor carpi ulnaris) tenosynovitis 08/07/2013     Fracture of ulnar styloid 08/07/2013     Genital herpes      Headache      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 Re-referred to urology.      JAMES (obstructive sleep apnea) 2019     Other abnormal glucose      Perennial allergic rhinitis 2020      Past Surgical History:   Procedure Laterality Date     C APPENDECTOMY       C TMJ RECONSTRUCTION       EXCISE MASS LOWER EXTREMITY  3/28/2012    Procedure:EXCISE MASS LOWER EXTREMITY; Left Ankle Mass Excision ; Surgeon:KATTY HENSLEY; Location:[facility]     SLING BLADDER SUSPENSION WITH FASCIA ROSALEE       OB History    Para Term  AB Living   2 2 2 0 0 2   SAB TAB Ectopic Multiple Live Births   0 0 0 0 2      # Outcome Date GA Lbr Ajit/2nd Weight Sex Delivery Anes PTL Lv   2 Term 94 40w0d       SULEIMAN   1 Term 90 40w0d       SULEIMAN       Review of Systems   Constitutional: Negative for chills and fever.   HENT: Negative for congestion, ear pain, hearing loss and sore throat.    Eyes: Negative for pain and visual disturbance.   Respiratory: Negative for cough and shortness of breath.    Cardiovascular: Negative for chest pain, palpitations and peripheral edema.   Gastrointestinal: Positive for heartburn. Negative for abdominal pain, constipation, diarrhea, hematochezia and nausea.   Breasts:  Negative for tenderness, breast mass and discharge.   Genitourinary: Negative for dysuria, frequency, genital sores, hematuria, pelvic pain, urgency, vaginal bleeding and vaginal discharge.   Musculoskeletal: Negative for arthralgias, joint swelling and myalgias.   Skin: Negative for rash.   Neurological: Negative for dizziness, weakness, headaches and paresthesias.   Psychiatric/Behavioral: Negative for mood changes. The patient is not nervous/anxious.           OBJECTIVE:   /78 (BP Location: Left arm,  "Patient Position: Chair, Cuff Size: Adult Large)   Pulse 82   Temp 98.5  F (36.9  C) (Tympanic)   Resp 13   Ht 1.549 m (5' 1\")   Wt 79.7 kg (175 lb 9.6 oz)   LMP 03/15/2013 (Exact Date)   SpO2 96%   BMI 33.18 kg/m    Physical Exam  GENERAL APPEARANCE: healthy, alert and no distress  EYES: Eyes grossly normal to inspection, conjunctivae and sclerae normal  HENT: ear canals and TM's normal  NECK: no adenopathy, no asymmetry, masses, or scars and thyroid normal to palpation  RESP: lungs clear to auscultation - no rales, rhonchi or wheezes  CV: regular rate and rhythm, normal S1 S2, no S3 or S4, no murmur, click or rub, no peripheral edema   ABDOMEN: soft, nontender, no hepatosplenomegaly, no masses   MS: no musculoskeletal defects are noted and gait is age appropriate without ataxia  SKIN: no suspicious lesions or rashes  NEURO: Normal strength and tone, sensory exam grossly normal, mentation intact and speech normal  PSYCH: mentation appears normal and affect normal/bright         ASSESSMENT/PLAN:     Routine general medical examination at a health care facility  Defer Hep B immunization for now as she is in the midst of her COVID vaccine series.     Screen for colon cancer  - Fecal colorectal cancer screen (FIT)    Type 2 diabetes mellitus without complication, without long-term current use of insulin (H)  She'll check with her new insurance formulary to see if there is a different SGLT2 Inhibitor that is preferred (cheaper).    - Hemoglobin A1c  - TSH with free T4 reflex  - Albumin Random Urine Quantitative with Creat Ratio  - metFORMIN (GLUCOPHAGE-XR) 500 MG 24 hr tablet  Dispense: 180 tablet; Refill: 1    Pure hypercholesterolemia  - atorvastatin (LIPITOR) 40 MG tablet  Dispense: 90 tablet; Refill: 1    Recurrent major depression in partial remission (H)  stable/well controlled - Continue current medication regimen.   - venlafaxine (EFFEXOR XR) 150 MG 24 hr capsule  Dispense: 90 capsule; Refill: 3  - " "buPROPion (WELLBUTRIN XL) 150 MG 24 hr tablet  Dispense: 90 tablet; Refill: 3    Anxiety  - venlafaxine (EFFEXOR XR) 150 MG 24 hr capsule  Dispense: 90 capsule; Refill: 3    Hypervitaminosis D  - Vitamin D Deficiency    Herpes simplex infection of genitourinary system  - valACYclovir (VALTREX) 500 MG tablet  Dispense: 90 tablet; Refill: 3         COUNSELING:  Reviewed preventive health counseling, as reflected in patient instructions      Estimated body mass index is 33.18 kg/m  as calculated from the following:    Height as of this encounter: 1.549 m (5' 1\").    Weight as of this encounter: 79.7 kg (175 lb 9.6 oz).  Weight management plan: Discussed healthy diet and exercise guidelines      She reports that she quit smoking about 41 years ago. She has never used smokeless tobacco.      Counseling Resources:  ATP IV Guidelines  Pooled Cohorts Equation Calculator  Breast Cancer Risk Calculator  BRCA-Related Cancer Risk Assessment: FHS-7 Tool  FRAX Risk Assessment  ICSI Preventive Guidelines  Dietary Guidelines for Americans, 2010  [employer]'s MyPlate  ASA Prophylaxis  Lung CA Screening    Veronica Solis MD  [facility]    Answers for HPI/ROS submitted by the patient on 3/10/2021   Annual Exam:  DANIEL 7 TOTAL SCORE: 1  If you checked off any problems, how difficult have these problems made it for you to do your work, take care of things at home, or get along with other people?: Somewhat difficult  PHQ9 TOTAL SCORE: 7    "

## 2021-03-11 ENCOUNTER — OFFICE VISIT (OUTPATIENT)
Dept: FAMILY MEDICINE | Facility: CLINIC | Age: 61
End: 2021-03-11
Payer: COMMERCIAL

## 2021-03-11 VITALS
WEIGHT: 175.6 LBS | RESPIRATION RATE: 13 BRPM | SYSTOLIC BLOOD PRESSURE: 138 MMHG | HEART RATE: 82 BPM | BODY MASS INDEX: 33.15 KG/M2 | HEIGHT: 61 IN | DIASTOLIC BLOOD PRESSURE: 78 MMHG | OXYGEN SATURATION: 96 % | TEMPERATURE: 98.5 F

## 2021-03-11 DIAGNOSIS — Z12.11 SCREEN FOR COLON CANCER: ICD-10-CM

## 2021-03-11 DIAGNOSIS — E11.9 TYPE 2 DIABETES MELLITUS WITHOUT COMPLICATION, WITHOUT LONG-TERM CURRENT USE OF INSULIN (H): ICD-10-CM

## 2021-03-11 DIAGNOSIS — E78.00 PURE HYPERCHOLESTEROLEMIA: ICD-10-CM

## 2021-03-11 DIAGNOSIS — F33.41 RECURRENT MAJOR DEPRESSION IN PARTIAL REMISSION (H): ICD-10-CM

## 2021-03-11 DIAGNOSIS — A60.00 HERPES SIMPLEX INFECTION OF GENITOURINARY SYSTEM: ICD-10-CM

## 2021-03-11 DIAGNOSIS — Z00.00 ROUTINE GENERAL MEDICAL EXAMINATION AT A HEALTH CARE FACILITY: Primary | ICD-10-CM

## 2021-03-11 DIAGNOSIS — E67.3 HYPERVITAMINOSIS D: ICD-10-CM

## 2021-03-11 DIAGNOSIS — F41.9 ANXIETY: ICD-10-CM

## 2021-03-11 LAB — HBA1C MFR BLD: 7 % (ref 0–5.6)

## 2021-03-11 PROCEDURE — 83036 HEMOGLOBIN GLYCOSYLATED A1C: CPT | Performed by: FAMILY MEDICINE

## 2021-03-11 PROCEDURE — 84443 ASSAY THYROID STIM HORMONE: CPT | Performed by: FAMILY MEDICINE

## 2021-03-11 PROCEDURE — 36415 COLL VENOUS BLD VENIPUNCTURE: CPT | Performed by: FAMILY MEDICINE

## 2021-03-11 PROCEDURE — 99396 PREV VISIT EST AGE 40-64: CPT | Performed by: FAMILY MEDICINE

## 2021-03-11 PROCEDURE — 99213 OFFICE O/P EST LOW 20 MIN: CPT | Mod: 25 | Performed by: FAMILY MEDICINE

## 2021-03-11 PROCEDURE — 82043 UR ALBUMIN QUANTITATIVE: CPT | Performed by: FAMILY MEDICINE

## 2021-03-11 PROCEDURE — 82306 VITAMIN D 25 HYDROXY: CPT | Performed by: FAMILY MEDICINE

## 2021-03-11 RX ORDER — METFORMIN HCL 500 MG
1000 TABLET, EXTENDED RELEASE 24 HR ORAL
Qty: 180 TABLET | Refills: 1 | Status: SHIPPED | OUTPATIENT
Start: 2021-03-11 | End: 2021-10-19

## 2021-03-11 RX ORDER — ATORVASTATIN CALCIUM 40 MG/1
40 TABLET, FILM COATED ORAL DAILY
Qty: 90 TABLET | Refills: 1 | Status: SHIPPED | OUTPATIENT
Start: 2021-03-11 | End: 2021-09-28

## 2021-03-11 RX ORDER — VENLAFAXINE HYDROCHLORIDE 150 MG/1
150 CAPSULE, EXTENDED RELEASE ORAL DAILY
Qty: 90 CAPSULE | Refills: 3 | Status: SHIPPED | OUTPATIENT
Start: 2021-03-11 | End: 2022-03-15

## 2021-03-11 RX ORDER — BUPROPION HYDROCHLORIDE 150 MG/1
150 TABLET ORAL EVERY MORNING
Qty: 90 TABLET | Refills: 3 | Status: SHIPPED | OUTPATIENT
Start: 2021-03-11 | End: 2022-03-15

## 2021-03-11 RX ORDER — VALACYCLOVIR HYDROCHLORIDE 500 MG/1
500 TABLET, FILM COATED ORAL DAILY
Qty: 90 TABLET | Refills: 3 | Status: SHIPPED | OUTPATIENT
Start: 2021-03-11 | End: 2022-03-15

## 2021-03-11 ASSESSMENT — MIFFLIN-ST. JEOR: SCORE: 1293.9

## 2021-03-11 ASSESSMENT — PATIENT HEALTH QUESTIONNAIRE - PHQ9: SUM OF ALL RESPONSES TO PHQ QUESTIONS 1-9: 7

## 2021-03-11 ASSESSMENT — ANXIETY QUESTIONNAIRES: GAD7 TOTAL SCORE: 1

## 2021-03-12 PROBLEM — E66.01 MORBID OBESITY (H): Status: RESOLVED | Noted: 2019-09-05 | Resolved: 2021-03-12

## 2021-03-12 LAB
DEPRECATED CALCIDIOL+CALCIFEROL SERPL-MC: 66 UG/L (ref 20–75)
TSH SERPL DL<=0.005 MIU/L-ACNC: 2.54 MU/L (ref 0.4–4)

## 2021-03-13 LAB
CREAT UR-MCNC: 54 MG/DL
MICROALBUMIN UR-MCNC: 7 MG/L
MICROALBUMIN/CREAT UR: 13.05 MG/G CR (ref 0–25)

## 2021-03-15 NOTE — RESULT ENCOUNTER NOTE
Job Malcolm,  This all looks good.  Don't forget to send in your stool sample.      Veronica Solis MD

## 2021-03-22 DIAGNOSIS — Z12.11 SCREEN FOR COLON CANCER: ICD-10-CM

## 2021-03-22 PROCEDURE — 82274 ASSAY TEST FOR BLOOD FECAL: CPT | Performed by: FAMILY MEDICINE

## 2021-03-26 LAB — HEMOCCULT STL QL IA: NEGATIVE

## 2021-03-26 NOTE — RESULT ENCOUNTER NOTE
Job Malcolm,  Thanks for completing the FIT stool test for colon cancer screening!  Your stool test is negative for microscopic (hidden) blood.  This test should be repeated annually.     Veronica Solis MD

## 2021-05-17 ENCOUNTER — MYC MEDICAL ADVICE (OUTPATIENT)
Dept: FAMILY MEDICINE | Facility: CLINIC | Age: 61
End: 2021-05-17

## 2021-05-17 DIAGNOSIS — E11.9 TYPE 2 DIABETES MELLITUS WITHOUT COMPLICATION, WITHOUT LONG-TERM CURRENT USE OF INSULIN (H): Primary | ICD-10-CM

## 2021-05-19 RX ORDER — ERTUGLIFLOZIN 5 MG/1
5 TABLET, FILM COATED ORAL DAILY
Qty: 90 TABLET | Refills: 0 | Status: SHIPPED | OUTPATIENT
Start: 2021-05-19 | End: 2021-08-10

## 2021-08-10 DIAGNOSIS — E11.9 TYPE 2 DIABETES MELLITUS WITHOUT COMPLICATION, WITHOUT LONG-TERM CURRENT USE OF INSULIN (H): ICD-10-CM

## 2021-08-10 RX ORDER — ERTUGLIFLOZIN 5 MG/1
TABLET, FILM COATED ORAL
Qty: 90 TABLET | Refills: 0 | Status: SHIPPED | OUTPATIENT
Start: 2021-08-10 | End: 2021-10-19

## 2021-08-10 NOTE — TELEPHONE ENCOUNTER
Steglatro is not on refill protocol    Lab Results   Component Value Date    A1C 7.0 03/11/2021    A1C 7.9 10/26/2020    A1C 8.5 07/21/2020    A1C 7.6 02/27/2020    A1C 6.8 09/05/2019     Pt is due for diabetic recheck.    Dr Solis, a Pyre[employer] message has been sent to pt to make appt. Can you refill the Steglatro    Elizabeth Falcon RN, BSN  [facility]

## 2021-09-04 ENCOUNTER — HEALTH MAINTENANCE LETTER (OUTPATIENT)
Age: 61
End: 2021-09-04

## 2021-09-24 ENCOUNTER — TRANSFERRED RECORDS (OUTPATIENT)
Dept: MULTI SPECIALTY CLINIC | Facility: CLINIC | Age: 61
End: 2021-09-24

## 2021-09-24 LAB — RETINOPATHY: NORMAL

## 2021-10-18 NOTE — PATIENT INSTRUCTIONS
I kindly request that you check your MyChart prior to all appointments with me and complete any assigned questionnaires ahead of time.  (Or you can come a bit early to your appointment and complete questionnaires on one of our tablets.)  This frees up more of our designated visit time to address your health issues. If you have not already done so, I encourage you to sign up for Mychart ([email or web address]).  This will allow you to review your results, securely communicate with your provider care team, and schedule virtual visits as well.    FYI:  I do telehealth (video) visits exclusively on Wednesdays.  I still do in-person visits at [facility] ([phone]) on Mondays, Tuesdays and Thursdays.  You can schedule a video visit for many conditions.  Please follow this link:  [email or web address]    To schedule any ordered imaging studies (including mammogram) you can call [facility] Scheduling at [phone].  If you are scheduling a DEXA (bone density) scan please do NOT schedule this at the [facility].  Please ask that it be done at [facility] in [place].  Other preferred DEXA locations include [place] (at the [facility] [place]), [place], or [place].

## 2021-10-18 NOTE — PROGRESS NOTES
"    Assessment & Plan     Type 2 diabetes mellitus without complication, without long-term current use of insulin (H)  stable/well controlled.  We'll have her discontinue the Steglatro and increase the metformin dose to a total of 1500 mg daily.  She has seen optometrist but would like referral to ophthalmology for new floaters.  - BASIC METABOLIC PANEL  - HEMOGLOBIN A1C  - metFORMIN (GLUCOPHAGE-XR) 500 MG 24 hr tablet  Dispense: 270 tablet; Refill: 1  - Adult Eye Referral    Pure hypercholesterolemia  Continue statin.  Awaiting lipid results to determine if dose change is indicated.     - Lipid panel reflex to direct LDL Fasting    Need for vaccination  - [place] RIV4 (FLUBLOK) VACCINE RECOMBINANT DNA PRSRV ANTIBIO FREE, IM ([id number])    Screening for skin condition  - Adult Dermatology Referral         BMI:   Estimated body mass index is 33.25 kg/m  as calculated from the following:    Height as of 3/11/21: 1.549 m (5' 1\").    Weight as of this encounter: 79.8 kg (176 lb).   Weight management plan: Discussed healthy diet and exercise guidelines    Return in about 5 months (around 3/19/2022) for routine preventive care, with me (Dr. Solis), in person.    Veronica Solis MD  [facility]        Naif Malcolm is a 60 year old who presents for the following health issues     HPI     Diabetes Follow-up    How often are you checking your blood sugar? Not at all    What concerns do you have today about your diabetes? None     Do you have any of these symptoms? (Select all that apply)  No numbness or tingling in feet.  No redness, sores or blisters on feet.  No complaints of excessive thirst.  No reports of blurry vision.  No significant changes to weight.    Have you had a diabetic eye exam in the last 12 months? Yes- Date of last eye exam: 9/24/2021,  [facility]   She continues to take metformin and Steglatro.  She is bothered by the risk of Simone's gangrene with the " Nicholas and would like to try discontinuing that and increasing the metformin dose instead.        BP Readings from Last 2 Encounters:   10/19/21 135/88   21 138/78     Hemoglobin A1C (%)   Date Value   10/19/2021 6.8 (H)   2021 7.0 (H)   10/26/2020 7.9 (H)     LDL Cholesterol Calculated (mg/dL)   Date Value   2020 105 (H)   04/15/2019 187 (H)     Depression and Anxiety Follow-Up    How are you doing with your depression since your last visit? Worsened     How are you doing with your anxiety since your last visit?  No change    Are you having other symptoms that might be associated with depression or anxiety? No    Have you had a significant life event? No     Do you have any concerns with your use of alcohol or other drugs? No  She continues on venlafaxine  mg daily (down from 225 mg last winter) and wellbutrin  mg daily.  Her son with mental illness has been living in her home and that is a significant stressor.  Social History     Tobacco Use     Smoking status: Former Smoker     Packs/day: 0.00     Years: 0.00     Pack years: 0.00     Types: Cigarettes     Quit date: 1980     Years since quittin.8     Smokeless tobacco: Never Used   Substance Use Topics     Alcohol use: Yes     Alcohol/week: 2.0 standard drinks     Drug use: No     PHQ 12/15/2020 3/10/2021 10/19/2021   PHQ-9 Total Score 10 7 8   Q9: Thoughts of better off dead/self-harm past 2 weeks Not at all Not at all Not at all   F/U: Thoughts of suicide or self-harm - - -   F/U: Self harm-plan - - -   F/U: Self-harm action - - -   F/U: Safety concerns - - -     DANIEL-7 SCORE 12/15/2020 3/10/2021 10/19/2021   Total Score - - -   Total Score 4 (minimal anxiety) 1 (minimal anxiety) 5 (mild anxiety)   Total Score 4 1 5       Answers for HPI/ROS submitted by the patient on 10/19/2021  If you checked off any problems, how difficult have these problems made it for you to do your work, take care of things at home, or get along  with other people?: Very difficult  PHQ9 TOTAL SCORE: 8  DANIEL 7 TOTAL SCORE: 5    Review of Systems   RESP:  NEGATIVE for shortness of breath  CV:  NEGATIVE for chest pain        Objective    /88 (BP Location: Right arm, Patient Position: Sitting, Cuff Size: Adult Regular)   Pulse 81   Temp 97.2  F (36.2  C) (Temporal)   Wt 79.8 kg (176 lb)   LMP 03/15/2013 (Exact Date)   SpO2 96%   BMI 33.25 kg/m    Body mass index is 33.25 kg/m .  Physical Exam   GEN:  no apparent distress  LUNGS:  normal respiratory effort, and lungs clear to auscultation bilaterally - no rales, rhonchi or wheezes  CV: regular rate and rhythm, normal S1 S2, no S3 or S4 and no murmur, click or rub   PSYCH:  Appearance/Behavior: patient is appropriately and casually dressed.  Speech:  normal  rate, rhythm, and tone.  Mood/Affect: Bright/congruent.  Insight: good

## 2021-10-19 ENCOUNTER — OFFICE VISIT (OUTPATIENT)
Dept: FAMILY MEDICINE | Facility: CLINIC | Age: 61
End: 2021-10-19
Payer: COMMERCIAL

## 2021-10-19 ENCOUNTER — TELEPHONE (OUTPATIENT)
Dept: OPHTHALMOLOGY | Facility: CLINIC | Age: 61
End: 2021-10-19

## 2021-10-19 VITALS
DIASTOLIC BLOOD PRESSURE: 88 MMHG | SYSTOLIC BLOOD PRESSURE: 135 MMHG | WEIGHT: 176 LBS | BODY MASS INDEX: 33.25 KG/M2 | OXYGEN SATURATION: 96 % | HEART RATE: 81 BPM | TEMPERATURE: 97.2 F

## 2021-10-19 DIAGNOSIS — Z13.89 SCREENING FOR SKIN CONDITION: ICD-10-CM

## 2021-10-19 DIAGNOSIS — Z23 NEED FOR VACCINATION: ICD-10-CM

## 2021-10-19 DIAGNOSIS — E11.9 TYPE 2 DIABETES MELLITUS WITHOUT COMPLICATION, WITHOUT LONG-TERM CURRENT USE OF INSULIN (H): Primary | ICD-10-CM

## 2021-10-19 DIAGNOSIS — E78.00 PURE HYPERCHOLESTEROLEMIA: ICD-10-CM

## 2021-10-19 LAB — HBA1C MFR BLD: 6.8 % (ref 0–5.6)

## 2021-10-19 PROCEDURE — 80048 BASIC METABOLIC PNL TOTAL CA: CPT | Performed by: FAMILY MEDICINE

## 2021-10-19 PROCEDURE — 80061 LIPID PANEL: CPT | Performed by: FAMILY MEDICINE

## 2021-10-19 PROCEDURE — 36415 COLL VENOUS BLD VENIPUNCTURE: CPT | Performed by: FAMILY MEDICINE

## 2021-10-19 PROCEDURE — 83036 HEMOGLOBIN GLYCOSYLATED A1C: CPT | Performed by: FAMILY MEDICINE

## 2021-10-19 PROCEDURE — 90682 RIV4 VACC RECOMBINANT DNA IM: CPT | Performed by: FAMILY MEDICINE

## 2021-10-19 PROCEDURE — 99214 OFFICE O/P EST MOD 30 MIN: CPT | Mod: 25 | Performed by: FAMILY MEDICINE

## 2021-10-19 PROCEDURE — 90471 IMMUNIZATION ADMIN: CPT | Performed by: FAMILY MEDICINE

## 2021-10-19 RX ORDER — METFORMIN HCL 500 MG
TABLET, EXTENDED RELEASE 24 HR ORAL
Qty: 270 TABLET | Refills: 1 | Status: SHIPPED | OUTPATIENT
Start: 2021-10-19 | End: 2022-04-18

## 2021-10-19 ASSESSMENT — ANXIETY QUESTIONNAIRES
7. FEELING AFRAID AS IF SOMETHING AWFUL MIGHT HAPPEN: SEVERAL DAYS
1. FEELING NERVOUS, ANXIOUS, OR ON EDGE: NOT AT ALL
2. NOT BEING ABLE TO STOP OR CONTROL WORRYING: MORE THAN HALF THE DAYS
6. BECOMING EASILY ANNOYED OR IRRITABLE: MORE THAN HALF THE DAYS
5. BEING SO RESTLESS THAT IT IS HARD TO SIT STILL: NOT AT ALL
3. WORRYING TOO MUCH ABOUT DIFFERENT THINGS: NOT AT ALL
7. FEELING AFRAID AS IF SOMETHING AWFUL MIGHT HAPPEN: SEVERAL DAYS
4. TROUBLE RELAXING: NOT AT ALL
8. IF YOU CHECKED OFF ANY PROBLEMS, HOW DIFFICULT HAVE THESE MADE IT FOR YOU TO DO YOUR WORK, TAKE CARE OF THINGS AT HOME, OR GET ALONG WITH OTHER PEOPLE?: SOMEWHAT DIFFICULT
GAD7 TOTAL SCORE: 5

## 2021-10-19 ASSESSMENT — PATIENT HEALTH QUESTIONNAIRE - PHQ9
10. IF YOU CHECKED OFF ANY PROBLEMS, HOW DIFFICULT HAVE THESE PROBLEMS MADE IT FOR YOU TO DO YOUR WORK, TAKE CARE OF THINGS AT HOME, OR GET ALONG WITH OTHER PEOPLE: VERY DIFFICULT
SUM OF ALL RESPONSES TO PHQ QUESTIONS 1-9: 8
SUM OF ALL RESPONSES TO PHQ QUESTIONS 1-9: 8

## 2021-10-19 NOTE — TELEPHONE ENCOUNTER
[facility] Call Center    Phone Message    May a detailed message be left on voicemail: yes     Reason for Call: Appointment Intake    Referring Provider Name: Veronica Solis MD in  FAMILY PRAC/IMPEDS  Diagnosis and/or Symptoms: Type 2 diabetes mellitus without complication, without long-term current use of ...  Reason for Referral:   Flashes/Floaters        Flashes and floaters for 1 week. Causes fatigue feeling.    Action Taken: Message routed to:  [facility] ([facility] [[id number]] - per protocols    Travel Screening: Not Applicable

## 2021-10-20 LAB
ANION GAP SERPL CALCULATED.3IONS-SCNC: 2 MMOL/L (ref 3–14)
BUN SERPL-MCNC: 14 MG/DL (ref 7–30)
CALCIUM SERPL-MCNC: 9.3 MG/DL (ref 8.5–10.1)
CHLORIDE BLD-SCNC: 105 MMOL/L (ref 94–109)
CHOLEST SERPL-MCNC: 185 MG/DL
CO2 SERPL-SCNC: 31 MMOL/L (ref 20–32)
CREAT SERPL-MCNC: 0.88 MG/DL (ref 0.52–1.04)
FASTING STATUS PATIENT QL REPORTED: YES
GFR SERPL CREATININE-BSD FRML MDRD: 71 ML/MIN/1.73M2
GLUCOSE BLD-MCNC: 143 MG/DL (ref 70–99)
HDLC SERPL-MCNC: 69 MG/DL
LDLC SERPL CALC-MCNC: 98 MG/DL
NONHDLC SERPL-MCNC: 116 MG/DL
POTASSIUM BLD-SCNC: 4.4 MMOL/L (ref 3.4–5.3)
SODIUM SERPL-SCNC: 138 MMOL/L (ref 133–144)
TRIGL SERPL-MCNC: 92 MG/DL

## 2021-10-20 RX ORDER — ATORVASTATIN CALCIUM 40 MG/1
40 TABLET, FILM COATED ORAL DAILY
Qty: 90 TABLET | Refills: 3 | Status: SHIPPED | OUTPATIENT
Start: 2021-10-20 | End: 2022-12-30

## 2021-10-20 ASSESSMENT — ANXIETY QUESTIONNAIRES: GAD7 TOTAL SCORE: 5

## 2021-10-20 ASSESSMENT — PATIENT HEALTH QUESTIONNAIRE - PHQ9: SUM OF ALL RESPONSES TO PHQ QUESTIONS 1-9: 8

## 2021-10-20 NOTE — RESULT ENCOUNTER NOTE
Job Malcolm,  This all looks great!  I sent authorization to your pharmacy to fill the atorvastatin at the same dose for another year.      Veronica Solis MD

## 2021-10-21 ENCOUNTER — OFFICE VISIT (OUTPATIENT)
Dept: OPHTHALMOLOGY | Facility: CLINIC | Age: 61
End: 2021-10-21
Attending: OPHTHALMOLOGY
Payer: COMMERCIAL

## 2021-10-21 DIAGNOSIS — H25.13 SENILE NUCLEAR SCLEROSIS, BILATERAL: ICD-10-CM

## 2021-10-21 DIAGNOSIS — H43.811 PVD (POSTERIOR VITREOUS DETACHMENT), RIGHT: Primary | ICD-10-CM

## 2021-10-21 PROCEDURE — G0463 HOSPITAL OUTPT CLINIC VISIT: HCPCS

## 2021-10-21 PROCEDURE — 99203 OFFICE O/P NEW LOW 30 MIN: CPT | Performed by: OPHTHALMOLOGY

## 2021-10-21 ASSESSMENT — SLIT LAMP EXAM - LIDS
COMMENTS: NORMAL
COMMENTS: NORMAL

## 2021-10-21 ASSESSMENT — REFRACTION_WEARINGRX
OD_SPHERE: +2.00
SPECS_TYPE: PAL
OS_SPHERE: +2.00
OD_AXIS: 180
OD_CYLINDER: +0.75
OS_CYLINDER: SPHERE

## 2021-10-21 ASSESSMENT — CONF VISUAL FIELD
OS_NORMAL: 1
METHOD: COUNTING FINGERS
OD_NORMAL: 1

## 2021-10-21 ASSESSMENT — EXTERNAL EXAM - RIGHT EYE: OD_EXAM: NORMAL

## 2021-10-21 ASSESSMENT — TONOMETRY
OS_IOP_MMHG: 20
IOP_METHOD: TONOPEN
OD_IOP_MMHG: 20

## 2021-10-21 ASSESSMENT — VISUAL ACUITY
CORRECTION_TYPE: GLASSES
OS_CC: J1+
OD_CC: J1+
OS_CC: 20/20
METHOD: SNELLEN - LINEAR
OS_CC+: -
OD_CC: 20/20

## 2021-10-21 ASSESSMENT — EXTERNAL EXAM - LEFT EYE: OS_EXAM: NORMAL

## 2021-10-21 NOTE — PROGRESS NOTES
I have confirmed the patient's and reviewed Past Medical History, Past Surgical History, Social History, Family History, Problem List, Medication List and agree with Tech note.    CC: floaters right eye     HPI: for one week    Assessment/plan:   1.  Posterior vitreous detachment (PVD) right eye    . Posterior vitreous detachment (PVD)       RTC 3 weeks for dilated fundus exam     Saud Teague MD PhD.  Professor & Chair

## 2021-10-21 NOTE — NURSING NOTE
Chief Complaints and History of Present Illnesses   Patient presents with     Floaters Right Eye     Chief Complaint(s) and History of Present Illness(es)     Floaters Right Eye     Laterality: right eye    Quality: ring    Duration: 1 week    Course: stable    Associated symptoms: flashes and blurred vision.  Negative for shade, peripheral vision loss and headache    Context: history of migraines and diabetes    Pain scale: 0/10              Comments     Floater and flashes in RE for about a week.  One open [place] appeared first, lights started later same day, in the evening.  Floater appears same in appearance and always present; flashing less intense and only noted 'with certain eye movements'.  Has an 'eye fatigue' sensation as is right in visual sight line.  States has had 'ocular migraines'. Can come many months apart to three days a week in frequency. Pt thought current episode was an ocular migraine, but states this event is much different.   Pt states 'has felt a little off for a few days, but attributes to just receiving flu shot at that time.  DM 2 for about 3 years.  Lab Results       Component                Value               Date                       A1C                      6.8                 10/19/2021                 A1C                      7.0                 03/11/2021                 A1C                      7.9                 10/26/2020                 A1C                      8.5                 07/21/2020                 A1C                      7.6                 02/27/2020                 A1C                      6.8                 09/05/2019              Told has small start of cataracts by optometrist a few weeks ago.  Sister and MGGM with glaucoma.  Milli Felipe, MAX COT 2:24 PM 10/21/2021

## 2021-11-11 ENCOUNTER — OFFICE VISIT (OUTPATIENT)
Dept: OPHTHALMOLOGY | Facility: CLINIC | Age: 61
End: 2021-11-11
Attending: OPHTHALMOLOGY
Payer: COMMERCIAL

## 2021-11-11 DIAGNOSIS — H25.13 SENILE NUCLEAR SCLEROSIS, BILATERAL: ICD-10-CM

## 2021-11-11 DIAGNOSIS — H43.811 PVD (POSTERIOR VITREOUS DETACHMENT), RIGHT: Primary | ICD-10-CM

## 2021-11-11 PROCEDURE — G0463 HOSPITAL OUTPT CLINIC VISIT: HCPCS

## 2021-11-11 PROCEDURE — 99213 OFFICE O/P EST LOW 20 MIN: CPT | Performed by: OPHTHALMOLOGY

## 2021-11-11 ASSESSMENT — VISUAL ACUITY
OS_CC: 20/20
CORRECTION_TYPE: GLASSES
METHOD: SNELLEN - LINEAR
OD_CC: 20/20

## 2021-11-11 ASSESSMENT — EXTERNAL EXAM - LEFT EYE: OS_EXAM: NORMAL

## 2021-11-11 ASSESSMENT — CONF VISUAL FIELD
OS_NORMAL: 1
OD_NORMAL: 1

## 2021-11-11 ASSESSMENT — TONOMETRY
IOP_METHOD: TONOPEN
OD_IOP_MMHG: 14
OS_IOP_MMHG: 16

## 2021-11-11 ASSESSMENT — SLIT LAMP EXAM - LIDS
COMMENTS: NORMAL
COMMENTS: NORMAL

## 2021-11-11 ASSESSMENT — EXTERNAL EXAM - RIGHT EYE: OD_EXAM: NORMAL

## 2021-11-11 NOTE — NURSING NOTE
Chief Complaints and History of Present Illnesses   Patient presents with     Follow Up     Chief Complaint(s) and History of Present Illness(es)     Follow Up     Laterality: right eye    Course: stable    Associated symptoms: Negative for eye pain, headache, floaters and flashes              Comments     Pt here for 3 week follow up on PVD right eye. Pt denies new floaters or flashes. Floaters are becoming less noticeable. Pt states vision is stable since last exam.  Pt using:  ATs PRN each eye   CHRISTIE REA 9:16 AM November 11, 2021

## 2021-11-11 NOTE — PROGRESS NOTES
I have confirmed the patient's and reviewed Past Medical History, Past Surgical History, Social History, Family History, Problem List, Medication List and agree with Tech note.    CC: floaters right eye     HPI: for one week    Assessment/plan:   1.  Posterior vitreous detachment (PVD) right eye    Retinal detachment/retinal tear precautions discussed with patient  Contact clinic immediately for new floaters/flashers or shadows in vision        RTC 3-4 months for dilated fundus exam     Saud Teague MD PhD.  Professor & Chair

## 2022-01-18 ENCOUNTER — E-VISIT (OUTPATIENT)
Dept: FAMILY MEDICINE | Facility: CLINIC | Age: 62
End: 2022-01-18
Payer: COMMERCIAL

## 2022-01-18 DIAGNOSIS — J01.90 ACUTE SINUSITIS WITH SYMPTOMS > 10 DAYS: Primary | ICD-10-CM

## 2022-01-18 PROCEDURE — 99422 OL DIG E/M SVC 11-20 MIN: CPT | Performed by: FAMILY MEDICINE

## 2022-01-18 RX ORDER — FLUTICASONE PROPIONATE 50 MCG
1 SPRAY, SUSPENSION (ML) NASAL DAILY
Qty: 16 G | Refills: 0 | Status: SHIPPED | OUTPATIENT
Start: 2022-01-18 | End: 2022-12-29

## 2022-01-18 RX ORDER — BENZONATATE 200 MG/1
200 CAPSULE ORAL 3 TIMES DAILY PRN
Qty: 20 CAPSULE | Refills: 0 | Status: SHIPPED | OUTPATIENT
Start: 2022-01-18 | End: 2022-04-18

## 2022-01-18 RX ORDER — DOXYCYCLINE 100 MG/1
100 CAPSULE ORAL 2 TIMES DAILY
Qty: 20 CAPSULE | Refills: 0 | Status: SHIPPED | OUTPATIENT
Start: 2022-01-18 | End: 2022-01-28

## 2022-01-18 RX ORDER — FLUCONAZOLE 150 MG/1
150 TABLET ORAL ONCE
Qty: 1 TABLET | Refills: 0 | Status: SHIPPED | OUTPATIENT
Start: 2022-01-18 | End: 2022-01-18

## 2022-01-18 NOTE — PATIENT INSTRUCTIONS
Dear Karlee Emery    After reviewing your responses, I suspect you have developed a sinus infection.  As you know it can be hard to determine if the cause of a sinus infection is viral or bacterial.  Due to concern about risks of antibiotics and the growing risk of antibiotic-resistant bacteria, we usually recommend trying non-antibiotic treatments first (like the ones I listed below).  Often times if we can get the sinuses to open up and drain we can get the sinus infection to resolve (without resorting to antibiotics).    Having said that, if those measures do not help in the next few days I think it is ok to go ahead and start an antibiotic.  Usually we do not treat sinus infections with zpaks any more as most sinus infections have become resistant to that.      I recommend that you first try taking Mucinex DM (available over-the-counter) and Flonase.  I will send a prescription for the flonase.  Drink plenty of water and try inhaling steam (from a hot shower or over a bowel of steaming water).      If your symptoms do not improve in the next few days then go ahead and start the Doxycycline (which I will also send in).  If you do take that be sure to take a probiotic or eat yogurt daily to prevent antibiotic-associated diarrhea and yeast infections.  I will also send in a prescription for fluconazole (Diflucan) which you can take at the end of the course of doxycycline if you feel you have developed a yeast infection.    Finally, I will send in a prescription for Tessalon (benzonatate) capsules.  Some folks find these helpful for cough.  I have not found any prescription cough syrups to be better than Robitussin DM (which is the same as Mucinex DM that I recommend above.)  If the cough is due to the sinus drainage it is best to treat the sinus drainage.      If your symptoms worsen, you develop chest pain or shortness of breath, fevers over 101, or are not improving in 5 days, please contact your primary  care provider for an appointment or visit any of our convenient Walk-in Care or Urgent Care Centers to be seen which can be found on our website here.    Thanks again for choosing us as your health care partner,    Veronica Solis MD

## 2022-03-12 DIAGNOSIS — A60.00 HERPES SIMPLEX INFECTION OF GENITOURINARY SYSTEM: ICD-10-CM

## 2022-03-12 DIAGNOSIS — F41.9 ANXIETY: ICD-10-CM

## 2022-03-12 DIAGNOSIS — F33.41 RECURRENT MAJOR DEPRESSION IN PARTIAL REMISSION (H): ICD-10-CM

## 2022-03-15 RX ORDER — VALACYCLOVIR HYDROCHLORIDE 500 MG/1
TABLET, FILM COATED ORAL
Qty: 90 TABLET | Refills: 1 | Status: SHIPPED | OUTPATIENT
Start: 2022-03-15 | End: 2022-10-19

## 2022-03-15 RX ORDER — BUPROPION HYDROCHLORIDE 150 MG/1
TABLET ORAL
Qty: 90 TABLET | Refills: 0 | Status: SHIPPED | OUTPATIENT
Start: 2022-03-15 | End: 2022-04-18

## 2022-03-15 RX ORDER — VENLAFAXINE HYDROCHLORIDE 150 MG/1
CAPSULE, EXTENDED RELEASE ORAL
Qty: 90 CAPSULE | Refills: 0 | Status: SHIPPED | OUTPATIENT
Start: 2022-03-15 | End: 2022-04-18

## 2022-03-15 NOTE — TELEPHONE ENCOUNTER
Dr. Solis-Please review and sign if agree.    Team Coordinators-Please contact patient to schedule annual physical (plan from 10/19/21 visit was to return in 5 months for physical).    Routing refill request to provider for review/approval because:  PHQ-9 > 5  PHQ 12/15/2020 3/10/2021 10/19/2021   PHQ-9 Total Score 10 7 8   Q9: Thoughts of better off dead/self-harm past 2 weeks Not at all Not at all Not at all   F/U: Thoughts of suicide or self-harm - - -   F/U: Self harm-plan - - -   F/U: Self-harm action - - -   F/U: Safety concerns - - -       Valtrex approved per [facility] Refill Protocol.    Thank you!  YEHUDA WardN, RN  [facility]

## 2022-04-16 ENCOUNTER — HEALTH MAINTENANCE LETTER (OUTPATIENT)
Age: 62
End: 2022-04-16
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
## 2022-04-16 NOTE — PATIENT INSTRUCTIONS
Yun, you can go ahead and stop taking the daily preventive aspirin.    Consider reaching out to your former therapist or schedule with Sophie Triplett, Behavioral Health Consultant at [facility].    I kindly request that you check your MyChart prior to all appointments with me and complete any assigned questionnaires ahead of time.        FYI:  I do virtual (video) visits exclusively on Wednesdays.  I still do in-person visits at [facility] ([phone]) on Mondays, Tuesdays and Thursdays.  You can schedule a video visit for many conditions.  Please follow this link:  [email or web address]    I do ask that all patients who are taking chronic medications for conditions that I am managing schedule an in-person visit with me at least once a year.     To schedule any ordered imaging studies (including mammogram) you can call [facility]uling at [phone].  If you are scheduling a DEXA (bone density) scan please do NOT schedule this at the [facility].  Please ask that it be done at [facility] in [place].  Other preferred DEXA locations include [place] (at the [facility]), [place] ([place]), or [place].        Preventive Health Recommendations  Female Ages 50 - 64    Yearly exam: See your health care provider every year in order to  Review health changes.   Discuss preventive care.    Review your medicines if your doctor has prescribed any.    Get a Pap test every three years (unless you have an abnormal result and your provider advises testing more often).  If you get Pap tests with HPV test, you only need to test every 5 years, unless you have an abnormal result.   You do not need a Pap test if your uterus was removed (hysterectomy) and you have not had cancer.  You should be tested each year for STDs (sexually transmitted diseases) if you're at  risk.   Have a mammogram every 1 to 2 years.  Have a colonoscopy at age 50, or have a yearly FIT test (stool test). These exams screen for colon cancer.    Have a cholesterol test every 5 years, or more often if advised.  Have a diabetes test (fasting glucose) every three years. If you are at risk for diabetes, you should have this test more often.   If you are at risk for osteoporosis (brittle bone disease), think about having a bone density scan (DEXA).    Shots: Get a flu shot each year. Get a tetanus shot every 10 years.    Nutrition:   Eat at least 5 servings of fruits and vegetables each day.  Eat whole-grain bread, whole-wheat pasta and brown rice instead of white grains and rice.  Get adequate Calcium and Vitamin D.     Lifestyle  Exercise at least 150 minutes a week (30 minutes a day, 5 days a week). This will help you control your weight and prevent disease.  Limit alcohol to one drink per day.  No smoking.   Wear sunscreen to prevent skin cancer.   See your dentist every six months for an exam and cleaning.  See your eye doctor every 1 to 2 years.

## 2022-04-17 ASSESSMENT — ANXIETY QUESTIONNAIRES
GAD7 TOTAL SCORE: 3
7. FEELING AFRAID AS IF SOMETHING AWFUL MIGHT HAPPEN: SEVERAL DAYS
GAD7 TOTAL SCORE: 3
5. BEING SO RESTLESS THAT IT IS HARD TO SIT STILL: NOT AT ALL
2. NOT BEING ABLE TO STOP OR CONTROL WORRYING: NOT AT ALL
7. FEELING AFRAID AS IF SOMETHING AWFUL MIGHT HAPPEN: SEVERAL DAYS
6. BECOMING EASILY ANNOYED OR IRRITABLE: MORE THAN HALF THE DAYS
3. WORRYING TOO MUCH ABOUT DIFFERENT THINGS: NOT AT ALL
1. FEELING NERVOUS, ANXIOUS, OR ON EDGE: NOT AT ALL
GAD7 TOTAL SCORE: 3
4. TROUBLE RELAXING: NOT AT ALL

## 2022-04-17 ASSESSMENT — ENCOUNTER SYMPTOMS
BREAST MASS: 0
HEMATURIA: 0
ABDOMINAL PAIN: 0
PALPITATIONS: 0
PARESTHESIAS: 0
DYSURIA: 0
DIZZINESS: 0
MYALGIAS: 0
NAUSEA: 0
CHILLS: 0
WEAKNESS: 0
JOINT SWELLING: 0
HEMATOCHEZIA: 0
NERVOUS/ANXIOUS: 0
FREQUENCY: 0
COUGH: 0
SHORTNESS OF BREATH: 0
CONSTIPATION: 0
ARTHRALGIAS: 0
HEADACHES: 0
HEARTBURN: 0
EYE PAIN: 0
SORE THROAT: 0
FEVER: 0
DIARRHEA: 0

## 2022-04-17 ASSESSMENT — PATIENT HEALTH QUESTIONNAIRE - PHQ9
SUM OF ALL RESPONSES TO PHQ QUESTIONS 1-9: 9
SUM OF ALL RESPONSES TO PHQ QUESTIONS 1-9: 9
10. IF YOU CHECKED OFF ANY PROBLEMS, HOW DIFFICULT HAVE THESE PROBLEMS MADE IT FOR YOU TO DO YOUR WORK, TAKE CARE OF THINGS AT HOME, OR GET ALONG WITH OTHER PEOPLE: SOMEWHAT DIFFICULT

## 2022-04-18 ENCOUNTER — OFFICE VISIT (OUTPATIENT)
Dept: FAMILY MEDICINE | Facility: CLINIC | Age: 62
End: 2022-04-18
Payer: COMMERCIAL

## 2022-04-18 VITALS
TEMPERATURE: 98.1 F | RESPIRATION RATE: 14 BRPM | WEIGHT: 179 LBS | HEART RATE: 86 BPM | DIASTOLIC BLOOD PRESSURE: 86 MMHG | OXYGEN SATURATION: 96 % | HEIGHT: 61 IN | SYSTOLIC BLOOD PRESSURE: 131 MMHG | BODY MASS INDEX: 33.79 KG/M2

## 2022-04-18 DIAGNOSIS — Z51.81 MEDICATION MONITORING ENCOUNTER: ICD-10-CM

## 2022-04-18 DIAGNOSIS — Z00.00 ROUTINE GENERAL MEDICAL EXAMINATION AT A HEALTH CARE FACILITY: Primary | ICD-10-CM

## 2022-04-18 DIAGNOSIS — F33.41 RECURRENT MAJOR DEPRESSION IN PARTIAL REMISSION (H): ICD-10-CM

## 2022-04-18 DIAGNOSIS — Z12.11 SCREEN FOR COLON CANCER: ICD-10-CM

## 2022-04-18 DIAGNOSIS — R94.6 ABNORMAL THYROID FUNCTION TEST: ICD-10-CM

## 2022-04-18 DIAGNOSIS — Z23 NEED FOR VACCINATION: ICD-10-CM

## 2022-04-18 DIAGNOSIS — F41.9 ANXIETY: ICD-10-CM

## 2022-04-18 DIAGNOSIS — E11.9 TYPE 2 DIABETES MELLITUS WITHOUT COMPLICATION, WITHOUT LONG-TERM CURRENT USE OF INSULIN (H): ICD-10-CM

## 2022-04-18 PROBLEM — G47.33 OSA (OBSTRUCTIVE SLEEP APNEA): Status: ACTIVE | Noted: 2019-09-03

## 2022-04-18 LAB
BASOPHILS # BLD AUTO: 0.1 10E3/UL (ref 0–0.2)
BASOPHILS NFR BLD AUTO: 1 %
CREAT UR-MCNC: 177 MG/DL
EOSINOPHIL # BLD AUTO: 0.4 10E3/UL (ref 0–0.7)
EOSINOPHIL NFR BLD AUTO: 6 %
ERYTHROCYTE [DISTWIDTH] IN BLOOD BY AUTOMATED COUNT: 12.1 % (ref 10–15)
HBA1C MFR BLD: 7 % (ref 0–5.6)
HCT VFR BLD AUTO: 39.9 % (ref 35–47)
HGB BLD-MCNC: 13.3 G/DL (ref 11.7–15.7)
IMM GRANULOCYTES # BLD: 0 10E3/UL
IMM GRANULOCYTES NFR BLD: 0 %
LYMPHOCYTES # BLD AUTO: 2.7 10E3/UL (ref 0.8–5.3)
LYMPHOCYTES NFR BLD AUTO: 44 %
MCH RBC QN AUTO: 31.8 PG (ref 26.5–33)
MCHC RBC AUTO-ENTMCNC: 33.3 G/DL (ref 31.5–36.5)
MCV RBC AUTO: 96 FL (ref 78–100)
MICROALBUMIN UR-MCNC: 16 MG/L
MICROALBUMIN/CREAT UR: 9.04 MG/G CR (ref 0–25)
MONOCYTES # BLD AUTO: 0.4 10E3/UL (ref 0–1.3)
MONOCYTES NFR BLD AUTO: 7 %
NEUTROPHILS # BLD AUTO: 2.6 10E3/UL (ref 1.6–8.3)
NEUTROPHILS NFR BLD AUTO: 43 %
PLATELET # BLD AUTO: 293 10E3/UL (ref 150–450)
RBC # BLD AUTO: 4.18 10E6/UL (ref 3.8–5.2)
T4 FREE SERPL-MCNC: 1.02 NG/DL (ref 0.76–1.46)
TSH SERPL DL<=0.005 MIU/L-ACNC: 5.96 MU/L (ref 0.4–4)
WBC # BLD AUTO: 6.2 10E3/UL (ref 4–11)

## 2022-04-18 PROCEDURE — 82043 UR ALBUMIN QUANTITATIVE: CPT | Performed by: FAMILY MEDICINE

## 2022-04-18 PROCEDURE — 84439 ASSAY OF FREE THYROXINE: CPT | Performed by: FAMILY MEDICINE

## 2022-04-18 PROCEDURE — 36415 COLL VENOUS BLD VENIPUNCTURE: CPT | Performed by: FAMILY MEDICINE

## 2022-04-18 PROCEDURE — 85025 COMPLETE CBC W/AUTO DIFF WBC: CPT | Performed by: FAMILY MEDICINE

## 2022-04-18 PROCEDURE — 0064A COVID-19,PF,MODERNA (18+ YRS BOOSTER .25ML): CPT | Performed by: FAMILY MEDICINE

## 2022-04-18 PROCEDURE — 84443 ASSAY THYROID STIM HORMONE: CPT | Performed by: FAMILY MEDICINE

## 2022-04-18 PROCEDURE — 90746 HEPB VACCINE 3 DOSE ADULT IM: CPT | Performed by: FAMILY MEDICINE

## 2022-04-18 PROCEDURE — 99396 PREV VISIT EST AGE 40-64: CPT | Mod: 25 | Performed by: FAMILY MEDICINE

## 2022-04-18 PROCEDURE — 90471 IMMUNIZATION ADMIN: CPT | Performed by: FAMILY MEDICINE

## 2022-04-18 PROCEDURE — 83036 HEMOGLOBIN GLYCOSYLATED A1C: CPT | Performed by: FAMILY MEDICINE

## 2022-04-18 PROCEDURE — 91306 COVID-19,PF,MODERNA (18+ YRS BOOSTER .25ML): CPT | Performed by: FAMILY MEDICINE

## 2022-04-18 PROCEDURE — 96127 BRIEF EMOTIONAL/BEHAV ASSMT: CPT | Performed by: FAMILY MEDICINE

## 2022-04-18 PROCEDURE — 99214 OFFICE O/P EST MOD 30 MIN: CPT | Mod: 25 | Performed by: FAMILY MEDICINE

## 2022-04-18 RX ORDER — METFORMIN HCL 500 MG
TABLET, EXTENDED RELEASE 24 HR ORAL
Qty: 270 TABLET | Refills: 1 | Status: SHIPPED | OUTPATIENT
Start: 2022-04-18 | End: 2022-12-29

## 2022-04-18 RX ORDER — VENLAFAXINE HYDROCHLORIDE 150 MG/1
CAPSULE, EXTENDED RELEASE ORAL
Qty: 90 CAPSULE | Refills: 1 | Status: SHIPPED | OUTPATIENT
Start: 2022-04-18 | End: 2022-12-29

## 2022-04-18 RX ORDER — BUPROPION HYDROCHLORIDE 150 MG/1
150 TABLET ORAL EVERY MORNING
Qty: 90 TABLET | Refills: 1 | Status: SHIPPED | OUTPATIENT
Start: 2022-04-18 | End: 2022-12-29

## 2022-04-18 ASSESSMENT — ENCOUNTER SYMPTOMS
PALPITATIONS: 0
ABDOMINAL PAIN: 0
CHILLS: 0
JOINT SWELLING: 0
MYALGIAS: 0
HEADACHES: 0
WEAKNESS: 0
DIZZINESS: 0
DYSURIA: 0
DIARRHEA: 0
EYE PAIN: 0
CONSTIPATION: 0
FREQUENCY: 0
HEMATOCHEZIA: 0
COUGH: 0
NERVOUS/ANXIOUS: 0
ARTHRALGIAS: 0
SHORTNESS OF BREATH: 0
PARESTHESIAS: 0
SORE THROAT: 0
BREAST MASS: 0
NAUSEA: 0
HEARTBURN: 0
HEMATURIA: 0
FEVER: 0

## 2022-04-18 ASSESSMENT — PATIENT HEALTH QUESTIONNAIRE - PHQ9: SUM OF ALL RESPONSES TO PHQ QUESTIONS 1-9: 9

## 2022-04-18 ASSESSMENT — ANXIETY QUESTIONNAIRES: GAD7 TOTAL SCORE: 3

## 2022-04-18 NOTE — RESULT ENCOUNTER NOTE
Job Malcolm,  Your TSH (thyroid function test) is slightly elevated with a normal T4 level.  Let's plan on rechecking this when I see you in 6 months.  The rest of your results fall within the expected range(s) or remain unchanged from previous results.  This includes CBC (blood cell counts) and urine albumin level (a test for diabetic kidney damage). Please continue with your current treatment plan.    Veronica Solis MD

## 2022-04-18 NOTE — NURSING NOTE
Prior to immunization administration, verified patients identity using patient s name and date of birth. Please see Immunization Activity for additional information.     Screening Questionnaire for Adult Immunization    Are you sick today?   No   Do you have allergies to medications, food, a vaccine component or latex?   Yes   Have you ever had a serious reaction after receiving a vaccination?   No   Do you have a long-term health problem with heart, lung, kidney, or metabolic disease (e.g., diabetes), asthma, a blood disorder, no spleen, complement component deficiency, a cochlear implant, or a spinal fluid leak?  Are you on long-term aspirin therapy?   Yes   Do you have cancer, leukemia, HIV/AIDS, or any other immune system problem?   No   Do you have a parent, brother, or sister with an immune system problem?   No   In the past 3 months, have you taken medications that affect  your immune system, such as prednisone, other steroids, or anticancer drugs; drugs for the treatment of rheumatoid arthritis, Crohn s disease, or psoriasis; or have you had radiation treatments?   No   Have you had a seizure, or a brain or other nervous system problem?   No   During the past year, have you received a transfusion of blood or blood    products, or been given immune (gamma) globulin or antiviral drug?   No   For women: Are you pregnant or is there a chance you could become       pregnant during the next month?   No   Have you received any vaccinations in the past 4 weeks?   No     Immunization questionnaire was positive for at least one answer.  Notified Veronica Solis.        Per orders of Veronica Mcguire, injection of hep B and Moderna booster .25ml given by Calli Abbott MA. Patient instructed to remain in clinic for 15 minutes afterwards, and to report any adverse reaction to me immediately.       Screening performed by Calli Abbott MA on 4/18/2022 at 9:56 AM.

## 2022-05-04 LAB — NONINV COLON CA DNA+OCC BLD SCRN STL QL: NEGATIVE

## 2022-09-07 ENCOUNTER — MYC MEDICAL ADVICE (OUTPATIENT)
Dept: FAMILY MEDICINE | Facility: CLINIC | Age: 62
End: 2022-09-07

## 2022-09-07 ENCOUNTER — VIRTUAL VISIT (OUTPATIENT)
Dept: URGENT CARE | Facility: CLINIC | Age: 62
End: 2022-09-07
Payer: COMMERCIAL

## 2022-09-07 DIAGNOSIS — E11.9 TYPE 2 DIABETES MELLITUS WITHOUT COMPLICATION, WITHOUT LONG-TERM CURRENT USE OF INSULIN (H): ICD-10-CM

## 2022-09-07 DIAGNOSIS — F41.9 ANXIETY: ICD-10-CM

## 2022-09-07 DIAGNOSIS — U07.1 INFECTION DUE TO 2019 NOVEL CORONAVIRUS: Primary | ICD-10-CM

## 2022-09-07 DIAGNOSIS — E78.00 PURE HYPERCHOLESTEROLEMIA: ICD-10-CM

## 2022-09-07 PROCEDURE — 99214 OFFICE O/P EST MOD 30 MIN: CPT | Mod: CS

## 2022-09-07 NOTE — PROGRESS NOTES
"  Karlee Emery is a 61 year old female who is being evaluated via a billable video visit.      The patient has been notified of following:     \"This video visit will be conducted via a video call between you and your physician/provider. We have found that certain health care needs can be provided without the need for a physical exam.  This service lets us provide the care you need with a video conversation.  If a prescription is necessary we can send it directly to your pharmacy.  If lab work is needed we can place an order for that and you can then stop by our lab to have the test done at a later time.\"     Patient has given verbal consent for video visit?  YES      SUBJECTIVE:  Karlee Emery is an 61 year old female who presents for covid.  Not feeling well yesterday with some sore throat and runny nose.  Recent travel to [place].  Tested positive for covid last night.  Today let her doctor know she was positive and told to do visit for medication.  No fevers.  Does have some chills and sweats.  Has cough.  Bad headache which tylenol helps some.  Some body aches.  Feels very tired.  No n/v/d.  No shortness of breath or increased work of breathing.  No hx of kidney problems.    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)     Ovarian cyst, right     Somatic dysfunction of pelvic region     Myalgia of pelvic floor     Type 2 diabetes mellitus without complication, without long-term current use of insulin (H)     JAMES (obstructive sleep apnea)     Hypervitaminosis D     Perennial allergic rhinitis     Abnormal thyroid function test     Social History     Socioeconomic History     Marital status:      Spouse name: Marbella     Number of children: 2   Occupational History     Occupation: Admin     Employer: [employer]   Tobacco Use     Smoking status: Former Smoker     Packs/day: 0.00     Years: 0.00     Pack years: 0.00     Types: Cigarettes     Quit date: 1980     Years since quittin.7     Smokeless tobacco: Never Used   Substance and Sexual Activity     Alcohol use: Yes     Alcohol/week: 2.0 standard drinks     Drug use: No     Sexual activity: Yes     Partners: Female     Birth control/protection: None   Other Topics Concern      Service No     Blood Transfusions Yes     Comment: 1984     Caffeine Concern No     Sleep Concern No     Stress Concern No     Weight Concern Yes     Comment: trying to loose     Exercise Yes     Comment: 3 x wk     Seat Belt Yes     Self-Exams No     Parent/sibling w/ CABG, MI or angioplasty before 65F 55M? No   Social History Narrative    Caffeine intake/servings daily - 4    Calcium intake/servings daily - 2    Exercise 2 times weekly - describe walking, yoga, wii fit    Sunscreen used - Yes    Seatbelts used - Yes    Guns stored in the home - No    Self Breast Exam - No    Pap test up to date -  Yes    Eye exam up to date -  No    Dental exam up to date -  Yes    DEXA scan up to date -  No    Flex Sig/Colonoscopy up to date -  No    Mammography up to date -  No    Immunizations reviewed and up to date - Yes  and TD today    Abuse: Current or Past (Physical, Sexual or Emotional) - No    Do you feel safe in your environment - Yes    Do you cope well with stress - Yes    Do you suffer from insomnia - No    Last updated by: Jacqui Hastings  2/25/2009    Jacqui Hastings M.A.                 Family History   Problem Relation Age of Onset     Diabetes Mother      Dementia Mother      Heart Disease Father         blockage     Lipids Father      Neurologic Disorder Father         migraines     Gastrointestinal Disease Father         ulcers     Diabetes Father      Cancer Father         lung     Graves' disease Sister      Obesity Sister      Glaucoma Sister      Graves' disease Sister      Thyroid Disease Sister         grave's disease     Thyroid Disease Sister      Diabetes Sister      Diabetes Sister      Thyroid Disease Sister         grave's disease     Obesity Sister      Cerebrovascular Disease Maternal Grandmother      Diabetes Maternal Grandmother      Cerebrovascular Disease Maternal Grandfather      Cerebrovascular Disease Paternal Grandmother      Cerebrovascular Disease Paternal Grandfather      Schizophrenia Son      Seizure Disorder Son      Glaucoma Maternal Great-Grandmother        ALLERGIES:  Sulfa drugs    Current Outpatient Medications   Medication     atorvastatin (LIPITOR) 40 MG tablet     blood glucose (ACCU-CHEK GUIDE) test strip     blood glucose (NO BRAND SPECIFIED) lancets standard     blood glucose monitoring (NO BRAND SPECIFIED) meter device kit     buPROPion (WELLBUTRIN XL) 150 MG 24 hr tablet     Calcium Carbonate-Vitamin D (CALCIUM + D PO)     fluticasone (FLONASE) 50 MCG/ACT nasal spray     ibuprofen (ADVIL/MOTRIN) 200 MG tablet     loratadine (CLARITIN) 10 MG tablet     metFORMIN (GLUCOPHAGE-XR) 500 MG 24 hr tablet     MULTIVITAMIN TABS   OR     valACYclovir (VALTREX) 500 MG tablet     venlafaxine (EFFEXOR-XR) 150 MG 24 hr capsule     No current facility-administered medications for this visit.          ROS:  ROS is done and is negative for general/constitutional, eye, ENT, Respiratory, cardiovascular, GI, , Skin, musculoskeletal except as noted elsewhere.  All other review of systems negative except as noted elsewhere.      OBJECTIVE:    No vital signs obtained as is virtual visit    RESP: No audible wheeze.   mild cough.    PSYCH: Mentation appears normal, affect normal/bright, judgement and insight intact, normal speech         ASSESSMENT/PLAN:    ASSESSMENT / PLAN:  (U07.1) Infection due to 2019 novel coronavirus  (primary encounter diagnosis)  Comment: sxs with positive test.  Has risk factors for complications.  Discussed treatment options and pt will go on paxlovid  Plan: nirmatrelvir and ritonavir (PAXLOVID) therapy         pack        Pt advised to stop her atorvastatin while on paxlovid and resume when paxlovid complete.  Reviewed medication instructions and side effects. Follow up if experiences side effects.. I reviewed supportive care, otc meds to use if needed, expected course, and signs of concern.  Follow up as needed or if she does not improve within 5 day(s) or if worsens in any way.  Reviewed red flag symptoms and is to go to the ER if experiences any of these.    (E11.9) Type 2 diabetes mellitus without complication, without long-term current use of insulin (H)  Comment: increases risk of complications from covid  Plan: treat covid as above.  Advised to monitor sugars closely and if less than 70 or over 250 is to contact pcp.    (E78.00) Pure hypercholesterolemia  Comment: has hx of  Plan: pt to stop atorvastatin while on paxlovid and resume when paxlovid complete.    (F41.9) Anxiety  Comment: has hx of.  Increases risk of covid complicaitons  Plan: continue medications.  Treat covid as above.          See [facility] for orders, medications, letters, patient instructions    Soumya Rodriguez MD  9/7/2022, 5:58 PM    Video-Visit Details    Video Start Time: 5:58pm  Started video visit but  had to switch to phone due to technical issues     Type of service:  Video Visit    Video End Time:6:22 PM    Originating Location (pt. Location): Home    Distant Location (provider location):  [facility]     Platform used for Video Visit: [employer]

## 2022-09-07 NOTE — TELEPHONE ENCOUNTER
Writer responded via [employer].    YEHUDA WardN, RN  [facility]

## 2022-09-07 NOTE — PATIENT INSTRUCTIONS
Do NOT take atorvastatin (Lipitor) while you are on Paxlovid.  When you are done taking Paxlovid, restart your atorvastatin daily.

## 2022-09-20 ENCOUNTER — VIRTUAL VISIT (OUTPATIENT)
Dept: URGENT CARE | Facility: CLINIC | Age: 62
End: 2022-09-20
Payer: COMMERCIAL

## 2022-09-20 DIAGNOSIS — U07.1 COVID: Primary | ICD-10-CM

## 2022-09-20 PROCEDURE — 99212 OFFICE O/P EST SF 10 MIN: CPT | Mod: CS | Performed by: EMERGENCY MEDICINE

## 2022-09-20 NOTE — PROGRESS NOTES
Video visit   Start time: 8:18 AM  Stop time: 8:24 AM   Time: 7 minutes   Patient location: Home   Provider location: [facility] virtual provider (remote).    Platform used for video visit: Brandi  CHIEF COMPLAINT: COVID infection      HPI: Patient is a 61-year-old female who tested positive for COVID yesterday i.e. 2 days ago with new onset symptoms.  Patient initially developed COVID on 9/6/2022 and was treated with paxlovid.  She tested negative by home test on 913 but then redeveloped symptoms testing positive yesterday.  Her symptoms have been similar with congestion and sore throat some body aches.  No fever.  No giovanny shortness of breath.      ROS: See HPI otherwise normal.    Allergies   Allergen Reactions     Sulfa Drugs Nausea and Vomiting and Hives      Current Outpatient Medications   Medication Sig Dispense Refill     atorvastatin (LIPITOR) 40 MG tablet Take 1 tablet (40 mg) by mouth daily 90 tablet 3     blood glucose (ACCU-CHEK GUIDE) test strip 1 strip by In Vitro route daily 100 each 0     blood glucose (NO BRAND SPECIFIED) lancets standard Use to test blood sugar 1 time daily or as directed. 50 each 3     blood glucose monitoring (NO BRAND SPECIFIED) meter device kit Use to test blood sugar 1 time daily or as directed. Blood Glucose Monitor Brands: per insurance formulary. 1 kit 0     buPROPion (WELLBUTRIN XL) 150 MG 24 hr tablet Take 1 tablet (150 mg) by mouth every morning 90 tablet 1     Calcium Carbonate-Vitamin D (CALCIUM + D PO) Take 1 tablet by mouth daily.       fluticasone (FLONASE) 50 MCG/ACT nasal spray Spray 1 spray into both nostrils daily 16 g 0     ibuprofen (ADVIL/MOTRIN) 200 MG tablet Take 200 mg by mouth every 4 hours as needed for mild pain       loratadine (CLARITIN) 10 MG tablet Take 1 tablet by mouth daily. 90 tablet 0     metFORMIN (GLUCOPHAGE-XR) 500 MG 24 hr tablet Take one tablet (500 mg) by mouth with food in the morning and two tablets (1,000 mg) with dinner in  the evening. 270 tablet 1     MULTIVITAMIN TABS   OR 1 TABLET DAILY       valACYclovir (VALTREX) 500 MG tablet Take 1 Tablet by mouth once daily. 90 tablet 1     venlafaxine (EFFEXOR-XR) 150 MG 24 hr capsule Take 1 Capsule by mouth once daily with a meal. 90 capsule 1         PE: No acute distress on video visit.  Nondyspneic appearing speaking in full set since.  She is alert and oriented.        TREATMENT: None.      ASSESSMENT: Recurrent COVID symptoms with testing positive consistent with rebound phenomena related to paxlovid or COVID disease itself.  No overwhelming symptoms and patient is stable to be monitored outpatient.      DIAGNOSIS: Recurrent COVID infection.      PLAN: Monitor symptoms.  I have asked that she contact her PCP in 4 to 5 days if symptoms do not prove for careful outpatient monitoring due to her health issues and having rebound symptoms.

## 2022-10-16 ENCOUNTER — HEALTH MAINTENANCE LETTER (OUTPATIENT)
Age: 62
End: 2022-10-16

## 2022-10-16 DIAGNOSIS — A60.00 HERPES SIMPLEX INFECTION OF GENITOURINARY SYSTEM: ICD-10-CM

## 2022-10-19 RX ORDER — VALACYCLOVIR HYDROCHLORIDE 500 MG/1
TABLET, FILM COATED ORAL
Qty: 90 TABLET | Refills: 0 | Status: SHIPPED | OUTPATIENT
Start: 2022-10-19 | End: 2022-12-29

## 2022-10-19 NOTE — TELEPHONE ENCOUNTER
,  --Please contact patient and ask to schedule a lab-only for creatinine/kidney  check for monitoring this medication..   --And looks like she is due for follow up with [facility].     --Last visit:  4/18/2022 [facility] plan RTC 6 months in-person.    --Future Visit: NONE.      ---Prescription approved per [facility] Refill Protocol.       Marii Najera RN BSN     [facility]

## 2022-11-15 ENCOUNTER — TRANSFERRED RECORDS (OUTPATIENT)
Dept: MULTI SPECIALTY CLINIC | Facility: CLINIC | Age: 62
End: 2022-11-15

## 2022-11-15 LAB — RETINOPATHY: NORMAL

## 2022-12-04 ENCOUNTER — HEALTH MAINTENANCE LETTER (OUTPATIENT)
Age: 62
End: 2022-12-04

## 2022-12-28 NOTE — PROGRESS NOTES
"  Assessment & Plan     Type 2 diabetes mellitus without complication, without long-term current use of insulin (H)  Adequate control but with A1c over 7.0 I recommend monitoring A1c every 3-6 months.  (It has been over 8 months since her last A1c.)  She'll increase metformin dose to 1,000 mg BID.    - COMPREHENSIVE METABOLIC PANEL  - HEMOGLOBIN A1C  - FOOT EXAM  - metFORMIN (GLUCOPHAGE XR) 500 MG 24 hr tablet  Dispense: 360 tablet; Refill: 1    Pure hypercholesterolemia  Continue statin.  Awaiting lipid results to determine if dose change is indicated.     - Lipid panel reflex to direct LDL Non-fasting    Abnormal thyroid function test  - TSH WITH FREE T4 REFLEX    Recurrent major depression in partial remission (H)  Anxiety  stable/adequately controlled - Continue current medication regimen.   - venlafaxine (EFFEXOR XR) 150 MG 24 hr capsule  Dispense: 90 capsule; Refill: 3  - buPROPion (WELLBUTRIN XL) 150 MG 24 hr tablet  Dispense: 90 tablet; Refill: 3    Herpes simplex infection of genitourinary system  stable/well controlled - Continue current medication regimen.   - valACYclovir (VALTREX) 500 MG tablet  Dispense: 90 tablet; Refill: 3    Encounter for screening mammogram for breast cancer  - MA Screening Digital Bilateral    Need for vaccination  She states she got her flu shot at [facility] in [employer] this fall.  I did not find it in [facility].  Hep B #2.  We'll defer PCV20 until she is 63.  - COVID-19,PF,MODERNA BIVALENT (18+YRS)  - HEPATITIS B VACCINE, ADULT, IM       BMI:   Estimated body mass index is 33.73 kg/m  as calculated from the following:    Height as of this encounter: 1.556 m (5' 1.25\").    Weight as of this encounter: 81.6 kg (180 lb).   Weight management plan: Discussed healthy diet and exercise guidelines Specifically recommended 5-10 minutes mild exertion after meals for blood sugar control.    See Patient Instructions     Follow-up Visit   Expected date:  Jun 29, 2023 (Approximate)      Follow Up " Appointment Details:     Follow-up with whom?: Me    Follow-Up for what?: Adult Preventive    Any Additional Chronic Condition Management?: Diabetes    How?: In Person                  Veronica Solis MD  [facility] VAN Malcolm is a 62 year old, presenting for the following health issues:  Diabetes and Thyroid Problem      History of Present Illness       Diabetes:   She presents for follow up of diabetes.  She is not checking blood glucose. She has no concerns regarding her diabetes at this time.  She is not experiencing numbness or burning in feet, excessive thirst, blurry vision, weight changes or redness, sores or blisters on feet. The patient has had a diabetic eye exam in the last 12 months. Eye exam performed on 11/15/2022. Location of last eye exam [facility].        Hypothyroidism:     Since last visit, patient describes the following symptoms::  Fatigue    She eats 2-3 servings of fruits and vegetables daily.She consumes 1 sweetened beverage(s) daily.She exercises with enough effort to increase her heart rate 9 or less minutes per day.  She exercises with enough effort to increase her heart rate 3 or less days per week. She is missing 2 dose(s) of medications per week.  She is not taking prescribed medications regularly due to other.     She is fasting for labs.    BP Readings from Last 2 Encounters:   12/29/22 138/80   04/18/22 131/86     Hemoglobin A1C (%)   Date Value   12/29/2022 7.3 (H)   04/18/2022 7.0 (H)   03/11/2021 7.0 (H)   10/26/2020 7.9 (H)     LDL Cholesterol Calculated (mg/dL)   Date Value   10/19/2021 98   07/21/2020 105 (H)   04/15/2019 187 (H)         Depression and Anxiety Follow-Up    How are you doing with your depression since your last visit? No change    How are you doing with your anxiety since your last visit?  No change    Have you had a significant life event? Continues to have issues with her adult son who has mental health and  "chemical dependency diagnoses    Social History     Tobacco Use     Smoking status: Former     Packs/day: 0.00     Years: 0.00     Pack years: 0.00     Types: Cigarettes     Quit date: 1980     Years since quittin.0     Smokeless tobacco: Never   Substance Use Topics     Alcohol use: Yes     Alcohol/week: 2.0 standard drinks     Drug use: No     PHQ 10/19/2021 2022 2022   PHQ-9 Total Score 8 9 6   Q9: Thoughts of better off dead/self-harm past 2 weeks Not at all Not at all Not at all   F/U: Thoughts of suicide or self-harm - - -   F/U: Self harm-plan - - -   F/U: Self-harm action - - -   F/U: Safety concerns - - -     DANIEL-7 SCORE 10/19/2021 2022 2022   Total Score - - -   Total Score 5 (mild anxiety) 3 (minimal anxiety) 4 (minimal anxiety)   Total Score 5 3 4       Suicide Assessment Five-step Evaluation and Treatment (SAFE-T)      Review of Systems   RESP:  NEGATIVE for shortness of breath  CV:  NEGATIVE for chest pain        Objective    /80 (BP Location: Left arm, Patient Position: Chair, Cuff Size: Adult Regular)   Pulse 80   Temp 98  F (36.7  C) (Temporal)   Resp 15   Ht 1.556 m (5' 1.25\")   Wt 81.6 kg (180 lb)   LMP 03/15/2013 (LMP Unknown)   SpO2 95%   BMI 33.73 kg/m    Body mass index is 33.73 kg/m .  Physical Exam   GENERAL APPEARANCE: healthy, alert and no distress  EYES: Eyes grossly normal to inspection, conjunctivae and sclerae normal  RESP: lungs clear to auscultation - no rales, rhonchi or wheezes  CV: regular rate and rhythm, normal S1 S2, no S3 or S4, no murmur, click or rub, no peripheral edema   MS: no musculoskeletal defects are noted and gait is age appropriate without ataxia  SKIN: no suspicious lesions or rashes  DIABETIC FOOT EXAM:  Bilateral feet examined.  No lesions or deformities noted.  Skin is warm and dry.  Pedal pulses are intact.  Sensation is intact to nylon filament exam.   PSYCH: mentation appears normal and affect normal/bright     "

## 2022-12-29 ENCOUNTER — OFFICE VISIT (OUTPATIENT)
Dept: FAMILY MEDICINE | Facility: CLINIC | Age: 62
End: 2022-12-29
Payer: COMMERCIAL

## 2022-12-29 VITALS
RESPIRATION RATE: 15 BRPM | SYSTOLIC BLOOD PRESSURE: 138 MMHG | BODY MASS INDEX: 33.99 KG/M2 | OXYGEN SATURATION: 95 % | HEIGHT: 61 IN | TEMPERATURE: 98 F | HEART RATE: 80 BPM | DIASTOLIC BLOOD PRESSURE: 80 MMHG | WEIGHT: 180 LBS

## 2022-12-29 DIAGNOSIS — A60.00 HERPES SIMPLEX INFECTION OF GENITOURINARY SYSTEM: ICD-10-CM

## 2022-12-29 DIAGNOSIS — E11.9 TYPE 2 DIABETES MELLITUS WITHOUT COMPLICATION, WITHOUT LONG-TERM CURRENT USE OF INSULIN (H): Primary | ICD-10-CM

## 2022-12-29 DIAGNOSIS — E78.00 PURE HYPERCHOLESTEROLEMIA: ICD-10-CM

## 2022-12-29 DIAGNOSIS — R94.6 ABNORMAL THYROID FUNCTION TEST: ICD-10-CM

## 2022-12-29 DIAGNOSIS — R74.01 ELEVATED TRANSAMINASE LEVEL: ICD-10-CM

## 2022-12-29 DIAGNOSIS — R74.8 ELEVATED ALKALINE PHOSPHATASE LEVEL: ICD-10-CM

## 2022-12-29 DIAGNOSIS — Z12.31 ENCOUNTER FOR SCREENING MAMMOGRAM FOR BREAST CANCER: ICD-10-CM

## 2022-12-29 DIAGNOSIS — F33.41 RECURRENT MAJOR DEPRESSION IN PARTIAL REMISSION (H): ICD-10-CM

## 2022-12-29 DIAGNOSIS — Z23 NEED FOR VACCINATION: ICD-10-CM

## 2022-12-29 DIAGNOSIS — F41.9 ANXIETY: ICD-10-CM

## 2022-12-29 LAB
ALBUMIN SERPL BCG-MCNC: 4.4 G/DL (ref 3.5–5.2)
ALP SERPL-CCNC: 128 U/L (ref 35–104)
ALT SERPL W P-5'-P-CCNC: 41 U/L (ref 10–35)
ANION GAP SERPL CALCULATED.3IONS-SCNC: 17 MMOL/L (ref 7–15)
AST SERPL W P-5'-P-CCNC: 45 U/L (ref 10–35)
BILIRUB SERPL-MCNC: 0.5 MG/DL
BUN SERPL-MCNC: 12.6 MG/DL (ref 8–23)
CALCIUM SERPL-MCNC: 9.6 MG/DL (ref 8.8–10.2)
CHLORIDE SERPL-SCNC: 103 MMOL/L (ref 98–107)
CHOLEST SERPL-MCNC: 196 MG/DL
CREAT SERPL-MCNC: 0.82 MG/DL (ref 0.51–0.95)
DEPRECATED HCO3 PLAS-SCNC: 24 MMOL/L (ref 22–29)
GFR SERPL CREATININE-BSD FRML MDRD: 79 ML/MIN/1.73M2
GLUCOSE SERPL-MCNC: 168 MG/DL (ref 70–99)
HBA1C MFR BLD: 7.3 % (ref 0–5.6)
HDLC SERPL-MCNC: 67 MG/DL
LDLC SERPL CALC-MCNC: 106 MG/DL
NONHDLC SERPL-MCNC: 129 MG/DL
POTASSIUM SERPL-SCNC: 4.4 MMOL/L (ref 3.4–5.3)
PROT SERPL-MCNC: 6.9 G/DL (ref 6.4–8.3)
SODIUM SERPL-SCNC: 144 MMOL/L (ref 136–145)
TRIGL SERPL-MCNC: 113 MG/DL
TSH SERPL DL<=0.005 MIU/L-ACNC: 2.73 UIU/ML (ref 0.3–4.2)

## 2022-12-29 PROCEDURE — 90471 IMMUNIZATION ADMIN: CPT | Performed by: FAMILY MEDICINE

## 2022-12-29 PROCEDURE — 36415 COLL VENOUS BLD VENIPUNCTURE: CPT | Performed by: FAMILY MEDICINE

## 2022-12-29 PROCEDURE — 90746 HEPB VACCINE 3 DOSE ADULT IM: CPT | Performed by: FAMILY MEDICINE

## 2022-12-29 PROCEDURE — 0134A COVID-19 VACCINE BIVALENT BOOSTER 18+ (MODERNA): CPT | Performed by: FAMILY MEDICINE

## 2022-12-29 PROCEDURE — 80061 LIPID PANEL: CPT | Performed by: FAMILY MEDICINE

## 2022-12-29 PROCEDURE — 99207 PR FOOT EXAM NO CHARGE: CPT | Performed by: FAMILY MEDICINE

## 2022-12-29 PROCEDURE — 96127 BRIEF EMOTIONAL/BEHAV ASSMT: CPT | Performed by: FAMILY MEDICINE

## 2022-12-29 PROCEDURE — 84443 ASSAY THYROID STIM HORMONE: CPT | Performed by: FAMILY MEDICINE

## 2022-12-29 PROCEDURE — 99214 OFFICE O/P EST MOD 30 MIN: CPT | Mod: 25 | Performed by: FAMILY MEDICINE

## 2022-12-29 PROCEDURE — 83036 HEMOGLOBIN GLYCOSYLATED A1C: CPT | Performed by: FAMILY MEDICINE

## 2022-12-29 PROCEDURE — 80053 COMPREHEN METABOLIC PANEL: CPT | Performed by: FAMILY MEDICINE

## 2022-12-29 PROCEDURE — 91313 COVID-19 VACCINE BIVALENT BOOSTER 18+ (MODERNA): CPT | Performed by: FAMILY MEDICINE

## 2022-12-29 RX ORDER — VALACYCLOVIR HYDROCHLORIDE 500 MG/1
TABLET, FILM COATED ORAL
Qty: 90 TABLET | Refills: 3 | Status: SHIPPED | OUTPATIENT
Start: 2022-12-29 | End: 2023-10-05

## 2022-12-29 RX ORDER — VENLAFAXINE HYDROCHLORIDE 150 MG/1
CAPSULE, EXTENDED RELEASE ORAL
Qty: 90 CAPSULE | Refills: 3 | Status: SHIPPED | OUTPATIENT
Start: 2022-12-29 | End: 2023-10-05

## 2022-12-29 RX ORDER — METFORMIN HCL 500 MG
1000 TABLET, EXTENDED RELEASE 24 HR ORAL 2 TIMES DAILY WITH MEALS
Qty: 360 TABLET | Refills: 1 | Status: SHIPPED | OUTPATIENT
Start: 2022-12-29 | End: 2023-01-17 | Stop reason: DRUGHIGH

## 2022-12-29 RX ORDER — BUPROPION HYDROCHLORIDE 150 MG/1
150 TABLET ORAL EVERY MORNING
Qty: 90 TABLET | Refills: 3 | Status: SHIPPED | OUTPATIENT
Start: 2022-12-29 | End: 2023-10-05

## 2022-12-29 ASSESSMENT — PATIENT HEALTH QUESTIONNAIRE - PHQ9
SUM OF ALL RESPONSES TO PHQ QUESTIONS 1-9: 6
SUM OF ALL RESPONSES TO PHQ QUESTIONS 1-9: 6
10. IF YOU CHECKED OFF ANY PROBLEMS, HOW DIFFICULT HAVE THESE PROBLEMS MADE IT FOR YOU TO DO YOUR WORK, TAKE CARE OF THINGS AT HOME, OR GET ALONG WITH OTHER PEOPLE: SOMEWHAT DIFFICULT

## 2022-12-29 ASSESSMENT — PAIN SCALES - GENERAL: PAINLEVEL: NO PAIN (0)

## 2022-12-29 ASSESSMENT — ANXIETY QUESTIONNAIRES
GAD7 TOTAL SCORE: 4
IF YOU CHECKED OFF ANY PROBLEMS ON THIS QUESTIONNAIRE, HOW DIFFICULT HAVE THESE PROBLEMS MADE IT FOR YOU TO DO YOUR WORK, TAKE CARE OF THINGS AT HOME, OR GET ALONG WITH OTHER PEOPLE: SOMEWHAT DIFFICULT
2. NOT BEING ABLE TO STOP OR CONTROL WORRYING: NOT AT ALL
GAD7 TOTAL SCORE: 4
1. FEELING NERVOUS, ANXIOUS, OR ON EDGE: SEVERAL DAYS
5. BEING SO RESTLESS THAT IT IS HARD TO SIT STILL: NOT AT ALL
7. FEELING AFRAID AS IF SOMETHING AWFUL MIGHT HAPPEN: SEVERAL DAYS
4. TROUBLE RELAXING: NOT AT ALL
GAD7 TOTAL SCORE: 4
6. BECOMING EASILY ANNOYED OR IRRITABLE: SEVERAL DAYS
7. FEELING AFRAID AS IF SOMETHING AWFUL MIGHT HAPPEN: SEVERAL DAYS
8. IF YOU CHECKED OFF ANY PROBLEMS, HOW DIFFICULT HAVE THESE MADE IT FOR YOU TO DO YOUR WORK, TAKE CARE OF THINGS AT HOME, OR GET ALONG WITH OTHER PEOPLE?: SOMEWHAT DIFFICULT
3. WORRYING TOO MUCH ABOUT DIFFERENT THINGS: SEVERAL DAYS

## 2022-12-29 NOTE — PATIENT INSTRUCTIONS
If you have MyChart:  1) I kindly request that you check your MyChart prior to all appointments with me and complete any assigned questionnaires ahead of time.    2) You may receive auto-released results from our system before I have the opportunity to review and comment.  Be assured I will review and comment on all of your results as soon as I can.      If you do not have MyChart:  1) I encourage you to sign up for Mychart ([email or web address]).  This will allow you to review your results, securely communicate with your care team, and schedule virtual visits as well.  2) Please be aware that result letters from the clinic can take up to 2 weeks but urgent results will be called to you.      FYI:  My schedule has been booking out very far in advance (2 months).  I apologize for the lack of timely access.  If you need to be seen for a chronic condition or preventive (wellness) visit, please be sure to schedule that appointment 2-3 months in advance.  If you have a concern that you feel cannot wait until my next available appointment (such as a hospital follow-up or new symptom of concern) please ask to speak to one of the [facility] nurses who may be able to access a sooner appointment.    I do ask that all patients who are taking chronic medications for conditions that I am managing schedule an in-person visit with me at least once a year.     To schedule any ordered imaging studies (including mammogram and/or DEXA scan) you can call [facility] Scheduling at [phone].

## 2022-12-29 NOTE — NURSING NOTE
Prior to immunization administration, verified patients identity using patient s name and date of birth. Please see Immunization Activity for additional information.     Screening Questionnaire for Adult Immunization    Are you sick today?   No   Do you have allergies to medications, food, a vaccine component or latex?   Yes   Have you ever had a serious reaction after receiving a vaccination?   No   Do you have a long-term health problem with heart, lung, kidney, or metabolic disease (e.g., diabetes), asthma, a blood disorder, no spleen, complement component deficiency, a cochlear implant, or a spinal fluid leak?  Are you on long-term aspirin therapy?   Yes   Do you have cancer, leukemia, HIV/AIDS, or any other immune system problem?   No   Do you have a parent, brother, or sister with an immune system problem?   No   In the past 3 months, have you taken medications that affect  your immune system, such as prednisone, other steroids, or anticancer drugs; drugs for the treatment of rheumatoid arthritis, Crohn s disease, or psoriasis; or have you had radiation treatments?   No   Have you had a seizure, or a brain or other nervous system problem?   No   During the past year, have you received a transfusion of blood or blood    products, or been given immune (gamma) globulin or antiviral drug?   No   For women: Are you pregnant or is there a chance you could become       pregnant during the next month?   No   Have you received any vaccinations in the past 4 weeks?   No     Immunization questionnaire was positive for at least one answer.  Notified Veronica Solis.        Per orders of Veronica Mcguire, injection of  Hep B and Moderna given by Calli Abobtt MA. Patient instructed to remain in clinic for 15 minutes afterwards, and to report any adverse reaction to me immediately.       Screening performed by Calli Abbott MA on 12/29/2022 at 1:20 PM.

## 2022-12-30 RX ORDER — ATORVASTATIN CALCIUM 40 MG/1
40 TABLET, FILM COATED ORAL DAILY
Qty: 90 TABLET | Refills: 3 | Status: SHIPPED | OUTPATIENT
Start: 2022-12-30 | End: 2023-10-05

## 2022-12-30 NOTE — RESULT ENCOUNTER NOTE
Job Malcolm,  Your TSH (thyroid function test) has normalized.  Your lipid panel (cholesterol) results show that your LDL (bad) cholesterol is a little higher than goal of under 100.  Your comprehensive metabolic panel results (liver function, kidney function, blood sugar, and blood salts) are showing elevations of some of your liver tests (AST, ALT, alk phos).      Let's hold your atorvastatin dose at 40 mg for now, but I would like to recheck the liver tests in 4-6 weeks.  I have placed those orders.  Please schedule a lab-only appointment in early February.  In the mean time avoid liver irritants such as alcohol.  We should also consider immunizing you against Hepatitis A.      Veronica Solis MD

## 2023-01-12 ENCOUNTER — MYC MEDICAL ADVICE (OUTPATIENT)
Dept: FAMILY MEDICINE | Facility: CLINIC | Age: 63
End: 2023-01-12

## 2023-01-12 DIAGNOSIS — E11.9 TYPE 2 DIABETES MELLITUS WITHOUT COMPLICATION, WITHOUT LONG-TERM CURRENT USE OF INSULIN (H): ICD-10-CM

## 2023-01-16 NOTE — TELEPHONE ENCOUNTER
Dr. Solis--    Since increasing metformin dosage, pt experiencing adverse GI symptoms.     Should she go back to previous dosage?    YEHUDA EspinosaN RN  [facility]

## 2023-01-17 RX ORDER — METFORMIN HCL 500 MG
TABLET, EXTENDED RELEASE 24 HR ORAL
Qty: 270 TABLET | Refills: 1
Start: 2023-01-17 | End: 2023-10-05

## 2023-01-17 NOTE — TELEPHONE ENCOUNTER
Yes, let's have her reduce metformin to prior dose of 1 tab in AM and 2 tabs in PM.  She should always take these with food to minimize GI side effects.      Continue to work on diet (consistent and lower carb diet) and exercise.    Let's do diabetes follow-up in 3 months (early April).    Veronica Solis MD  [facility]

## 2023-03-14 ENCOUNTER — ANCILLARY PROCEDURE (OUTPATIENT)
Dept: MAMMOGRAPHY | Facility: CLINIC | Age: 63
End: 2023-03-14
Attending: FAMILY MEDICINE
Payer: COMMERCIAL

## 2023-03-14 DIAGNOSIS — Z12.31 ENCOUNTER FOR SCREENING MAMMOGRAM FOR BREAST CANCER: ICD-10-CM

## 2023-03-14 DIAGNOSIS — Z12.31 VISIT FOR SCREENING MAMMOGRAM: ICD-10-CM

## 2023-03-14 PROCEDURE — 77067 SCR MAMMO BI INCL CAD: CPT | Performed by: RADIOLOGY

## 2023-03-14 PROCEDURE — 77063 BREAST TOMOSYNTHESIS BI: CPT | Performed by: RADIOLOGY

## 2023-04-02 NOTE — PROGRESS NOTES
Assessment & Plan     Type 2 diabetes mellitus without complication, without long-term current use of insulin (H)  stable/adequately controlled but with GI symptoms that are potential metformin side effects.  I'd like to reduce her metformin dose and see if she continues to have the GI symptoms.  We discussed adding an alternative diabetes med.  She is hesitant about SGLT2 Inhibitors but agreeable to trying low-dose Rybelsus which appears to be on her formulary.  Discussed R/B/SE of GLP-1 agonists including benefits of moderate weight loss and potential decrease in cardiovascular events, rare risks of MILI with dehydration, gallbladder disease, pancreatitis, and medullary thyroid cancer.  Reviewed potential GI side effects including nausea, vomiting, diarrhea and advised patient stay well-hydrated while on this medication and to notify clinic for mass in neck, dysphagia, severe upper abdominal pain.   Once she starts that she can decrease metformin down to 500 mg daily.    - Albumin Random Urine Quantitative with Creat Ratio  - Hemoglobin A1c  - Semaglutide (RYBELSUS) 3 MG tablet  Dispense: 90 tablet; Refill: 1    Elevated alkaline phosphatase level  - Hepatic panel (Albumin, ALT, AST, Bili, Alk Phos, TP)  - Alkaline phosphatase isoenzymes    Elevated transaminase level  - Hepatic panel (Albumin, ALT, AST, Bili, Alk Phos, TP)  - Ferritin    Recurrent major depressive disorder, in partial remission (H)  Stable - Continue current medication regimen.     Hypervitaminosis D  - Vitamin D Deficiency    Medication monitoring encounter  Daily Valtrex  - CBC with Platelets & Differential    Need for vaccination  With elevated LFT's I recommended Hep A series and we started that today.    - HEPATITIS A VACCINE (ADULT)     See Patient Instructions    Veronica Solis MD  [facility]    Naif Malcolm is a 62 year old, presenting for the following health issues:  Diabetes         View : No  data to display.              History of Present Illness       Diabetes:   She presents for follow up of diabetes.  She is not checking blood glucose. She is concerned about other. She is not experiencing numbness or burning in feet, excessive thirst, blurry vision, weight changes or redness, sores or blisters on feet.         She eats 2-3 servings of fruits and vegetables daily.She consumes 0 sweetened beverage(s) daily.She exercises with enough effort to increase her heart rate 9 or less minutes per day.  She exercises with enough effort to increase her heart rate 3 or less days per week. She is missing 1 dose(s) of medications per week.  She is not taking prescribed medications regularly due to side effects.       Diabetes Follow-up  At our last visit in December her A1c had increased above 7 and we had her increase her metformin dose to 1,000 mg BID.  She didn't tolerate that so decreased down to 500 mg in AM and 1,000 mg in PM.  She still has loose stools with stomach cramps. Wonders about IBS.    BP Readings from Last 2 Encounters:   04/04/23 130/87   12/29/22 138/80     Hemoglobin A1C (%)   Date Value   04/04/2023 7.1 (H)   12/29/2022 7.3 (H)   03/11/2021 7.0 (H)   10/26/2020 7.9 (H)     LDL Cholesterol Calculated (mg/dL)   Date Value   12/29/2022 106 (H)   10/19/2021 98   07/21/2020 105 (H)   04/15/2019 187 (H)     Wt Readings from Last 3 Encounters:   04/04/23 78 kg (172 lb)   12/29/22 81.6 kg (180 lb)   04/18/22 81.2 kg (179 lb)       Elevated LFT's - In December her AST, ALT and alk phos were all slightly elevated.  I had recommended she avoid alcohol and recheck a hepatic panel (plus alk phos isoenzyme and ferritin) in February.  She has essentially quit drinking as she did not drink much beforehand.        4/17/2022     7:55 PM 12/29/2022    12:17 PM 4/4/2023     6:58 AM   PHQ   PHQ-9 Total Score 9 6 7   Q9: Thoughts of better off dead/self-harm past 2 weeks Not at all Not at all Not at all           "10/19/2021     9:55 AM 4/17/2022     7:58 PM 12/29/2022    12:24 PM   DANIEL-7 SCORE   Total Score 5 (mild anxiety) 3 (minimal anxiety) 4 (minimal anxiety)   Total Score 5 3 4         Review of Systems         Objective    /87 (BP Location: Right arm, Patient Position: Sitting, Cuff Size: Adult Regular)   Pulse 85   Temp 97.7  F (36.5  C) (Temporal)   Resp 15   Ht 1.554 m (5' 1.2\")   Wt 78 kg (172 lb)   LMP 03/15/2013 (LMP Unknown)   SpO2 97%   BMI 32.29 kg/m    Body mass index is 32.29 kg/m .  Physical Exam   GENERAL APPEARANCE: healthy, alert and no distress  EYES: Eyes grossly normal to inspection, conjunctivae and sclerae normal  RESP: lungs clear to auscultation - no rales, rhonchi or wheezes  CV: regular rate and rhythm, normal S1 S2, no S3 or S4, no murmur, click or rub, no peripheral edema   NEURO: Normal strength and tone, sensory exam grossly normal, mentation intact and speech normal  PSYCH: mentation appears normal and affect slightly flat     Results for orders placed or performed in visit on 04/04/23   Hemoglobin A1c     Status: Abnormal   Result Value Ref Range    Hemoglobin A1C 7.1 (H) 0.0 - 5.6 %   CBC with platelets and differential     Status: None   Result Value Ref Range    WBC Count 6.9 4.0 - 11.0 10e3/uL    RBC Count 4.36 3.80 - 5.20 10e6/uL    Hemoglobin 13.7 11.7 - 15.7 g/dL    Hematocrit 41.0 35.0 - 47.0 %    MCV 94 78 - 100 fL    MCH 31.4 26.5 - 33.0 pg    MCHC 33.4 31.5 - 36.5 g/dL    RDW 11.9 10.0 - 15.0 %    Platelet Count 281 150 - 450 10e3/uL    % Neutrophils 47 %    % Lymphocytes 43 %    % Monocytes 6 %    % Eosinophils 4 %    % Basophils 0 %    % Immature Granulocytes 0 %    Absolute Neutrophils 3.2 1.6 - 8.3 10e3/uL    Absolute Lymphocytes 2.9 0.8 - 5.3 10e3/uL    Absolute Monocytes 0.4 0.0 - 1.3 10e3/uL    Absolute Eosinophils 0.3 0.0 - 0.7 10e3/uL    Absolute Basophils 0.0 0.0 - 0.2 10e3/uL    Absolute Immature Granulocytes 0.0 <=0.4 10e3/uL   CBC with Platelets & " Differential     Status: None    Narrative    The following orders were created for panel order CBC with Platelets & Differential.  Procedure                               Abnormality         Status                     ---------                               -----------         ------                     CBC with platelets and d...[[id number]]                      Final result                 Please view results for these tests on the individual orders.

## 2023-04-04 ENCOUNTER — OFFICE VISIT (OUTPATIENT)
Dept: FAMILY MEDICINE | Facility: CLINIC | Age: 63
End: 2023-04-04
Payer: COMMERCIAL

## 2023-04-04 ENCOUNTER — TELEPHONE (OUTPATIENT)
Dept: FAMILY MEDICINE | Facility: CLINIC | Age: 63
End: 2023-04-04

## 2023-04-04 VITALS
TEMPERATURE: 97.7 F | BODY MASS INDEX: 32.47 KG/M2 | SYSTOLIC BLOOD PRESSURE: 130 MMHG | DIASTOLIC BLOOD PRESSURE: 87 MMHG | HEIGHT: 61 IN | RESPIRATION RATE: 15 BRPM | HEART RATE: 85 BPM | WEIGHT: 172 LBS | OXYGEN SATURATION: 97 %

## 2023-04-04 DIAGNOSIS — Z23 NEED FOR VACCINATION: ICD-10-CM

## 2023-04-04 DIAGNOSIS — R74.8 ELEVATED ALKALINE PHOSPHATASE LEVEL: ICD-10-CM

## 2023-04-04 DIAGNOSIS — F33.41 RECURRENT MAJOR DEPRESSIVE DISORDER, IN PARTIAL REMISSION (H): ICD-10-CM

## 2023-04-04 DIAGNOSIS — Z51.81 MEDICATION MONITORING ENCOUNTER: ICD-10-CM

## 2023-04-04 DIAGNOSIS — R74.01 ELEVATED TRANSAMINASE LEVEL: ICD-10-CM

## 2023-04-04 DIAGNOSIS — E67.3 HYPERVITAMINOSIS D: ICD-10-CM

## 2023-04-04 DIAGNOSIS — E11.9 TYPE 2 DIABETES MELLITUS WITHOUT COMPLICATION, WITHOUT LONG-TERM CURRENT USE OF INSULIN (H): Primary | ICD-10-CM

## 2023-04-04 LAB
ALBUMIN SERPL BCG-MCNC: 4.7 G/DL (ref 3.5–5.2)
ALP SERPL-CCNC: 116 U/L (ref 35–104)
ALT SERPL W P-5'-P-CCNC: 21 U/L (ref 10–35)
AST SERPL W P-5'-P-CCNC: 28 U/L (ref 10–35)
BASOPHILS # BLD AUTO: 0 10E3/UL (ref 0–0.2)
BASOPHILS NFR BLD AUTO: 0 %
BILIRUB DIRECT SERPL-MCNC: <0.2 MG/DL (ref 0–0.3)
BILIRUB SERPL-MCNC: 0.4 MG/DL
CREAT UR-MCNC: 316 MG/DL
DEPRECATED CALCIDIOL+CALCIFEROL SERPL-MC: 50 UG/L (ref 20–75)
EOSINOPHIL # BLD AUTO: 0.3 10E3/UL (ref 0–0.7)
EOSINOPHIL NFR BLD AUTO: 4 %
ERYTHROCYTE [DISTWIDTH] IN BLOOD BY AUTOMATED COUNT: 11.9 % (ref 10–15)
FERRITIN SERPL-MCNC: 68 NG/ML (ref 11–328)
HBA1C MFR BLD: 7.1 % (ref 0–5.6)
HCT VFR BLD AUTO: 41 % (ref 35–47)
HGB BLD-MCNC: 13.7 G/DL (ref 11.7–15.7)
IMM GRANULOCYTES # BLD: 0 10E3/UL
IMM GRANULOCYTES NFR BLD: 0 %
LYMPHOCYTES # BLD AUTO: 2.9 10E3/UL (ref 0.8–5.3)
LYMPHOCYTES NFR BLD AUTO: 43 %
MCH RBC QN AUTO: 31.4 PG (ref 26.5–33)
MCHC RBC AUTO-ENTMCNC: 33.4 G/DL (ref 31.5–36.5)
MCV RBC AUTO: 94 FL (ref 78–100)
MICROALBUMIN UR-MCNC: <12 MG/L
MICROALBUMIN/CREAT UR: NORMAL MG/G{CREAT}
MONOCYTES # BLD AUTO: 0.4 10E3/UL (ref 0–1.3)
MONOCYTES NFR BLD AUTO: 6 %
NEUTROPHILS # BLD AUTO: 3.2 10E3/UL (ref 1.6–8.3)
NEUTROPHILS NFR BLD AUTO: 47 %
PLATELET # BLD AUTO: 281 10E3/UL (ref 150–450)
PROT SERPL-MCNC: 7.5 G/DL (ref 6.4–8.3)
RBC # BLD AUTO: 4.36 10E6/UL (ref 3.8–5.2)
WBC # BLD AUTO: 6.9 10E3/UL (ref 4–11)

## 2023-04-04 PROCEDURE — 80076 HEPATIC FUNCTION PANEL: CPT | Mod: 90 | Performed by: FAMILY MEDICINE

## 2023-04-04 PROCEDURE — 82570 ASSAY OF URINE CREATININE: CPT | Performed by: FAMILY MEDICINE

## 2023-04-04 PROCEDURE — 99214 OFFICE O/P EST MOD 30 MIN: CPT | Mod: 25 | Performed by: FAMILY MEDICINE

## 2023-04-04 PROCEDURE — 84075 ASSAY ALKALINE PHOSPHATASE: CPT | Mod: 59 | Performed by: FAMILY MEDICINE

## 2023-04-04 PROCEDURE — 90632 HEPA VACCINE ADULT IM: CPT | Performed by: FAMILY MEDICINE

## 2023-04-04 PROCEDURE — 82306 VITAMIN D 25 HYDROXY: CPT | Performed by: FAMILY MEDICINE

## 2023-04-04 PROCEDURE — 82043 UR ALBUMIN QUANTITATIVE: CPT | Performed by: FAMILY MEDICINE

## 2023-04-04 PROCEDURE — 85025 COMPLETE CBC W/AUTO DIFF WBC: CPT | Performed by: FAMILY MEDICINE

## 2023-04-04 PROCEDURE — 90471 IMMUNIZATION ADMIN: CPT | Performed by: FAMILY MEDICINE

## 2023-04-04 PROCEDURE — 99000 SPECIMEN HANDLING OFFICE-LAB: CPT | Performed by: FAMILY MEDICINE

## 2023-04-04 PROCEDURE — 82728 ASSAY OF FERRITIN: CPT | Performed by: FAMILY MEDICINE

## 2023-04-04 PROCEDURE — 36415 COLL VENOUS BLD VENIPUNCTURE: CPT | Performed by: FAMILY MEDICINE

## 2023-04-04 PROCEDURE — 84080 ASSAY ALKALINE PHOSPHATASES: CPT | Mod: 90 | Performed by: FAMILY MEDICINE

## 2023-04-04 PROCEDURE — 96127 BRIEF EMOTIONAL/BEHAV ASSMT: CPT | Performed by: FAMILY MEDICINE

## 2023-04-04 PROCEDURE — 83036 HEMOGLOBIN GLYCOSYLATED A1C: CPT | Performed by: FAMILY MEDICINE

## 2023-04-04 ASSESSMENT — PAIN SCALES - GENERAL: PAINLEVEL: NO PAIN (0)

## 2023-04-04 NOTE — PATIENT INSTRUCTIONS
If your insurace covers the Rybelsus, be sure to drop the metformin dose to one table daily when you start Rybelsus.      If you have MyChart:  1) I kindly request that you check your MyChart prior to all appointments with me and complete any assigned questionnaires ahead of time.    2) You may receive auto-released results from our system before I have the opportunity to review and comment.  Be assured I will review and comment on all of your results as soon as I can.      FYI:  My schedule has been booking out very far in advance (2 months).  I apologize for the lack of timely access.  If you need to be seen for a chronic condition or preventive (wellness) visit, please be sure to schedule that appointment 2-3 months in advance.  If you have a concern that you feel cannot wait until my next available appointment (such as a hospital follow-up or new symptom of concern) please ask to speak to one of the [place] nurses who may be able to access a sooner appointment.    I do ask that all patients who are taking chronic medications for conditions that I am managing schedule an in-person visit with me at least once a year.     To schedule any ordered imaging studies (including mammogram and/or DEXA scan) you can call [facility] Scheduling at [phone].

## 2023-04-04 NOTE — NURSING NOTE
Prior to immunization administration, verified patients identity using patient s name and date of birth. Please see Immunization Activity for additional information.     Screening Questionnaire for Adult Immunization    Are you sick today?   No   Do you have allergies to medications, food, a vaccine component or latex?   Yes   Have you ever had a serious reaction after receiving a vaccination?   No   Do you have a long-term health problem with heart, lung, kidney, or metabolic disease (e.g., diabetes), asthma, a blood disorder, no spleen, complement component deficiency, a cochlear implant, or a spinal fluid leak?  Are you on long-term aspirin therapy?   Yes   Do you have cancer, leukemia, HIV/AIDS, or any other immune system problem?   No   Do you have a parent, brother, or sister with an immune system problem?   No   In the past 3 months, have you taken medications that affect  your immune system, such as prednisone, other steroids, or anticancer drugs; drugs for the treatment of rheumatoid arthritis, Crohn s disease, or psoriasis; or have you had radiation treatments?   No   Have you had a seizure, or a brain or other nervous system problem?   No   During the past year, have you received a transfusion of blood or blood    products, or been given immune (gamma) globulin or antiviral drug?   No   For women: Are you pregnant or is there a chance you could become       pregnant during the next month?   No   Have you received any vaccinations in the past 4 weeks?   No     Immunization questionnaire was positive for at least one answer.  Notified [facility].      Injection of Hep A given by Trisha Maldonado. Patient instructed to remain in clinic for 15 minutes afterwards, and to report any adverse reactions.     Screening performed by Trisha Maldonado on 4/4/2023 at 8:21 AM.

## 2023-04-04 NOTE — TELEPHONE ENCOUNTER
Plan does not cover Semaglutide (RYBELSUS) 3 MG tablet.  Please start a new PA.    Reason for denial:      Insurance plan:    Patient ID:  [id number]

## 2023-04-07 LAB
ALP BONE SERPL-CCNC: 31 U/L
ALP LIVER SERPL-CCNC: 90 U/L
ALP OTHER SERPL-CCNC: 0 U/L
ALP SERPL-CCNC: 121 U/L

## 2023-04-07 NOTE — TELEPHONE ENCOUNTER
Central Prior Authorization Team  Phone: [phone]    PA Initiation    Medication: Semaglutide (RYBELSUS) 3 MG tablet, rec 4-4-23  Insurance Company: [employer] - Phone [phone] Fax [phone]  [facility] [place] [place] [facility] [address]   Filling Pharmacy Phone: [phone]  Filling Pharmacy Fax:    Start Date: 4/7/2023

## 2023-04-07 NOTE — RESULT ENCOUNTER NOTE
Job Malcolm,  This all looks ok.  Your total alk phos is again slightly elevated but the breakdown between bone and liver components is normal so I am not concerned.  Your other liver enzymes (ALT and AST) have completely normalized.    Your Vitamin D level is excellent and your CBC (blood cell counts), ferritin level, and urine albumin (protein) level are all normal.  Urine albumin is a test for microscopic proteins in the urine - a sign of early kidney disease from hypertension and/or diabetes. Keeping blood pressure and blood sugars controlled helps keep the kidneys healthy.  I also recommend staying hydrated and avoiding frequent use of over-the-counter NSAID medications (ibuprofen, naproxen, advil, motrin, aleve).   Veronica Solis MD

## 2023-04-11 NOTE — TELEPHONE ENCOUNTER
Central Prior Authorization Team  Phone: [phone]    Prior Authorization Approval    Authorization Effective Date: 4/6/2023  Authorization Expiration Date: 4/6/2024  Medication: Semaglutide (RYBELSUS) 3 MG tablet, rec 4-4-23  Approved Dose/Quantity:     Reference #:     Insurance Company: [employer] - Phone [phone] Fax [phone]  Expected CoPay:       CoPay Card Available:      Foundation Assistance Needed:    Which Pharmacy is filling the prescription (Not needed for infusion/clinic administered): [facility] - [place], [place] - [address]  Pharmacy Notified: Yes  Patient Notified:

## 2023-05-07 ENCOUNTER — OFFICE VISIT (OUTPATIENT)
Dept: URGENT CARE | Facility: URGENT CARE | Age: 63
End: 2023-05-07
Payer: COMMERCIAL

## 2023-05-07 VITALS
HEIGHT: 62 IN | SYSTOLIC BLOOD PRESSURE: 133 MMHG | WEIGHT: 176 LBS | TEMPERATURE: 98 F | HEART RATE: 77 BPM | OXYGEN SATURATION: 97 % | DIASTOLIC BLOOD PRESSURE: 88 MMHG | BODY MASS INDEX: 32.39 KG/M2

## 2023-05-07 DIAGNOSIS — R07.0 THROAT PAIN: Primary | ICD-10-CM

## 2023-05-07 LAB
DEPRECATED S PYO AG THROAT QL EIA: NEGATIVE
GROUP A STREP BY PCR: NOT DETECTED

## 2023-05-07 PROCEDURE — 87651 STREP A DNA AMP PROBE: CPT | Performed by: PHYSICIAN ASSISTANT

## 2023-05-07 PROCEDURE — 99213 OFFICE O/P EST LOW 20 MIN: CPT | Performed by: PHYSICIAN ASSISTANT

## 2023-05-07 NOTE — PROGRESS NOTES
Assessment & Plan     1. Throat pain  - Streptococcus A Rapid Screen w/Reflex to PCR - Clinic Collect  - Group A Streptococcus PCR Throat Swab      This patient presented with sore throat and clinical evidence of pharyngitis.  The rapid strep test is negative. There is no clinical evidence of  peritonsillar abscess, retropharyngeal abscess, Lemierre's Syndrome, epiglottis, or Juan's angina. I suspect she has a viral infection. Encouraged supportive cares, fluids, rest, Tylenol / Ibuprofen for comfort.         Trice Killian PA-C  [facility] [place]    CHIEF COMPLAINT:   Chief Complaint   Patient presents with     Urgent Care     Throat Pain     Pt in clinic to have eval for sore throat.     Naif Malcolm is a 62 year old female who presents to clinic today for evaluation of sore throat.  Symptoms started yesterday.  No fever or chills.  She has not had any nasal congestion or cough.  No known strep exposures.      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 Re-referred to urology.      JAMES  "(obstructive sleep apnea) 2019     Other abnormal glucose      Perennial allergic rhinitis 2020     Past Surgical History:   Procedure Laterality Date     ABDOMEN SURGERY      appenidix     EXCISE MASS LOWER EXTREMITY  3/28/2012    Procedure:EXCISE MASS LOWER EXTREMITY; Left Ankle Mass Excision ; Surgeon:KATTY HENSLEY; Location:[facility]     SLING BLADDER SUSPENSION WITH FASCIA ROSALEE       ZZC APPENDECTOMY       Z TMJ RECONSTRUCTION       Social History     Tobacco Use     Smoking status: Former     Packs/day: 0.00     Years: 0.00     Pack years: 0.00     Types: Cigarettes     Quit date: 1980     Years since quittin.3     Smokeless tobacco: Never   Vaping Use     Vaping status: Never Used   Substance Use Topics     Alcohol use: Yes     Alcohol/week: 2.0 standard drinks of alcohol     Current Outpatient Medications   Medication     atorvastatin (LIPITOR) 40 MG tablet     buPROPion (WELLBUTRIN XL) 150 MG 24 hr tablet     ibuprofen (ADVIL/MOTRIN) 200 MG tablet     loratadine (CLARITIN) 10 MG tablet     metFORMIN (GLUCOPHAGE XR) 500 MG 24 hr tablet     Semaglutide (RYBELSUS) 3 MG tablet     valACYclovir (VALTREX) 500 MG tablet     blood glucose (ACCU-CHEK GUIDE) test strip     blood glucose (NO BRAND SPECIFIED) lancets standard     blood glucose monitoring (NO BRAND SPECIFIED) meter device kit     Calcium Carbonate-Vitamin D (CALCIUM + D PO)     MULTIVITAMIN TABS   OR     venlafaxine (EFFEXOR XR) 150 MG 24 hr capsule     No current facility-administered medications for this visit.     Allergies   Allergen Reactions     Sulfa Antibiotics Nausea and Vomiting and Hives       10 point ROS of systems were all negative except for pertinent positives noted in my HPI.      Exam:   /88   Pulse 77   Temp 98  F (36.7  C) (Temporal)   Ht 1.562 m (5' 1.5\")   Wt 79.8 kg (176 lb)   LMP 03/15/2013 (LMP Unknown)   SpO2 97%   BMI 32.72 kg/m    Constitutional: healthy, alert and no " distress  Head: Normocephalic, atraumatic.  Eyes: conjunctiva clear, no drainage  ENT: TMs clear and shiny joe, nasal mucosa pink and moist, throat erythematous without trismus or drooling.   Neck: neck is supple, no cervical lymphadenopathy or nuchal rigidity  Cardiovascular: RRR  Respiratory: CTA bilaterally, no rhonchi or rales  Skin: no rashes  Neurologic: Speech clear, gait normal. Moves all extremities.    Results for orders placed or performed in visit on 05/07/23   Streptococcus A Rapid Screen w/Reflex to PCR - Clinic Collect     Status: Normal    Specimen: Throat; Swab   Result Value Ref Range    Group A Strep antigen Negative Negative

## 2023-06-01 ENCOUNTER — HEALTH MAINTENANCE LETTER (OUTPATIENT)
Age: 63
End: 2023-06-01

## 2023-06-26 NOTE — RESULT ENCOUNTER NOTE
CC:  Margaret S Dakins is here today for Follow-up (Lab results)       Medications have been reviewed and updated    Patient preferred phone number: [phone]  Prefers communication and results via [employer] Due   Topic Date Due   • Lung Cancer Screening  11/29/2022   • Depression Screening  07/11/2023   • Traditional Medicare- Medicare Wellness Visit  07/18/2023       Patient is due for topics as listed above but is not proceeding with Depression Screening , Lung Cancer Screening and MWV (Medicare Wellness Visit) at this time. Not due yet      Job Malcolm,  Thanks for completing the cologuard stool test for colon cancer screening!  Your stool test is negative for microscopic (hidden) blood.  This test should be repeated every 3 years.     Veronica Solis MD

## 2023-10-04 ASSESSMENT — ENCOUNTER SYMPTOMS
BREAST MASS: 0
HEMATURIA: 0
SORE THROAT: 0
PARESTHESIAS: 0
WEAKNESS: 0
CONSTIPATION: 1
EYE PAIN: 0
MYALGIAS: 0
PALPITATIONS: 0
DYSURIA: 0
ABDOMINAL PAIN: 0
HEMATOCHEZIA: 0
ARTHRALGIAS: 0
HEARTBURN: 0
JOINT SWELLING: 0
DIZZINESS: 0
COUGH: 0
CHILLS: 0
NAUSEA: 0
SHORTNESS OF BREATH: 0
HEADACHES: 0
FREQUENCY: 0
NERVOUS/ANXIOUS: 0
DIARRHEA: 1
FEVER: 0

## 2023-10-05 ENCOUNTER — OFFICE VISIT (OUTPATIENT)
Dept: FAMILY MEDICINE | Facility: CLINIC | Age: 63
End: 2023-10-05
Payer: COMMERCIAL

## 2023-10-05 VITALS
HEIGHT: 61 IN | TEMPERATURE: 97.3 F | SYSTOLIC BLOOD PRESSURE: 137 MMHG | OXYGEN SATURATION: 97 % | BODY MASS INDEX: 31.91 KG/M2 | RESPIRATION RATE: 15 BRPM | DIASTOLIC BLOOD PRESSURE: 87 MMHG | WEIGHT: 169 LBS | HEART RATE: 81 BPM

## 2023-10-05 DIAGNOSIS — E78.00 PURE HYPERCHOLESTEROLEMIA: ICD-10-CM

## 2023-10-05 DIAGNOSIS — F33.41 RECURRENT MAJOR DEPRESSION IN PARTIAL REMISSION (H): ICD-10-CM

## 2023-10-05 DIAGNOSIS — Z00.00 ROUTINE GENERAL MEDICAL EXAMINATION AT A HEALTH CARE FACILITY: Primary | ICD-10-CM

## 2023-10-05 DIAGNOSIS — E11.9 TYPE 2 DIABETES MELLITUS WITHOUT COMPLICATION, WITHOUT LONG-TERM CURRENT USE OF INSULIN (H): ICD-10-CM

## 2023-10-05 DIAGNOSIS — F41.9 ANXIETY: ICD-10-CM

## 2023-10-05 DIAGNOSIS — A60.00 HERPES SIMPLEX INFECTION OF GENITOURINARY SYSTEM: ICD-10-CM

## 2023-10-05 DIAGNOSIS — Z23 NEED FOR VACCINATION: ICD-10-CM

## 2023-10-05 LAB
ALBUMIN SERPL BCG-MCNC: 4.5 G/DL (ref 3.5–5.2)
ALP SERPL-CCNC: 117 U/L (ref 35–104)
ALT SERPL W P-5'-P-CCNC: 24 U/L (ref 0–50)
ANION GAP SERPL CALCULATED.3IONS-SCNC: 10 MMOL/L (ref 7–15)
AST SERPL W P-5'-P-CCNC: 26 U/L (ref 0–45)
BILIRUB SERPL-MCNC: 0.4 MG/DL
BUN SERPL-MCNC: 13.8 MG/DL (ref 8–23)
CALCIUM SERPL-MCNC: 10 MG/DL (ref 8.8–10.2)
CHLORIDE SERPL-SCNC: 104 MMOL/L (ref 98–107)
CHOLEST SERPL-MCNC: 154 MG/DL
CREAT SERPL-MCNC: 0.84 MG/DL (ref 0.51–0.95)
DEPRECATED HCO3 PLAS-SCNC: 29 MMOL/L (ref 22–29)
EGFRCR SERPLBLD CKD-EPI 2021: 77 ML/MIN/1.73M2
GLUCOSE SERPL-MCNC: 118 MG/DL (ref 70–99)
HBA1C MFR BLD: 6.6 % (ref 0–5.6)
HDLC SERPL-MCNC: 61 MG/DL
LDLC SERPL CALC-MCNC: 77 MG/DL
NONHDLC SERPL-MCNC: 93 MG/DL
POTASSIUM SERPL-SCNC: 4.7 MMOL/L (ref 3.4–5.3)
PROT SERPL-MCNC: 7.1 G/DL (ref 6.4–8.3)
SODIUM SERPL-SCNC: 143 MMOL/L (ref 135–145)
TRIGL SERPL-MCNC: 80 MG/DL
TSH SERPL DL<=0.005 MIU/L-ACNC: 3.76 UIU/ML (ref 0.3–4.2)

## 2023-10-05 PROCEDURE — 96127 BRIEF EMOTIONAL/BEHAV ASSMT: CPT | Performed by: FAMILY MEDICINE

## 2023-10-05 PROCEDURE — 90472 IMMUNIZATION ADMIN EACH ADD: CPT | Performed by: FAMILY MEDICINE

## 2023-10-05 PROCEDURE — 99396 PREV VISIT EST AGE 40-64: CPT | Mod: 25 | Performed by: FAMILY MEDICINE

## 2023-10-05 PROCEDURE — 83036 HEMOGLOBIN GLYCOSYLATED A1C: CPT | Performed by: FAMILY MEDICINE

## 2023-10-05 PROCEDURE — 90480 ADMN SARSCOV2 VAC 1/ONLY CMP: CPT | Performed by: FAMILY MEDICINE

## 2023-10-05 PROCEDURE — 90682 RIV4 VACC RECOMBINANT DNA IM: CPT | Performed by: FAMILY MEDICINE

## 2023-10-05 PROCEDURE — 36415 COLL VENOUS BLD VENIPUNCTURE: CPT | Performed by: FAMILY MEDICINE

## 2023-10-05 PROCEDURE — 84443 ASSAY THYROID STIM HORMONE: CPT | Performed by: FAMILY MEDICINE

## 2023-10-05 PROCEDURE — 90746 HEPB VACCINE 3 DOSE ADULT IM: CPT | Performed by: FAMILY MEDICINE

## 2023-10-05 PROCEDURE — 80061 LIPID PANEL: CPT | Performed by: FAMILY MEDICINE

## 2023-10-05 PROCEDURE — 90471 IMMUNIZATION ADMIN: CPT | Performed by: FAMILY MEDICINE

## 2023-10-05 PROCEDURE — 91320 SARSCV2 VAC 30MCG TRS-SUC IM: CPT | Performed by: FAMILY MEDICINE

## 2023-10-05 PROCEDURE — 80053 COMPREHEN METABOLIC PANEL: CPT | Performed by: FAMILY MEDICINE

## 2023-10-05 PROCEDURE — 99214 OFFICE O/P EST MOD 30 MIN: CPT | Mod: 25 | Performed by: FAMILY MEDICINE

## 2023-10-05 RX ORDER — ATORVASTATIN CALCIUM 40 MG/1
40 TABLET, FILM COATED ORAL DAILY
Qty: 90 TABLET | Refills: 3 | Status: SHIPPED | OUTPATIENT
Start: 2023-10-05

## 2023-10-05 RX ORDER — VENLAFAXINE HYDROCHLORIDE 150 MG/1
CAPSULE, EXTENDED RELEASE ORAL
Qty: 90 CAPSULE | Refills: 3 | Status: SHIPPED | OUTPATIENT
Start: 2023-10-05 | End: 2024-09-29

## 2023-10-05 RX ORDER — VALACYCLOVIR HYDROCHLORIDE 500 MG/1
TABLET, FILM COATED ORAL
Qty: 90 TABLET | Refills: 3 | Status: SHIPPED | OUTPATIENT
Start: 2023-10-05

## 2023-10-05 RX ORDER — BUPROPION HYDROCHLORIDE 150 MG/1
150 TABLET ORAL EVERY MORNING
Qty: 90 TABLET | Refills: 3 | Status: SHIPPED | OUTPATIENT
Start: 2023-10-05

## 2023-10-05 RX ORDER — METFORMIN HCL 500 MG
TABLET, EXTENDED RELEASE 24 HR ORAL
Qty: 270 TABLET | Refills: 1 | Status: SHIPPED | OUTPATIENT
Start: 2023-10-05 | End: 2024-06-03

## 2023-10-05 ASSESSMENT — ENCOUNTER SYMPTOMS
WEAKNESS: 0
DIARRHEA: 1
HEMATOCHEZIA: 0
PARESTHESIAS: 0
HEARTBURN: 0
JOINT SWELLING: 0
DYSURIA: 0
CHILLS: 0
HEADACHES: 0
PALPITATIONS: 0
ABDOMINAL PAIN: 0
SHORTNESS OF BREATH: 0
EYE PAIN: 0
ARTHRALGIAS: 0
SORE THROAT: 0
FEVER: 0
CONSTIPATION: 1
MYALGIAS: 0
HEMATURIA: 0
COUGH: 0
FREQUENCY: 0
NAUSEA: 0
NERVOUS/ANXIOUS: 0
DIZZINESS: 0
BREAST MASS: 0

## 2023-10-05 ASSESSMENT — ANXIETY QUESTIONNAIRES
6. BECOMING EASILY ANNOYED OR IRRITABLE: SEVERAL DAYS
IF YOU CHECKED OFF ANY PROBLEMS ON THIS QUESTIONNAIRE, HOW DIFFICULT HAVE THESE PROBLEMS MADE IT FOR YOU TO DO YOUR WORK, TAKE CARE OF THINGS AT HOME, OR GET ALONG WITH OTHER PEOPLE: SOMEWHAT DIFFICULT
GAD7 TOTAL SCORE: 4
1. FEELING NERVOUS, ANXIOUS, OR ON EDGE: SEVERAL DAYS
GAD7 TOTAL SCORE: 4
7. FEELING AFRAID AS IF SOMETHING AWFUL MIGHT HAPPEN: SEVERAL DAYS
4. TROUBLE RELAXING: NOT AT ALL
5. BEING SO RESTLESS THAT IT IS HARD TO SIT STILL: NOT AT ALL
2. NOT BEING ABLE TO STOP OR CONTROL WORRYING: SEVERAL DAYS
3. WORRYING TOO MUCH ABOUT DIFFERENT THINGS: NOT AT ALL

## 2023-10-05 ASSESSMENT — PAIN SCALES - GENERAL: PAINLEVEL: NO PAIN (0)

## 2023-10-05 NOTE — PROGRESS NOTES
SUBJECTIVE:   CC: Yun is an 62 year old who presents for preventive health visit.       10/5/2023    11:50 AM   Additional Questions   Roomed by Jazz Meyer       Healthy Habits:     Getting at least 3 servings of Calcium per day:  Yes    Bi-annual eye exam:  Yes    Dental care twice a year:  Yes    Sleep apnea or symptoms of sleep apnea:  Sleep apnea    Diet:  Regular (no restrictions) and Diabetic    Frequency of exercise:  1 day/week    Duration of exercise:  15-30 minutes    Taking medications regularly:  Yes    Medication side effects:  Other    Additional concerns today:  Yes      Today's PHQ-9 Score:       10/4/2023     3:11 PM   PHQ-9 SCORE   PHQ-9 Total Score MyChart 7 (Mild depression)   PHQ-9 Total Score 7       Diabetes Follow-up  How often are you checking your blood sugar? Not at all  Some GI side effects on Rybelsus but manageable.      BP Readings from Last 2 Encounters:   10/05/23 137/87   23 133/88     Hemoglobin A1C (%)   Date Value   10/05/2023 6.6 (H)   2023 7.1 (H)   2021 7.0 (H)   10/26/2020 7.9 (H)     LDL Cholesterol Calculated (mg/dL)   Date Value   2022 106 (H)   10/19/2021 98   2020 105 (H)   04/15/2019 187 (H)         Depression and Anxiety Follow-Up  How are you doing with your depression since your last visit? No change  How are you doing with your anxiety since your last visit?  No change  Have you had a significant life event? Yes, her son who has paranoid schizophrenia and is currently living with her is having escalating and potentially threatening behaviors.   Do you have any concerns with your use of alcohol or other drugs? No  She used to have a therapist but that therapist is no longer in practice.    Social History     Tobacco Use    Smoking status: Former     Packs/day: 0.00     Years: 0.00     Pack years: 0.00     Types: Cigarettes     Quit date: 1980     Years since quittin.7    Smokeless tobacco: Never   Vaping Use    Vaping  Use: Never used   Substance Use Topics    Alcohol use: Not Currently     Alcohol/week: 2.0 standard drinks of alcohol    Drug use: No         2022    12:17 PM 2023     6:58 AM 10/4/2023     3:11 PM   PHQ   PHQ-9 Total Score 6 7 7   Q9: Thoughts of better off dead/self-harm past 2 weeks Not at all Not at all Not at all         2022     7:58 PM 2022    12:24 PM 10/5/2023    11:38 AM   DANIEL-7 SCORE   Total Score 3 (minimal anxiety) 4 (minimal anxiety) 4 (minimal anxiety)   Total Score 3 4 4       Social History     Tobacco Use    Smoking status: Former     Packs/day: 0.00     Years: 0.00     Pack years: 0.00     Types: Cigarettes     Quit date: 1980     Years since quittin.7    Smokeless tobacco: Never   Substance Use Topics    Alcohol use: Not Currently     Alcohol/week: 2.0 standard drinks of alcohol           10/4/2023     3:16 PM   Alcohol Use   Prescreen: >3 drinks/day or >7 drinks/week? No       Reviewed orders with patient.  Reviewed health maintenance and updated orders accordingly - Yes  BP Readings from Last 3 Encounters:   10/05/23 137/87   23 133/88   23 130/87    Wt Readings from Last 3 Encounters:   10/05/23 76.7 kg (169 lb)   23 79.8 kg (176 lb)   23 78 kg (172 lb)              Breast Cancer Screening:      3/5/2021     6:19 PM   Breast CA Risk Assessment (FHS-7)   Do you have a family history of breast, colon, or ovarian cancer? No / Unknown   Mammogram Screening: Recommended mammography every 1-2 years with patient discussion and risk factor consideration  Pertinent mammograms are reviewed under the imaging tab.      History of abnormal Pap smear: NO - age 30-65 PAP every 5 years with negative HPV co-testing recommended      Latest Ref Rng & Units 4/15/2019    11:50 AM 4/15/2019    11:23 AM 2016    12:00 AM   PAP / HPV   PAP (Historical)   NIL  OTHER-NIL, See Result    HPV 16 DNA NEG^Negative Negative      HPV 18 DNA NEG^Negative Negative       Other HR HPV NEG^Negative Negative        Reviewed and updated as needed this visit by clinical staff   Tobacco  Allergies  Meds  Problems             Reviewed and updated as needed this visit by Provider     Meds  Problems            Past Medical History:   Diagnosis Date    Ankle joint pain 2012    Arthritis     Depression     Depressive disorder     Diabetes mellitus (H)     TYPE 2- DIET, resolved with weight loss    Endometrial thickening on ultra sound 2016    Normal endo bx 2016    FCU (flexor carpi ulnaris) tenosynovitis 2013    Fracture of ulnar styloid 2013    Genital herpes     Headache     History of blood transfusion  or     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 Re-referred to urology.     JAMES (obstructive sleep apnea) 2019    Other abnormal glucose     Perennial allergic rhinitis 2020      Past Surgical History:   Procedure Laterality Date    ABDOMEN SURGERY      appenidix    EXCISE MASS LOWER EXTREMITY  3/28/2012    Procedure:EXCISE MASS LOWER EXTREMITY; Left Ankle Mass Excision ; Surgeon:KATTY HENSLEY; Location:[facility]    SLING BLADDER SUSPENSION WITH FASCIA ROSALEE      ZZC APPENDECTOMY      ZZC TMJ RECONSTRUCTION       OB History    Para Term  AB Living   2 2 2 0 0 2   SAB IAB Ectopic Multiple Live Births   0 0 0 0 2      # Outcome Date GA Lbr Ajit/2nd Weight Sex Delivery Anes PTL Lv   2 Term 94 40w0d       SULEIMAN   1 Term 90 40w0d       SULEIMAN       Review of Systems  "  Constitutional:  Negative for chills and fever.   HENT:  Negative for congestion, ear pain, hearing loss and sore throat.    Eyes:  Positive for visual disturbance. Negative for pain.   Respiratory:  Negative for cough and shortness of breath.    Cardiovascular:  Negative for chest pain, palpitations and peripheral edema.   Gastrointestinal:  Positive for constipation and diarrhea. Negative for abdominal pain, heartburn, hematochezia and nausea.   Breasts:  Negative for tenderness, breast mass and discharge.   Genitourinary:  Negative for dysuria, frequency, genital sores, hematuria, pelvic pain, urgency, vaginal bleeding and vaginal discharge.   Musculoskeletal:  Negative for arthralgias, joint swelling and myalgias.   Skin:  Negative for rash.   Neurological:  Negative for dizziness, weakness, headaches and paresthesias.   Psychiatric/Behavioral:  Negative for mood changes. The patient is not nervous/anxious.           OBJECTIVE:   /87 (BP Location: Right arm, Patient Position: Sitting, Cuff Size: Adult Regular)   Pulse 81   Temp 97.3  F (36.3  C) (Temporal)   Resp 15   Ht 1.549 m (5' 1\")   Wt 76.7 kg (169 lb)   LMP 03/15/2013 (LMP Unknown)   SpO2 97%   BMI 31.93 kg/m    Physical Exam  GENERAL: healthy, alert and no distress  EYES: Eyes grossly normal to inspection, conjunctivae and sclerae normal  HENT: ear canals and TM's normal  NECK: no adenopathy, no asymmetry, masses, or scars and thyroid normal to palpation  RESP: lungs clear to auscultation - no rales, rhonchi or wheezes  CV: regular rate and rhythm, normal S1 S2, no S3 or S4, no murmur, click or rub, no peripheral edema  ABDOMEN: soft, nontender, no hepatosplenomegaly, no masses  MS: no gross musculoskeletal defects noted, no edema  SKIN: no suspicious lesions or rashes  NEURO: Grossly normal strength and tone, mentation intact and speech normal  PSYCH: mentation appears normal, affect normal/bright      ASSESSMENT/PLAN:     Routine " "general medical examination at a health care facility  Reviewed/updated Health Maintenance     Type 2 diabetes mellitus without complication, without long-term current use of insulin (H)  Excellent control - Continue current medication regimen. I encouraged more exercise/physical activity.  Shoot for 7,000 steps/day.  - TSH WITH FREE T4 REFLEX  - HEMOGLOBIN A1C  - Comprehensive metabolic panel (BMP + Alb, Alk Phos, ALT, AST, Total. Bili, TP)  - metFORMIN (GLUCOPHAGE XR) 500 MG 24 hr tablet  Dispense: 270 tablet; Refill: 1  - Semaglutide (RYBELSUS) 3 MG tablet  Dispense: 90 tablet; Refill: 1    Pure hypercholesterolemia  - Comprehensive metabolic panel (BMP + Alb, Alk Phos, ALT, AST, Total. 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    - buPROPion (WELLBUTRIN XL) 150 MG 24 hr tablet  Dispense: 90 tablet; Refill: 3  - Adult Mental Health  Referral  - venlafaxine (EFFEXOR XR) 150 MG 24 hr capsule  Dispense: 90 capsule; Refill: 3    Anxiety  - Adult Mental Health  Referral  - venlafaxine (EFFEXOR XR) 150 MG 24 hr capsule  Dispense: 90 capsule; Refill: 3    Herpes simplex infection of genitourinary system  - valACYclovir (VALTREX) 500 MG tablet  Dispense: 90 tablet; Refill: 3    Need for vaccination  - COVID-19 12+ (2023-24) (PFIZER)  - INFLUENZA VACCINE 18-64Y (FLUBLOK)  - HEPATITIS B, ADULT 20+ (ENGERIX-B/RECOMBIVAX HB)       COUNSELING:  Reviewed preventive health counseling, as reflected in patient instructions      BMI:   Estimated body mass index is 31.93 kg/m  as calculated from the following:    Height as of this encounter: 1.549 m (5' 1\").    Weight as of this encounter: 76.7 kg (169 lb).   Weight management plan: Discussed healthy diet and " exercise guidelines      She reports that she quit smoking about 43 years ago. Her smoking use included cigarettes. She has never used smokeless tobacco.     Follow-up Visit   Expected date:  Apr 05, 2024 (Approximate)      Follow Up Appointment Details:     Follow-up with whom?: Me    Follow-Up for what?: Chronic Disease f/u    Chronic Disease f/u:  Diabetes  Anxiety  Depression       How?: In Person                    Veronica Solis MD  [facility]

## 2023-10-05 NOTE — PATIENT INSTRUCTIONS
Understanding Preventive vs. Non-preventive charges  You were seen today for a preventive visit - for medicare patients this is known as the Annual Welllness Visit.  Due to the Affordable Care Act of 2010, all government-approved preventive services are legally mandated to be covered 100% by your insurance.  This was not always the case and is a great benefit!  Preventive services include cancer screenings, vaccinations, and screening blood tests for conditions you DO NOT already have.  By definition, if you already have a condition or any symptoms of a condition, all ensuing testing and/or treatment (including medication prescriptions) is not preventive - rather it is diagnostic and/or therapeutic and the legally-mandated complete insurance coverage does not apply.  Your coverage for non-preventive services will depend on your individual insurance plan.    If you have any chronic conditions that I am managing with tests or medication (including hypertension, hyperlipidemia, depression, diabetes, asthma, etc.) I do also need to see you at least once annually to address these conditions.  I typically do that at the same time as your annual preventive visit to minimize the number of times you need to come to clinic.  Please understand that when we combine both the preventive visit with the chronic condition visit you will be charged per your insurance plan for the non-preventive services.  If you prefer to come to clinic for two separate visits (one visit for preventive care and one visit for non-preventive care) please let me know.       Preventive Health Recommendations  Female Ages 50 - 64    Yearly exam: See your health care provider every year in order to  Review health changes.   Discuss preventive care.    Review your medicines if your doctor has prescribed any.    Get a Pap test every three years (unless you have an abnormal result and your provider advises testing more often).  If you get Pap tests with HPV  test, you only need to test every 5 years, unless you have an abnormal result.   You do not need a Pap test if your uterus was removed (hysterectomy) and you have not had cancer.  You should be tested each year for STDs (sexually transmitted diseases) if you're at risk.   Have a mammogram every 1 to 2 years.  Have a colonoscopy at age 45, or have a yearly FIT test (stool test). These exams screen for colon cancer.    Have a cholesterol test every 5 years, or more often if advised.  Have a diabetes test (fasting glucose) every three years. If you are at risk for diabetes, you should have this test more often.   If you are at risk for osteoporosis (brittle bone disease), think about having a bone density scan (DEXA).    Shots: Get a flu shot each year. Get a tetanus shot every 10 years.    Nutrition:   Eat at least 5 servings of fruits and vegetables each day.  Eat whole-grain bread, whole-wheat pasta and brown rice instead of white grains and rice.  Get adequate Calcium and Vitamin D.     Lifestyle  Exercise at least 150 minutes a week (30 minutes a day, 5 days a week). This will help you control your weight and prevent disease.  Limit alcohol to one drink per day.  No smoking.   Wear sunscreen to prevent skin cancer.   See your dentist every six months for an exam and cleaning.  See your eye doctor every 1 to 2 years.

## 2023-10-05 NOTE — NURSING NOTE
Prior to immunization administration, verified patients identity using patient s name and date of birth. Please see Immunization Activity for additional information.     Screening Questionnaire for Adult Immunization    Are you sick today?   No   Do you have allergies to medications, food, a vaccine component or latex?   Yes   Have you ever had a serious reaction after receiving a vaccination?   No   Do you have a long-term health problem with heart, lung, kidney, or metabolic disease (e.g., diabetes), asthma, a blood disorder, no spleen, complement component deficiency, a cochlear implant, or a spinal fluid leak?  Are you on long-term aspirin therapy?   No   Do you have cancer, leukemia, HIV/AIDS, or any other immune system problem?   No   Do you have a parent, brother, or sister with an immune system problem?   No   In the past 3 months, have you taken medications that affect  your immune system, such as prednisone, other steroids, or anticancer drugs; drugs for the treatment of rheumatoid arthritis, Crohn s disease, or psoriasis; or have you had radiation treatments?   No   Have you had a seizure, or a brain or other nervous system problem?   No   During the past year, have you received a transfusion of blood or blood    products, or been given immune (gamma) globulin or antiviral drug?   No   For women: Are you pregnant or is there a chance you could become       pregnant during the next month?   No   Have you received any vaccinations in the past 4 weeks?   No     Immunization questionnaire was positive for at least one answer.  Notified rommel.      Patient instructed to remain in clinic for 15 minutes afterwarddrs, and to report any adverse reactions.     Screening performed by Wendy Estrada MA on 10/5/2023 at 12:34 PM.

## 2023-10-06 NOTE — RESULT ENCOUNTER NOTE
Job Malcolm,  Your test results fall within the expected range(s) or remain unchanged from previous results.  Please continue with your current treatment plan.    Veronica Solis MD

## 2023-10-12 ENCOUNTER — VIRTUAL VISIT (OUTPATIENT)
Dept: PSYCHOLOGY | Facility: CLINIC | Age: 63
End: 2023-10-12
Attending: FAMILY MEDICINE
Payer: COMMERCIAL

## 2023-10-12 DIAGNOSIS — F33.41 RECURRENT MAJOR DEPRESSION IN PARTIAL REMISSION (H): ICD-10-CM

## 2023-10-12 DIAGNOSIS — F41.1 GAD (GENERALIZED ANXIETY DISORDER): Primary | ICD-10-CM

## 2023-10-12 DIAGNOSIS — F33.1 MODERATE EPISODE OF RECURRENT MAJOR DEPRESSIVE DISORDER (H): ICD-10-CM

## 2023-10-12 PROCEDURE — 90791 PSYCH DIAGNOSTIC EVALUATION: CPT | Mod: 95 | Performed by: COUNSELOR

## 2023-10-12 ASSESSMENT — COLUMBIA-SUICIDE SEVERITY RATING SCALE - C-SSRS
1. IN THE PAST MONTH, HAVE YOU WISHED YOU WERE DEAD OR WISHED YOU COULD GO TO SLEEP AND NOT WAKE UP?: YES
2. HAVE YOU ACTUALLY HAD ANY THOUGHTS OF KILLING YOURSELF?: NO
ATTEMPT PAST THREE MONTHS: NO
ATTEMPT LIFETIME: YES
TOTAL  NUMBER OF ABORTED OR SELF INTERRUPTED ATTEMPTS LIFETIME: NO
TOTAL  NUMBER OF ACTUAL ATTEMPTS LIFETIME: 1
REASONS FOR IDEATION PAST MONTH: COMPLETELY TO END OR STOP THE PAIN (YOU COULDN'T GO ON LIVING WITH THE PAIN OR HOW YOU WERE FEELING)
1. HAVE YOU WISHED YOU WERE DEAD OR WISHED YOU COULD GO TO SLEEP AND NOT WAKE UP?: YES
REASONS FOR IDEATION LIFETIME: COMPLETELY TO END OR STOP THE PAIN (YOU COULDN'T GO ON LIVING WITH THE PAIN OR HOW YOU WERE FEELING)
TOTAL  NUMBER OF INTERRUPTED ATTEMPTS LIFETIME: NO
6. HAVE YOU EVER DONE ANYTHING, STARTED TO DO ANYTHING, OR PREPARED TO DO ANYTHING TO END YOUR LIFE?: NO

## 2023-10-12 ASSESSMENT — PATIENT HEALTH QUESTIONNAIRE - PHQ9
SUM OF ALL RESPONSES TO PHQ QUESTIONS 1-9: 10
SUM OF ALL RESPONSES TO PHQ QUESTIONS 1-9: 10
10. IF YOU CHECKED OFF ANY PROBLEMS, HOW DIFFICULT HAVE THESE PROBLEMS MADE IT FOR YOU TO DO YOUR WORK, TAKE CARE OF THINGS AT HOME, OR GET ALONG WITH OTHER PEOPLE: SOMEWHAT DIFFICULT

## 2023-10-12 NOTE — PROGRESS NOTES
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 and Osmani Garrett is reprinted with the express permission of the authors.  No portion of the Safety Plan Template may be reproduced without the express, written permission.  You can contact the authors at [email or web address] or [email or web address].        Provider Name/ Credentials:  Yelitza Schuster [facility]  October 12, 2023

## 2023-10-19 ENCOUNTER — VIRTUAL VISIT (OUTPATIENT)
Dept: PSYCHOLOGY | Facility: CLINIC | Age: 63
End: 2023-10-19
Payer: COMMERCIAL

## 2023-10-19 DIAGNOSIS — F41.1 GAD (GENERALIZED ANXIETY DISORDER): Primary | ICD-10-CM

## 2023-10-19 DIAGNOSIS — F33.1 MODERATE EPISODE OF RECURRENT MAJOR DEPRESSIVE DISORDER (H): ICD-10-CM

## 2023-10-19 PROCEDURE — 90834 PSYTX W PT 45 MINUTES: CPT | Mod: VID | Performed by: COUNSELOR

## 2023-10-19 NOTE — PROGRESS NOTES
[facility]                                     Progress Note    Patient Name: Karlee Emery  Date: 10/19/23         Service Type: Individual      Session Start Time: 200 pm  Session End Time: 245 pm     Session Length: 45 minutes    Session #: 1    Attendees: Client attended alone    Service Modality:  Video Visit:      Provider verified identity through the following two step process.  Patient provided:  Patient  and Patient address    Telemedicine Visit: The patient's condition can be safely assessed and treated via synchronous audio and visual telemedicine encounter.      Reason for Telemedicine Visit: Services only offered telehealth    Originating Site (Patient Location): Patient's home    Distant Site (Provider Location): Provider Remote Setting- Home Office    Consent:  The patient/guardian has verbally consented to: the potential risks and benefits of telemedicine (video visit) versus in person care; bill my insurance or make self-payment for services provided; and responsibility for payment of non-covered services.     Patient would like the video invitation sent by:  My Chart    Mode of Communication:  Video Conference via Amwell    Distant Location (Provider):  Off-site    As the provider I attest to compliance with applicable laws and regulations related to telemedicine.    DATA  Interactive Complexity: No  Crisis: No        Progress Since Last Session (Related to Symptoms / Goals / Homework):   Symptoms: Improving      Homework: Partially completed      Episode of Care Goals: Minimal progress - ACTION (Actively working towards change); Intervened by reinforcing change plan / affirming steps taken     Current / Ongoing Stressors and Concerns:   Relationship and setting boundaries with son     Treatment Objective(s) Addressed in This Session:   Identify negative self-talk and behaviors: challenge core beliefs, myths, and actions       Intervention:   Motivational Interviewing:  OARS    Assessments completed prior to visit:  The following assessments were completed by patient for this visit:  PHQ9:       3/10/2021    10:02 PM 10/19/2021     9:54 AM 4/17/2022     7:55 PM 12/29/2022    12:17 PM 4/4/2023     6:58 AM 10/4/2023     3:11 PM 10/12/2023    12:23 PM   PHQ-9 SCORE   PHQ-9 Total Score MyChart 7 (Mild depression) 8 (Mild depression) 9 (Mild depression) 6 (Mild depression) 7 (Mild depression) 7 (Mild depression) 10 (Moderate depression)   PHQ-9 Total Score 7 8 9 6 7 7 10     GAD7:       10/28/2020     9:26 AM 12/15/2020     9:40 PM 3/10/2021    10:01 PM 10/19/2021     9:55 AM 4/17/2022     7:58 PM 12/29/2022    12:24 PM 10/5/2023    11:38 AM   DANIEL-7 SCORE   Total Score 3 (minimal anxiety) 4 (minimal anxiety) 1 (minimal anxiety) 5 (mild anxiety) 3 (minimal anxiety) 4 (minimal anxiety) 4 (minimal anxiety)   Total Score 3 4 1 5 3 4 4         ASSESSMENT: Current Emotional / Mental Status (status of significant symptoms):   Risk status (Self / Other harm or suicidal ideation)   Patient denies current fears or concerns for personal safety.   Patient denies current or recent suicidal ideation or behaviors.   Patient denies current or recent homicidal ideation or behaviors.   Patient denies current or recent self injurious behavior or ideation.   Patient denies other safety concerns.   Patient reports there has been no change in risk factors since their last session.     Patient reports there has been no change in protective factors since their last session.     A safety and risk management plan has been developed including: Patient consented to co-developed safety plan on 10/12/23.  Safety and risk management plan was reviewed.   Patient agreed to use safety plan should any safety concerns arise.  A copy was made available to the patient.     Appearance:   Appropriate    Eye Contact:   Good    Psychomotor Behavior: Normal    Attitude:   Cooperative   Interested   Orientation:   All   Speech    Rate / Production: Normal     Volume:  Normal    Mood:    Normal   Affect:    Appropriate    Thought Content:  Clear    Thought Form:  Coherent  Logical    Insight:    Good      Medication Review:   No changes to current psychiatric medication(s)     Medication Compliance:   Yes     Changes in Health Issues:   None reported     Chemical Use Review:   Substance Use: Chemical use reviewed, no active concerns identified      Tobacco Use: No current tobacco use.      Diagnosis:  1. DANIEL (generalized anxiety disorder)    2. Moderate episode of recurrent major depressive disorder (H)        Collateral Reports Completed:   Not Applicable    PLAN: (Patient Tasks / Therapist Tasks / Other)  Patient to explore setting boundaries with son and enforcing limits.        [facility]

## 2023-10-26 ENCOUNTER — VIRTUAL VISIT (OUTPATIENT)
Dept: PSYCHOLOGY | Facility: CLINIC | Age: 63
End: 2023-10-26
Payer: COMMERCIAL

## 2023-10-26 DIAGNOSIS — F33.1 MODERATE EPISODE OF RECURRENT MAJOR DEPRESSIVE DISORDER (H): ICD-10-CM

## 2023-10-26 DIAGNOSIS — F41.1 GAD (GENERALIZED ANXIETY DISORDER): Primary | ICD-10-CM

## 2023-10-26 PROCEDURE — 90837 PSYTX W PT 60 MINUTES: CPT | Mod: VID | Performed by: COUNSELOR

## 2023-10-26 NOTE — PROGRESS NOTES
[facility]                                     Progress Note    Patient Name: Karlee Emery  Date: 10/26/23         Service Type: Individual      Session Start Time: 200 pm  Session End Time: 259pm     Session Length: 59 minutes    Session #: 2  Attendees: Client attended alone    Service Modality:  Video Visit:      Provider verified identity through the following two step process.  Patient provided:  Patient  and Patient address    Telemedicine Visit: The patient's condition can be safely assessed and treated via synchronous audio and visual telemedicine encounter.      Reason for Telemedicine Visit: Services only offered telehealth    Originating Site (Patient Location): Patient's home    Distant Site (Provider Location): Provider Remote Setting- Home Office    Consent:  The patient/guardian has verbally consented to: the potential risks and benefits of telemedicine (video visit) versus in person care; bill my insurance or make self-payment for services provided; and responsibility for payment of non-covered services.     Patient would like the video invitation sent by:  My Chart    Mode of Communication:  Video Conference via Amwell    Distant Location (Provider):  Off-site    As the provider I attest to compliance with applicable laws and regulations related to telemedicine.    DATA  Interactive Complexity: No  Crisis: No        Progress Since Last Session (Related to Symptoms / Goals / Homework):   Symptoms: Improving      Homework: Partially completed      Episode of Care Goals: Minimal progress - ACTION (Actively working towards change); Intervened by reinforcing change plan / affirming steps taken     Current / Ongoing Stressors and Concerns:   Relationship and setting boundaries with son, managing depression     Treatment Objective(s) Addressed in This Session:   Identify negative self-talk and behaviors: challenge core beliefs, myths, and  actions       Intervention:   Motivational Interviewing: OARS    Assessments completed prior to visit:  The following assessments were completed by patient for this visit:  PHQ9:       3/10/2021    10:02 PM 10/19/2021     9:54 AM 4/17/2022     7:55 PM 12/29/2022    12:17 PM 4/4/2023     6:58 AM 10/4/2023     3:11 PM 10/12/2023    12:23 PM   PHQ-9 SCORE   PHQ-9 Total Score MyChart 7 (Mild depression) 8 (Mild depression) 9 (Mild depression) 6 (Mild depression) 7 (Mild depression) 7 (Mild depression) 10 (Moderate depression)   PHQ-9 Total Score 7 8 9 6 7 7 10     GAD7:       10/28/2020     9:26 AM 12/15/2020     9:40 PM 3/10/2021    10:01 PM 10/19/2021     9:55 AM 4/17/2022     7:58 PM 12/29/2022    12:24 PM 10/5/2023    11:38 AM   DANIEL-7 SCORE   Total Score 3 (minimal anxiety) 4 (minimal anxiety) 1 (minimal anxiety) 5 (mild anxiety) 3 (minimal anxiety) 4 (minimal anxiety) 4 (minimal anxiety)   Total Score 3 4 1 5 3 4 4         ASSESSMENT: Current Emotional / Mental Status (status of significant symptoms):   Risk status (Self / Other harm or suicidal ideation)   Patient denies current fears or concerns for personal safety.   Patient denies current or recent suicidal ideation or behaviors.   Patient denies current or recent homicidal ideation or behaviors.   Patient denies current or recent self injurious behavior or ideation.   Patient denies other safety concerns.   Patient reports there has been no change in risk factors since their last session.     Patient reports there has been no change in protective factors since their last session.     A safety and risk management plan has been developed including: Patient consented to co-developed safety plan on 10/12/23.  Safety and risk management plan was reviewed.   Patient agreed to use safety plan should any safety concerns arise.  A copy was made available to the patient.     Appearance:   Appropriate    Eye Contact:   Good    Psychomotor Behavior: Normal     Attitude:   Cooperative  Interested   Orientation:   All   Speech    Rate / Production: Normal     Volume:  Normal    Mood:    Normal   Affect:    Appropriate    Thought Content:  Clear    Thought Form:  Coherent  Logical    Insight:    Good      Medication Review:   No changes to current psychiatric medication(s)     Medication Compliance:   Yes     Changes in Health Issues:   None reported     Chemical Use Review:   Substance Use: Chemical use reviewed, no active concerns identified      Tobacco Use: No current tobacco use.      Diagnosis:  1. DANIEL (generalized anxiety disorder)    2. Moderate episode of recurrent major depressive disorder (H)          Collateral Reports Completed:   Not Applicable    PLAN: (Patient Tasks / Therapist Tasks / Other)  Patient to explore setting boundaries with son and enforcing limits, walking the dogs through out the week.        Yelitza Schuster, [facility]                                            Individual Treatment Plan    Patient's Name: Karlee Emery  YOB: 1960    Date of Creation: 10/26/23  Date Treatment Plan Last Reviewed/Revised: NA    DSM5 Diagnoses: 296.32 (F33.1) Major Depressive Disorder, Recurrent Episode, Moderate _ and With anxious distress or 300.02 (F41.1) Generalized Anxiety Disorder  Psychosocial / Contextual Factors: Familial conflict  PROMIS (reviewed every 90 days):     Referral / Collaboration:  Referral to another professional/service is not indicated at this time..    Anticipated number of session for this episode of care: 9-12 sessions  Anticipation frequency of session: Weekly  Anticipated Duration of each session: 38-52 minutes  Treatment plan will be reviewed in 90 days or when goals have been changed.       MeasurableTreatment Goal(s) related to diagnosis / functional impairment(s)  Goal 1: Patient will develop and implement strategies related to anxiety.    I will know I've met my goal when I will be able to regulate myself.       Objective #A (Patient Action)    Patient will identify 4 fears / thoughts that contribute to feeling anxious.  Status: New - Date: 10/26/23      Intervention(s)  Therapist will teach emotional recognition/identification.   .    Objective #B  Patient will use relaxation strategies 2 times per day to reduce the physical symptoms of anxiety.  Status: New - Date: 10/26/23      Intervention(s)  Therapist will teach relaxation strategies and mindfulness .    Objective #C  Patient will attend and participate in social or recreational activities   .  Status: New - Date: 10/26/23      Intervention(s)  Therapist will teach skills related to engagement .      Goal 2: Patient will develop and implement strategies related to depression.    I will know I've met my goal when I feel better about myself.      Objective #A (Patient Action)    Status: New - Date: 10/26/23      Patient will Increase interest, engagement, and pleasure in doing things.    Intervention(s)  Therapist will teach emotional regulation skills.   .    Objective #B  Patient will Feel less tired and more energy during the day .    Status: New - Date: 10/26/23      Intervention(s)  Therapist will teach about healthy boundaries.   .    Objective #C  Patient will Decrease frequency and intensity of feeling down, depressed, hopeless.  Status: New - Date: 10/26/23      Intervention(s)  Therapist will teach distraction skills.   .      Patient has reviewed and agreed to the above plan.      [facility]  October 26, 2023

## 2023-11-02 ENCOUNTER — VIRTUAL VISIT (OUTPATIENT)
Dept: PSYCHOLOGY | Facility: CLINIC | Age: 63
End: 2023-11-02
Payer: COMMERCIAL

## 2023-11-02 DIAGNOSIS — F41.1 GAD (GENERALIZED ANXIETY DISORDER): Primary | ICD-10-CM

## 2023-11-02 DIAGNOSIS — F33.1 MODERATE EPISODE OF RECURRENT MAJOR DEPRESSIVE DISORDER (H): ICD-10-CM

## 2023-11-02 PROCEDURE — 90837 PSYTX W PT 60 MINUTES: CPT | Mod: VID | Performed by: COUNSELOR

## 2023-11-02 NOTE — PROGRESS NOTES
[facility]                                     Progress Note    Patient Name: Karlee Emery  Date: 23         Service Type: Individual      Session Start Time: 200 pm  Session End Time: 258pm     Session Length: 58 minutes    Session #: 3  Attendees: Client attended alone    Service Modality:  Video Visit:      Provider verified identity through the following two step process.  Patient provided:  Patient  and Patient address    Telemedicine Visit: The patient's condition can be safely assessed and treated via synchronous audio and visual telemedicine encounter.      Reason for Telemedicine Visit: Services only offered telehealth    Originating Site (Patient Location): Patient's home    Distant Site (Provider Location): Provider Remote Setting- Home Office    Consent:  The patient/guardian has verbally consented to: the potential risks and benefits of telemedicine (video visit) versus in person care; bill my insurance or make self-payment for services provided; and responsibility for payment of non-covered services.     Patient would like the video invitation sent by:  My Chart    Mode of Communication:  Video Conference via Amwell    Distant Location (Provider):  Off-site    As the provider I attest to compliance with applicable laws and regulations related to telemedicine.    DATA  Interactive Complexity: No  Crisis: No        Progress Since Last Session (Related to Symptoms / Goals / Homework):   Symptoms: Improving      Homework: Partially completed      Episode of Care Goals: Minimal progress - ACTION (Actively working towards change); Intervened by reinforcing change plan / affirming steps taken     Current / Ongoing Stressors and Concerns:   Relationship and setting boundaries with son, managing depression     Treatment Objective(s) Addressed in This Session:   Identify negative self-talk and behaviors: challenge core beliefs, myths, and  actions       Intervention:   Motivational Interviewing: OARS    Assessments completed prior to visit:  The following assessments were completed by patient for this visit:  PHQ9:       3/10/2021    10:02 PM 10/19/2021     9:54 AM 4/17/2022     7:55 PM 12/29/2022    12:17 PM 4/4/2023     6:58 AM 10/4/2023     3:11 PM 10/12/2023    12:23 PM   PHQ-9 SCORE   PHQ-9 Total Score MyChart 7 (Mild depression) 8 (Mild depression) 9 (Mild depression) 6 (Mild depression) 7 (Mild depression) 7 (Mild depression) 10 (Moderate depression)   PHQ-9 Total Score 7 8 9 6 7 7 10     GAD7:       10/28/2020     9:26 AM 12/15/2020     9:40 PM 3/10/2021    10:01 PM 10/19/2021     9:55 AM 4/17/2022     7:58 PM 12/29/2022    12:24 PM 10/5/2023    11:38 AM   DANIEL-7 SCORE   Total Score 3 (minimal anxiety) 4 (minimal anxiety) 1 (minimal anxiety) 5 (mild anxiety) 3 (minimal anxiety) 4 (minimal anxiety) 4 (minimal anxiety)   Total Score 3 4 1 5 3 4 4         ASSESSMENT: Current Emotional / Mental Status (status of significant symptoms):   Risk status (Self / Other harm or suicidal ideation)   Patient denies current fears or concerns for personal safety.   Patient denies current or recent suicidal ideation or behaviors.   Patient denies current or recent homicidal ideation or behaviors.   Patient denies current or recent self injurious behavior or ideation.   Patient denies other safety concerns.   Patient reports there has been no change in risk factors since their last session.     Patient reports there has been no change in protective factors since their last session.     A safety and risk management plan has been developed including: Patient consented to co-developed safety plan on 10/12/23.  Safety and risk management plan was reviewed.   Patient agreed to use safety plan should any safety concerns arise.  A copy was made available to the patient.     Appearance:   Appropriate    Eye Contact:   Good    Psychomotor Behavior: Normal     Attitude:   Cooperative  Interested   Orientation:   All   Speech    Rate / Production: Normal     Volume:  Normal    Mood:    Normal   Affect:    Appropriate    Thought Content:  Clear    Thought Form:  Coherent  Logical    Insight:    Good      Medication Review:   No changes to current psychiatric medication(s)     Medication Compliance:   Yes     Changes in Health Issues:   None reported     Chemical Use Review:   Substance Use: Chemical use reviewed, no active concerns identified      Tobacco Use: No current tobacco use.      Diagnosis:  1. DANIEL (generalized anxiety disorder)    2. Moderate episode of recurrent major depressive disorder (H)            Collateral Reports Completed:   Not Applicable    PLAN: (Patient Tasks / Therapist Tasks / Other)  Patient to explore setting boundaries with son and enforcing limits, implement meditation at bed time to promote restful sleep.      Yelitza Schuster, [facility]                                            Individual Treatment Plan    Patient's Name: Karlee Emery  YOB: 1960    Date of Creation: 10/26/23  Date Treatment Plan Last Reviewed/Revised: NA    DSM5 Diagnoses: 296.32 (F33.1) Major Depressive Disorder, Recurrent Episode, Moderate _ and With anxious distress or 300.02 (F41.1) Generalized Anxiety Disorder  Psychosocial / Contextual Factors: Familial conflict  PROMIS (reviewed every 90 days):     Referral / Collaboration:  Referral to another professional/service is not indicated at this time..    Anticipated number of session for this episode of care: 9-12 sessions  Anticipation frequency of session: Weekly  Anticipated Duration of each session: 38-52 minutes  Treatment plan will be reviewed in 90 days or when goals have been changed.       MeasurableTreatment Goal(s) related to diagnosis / functional impairment(s)  Goal 1: Patient will develop and implement strategies related to anxiety.    I will know I've met my goal when I will be able to  regulate myself.      Objective #A (Patient Action)    Patient will identify 4 fears / thoughts that contribute to feeling anxious.  Status: New - Date: 10/26/23      Intervention(s)  Therapist will teach emotional recognition/identification.   .    Objective #B  Patient will use relaxation strategies 2 times per day to reduce the physical symptoms of anxiety.  Status: New - Date: 10/26/23      Intervention(s)  Therapist will teach relaxation strategies and mindfulness .    Objective #C  Patient will attend and participate in social or recreational activities   .  Status: New - Date: 10/26/23      Intervention(s)  Therapist will teach skills related to engagement .      Goal 2: Patient will develop and implement strategies related to depression.    I will know I've met my goal when I feel better about myself.      Objective #A (Patient Action)    Status: New - Date: 10/26/23      Patient will Increase interest, engagement, and pleasure in doing things.    Intervention(s)  Therapist will teach emotional regulation skills.   .    Objective #B  Patient will Feel less tired and more energy during the day .    Status: New - Date: 10/26/23      Intervention(s)  Therapist will teach about healthy boundaries.   .    Objective #C  Patient will Decrease frequency and intensity of feeling down, depressed, hopeless.  Status: New - Date: 10/26/23      Intervention(s)  Therapist will teach distraction skills.   .      Patient has reviewed and agreed to the above plan.      Yelitza Schuster, [facility]  October 26, 2023

## 2023-11-09 ENCOUNTER — VIRTUAL VISIT (OUTPATIENT)
Dept: PSYCHOLOGY | Facility: CLINIC | Age: 63
End: 2023-11-09
Payer: COMMERCIAL

## 2023-11-09 DIAGNOSIS — F33.1 MODERATE EPISODE OF RECURRENT MAJOR DEPRESSIVE DISORDER (H): ICD-10-CM

## 2023-11-09 DIAGNOSIS — F41.1 GAD (GENERALIZED ANXIETY DISORDER): Primary | ICD-10-CM

## 2023-11-09 PROCEDURE — 90837 PSYTX W PT 60 MINUTES: CPT | Mod: VID | Performed by: COUNSELOR

## 2023-11-09 NOTE — PROGRESS NOTES
[facility]                                     Progress Note    Patient Name: Karlee Emery  Date: 23         Service Type: Individual      Session Start Time: 200 pm  Session End Time: 258pm     Session Length: 58 minutes    Session #: 4  Attendees: Client attended alone    Service Modality:  Video Visit:      Provider verified identity through the following two step process.  Patient provided:  Patient  and Patient address    Telemedicine Visit: The patient's condition can be safely assessed and treated via synchronous audio and visual telemedicine encounter.      Reason for Telemedicine Visit: Services only offered telehealth    Originating Site (Patient Location): Patient's home    Distant Site (Provider Location): Provider Remote Setting- Home Office    Consent:  The patient/guardian has verbally consented to: the potential risks and benefits of telemedicine (video visit) versus in person care; bill my insurance or make self-payment for services provided; and responsibility for payment of non-covered services.     Patient would like the video invitation sent by:  My Chart    Mode of Communication:  Video Conference via Amwell    Distant Location (Provider):  Off-site    As the provider I attest to compliance with applicable laws and regulations related to telemedicine.    DATA  Interactive Complexity: No  Crisis: No        Progress Since Last Session (Related to Symptoms / Goals / Homework):   Symptoms: Improving      Homework: Partially completed      Episode of Care Goals: Minimal progress - ACTION (Actively working towards change); Intervened by reinforcing change plan / affirming steps taken     Current / Ongoing Stressors and Concerns:   Relationship and setting boundaries with son, managing depression     Treatment Objective(s) Addressed in This Session:   Identify negative self-talk and behaviors: challenge core beliefs, myths, and  actions       Intervention:   Motivational Interviewing: OARS    Assessments completed prior to visit:  The following assessments were completed by patient for this visit:  PHQ9:       3/10/2021    10:02 PM 10/19/2021     9:54 AM 4/17/2022     7:55 PM 12/29/2022    12:17 PM 4/4/2023     6:58 AM 10/4/2023     3:11 PM 10/12/2023    12:23 PM   PHQ-9 SCORE   PHQ-9 Total Score MyChart 7 (Mild depression) 8 (Mild depression) 9 (Mild depression) 6 (Mild depression) 7 (Mild depression) 7 (Mild depression) 10 (Moderate depression)   PHQ-9 Total Score 7 8 9 6 7 7 10     GAD7:       10/28/2020     9:26 AM 12/15/2020     9:40 PM 3/10/2021    10:01 PM 10/19/2021     9:55 AM 4/17/2022     7:58 PM 12/29/2022    12:24 PM 10/5/2023    11:38 AM   DANIEL-7 SCORE   Total Score 3 (minimal anxiety) 4 (minimal anxiety) 1 (minimal anxiety) 5 (mild anxiety) 3 (minimal anxiety) 4 (minimal anxiety) 4 (minimal anxiety)   Total Score 3 4 1 5 3 4 4         ASSESSMENT: Current Emotional / Mental Status (status of significant symptoms):   Risk status (Self / Other harm or suicidal ideation)   Patient denies current fears or concerns for personal safety.   Patient denies current or recent suicidal ideation or behaviors.   Patient denies current or recent homicidal ideation or behaviors.   Patient denies current or recent self injurious behavior or ideation.   Patient denies other safety concerns.   Patient reports there has been no change in risk factors since their last session.     Patient reports there has been no change in protective factors since their last session.     A safety and risk management plan has been developed including: Patient consented to co-developed safety plan on 10/12/23.  Safety and risk management plan was reviewed.   Patient agreed to use safety plan should any safety concerns arise.  A copy was made available to the patient.     Appearance:   Appropriate    Eye Contact:   Good    Psychomotor Behavior: Normal     Attitude:   Cooperative  Interested   Orientation:   All   Speech    Rate / Production: Normal     Volume:  Normal    Mood:    Normal   Affect:    Appropriate    Thought Content:  Clear    Thought Form:  Coherent  Logical    Insight:    Good      Medication Review:   No changes to current psychiatric medication(s)     Medication Compliance:   Yes     Changes in Health Issues:   None reported     Chemical Use Review:   Substance Use: Chemical use reviewed, no active concerns identified      Tobacco Use: No current tobacco use.      Diagnosis:  1. DANIEL (generalized anxiety disorder)    2. Moderate episode of recurrent major depressive disorder (H)              Collateral Reports Completed:   Not Applicable    PLAN: (Patient Tasks / Therapist Tasks / Other)  Patient to continue to practice self care and mindfulness, engage in relationships with children until next session.      Yelitza Schuster, [facility]                                            Individual Treatment Plan    Patient's Name: Karlee Emery  YOB: 1960    Date of Creation: 10/26/23  Date Treatment Plan Last Reviewed/Revised: NA    DSM5 Diagnoses: 296.32 (F33.1) Major Depressive Disorder, Recurrent Episode, Moderate _ and With anxious distress or 300.02 (F41.1) Generalized Anxiety Disorder  Psychosocial / Contextual Factors: Familial conflict  PROMIS (reviewed every 90 days):     Referral / Collaboration:  Referral to another professional/service is not indicated at this time..    Anticipated number of session for this episode of care: 9-12 sessions  Anticipation frequency of session: Weekly  Anticipated Duration of each session: 38-52 minutes  Treatment plan will be reviewed in 90 days or when goals have been changed.       MeasurableTreatment Goal(s) related to diagnosis / functional impairment(s)  Goal 1: Patient will develop and implement strategies related to anxiety.    I will know I've met my goal when I will be able to regulate  myself.      Objective #A (Patient Action)    Patient will identify 4 fears / thoughts that contribute to feeling anxious.  Status: New - Date: 10/26/23      Intervention(s)  Therapist will teach emotional recognition/identification.   .    Objective #B  Patient will use relaxation strategies 2 times per day to reduce the physical symptoms of anxiety.  Status: New - Date: 10/26/23      Intervention(s)  Therapist will teach relaxation strategies and mindfulness .    Objective #C  Patient will attend and participate in social or recreational activities   .  Status: New - Date: 10/26/23      Intervention(s)  Therapist will teach skills related to engagement .      Goal 2: Patient will develop and implement strategies related to depression.    I will know I've met my goal when I feel better about myself.      Objective #A (Patient Action)    Status: New - Date: 10/26/23      Patient will Increase interest, engagement, and pleasure in doing things.    Intervention(s)  Therapist will teach emotional regulation skills.   .    Objective #B  Patient will Feel less tired and more energy during the day .    Status: New - Date: 10/26/23      Intervention(s)  Therapist will teach about healthy boundaries.   .    Objective #C  Patient will Decrease frequency and intensity of feeling down, depressed, hopeless.  Status: New - Date: 10/26/23      Intervention(s)  Therapist will teach distraction skills.   .      Patient has reviewed and agreed to the above plan.      Yelitza Schuster, [facility]  October 26, 2023

## 2023-11-16 ENCOUNTER — VIRTUAL VISIT (OUTPATIENT)
Dept: PSYCHOLOGY | Facility: CLINIC | Age: 63
End: 2023-11-16
Payer: COMMERCIAL

## 2023-11-16 DIAGNOSIS — F33.1 MODERATE EPISODE OF RECURRENT MAJOR DEPRESSIVE DISORDER (H): ICD-10-CM

## 2023-11-16 DIAGNOSIS — F41.1 GAD (GENERALIZED ANXIETY DISORDER): Primary | ICD-10-CM

## 2023-11-16 PROCEDURE — 90834 PSYTX W PT 45 MINUTES: CPT | Mod: VID | Performed by: COUNSELOR

## 2023-11-16 ASSESSMENT — PATIENT HEALTH QUESTIONNAIRE - PHQ9
SUM OF ALL RESPONSES TO PHQ QUESTIONS 1-9: 7
10. IF YOU CHECKED OFF ANY PROBLEMS, HOW DIFFICULT HAVE THESE PROBLEMS MADE IT FOR YOU TO DO YOUR WORK, TAKE CARE OF THINGS AT HOME, OR GET ALONG WITH OTHER PEOPLE: SOMEWHAT DIFFICULT
SUM OF ALL RESPONSES TO PHQ QUESTIONS 1-9: 7

## 2023-11-16 NOTE — PROGRESS NOTES
"    [facility]                                     Progress Note    Patient Name: Karlee Emery  Date: 23         Service Type: Individual      Session Start Time: 200 pm  Session End Time: 250 pm     Session Length: 50 minutes    Session #: 5  Attendees: Client attended alone    Service Modality:  Video Visit:      Provider verified identity through the following two step process.  Patient provided:  Patient  and Patient address    Telemedicine Visit: The patient's condition can be safely assessed and treated via synchronous audio and visual telemedicine encounter.      Reason for Telemedicine Visit: Services only offered telehealth    Originating Site (Patient Location): Patient's home    Distant Site (Provider Location): Provider Remote Setting- Home Office    Consent:  The patient/guardian has verbally consented to: the potential risks and benefits of telemedicine (video visit) versus in person care; bill my insurance or make self-payment for services provided; and responsibility for payment of non-covered services.     Patient would like the video invitation sent by:  My Chart    Mode of Communication:  Video Conference via Amwell    Distant Location (Provider):  Off-site    As the provider I attest to compliance with applicable laws and regulations related to telemedicine.    DATA  Interactive Complexity: No  Crisis: No        Progress Since Last Session (Related to Symptoms / Goals / Homework):   Symptoms: Worsening - reports feeling \"blah\"    Homework: Partially completed      Episode of Care Goals: Minimal progress - ACTION (Actively working towards change); Intervened by reinforcing change plan / affirming steps taken     Current / Ongoing Stressors and Concerns:   Relationship and setting boundaries with son, managing depression     Treatment Objective(s) Addressed in This Session:   Identify negative self-talk and behaviors: challenge core beliefs, myths, and " actions       Intervention:   DBT    Assessments completed prior to visit:  The following assessments were completed by patient for this visit:  PHQ9:       10/19/2021     9:54 AM 4/17/2022     7:55 PM 12/29/2022    12:17 PM 4/4/2023     6:58 AM 10/4/2023     3:11 PM 10/12/2023    12:23 PM 11/16/2023     1:52 PM   PHQ-9 SCORE   PHQ-9 Total Score MyChart 8 (Mild depression) 9 (Mild depression) 6 (Mild depression) 7 (Mild depression) 7 (Mild depression) 10 (Moderate depression) 7 (Mild depression)   PHQ-9 Total Score 8 9 6 7 7 10 7     GAD7:       10/28/2020     9:26 AM 12/15/2020     9:40 PM 3/10/2021    10:01 PM 10/19/2021     9:55 AM 4/17/2022     7:58 PM 12/29/2022    12:24 PM 10/5/2023    11:38 AM   DANIEL-7 SCORE   Total Score 3 (minimal anxiety) 4 (minimal anxiety) 1 (minimal anxiety) 5 (mild anxiety) 3 (minimal anxiety) 4 (minimal anxiety) 4 (minimal anxiety)   Total Score 3 4 1 5 3 4 4         ASSESSMENT: Current Emotional / Mental Status (status of significant symptoms):   Risk status (Self / Other harm or suicidal ideation)   Patient denies current fears or concerns for personal safety.   Patient denies current or recent suicidal ideation or behaviors.   Patient denies current or recent homicidal ideation or behaviors.   Patient denies current or recent self injurious behavior or ideation.   Patient denies other safety concerns.   Patient reports there has been no change in risk factors since their last session.     Patient reports there has been no change in protective factors since their last session.     A safety and risk management plan has been developed including: Patient consented to co-developed safety plan on 10/12/23.  Safety and risk management plan was reviewed.   Patient agreed to use safety plan should any safety concerns arise.  A copy was made available to the patient.     Appearance:   Appropriate    Eye Contact:   Good    Psychomotor Behavior: Normal    Attitude:   Cooperative   Interested   Orientation:   All   Speech    Rate / Production: Normal     Volume:  Normal    Mood:    Normal   Affect:    Appropriate    Thought Content:  Clear    Thought Form:  Coherent  Logical    Insight:    Good      Medication Review:   No changes to current psychiatric medication(s)     Medication Compliance:   Yes     Changes in Health Issues:   None reported     Chemical Use Review:   Substance Use: Chemical use reviewed, no active concerns identified      Tobacco Use: No current tobacco use.      Diagnosis:  1. DANIEL (generalized anxiety disorder)    2. Moderate episode of recurrent major depressive disorder (H)                Collateral Reports Completed:   Not Applicable    PLAN: (Patient Tasks / Therapist Tasks / Other)  Patient to continue to practice self care and mindfulness, engage in opposite to emotion actions until next session.      Yelitza Schuster, [facility]                                            Individual Treatment Plan    Patient's Name: Karlee Emery  YOB: 1960    Date of Creation: 10/26/23  Date Treatment Plan Last Reviewed/Revised: NA    DSM5 Diagnoses: 296.32 (F33.1) Major Depressive Disorder, Recurrent Episode, Moderate _ and With anxious distress or 300.02 (F41.1) Generalized Anxiety Disorder  Psychosocial / Contextual Factors: Familial conflict  PROMIS (reviewed every 90 days):     Referral / Collaboration:  Referral to another professional/service is not indicated at this time..    Anticipated number of session for this episode of care: 9-12 sessions  Anticipation frequency of session: Weekly  Anticipated Duration of each session: 38-52 minutes  Treatment plan will be reviewed in 90 days or when goals have been changed.       MeasurableTreatment Goal(s) related to diagnosis / functional impairment(s)  Goal 1: Patient will develop and implement strategies related to anxiety.    I will know I've met my goal when I will be able to regulate myself.      Objective #A  (Patient Action)    Patient will identify 4 fears / thoughts that contribute to feeling anxious.  Status: New - Date: 10/26/23      Intervention(s)  Therapist will teach emotional recognition/identification.   .    Objective #B  Patient will use relaxation strategies 2 times per day to reduce the physical symptoms of anxiety.  Status: New - Date: 10/26/23      Intervention(s)  Therapist will teach relaxation strategies and mindfulness .    Objective #C  Patient will attend and participate in social or recreational activities   .  Status: New - Date: 10/26/23      Intervention(s)  Therapist will teach skills related to engagement .      Goal 2: Patient will develop and implement strategies related to depression.    I will know I've met my goal when I feel better about myself.      Objective #A (Patient Action)    Status: New - Date: 10/26/23      Patient will Increase interest, engagement, and pleasure in doing things.    Intervention(s)  Therapist will teach emotional regulation skills.   .    Objective #B  Patient will Feel less tired and more energy during the day .    Status: New - Date: 10/26/23      Intervention(s)  Therapist will teach about healthy boundaries.   .    Objective #C  Patient will Decrease frequency and intensity of feeling down, depressed, hopeless.  Status: New - Date: 10/26/23      Intervention(s)  Therapist will teach distraction skills.   .      Patient has reviewed and agreed to the above plan.      Yelitza Schuster, [facility]  October 26, 2023

## 2023-11-29 ASSESSMENT — PATIENT HEALTH QUESTIONNAIRE - PHQ9
SUM OF ALL RESPONSES TO PHQ QUESTIONS 1-9: 7
SUM OF ALL RESPONSES TO PHQ QUESTIONS 1-9: 7
10. IF YOU CHECKED OFF ANY PROBLEMS, HOW DIFFICULT HAVE THESE PROBLEMS MADE IT FOR YOU TO DO YOUR WORK, TAKE CARE OF THINGS AT HOME, OR GET ALONG WITH OTHER PEOPLE: SOMEWHAT DIFFICULT

## 2023-11-30 ENCOUNTER — VIRTUAL VISIT (OUTPATIENT)
Dept: PSYCHOLOGY | Facility: CLINIC | Age: 63
End: 2023-11-30
Payer: COMMERCIAL

## 2023-11-30 DIAGNOSIS — F33.1 MODERATE EPISODE OF RECURRENT MAJOR DEPRESSIVE DISORDER (H): ICD-10-CM

## 2023-11-30 DIAGNOSIS — F41.1 GAD (GENERALIZED ANXIETY DISORDER): Primary | ICD-10-CM

## 2023-11-30 PROCEDURE — 90837 PSYTX W PT 60 MINUTES: CPT | Mod: VID | Performed by: COUNSELOR

## 2023-11-30 NOTE — PROGRESS NOTES
"    [facility]                                     Progress Note    Patient Name: Karlee Emery  Date: 23         Service Type: Individual      Session Start Time: 200 pm  Session End Time: 300 pm     Session Length: 60 minutes    Session #: 6  Attendees: Client attended alone    Service Modality:  Video Visit:      Provider verified identity through the following two step process.  Patient provided:  Patient  and Patient address    Telemedicine Visit: The patient's condition can be safely assessed and treated via synchronous audio and visual telemedicine encounter.      Reason for Telemedicine Visit: Services only offered telehealth    Originating Site (Patient Location): Patient's home    Distant Site (Provider Location): Provider Remote Setting- Home Office    Consent:  The patient/guardian has verbally consented to: the potential risks and benefits of telemedicine (video visit) versus in person care; bill my insurance or make self-payment for services provided; and responsibility for payment of non-covered services.     Patient would like the video invitation sent by:  My Chart    Mode of Communication:  Video Conference via Amwell    Distant Location (Provider):  Off-site    As the provider I attest to compliance with applicable laws and regulations related to telemedicine.    DATA  Interactive Complexity: No  Crisis: No        Progress Since Last Session (Related to Symptoms / Goals / Homework):   Symptoms: Worsening - reports feeling \"blah\"    Homework: Partially completed      Episode of Care Goals: Minimal progress - ACTION (Actively working towards change); Intervened by reinforcing change plan / affirming steps taken     Current / Ongoing Stressors and Concerns:   Relationship and setting boundaries with son, managing depression     Treatment Objective(s) Addressed in This Session:   Identify negative self-talk and behaviors: challenge core beliefs, myths, and " actions       Intervention:   DBT    Assessments completed prior to visit:  The following assessments were completed by patient for this visit:  PHQ9:       4/17/2022     7:55 PM 12/29/2022    12:17 PM 4/4/2023     6:58 AM 10/4/2023     3:11 PM 10/12/2023    12:23 PM 11/16/2023     1:52 PM 11/29/2023     7:15 PM   PHQ-9 SCORE   PHQ-9 Total Score MyChart 9 (Mild depression) 6 (Mild depression) 7 (Mild depression) 7 (Mild depression) 10 (Moderate depression) 7 (Mild depression) 7 (Mild depression)   PHQ-9 Total Score 9 6 7 7 10 7 7     GAD7:       10/28/2020     9:26 AM 12/15/2020     9:40 PM 3/10/2021    10:01 PM 10/19/2021     9:55 AM 4/17/2022     7:58 PM 12/29/2022    12:24 PM 10/5/2023    11:38 AM   DANIEL-7 SCORE   Total Score 3 (minimal anxiety) 4 (minimal anxiety) 1 (minimal anxiety) 5 (mild anxiety) 3 (minimal anxiety) 4 (minimal anxiety) 4 (minimal anxiety)   Total Score 3 4 1 5 3 4 4         ASSESSMENT: Current Emotional / Mental Status (status of significant symptoms):   Risk status (Self / Other harm or suicidal ideation)   Patient denies current fears or concerns for personal safety.   Patient denies current or recent suicidal ideation or behaviors.   Patient denies current or recent homicidal ideation or behaviors.   Patient denies current or recent self injurious behavior or ideation.   Patient denies other safety concerns.   Patient reports there has been no change in risk factors since their last session.     Patient reports there has been no change in protective factors since their last session.     A safety and risk management plan has been developed including: Patient consented to co-developed safety plan on 10/12/23.  Safety and risk management plan was reviewed.   Patient agreed to use safety plan should any safety concerns arise.  A copy was made available to the patient.     Appearance:   Appropriate    Eye Contact:   Good    Psychomotor Behavior: Normal    Attitude:   Cooperative   Interested   Orientation:   All   Speech    Rate / Production: Normal     Volume:  Normal    Mood:    Normal   Affect:    Appropriate    Thought Content:  Clear    Thought Form:  Coherent  Logical    Insight:    Good      Medication Review:   No changes to current psychiatric medication(s)     Medication Compliance:   Yes     Changes in Health Issues:   None reported     Chemical Use Review:   Substance Use: Chemical use reviewed, no active concerns identified      Tobacco Use: No current tobacco use.      Diagnosis:  1. DANIEL (generalized anxiety disorder)    2. Moderate episode of recurrent major depressive disorder (H)                  Collateral Reports Completed:   Not Applicable    PLAN: (Patient Tasks / Therapist Tasks / Other)  Patient to explore expectations in relationship with child and continue to enforce boundaries.    Yelitza Schuster, [facility]                                            Individual Treatment Plan    Patient's Name: Karlee Emery  YOB: 1960    Date of Creation: 10/26/23  Date Treatment Plan Last Reviewed/Revised: NA    DSM5 Diagnoses: 296.32 (F33.1) Major Depressive Disorder, Recurrent Episode, Moderate _ and With anxious distress or 300.02 (F41.1) Generalized Anxiety Disorder  Psychosocial / Contextual Factors: Familial conflict  PROMIS (reviewed every 90 days):     Referral / Collaboration:  Referral to another professional/service is not indicated at this time..    Anticipated number of session for this episode of care: 9-12 sessions  Anticipation frequency of session: Weekly  Anticipated Duration of each session: 38-52 minutes  Treatment plan will be reviewed in 90 days or when goals have been changed.       MeasurableTreatment Goal(s) related to diagnosis / functional impairment(s)  Goal 1: Patient will develop and implement strategies related to anxiety.    I will know I've met my goal when I will be able to regulate myself.      Objective #A (Patient Action)    Patient  will identify 4 fears / thoughts that contribute to feeling anxious.  Status: New - Date: 10/26/23      Intervention(s)  Therapist will teach emotional recognition/identification.   .    Objective #B  Patient will use relaxation strategies 2 times per day to reduce the physical symptoms of anxiety.  Status: New - Date: 10/26/23      Intervention(s)  Therapist will teach relaxation strategies and mindfulness .    Objective #C  Patient will attend and participate in social or recreational activities   .  Status: New - Date: 10/26/23      Intervention(s)  Therapist will teach skills related to engagement .      Goal 2: Patient will develop and implement strategies related to depression.    I will know I've met my goal when I feel better about myself.      Objective #A (Patient Action)    Status: New - Date: 10/26/23      Patient will Increase interest, engagement, and pleasure in doing things.    Intervention(s)  Therapist will teach emotional regulation skills.   .    Objective #B  Patient will Feel less tired and more energy during the day .    Status: New - Date: 10/26/23      Intervention(s)  Therapist will teach about healthy boundaries.   .    Objective #C  Patient will Decrease frequency and intensity of feeling down, depressed, hopeless.  Status: New - Date: 10/26/23      Intervention(s)  Therapist will teach distraction skills.   .      Patient has reviewed and agreed to the above plan.      Yelitza Schuster, [facility]  October 26, 2023

## 2023-12-07 ENCOUNTER — VIRTUAL VISIT (OUTPATIENT)
Dept: PSYCHOLOGY | Facility: CLINIC | Age: 63
End: 2023-12-07
Payer: COMMERCIAL

## 2023-12-07 DIAGNOSIS — F41.1 GAD (GENERALIZED ANXIETY DISORDER): ICD-10-CM

## 2023-12-07 DIAGNOSIS — F33.1 MODERATE EPISODE OF RECURRENT MAJOR DEPRESSIVE DISORDER (H): Primary | ICD-10-CM

## 2023-12-07 PROCEDURE — 90837 PSYTX W PT 60 MINUTES: CPT | Mod: VID | Performed by: COUNSELOR

## 2023-12-07 ASSESSMENT — PATIENT HEALTH QUESTIONNAIRE - PHQ9
SUM OF ALL RESPONSES TO PHQ QUESTIONS 1-9: 8
10. IF YOU CHECKED OFF ANY PROBLEMS, HOW DIFFICULT HAVE THESE PROBLEMS MADE IT FOR YOU TO DO YOUR WORK, TAKE CARE OF THINGS AT HOME, OR GET ALONG WITH OTHER PEOPLE: SOMEWHAT DIFFICULT
SUM OF ALL RESPONSES TO PHQ QUESTIONS 1-9: 8

## 2023-12-07 NOTE — PROGRESS NOTES
[facility]                                     Progress Note    Patient Name: Karlee Emery  Date: 23         Service Type: Individual      Session Start Time: 200 pm  Session End Time: 300 pm     Session Length: 60 minutes    Session #: 7  Attendees: Client attended alone    Service Modality:  Video Visit:      Provider verified identity through the following two step process.  Patient provided:  Patient  and Patient address    Telemedicine Visit: The patient's condition can be safely assessed and treated via synchronous audio and visual telemedicine encounter.      Reason for Telemedicine Visit: Services only offered telehealth    Originating Site (Patient Location): Patient's home    Distant Site (Provider Location): Provider Remote Setting- Home Office    Consent:  The patient/guardian has verbally consented to: the potential risks and benefits of telemedicine (video visit) versus in person care; bill my insurance or make self-payment for services provided; and responsibility for payment of non-covered services.     Patient would like the video invitation sent by:  My Chart    Mode of Communication:  Video Conference via Amwell    Distant Location (Provider):  Off-site    As the provider I attest to compliance with applicable laws and regulations related to telemedicine.    DATA  Interactive Complexity: No  Crisis: No        Progress Since Last Session (Related to Symptoms / Goals / Homework):   Symptoms: No change    Homework: Partially completed      Episode of Care Goals: Minimal progress - ACTION (Actively working towards change); Intervened by reinforcing change plan / affirming steps taken     Current / Ongoing Stressors and Concerns:   Relationship and setting boundaries with son, managing depression     Treatment Objective(s) Addressed in This Session:   Identify negative self-talk and behaviors: challenge core beliefs, myths, and  actions       Intervention:   DBT    Assessments completed prior to visit:  The following assessments were completed by patient for this visit:  PHQ9:       12/29/2022    12:17 PM 4/4/2023     6:58 AM 10/4/2023     3:11 PM 10/12/2023    12:23 PM 11/16/2023     1:52 PM 11/29/2023     7:15 PM 12/7/2023     1:52 PM   PHQ-9 SCORE   PHQ-9 Total Score MyChart 6 (Mild depression) 7 (Mild depression) 7 (Mild depression) 10 (Moderate depression) 7 (Mild depression) 7 (Mild depression) 8 (Mild depression)   PHQ-9 Total Score 6 7 7 10 7 7 8     GAD7:       10/28/2020     9:26 AM 12/15/2020     9:40 PM 3/10/2021    10:01 PM 10/19/2021     9:55 AM 4/17/2022     7:58 PM 12/29/2022    12:24 PM 10/5/2023    11:38 AM   DANIEL-7 SCORE   Total Score 3 (minimal anxiety) 4 (minimal anxiety) 1 (minimal anxiety) 5 (mild anxiety) 3 (minimal anxiety) 4 (minimal anxiety) 4 (minimal anxiety)   Total Score 3 4 1 5 3 4 4         ASSESSMENT: Current Emotional / Mental Status (status of significant symptoms):   Risk status (Self / Other harm or suicidal ideation)   Patient denies current fears or concerns for personal safety.   Patient denies current or recent suicidal ideation or behaviors.   Patient denies current or recent homicidal ideation or behaviors.   Patient denies current or recent self injurious behavior or ideation.   Patient denies other safety concerns.   Patient reports there has been no change in risk factors since their last session.     Patient reports there has been no change in protective factors since their last session.     A safety and risk management plan has been developed including: Patient consented to co-developed safety plan on 10/12/23.  Safety and risk management plan was reviewed.   Patient agreed to use safety plan should any safety concerns arise.  A copy was made available to the patient.     Appearance:   Appropriate    Eye Contact:   Good    Psychomotor Behavior: Normal    Attitude:   Cooperative   Interested   Orientation:   All   Speech    Rate / Production: Normal     Volume:  Normal    Mood:    Normal   Affect:    Appropriate    Thought Content:  Clear    Thought Form:  Coherent  Logical    Insight:    Good      Medication Review:   No changes to current psychiatric medication(s)     Medication Compliance:   Yes     Changes in Health Issues:   None reported     Chemical Use Review:   Substance Use: Chemical use reviewed, no active concerns identified      Tobacco Use: No current tobacco use.      Diagnosis:  1. Moderate episode of recurrent major depressive disorder (H)    2. DANIEL (generalized anxiety disorder)                    Collateral Reports Completed:   Not Applicable    PLAN: (Patient Tasks / Therapist Tasks / Other)  Patient to explore expectations in relationship with partner in key areas of functioning.    Yelitza Schuster, [facility]                                            Individual Treatment Plan    Patient's Name: Karlee Emery  YOB: 1960    Date of Creation: 10/26/23  Date Treatment Plan Last Reviewed/Revised: NA    DSM5 Diagnoses: 296.32 (F33.1) Major Depressive Disorder, Recurrent Episode, Moderate _ and With anxious distress or 300.02 (F41.1) Generalized Anxiety Disorder  Psychosocial / Contextual Factors: Familial conflict  PROMIS (reviewed every 90 days):     Referral / Collaboration:  Referral to another professional/service is not indicated at this time..    Anticipated number of session for this episode of care: 9-12 sessions  Anticipation frequency of session: Weekly  Anticipated Duration of each session: 38-52 minutes  Treatment plan will be reviewed in 90 days or when goals have been changed.       MeasurableTreatment Goal(s) related to diagnosis / functional impairment(s)  Goal 1: Patient will develop and implement strategies related to anxiety.    I will know I've met my goal when I will be able to regulate myself.      Objective #A (Patient Action)    Patient  will identify 4 fears / thoughts that contribute to feeling anxious.  Status: New - Date: 10/26/23      Intervention(s)  Therapist will teach emotional recognition/identification.   .    Objective #B  Patient will use relaxation strategies 2 times per day to reduce the physical symptoms of anxiety.  Status: New - Date: 10/26/23      Intervention(s)  Therapist will teach relaxation strategies and mindfulness .    Objective #C  Patient will attend and participate in social or recreational activities   .  Status: New - Date: 10/26/23      Intervention(s)  Therapist will teach skills related to engagement .      Goal 2: Patient will develop and implement strategies related to depression.    I will know I've met my goal when I feel better about myself.      Objective #A (Patient Action)    Status: New - Date: 10/26/23      Patient will Increase interest, engagement, and pleasure in doing things.    Intervention(s)  Therapist will teach emotional regulation skills.   .    Objective #B  Patient will Feel less tired and more energy during the day .    Status: New - Date: 10/26/23      Intervention(s)  Therapist will teach about healthy boundaries.   .    Objective #C  Patient will Decrease frequency and intensity of feeling down, depressed, hopeless.  Status: New - Date: 10/26/23      Intervention(s)  Therapist will teach distraction skills.   .      Patient has reviewed and agreed to the above plan.      Yelitza Schuster, [facility]  October 26, 2023

## 2023-12-11 ENCOUNTER — TRANSFERRED RECORDS (OUTPATIENT)
Dept: MULTI SPECIALTY CLINIC | Facility: CLINIC | Age: 63
End: 2023-12-11

## 2023-12-11 LAB — RETINOPATHY: NORMAL

## 2023-12-14 ENCOUNTER — VIRTUAL VISIT (OUTPATIENT)
Dept: PSYCHOLOGY | Facility: CLINIC | Age: 63
End: 2023-12-14
Payer: COMMERCIAL

## 2023-12-14 DIAGNOSIS — F41.1 GAD (GENERALIZED ANXIETY DISORDER): Primary | ICD-10-CM

## 2023-12-14 DIAGNOSIS — F33.1 MODERATE EPISODE OF RECURRENT MAJOR DEPRESSIVE DISORDER (H): ICD-10-CM

## 2023-12-14 PROCEDURE — 90837 PSYTX W PT 60 MINUTES: CPT | Mod: VID | Performed by: COUNSELOR

## 2023-12-14 NOTE — PROGRESS NOTES
[facility]                                     Progress Note    Patient Name: Karlee Emery  Date: 23         Service Type: Individual      Session Start Time: 200 pm  Session End Time: 300 pm     Session Length: 60 minutes    Session #: 8  Attendees: Client attended alone    Service Modality:  Video Visit:      Provider verified identity through the following two step process.  Patient provided:  Patient  and Patient address    Telemedicine Visit: The patient's condition can be safely assessed and treated via synchronous audio and visual telemedicine encounter.      Reason for Telemedicine Visit: Services only offered telehealth    Originating Site (Patient Location): Patient's home    Distant Site (Provider Location): Provider Remote Setting- Home Office    Consent:  The patient/guardian has verbally consented to: the potential risks and benefits of telemedicine (video visit) versus in person care; bill my insurance or make self-payment for services provided; and responsibility for payment of non-covered services.     Patient would like the video invitation sent by:  My Chart    Mode of Communication:  Video Conference via Amwell    Distant Location (Provider):  Off-site    As the provider I attest to compliance with applicable laws and regulations related to telemedicine.    DATA  Interactive Complexity: No  Crisis: No        Progress Since Last Session (Related to Symptoms / Goals / Homework):   Symptoms: No change    Homework: Partially completed      Episode of Care Goals: Minimal progress - ACTION (Actively working towards change); Intervened by reinforcing change plan / affirming steps taken     Current / Ongoing Stressors and Concerns:   Relationship and setting boundaries with son, managing depression, loss of father in law     Treatment Objective(s) Addressed in This Session:   Identify negative self-talk and behaviors: challenge core beliefs, myths, and  actions       Intervention:   DBT    Assessments completed prior to visit:  The following assessments were completed by patient for this visit:  PHQ9:       4/4/2023     6:58 AM 10/4/2023     3:11 PM 10/12/2023    12:23 PM 11/16/2023     1:52 PM 11/29/2023     7:15 PM 12/7/2023     1:52 PM 12/14/2023     1:23 PM   PHQ-9 SCORE   PHQ-9 Total Score MyChart 7 (Mild depression) 7 (Mild depression) 10 (Moderate depression) 7 (Mild depression) 7 (Mild depression) 8 (Mild depression) 7 (Mild depression)   PHQ-9 Total Score 7 7 10 7 7 8 7     GAD7:       10/28/2020     9:26 AM 12/15/2020     9:40 PM 3/10/2021    10:01 PM 10/19/2021     9:55 AM 4/17/2022     7:58 PM 12/29/2022    12:24 PM 10/5/2023    11:38 AM   DANIEL-7 SCORE   Total Score 3 (minimal anxiety) 4 (minimal anxiety) 1 (minimal anxiety) 5 (mild anxiety) 3 (minimal anxiety) 4 (minimal anxiety) 4 (minimal anxiety)   Total Score 3 4 1 5 3 4 4         ASSESSMENT: Current Emotional / Mental Status (status of significant symptoms):   Risk status (Self / Other harm or suicidal ideation)   Patient denies current fears or concerns for personal safety.   Patient denies current or recent suicidal ideation or behaviors.   Patient denies current or recent homicidal ideation or behaviors.   Patient denies current or recent self injurious behavior or ideation.   Patient denies other safety concerns.   Patient reports there has been no change in risk factors since their last session.     Patient reports there has been no change in protective factors since their last session.     A safety and risk management plan has been developed including: Patient consented to co-developed safety plan on 10/12/23.  Safety and risk management plan was reviewed.   Patient agreed to use safety plan should any safety concerns arise.  A copy was made available to the patient.     Appearance:   Appropriate    Eye Contact:   Good    Psychomotor Behavior: Normal    Attitude:   Cooperative   Interested   Orientation:   All   Speech    Rate / Production: Normal     Volume:  Normal    Mood:    Normal   Affect:    Appropriate    Thought Content:  Clear    Thought Form:  Coherent  Logical    Insight:    Good      Medication Review:   No changes to current psychiatric medication(s)     Medication Compliance:   Yes     Changes in Health Issues:   None reported     Chemical Use Review:   Substance Use: Chemical use reviewed, no active concerns identified      Tobacco Use: No current tobacco use.      Diagnosis:  1. DANIEL (generalized anxiety disorder)    2. Moderate episode of recurrent major depressive disorder (H)                      Collateral Reports Completed:   Not Applicable    PLAN: (Patient Tasks / Therapist Tasks / Other)  Patient to utilize positive self talk during travel to her father in laws  as well as create opportunities for space during this time.    Yelitza Schuster, [facility]                                            Individual Treatment Plan    Patient's Name: Karlee Emery  YOB: 1960    Date of Creation: 10/26/23  Date Treatment Plan Last Reviewed/Revised: NA    DSM5 Diagnoses: 296.32 (F33.1) Major Depressive Disorder, Recurrent Episode, Moderate _ and With anxious distress or 300.02 (F41.1) Generalized Anxiety Disorder  Psychosocial / Contextual Factors: Familial conflict  PROMIS (reviewed every 90 days):     Referral / Collaboration:  Referral to another professional/service is not indicated at this time..    Anticipated number of session for this episode of care: 9-12 sessions  Anticipation frequency of session: Weekly  Anticipated Duration of each session: 38-52 minutes  Treatment plan will be reviewed in 90 days or when goals have been changed.       MeasurableTreatment Goal(s) related to diagnosis / functional impairment(s)  Goal 1: Patient will develop and implement strategies related to anxiety.    I will know I've met my goal when I will be able to regulate  myself.      Objective #A (Patient Action)    Patient will identify 4 fears / thoughts that contribute to feeling anxious.  Status: New - Date: 10/26/23      Intervention(s)  Therapist will teach emotional recognition/identification.   .    Objective #B  Patient will use relaxation strategies 2 times per day to reduce the physical symptoms of anxiety.  Status: New - Date: 10/26/23      Intervention(s)  Therapist will teach relaxation strategies and mindfulness .    Objective #C  Patient will attend and participate in social or recreational activities   .  Status: New - Date: 10/26/23      Intervention(s)  Therapist will teach skills related to engagement .      Goal 2: Patient will develop and implement strategies related to depression.    I will know I've met my goal when I feel better about myself.      Objective #A (Patient Action)    Status: New - Date: 10/26/23      Patient will Increase interest, engagement, and pleasure in doing things.    Intervention(s)  Therapist will teach emotional regulation skills.   .    Objective #B  Patient will Feel less tired and more energy during the day .    Status: New - Date: 10/26/23      Intervention(s)  Therapist will teach about healthy boundaries.   .    Objective #C  Patient will Decrease frequency and intensity of feeling down, depressed, hopeless.  Status: New - Date: 10/26/23      Intervention(s)  Therapist will teach distraction skills.   .      Patient has reviewed and agreed to the above plan.      Yelitza Schuster, [facility]  October 26, 2023

## 2023-12-21 ENCOUNTER — VIRTUAL VISIT (OUTPATIENT)
Dept: PSYCHOLOGY | Facility: CLINIC | Age: 63
End: 2023-12-21
Payer: COMMERCIAL

## 2023-12-21 DIAGNOSIS — F41.1 GAD (GENERALIZED ANXIETY DISORDER): Primary | ICD-10-CM

## 2023-12-21 DIAGNOSIS — F33.1 MODERATE EPISODE OF RECURRENT MAJOR DEPRESSIVE DISORDER (H): ICD-10-CM

## 2023-12-21 PROCEDURE — 90834 PSYTX W PT 45 MINUTES: CPT | Mod: VID | Performed by: COUNSELOR

## 2023-12-21 ASSESSMENT — ANXIETY QUESTIONNAIRES
7. FEELING AFRAID AS IF SOMETHING AWFUL MIGHT HAPPEN: SEVERAL DAYS
2. NOT BEING ABLE TO STOP OR CONTROL WORRYING: SEVERAL DAYS
IF YOU CHECKED OFF ANY PROBLEMS ON THIS QUESTIONNAIRE, HOW DIFFICULT HAVE THESE PROBLEMS MADE IT FOR YOU TO DO YOUR WORK, TAKE CARE OF THINGS AT HOME, OR GET ALONG WITH OTHER PEOPLE: SOMEWHAT DIFFICULT
1. FEELING NERVOUS, ANXIOUS, OR ON EDGE: MORE THAN HALF THE DAYS
GAD7 TOTAL SCORE: 6
4. TROUBLE RELAXING: NOT AT ALL
6. BECOMING EASILY ANNOYED OR IRRITABLE: SEVERAL DAYS
3. WORRYING TOO MUCH ABOUT DIFFERENT THINGS: SEVERAL DAYS
5. BEING SO RESTLESS THAT IT IS HARD TO SIT STILL: NOT AT ALL
GAD7 TOTAL SCORE: 6

## 2023-12-21 NOTE — PROGRESS NOTES
[facility]                                     Progress Note    Patient Name: Karlee Emery  Date: 23         Service Type: Individual      Session Start Time: 200 pm  Session End Time: 250 pm     Session Length: 50 minutes    Session #: 9  Attendees: Client attended alone    Service Modality:  Video Visit:      Provider verified identity through the following two step process.  Patient provided:  Patient  and Patient address    Telemedicine Visit: The patient's condition can be safely assessed and treated via synchronous audio and visual telemedicine encounter.      Reason for Telemedicine Visit: Services only offered telehealth    Originating Site (Patient Location): Patient's home    Distant Site (Provider Location): Provider Remote Setting- Home Office    Consent:  The patient/guardian has verbally consented to: the potential risks and benefits of telemedicine (video visit) versus in person care; bill my insurance or make self-payment for services provided; and responsibility for payment of non-covered services.     Patient would like the video invitation sent by:  My Chart    Mode of Communication:  Video Conference via Amwell    Distant Location (Provider):  Off-site    As the provider I attest to compliance with applicable laws and regulations related to telemedicine.    DATA  Interactive Complexity: No  Crisis: No        Progress Since Last Session (Related to Symptoms / Goals / Homework):   Symptoms: No change    Homework: Partially completed      Episode of Care Goals: Minimal progress - ACTION (Actively working towards change); Intervened by reinforcing change plan / affirming steps taken     Current / Ongoing Stressors and Concerns:   Managing Holiday Season     Treatment Objective(s) Addressed in This Session:   Identify negative self-talk and behaviors: challenge core beliefs, myths, and actions       Intervention:   DBT    Assessments completed prior to visit:  The  following assessments were completed by patient for this visit:  PHQ9:       10/4/2023     3:11 PM 10/12/2023    12:23 PM 11/16/2023     1:52 PM 11/29/2023     7:15 PM 12/7/2023     1:52 PM 12/14/2023     1:23 PM 12/21/2023     1:48 PM   PHQ-9 SCORE   PHQ-9 Total Score MyChart 7 (Mild depression) 10 (Moderate depression) 7 (Mild depression) 7 (Mild depression) 8 (Mild depression) 7 (Mild depression) 7 (Mild depression)   PHQ-9 Total Score 7 10 7 7 8 7 7     GAD7:       12/15/2020     9:40 PM 3/10/2021    10:01 PM 10/19/2021     9:55 AM 4/17/2022     7:58 PM 12/29/2022    12:24 PM 10/5/2023    11:38 AM 12/21/2023     1:49 PM   DANIEL-7 SCORE   Total Score 4 (minimal anxiety) 1 (minimal anxiety) 5 (mild anxiety) 3 (minimal anxiety) 4 (minimal anxiety) 4 (minimal anxiety) 6 (mild anxiety)   Total Score 4 1 5 3 4 4 6         ASSESSMENT: Current Emotional / Mental Status (status of significant symptoms):   Risk status (Self / Other harm or suicidal ideation)   Patient denies current fears or concerns for personal safety.   Patient denies current or recent suicidal ideation or behaviors.   Patient denies current or recent homicidal ideation or behaviors.   Patient denies current or recent self injurious behavior or ideation.   Patient denies other safety concerns.   Patient reports there has been no change in risk factors since their last session.     Patient reports there has been no change in protective factors since their last session.     A safety and risk management plan has been developed including: Patient consented to co-developed safety plan on 10/12/23.  Safety and risk management plan was reviewed.   Patient agreed to use safety plan should any safety concerns arise.  A copy was made available to the patient.     Appearance:   Appropriate    Eye Contact:   Good    Psychomotor Behavior: Normal    Attitude:   Cooperative  Interested   Orientation:   All   Speech    Rate / Production: Normal     Volume:  Normal     Mood:    Normal   Affect:    Appropriate    Thought Content:  Clear    Thought Form:  Coherent  Logical    Insight:    Good      Medication Review:   No changes to current psychiatric medication(s)     Medication Compliance:   Yes     Changes in Health Issues:   None reported     Chemical Use Review:   Substance Use: Chemical use reviewed, no active concerns identified      Tobacco Use: No current tobacco use.      Diagnosis:  1. DANIEL (generalized anxiety disorder)    2. Moderate episode of recurrent major depressive disorder (H)                        Collateral Reports Completed:   Not Applicable    PLAN: (Patient Tasks / Therapist Tasks / Other)  Patient to practice self care until next session and manage expectations with a positive mindset.    Yelitza Schuster, [facility]                                            Individual Treatment Plan    Patient's Name: Karlee Emery  YOB: 1960    Date of Creation: 10/26/23  Date Treatment Plan Last Reviewed/Revised: NA    DSM5 Diagnoses: 296.32 (F33.1) Major Depressive Disorder, Recurrent Episode, Moderate _ and With anxious distress or 300.02 (F41.1) Generalized Anxiety Disorder  Psychosocial / Contextual Factors: Familial conflict  PROMIS (reviewed every 90 days):     Referral / Collaboration:  Referral to another professional/service is not indicated at this time..    Anticipated number of session for this episode of care: 9-12 sessions  Anticipation frequency of session: Weekly  Anticipated Duration of each session: 38-52 minutes  Treatment plan will be reviewed in 90 days or when goals have been changed.       MeasurableTreatment Goal(s) related to diagnosis / functional impairment(s)  Goal 1: Patient will develop and implement strategies related to anxiety.    I will know I've met my goal when I will be able to regulate myself.      Objective #A (Patient Action)    Patient will identify 4 fears / thoughts that contribute to feeling anxious.  Status:  New - Date: 10/26/23      Intervention(s)  Therapist will teach emotional recognition/identification.   .    Objective #B  Patient will use relaxation strategies 2 times per day to reduce the physical symptoms of anxiety.  Status: New - Date: 10/26/23      Intervention(s)  Therapist will teach relaxation strategies and mindfulness .    Objective #C  Patient will attend and participate in social or recreational activities   .  Status: New - Date: 10/26/23      Intervention(s)  Therapist will teach skills related to engagement .      Goal 2: Patient will develop and implement strategies related to depression.    I will know I've met my goal when I feel better about myself.      Objective #A (Patient Action)    Status: New - Date: 10/26/23      Patient will Increase interest, engagement, and pleasure in doing things.    Intervention(s)  Therapist will teach emotional regulation skills.   .    Objective #B  Patient will Feel less tired and more energy during the day .    Status: New - Date: 10/26/23      Intervention(s)  Therapist will teach about healthy boundaries.   .    Objective #C  Patient will Decrease frequency and intensity of feeling down, depressed, hopeless.  Status: New - Date: 10/26/23      Intervention(s)  Therapist will teach distraction skills.   .      Patient has reviewed and agreed to the above plan.      Yelitza Schuster, [facility]  October 26, 2023

## 2024-01-11 ENCOUNTER — VIRTUAL VISIT (OUTPATIENT)
Dept: PSYCHOLOGY | Facility: CLINIC | Age: 64
End: 2024-01-11
Payer: COMMERCIAL

## 2024-01-11 DIAGNOSIS — F41.1 GAD (GENERALIZED ANXIETY DISORDER): Primary | ICD-10-CM

## 2024-01-11 DIAGNOSIS — F33.1 MODERATE EPISODE OF RECURRENT MAJOR DEPRESSIVE DISORDER (H): ICD-10-CM

## 2024-01-11 PROCEDURE — 90837 PSYTX W PT 60 MINUTES: CPT | Mod: 95 | Performed by: COUNSELOR

## 2024-01-11 NOTE — PROGRESS NOTES
[facility]                                     Progress Note    Patient Name: Karlee Emery  Date: 24         Service Type: Individual      Session Start Time: 200 pm  Session End Time: 255 pm     Session Length: 55 minutes    Session #: 10  Attendees: Client attended alone    Service Modality:  Video Visit:      Provider verified identity through the following two step process.  Patient provided:  Patient  and Patient address    Telemedicine Visit: The patient's condition can be safely assessed and treated via synchronous audio and visual telemedicine encounter.      Reason for Telemedicine Visit: Services only offered telehealth    Originating Site (Patient Location): Patient's home    Distant Site (Provider Location): Provider Remote Setting- Home Office    Consent:  The patient/guardian has verbally consented to: the potential risks and benefits of telemedicine (video visit) versus in person care; bill my insurance or make self-payment for services provided; and responsibility for payment of non-covered services.     Patient would like the video invitation sent by:  My Chart    Mode of Communication:  Video Conference via Amwell    Distant Location (Provider):  Off-site    As the provider I attest to compliance with applicable laws and regulations related to telemedicine.    DATA  Interactive Complexity: No  Crisis: No        Progress Since Last Session (Related to Symptoms / Goals / Homework):   Symptoms: No change    Homework: Partially completed      Episode of Care Goals: Minimal progress - ACTION (Actively working towards change); Intervened by reinforcing change plan / affirming steps taken     Current / Ongoing Stressors and Concerns:   Managing Holiday Season     Treatment Objective(s) Addressed in This Session:   Identify negative self-talk and behaviors: challenge core beliefs, myths, and actions       Intervention:   DBT    Assessments completed prior to visit:  The  following assessments were completed by patient for this visit:  PHQ9:       10/12/2023    12:23 PM 11/16/2023     1:52 PM 11/29/2023     7:15 PM 12/7/2023     1:52 PM 12/14/2023     1:23 PM 12/21/2023     1:48 PM 1/10/2024     4:50 PM   PHQ-9 SCORE   PHQ-9 Total Score MyChart 10 (Moderate depression) 7 (Mild depression) 7 (Mild depression) 8 (Mild depression) 7 (Mild depression) 7 (Mild depression) 7 (Mild depression)   PHQ-9 Total Score 10 7 7 8 7 7 7     GAD7:       12/15/2020     9:40 PM 3/10/2021    10:01 PM 10/19/2021     9:55 AM 4/17/2022     7:58 PM 12/29/2022    12:24 PM 10/5/2023    11:38 AM 12/21/2023     1:49 PM   DANIEL-7 SCORE   Total Score 4 (minimal anxiety) 1 (minimal anxiety) 5 (mild anxiety) 3 (minimal anxiety) 4 (minimal anxiety) 4 (minimal anxiety) 6 (mild anxiety)   Total Score 4 1 5 3 4 4 6         ASSESSMENT: Current Emotional / Mental Status (status of significant symptoms):   Risk status (Self / Other harm or suicidal ideation)   Patient denies current fears or concerns for personal safety.   Patient denies current or recent suicidal ideation or behaviors.   Patient denies current or recent homicidal ideation or behaviors.   Patient denies current or recent self injurious behavior or ideation.   Patient denies other safety concerns.   Patient reports there has been no change in risk factors since their last session.     Patient reports there has been no change in protective factors since their last session.     A safety and risk management plan has been developed including: Patient consented to co-developed safety plan on 10/12/23.  Safety and risk management plan was reviewed.   Patient agreed to use safety plan should any safety concerns arise.  A copy was made available to the patient.     Appearance:   Appropriate    Eye Contact:   Good    Psychomotor Behavior: Normal    Attitude:   Cooperative  Interested   Orientation:   All   Speech    Rate / Production: Normal     Volume:  Normal     Mood:    Normal   Affect:    Appropriate    Thought Content:  Clear    Thought Form:  Coherent  Logical    Insight:    Good      Medication Review:   No changes to current psychiatric medication(s)     Medication Compliance:   Yes     Changes in Health Issues:   None reported     Chemical Use Review:   Substance Use: Chemical use reviewed, no active concerns identified      Tobacco Use: No current tobacco use.      Diagnosis:  1. DANIEL (generalized anxiety disorder)    2. Moderate episode of recurrent major depressive disorder (H)                          Collateral Reports Completed:   Not Applicable    PLAN: (Patient Tasks / Therapist Tasks / Other)  Patient to practice self care until next session.  Patient to continue to gain insight into familial illness.    Yelitza Schuster, [facility]                                            Individual Treatment Plan    Patient's Name: Karlee Emery  YOB: 1960    Date of Creation: 10/26/23  Date Treatment Plan Last Reviewed/Revised: NA    DSM5 Diagnoses: 296.32 (F33.1) Major Depressive Disorder, Recurrent Episode, Moderate _ and With anxious distress or 300.02 (F41.1) Generalized Anxiety Disorder  Psychosocial / Contextual Factors: Familial conflict  PROMIS (reviewed every 90 days):     Referral / Collaboration:  Referral to another professional/service is not indicated at this time..    Anticipated number of session for this episode of care: 9-12 sessions  Anticipation frequency of session: Weekly  Anticipated Duration of each session: 38-52 minutes  Treatment plan will be reviewed in 90 days or when goals have been changed.       MeasurableTreatment Goal(s) related to diagnosis / functional impairment(s)  Goal 1: Patient will develop and implement strategies related to anxiety.    I will know I've met my goal when I will be able to regulate myself.      Objective #A (Patient Action)    Patient will identify 4 fears / thoughts that contribute to feeling  anxious.  Status: New - Date: 10/26/23      Intervention(s)  Therapist will teach emotional recognition/identification.   .    Objective #B  Patient will use relaxation strategies 2 times per day to reduce the physical symptoms of anxiety.  Status: New - Date: 10/26/23      Intervention(s)  Therapist will teach relaxation strategies and mindfulness .    Objective #C  Patient will attend and participate in social or recreational activities   .  Status: New - Date: 10/26/23      Intervention(s)  Therapist will teach skills related to engagement .      Goal 2: Patient will develop and implement strategies related to depression.    I will know I've met my goal when I feel better about myself.      Objective #A (Patient Action)    Status: New - Date: 10/26/23      Patient will Increase interest, engagement, and pleasure in doing things.    Intervention(s)  Therapist will teach emotional regulation skills.   .    Objective #B  Patient will Feel less tired and more energy during the day .    Status: New - Date: 10/26/23      Intervention(s)  Therapist will teach about healthy boundaries.   .    Objective #C  Patient will Decrease frequency and intensity of feeling down, depressed, hopeless.  Status: New - Date: 10/26/23      Intervention(s)  Therapist will teach distraction skills.   .      Patient has reviewed and agreed to the above plan.      Yelitza Schuster, [facility]  October 26, 2023

## 2024-01-18 ENCOUNTER — VIRTUAL VISIT (OUTPATIENT)
Dept: PSYCHOLOGY | Facility: CLINIC | Age: 64
End: 2024-01-18
Payer: COMMERCIAL

## 2024-01-18 DIAGNOSIS — F33.1 MODERATE EPISODE OF RECURRENT MAJOR DEPRESSIVE DISORDER (H): ICD-10-CM

## 2024-01-18 DIAGNOSIS — F41.1 GAD (GENERALIZED ANXIETY DISORDER): Primary | ICD-10-CM

## 2024-01-18 PROCEDURE — 90837 PSYTX W PT 60 MINUTES: CPT | Mod: 95 | Performed by: COUNSELOR

## 2024-01-18 NOTE — PROGRESS NOTES
[facility]                                     Progress Note    Patient Name: Karlee Emery  Date: 24         Service Type: Individual      Session Start Time: 200 pm  Session End Time: 255 pm     Session Length: 55 minutes    Session #: 11  Attendees: Client attended alone    Service Modality:  Video Visit:      Provider verified identity through the following two step process.  Patient provided:  Patient  and Patient address    Telemedicine Visit: The patient's condition can be safely assessed and treated via synchronous audio and visual telemedicine encounter.      Reason for Telemedicine Visit: Services only offered telehealth    Originating Site (Patient Location): Patient's home    Distant Site (Provider Location): Provider Remote Setting- Home Office    Consent:  The patient/guardian has verbally consented to: the potential risks and benefits of telemedicine (video visit) versus in person care; bill my insurance or make self-payment for services provided; and responsibility for payment of non-covered services.     Patient would like the video invitation sent by:  My Chart    Mode of Communication:  Video Conference via Amwell    Distant Location (Provider):  Off-site    As the provider I attest to compliance with applicable laws and regulations related to telemedicine.    DATA  Interactive Complexity: No  Crisis: No        Progress Since Last Session (Related to Symptoms / Goals / Homework):   Symptoms: No change    Homework: Partially completed      Episode of Care Goals: Minimal progress - ACTION (Actively working towards change); Intervened by reinforcing change plan / affirming steps taken     Current / Ongoing Stressors and Concerns:   Aging concerns with mother     Treatment Objective(s) Addressed in This Session:   Identify negative self-talk and behaviors: challenge core beliefs, myths, and actions       Intervention:   DBT    Assessments completed prior to  visit:  The following assessments were completed by patient for this visit:  PHQ9:       11/16/2023     1:52 PM 11/29/2023     7:15 PM 12/7/2023     1:52 PM 12/14/2023     1:23 PM 12/21/2023     1:48 PM 1/10/2024     4:50 PM 1/18/2024     1:50 PM   PHQ-9 SCORE   PHQ-9 Total Score MyChart 7 (Mild depression) 7 (Mild depression) 8 (Mild depression) 7 (Mild depression) 7 (Mild depression) 7 (Mild depression) 7 (Mild depression)   PHQ-9 Total Score 7 7 8 7 7 7 7     GAD7:       12/15/2020     9:40 PM 3/10/2021    10:01 PM 10/19/2021     9:55 AM 4/17/2022     7:58 PM 12/29/2022    12:24 PM 10/5/2023    11:38 AM 12/21/2023     1:49 PM   DANIEL-7 SCORE   Total Score 4 (minimal anxiety) 1 (minimal anxiety) 5 (mild anxiety) 3 (minimal anxiety) 4 (minimal anxiety) 4 (minimal anxiety) 6 (mild anxiety)   Total Score 4 1 5 3 4 4 6          10/10/2023    12:59 PM 1/10/2024     4:52 PM 1/18/2024     1:52 PM   PROMIS-10 Scores Only   Global Mental Health Score 6 9 8    8   Global Physical Health Score 14 15 13    13   PROMIS TOTAL - SUBSCORES 20 24 21    21        ASSESSMENT: Current Emotional / Mental Status (status of significant symptoms):   Risk status (Self / Other harm or suicidal ideation)   Patient denies current fears or concerns for personal safety.   Patient denies current or recent suicidal ideation or behaviors.   Patient denies current or recent homicidal ideation or behaviors.   Patient denies current or recent self injurious behavior or ideation.   Patient denies other safety concerns.   Patient reports there has been no change in risk factors since their last session.     Patient reports there has been no change in protective factors since their last session.     A safety and risk management plan has been developed including: Patient consented to co-developed safety plan on 10/12/23.  Safety and risk management plan was reviewed.   Patient agreed to use safety plan should any safety concerns arise.  A copy was made  available to the patient.     Appearance:   Appropriate    Eye Contact:   Good    Psychomotor Behavior: Normal    Attitude:   Cooperative  Interested   Orientation:   All   Speech    Rate / Production: Normal     Volume:  Normal    Mood:    Normal   Affect:    Appropriate    Thought Content:  Clear    Thought Form:  Coherent  Logical    Insight:    Good      Medication Review:   No changes to current psychiatric medication(s)     Medication Compliance:   Yes     Changes in Health Issues:   None reported     Chemical Use Review:   Substance Use: Chemical use reviewed, no active concerns identified      Tobacco Use: No current tobacco use.      Diagnosis:  1. DANIEL (generalized anxiety disorder)    2. Moderate episode of recurrent major depressive disorder (H)                            Collateral Reports Completed:   Not Applicable    PLAN: (Patient Tasks / Therapist Tasks / Other)  Patient to reflect on thoughts of letting family down in regards to parenting until next session.    Yelitza Schuster [facility]                                            Individual Treatment Plan    Patient's Name: Karlee Emery  YOB: 1960    Date of Creation: 10/26/23  Date Treatment Plan Last Reviewed/Revised: NA    DSM5 Diagnoses: 296.32 (F33.1) Major Depressive Disorder, Recurrent Episode, Moderate _ and With anxious distress or 300.02 (F41.1) Generalized Anxiety Disorder  Psychosocial / Contextual Factors: Familial conflict  PROMIS (reviewed every 90 days): completed 1/18/24    Referral / Collaboration:  Referral to another professional/service is not indicated at this time..    Anticipated number of session for this episode of care: 9-12 sessions  Anticipation frequency of session: Weekly  Anticipated Duration of each session: 38-52 minutes  Treatment plan will be reviewed in 90 days or when goals have been changed.       MeasurableTreatment Goal(s) related to diagnosis / functional impairment(s)  Goal 1: Patient will  develop and implement strategies related to anxiety.    I will know I've met my goal when I will be able to regulate myself.      Objective #A (Patient Action)    Patient will identify 4 fears / thoughts that contribute to feeling anxious.  Status: New - Date: 10/26/23      Intervention(s)  Therapist will teach emotional recognition/identification.   .    Objective #B  Patient will use relaxation strategies 2 times per day to reduce the physical symptoms of anxiety.  Status: New - Date: 10/26/23      Intervention(s)  Therapist will teach relaxation strategies and mindfulness .    Objective #C  Patient will attend and participate in social or recreational activities   .  Status: New - Date: 10/26/23      Intervention(s)  Therapist will teach skills related to engagement .      Goal 2: Patient will develop and implement strategies related to depression.    I will know I've met my goal when I feel better about myself.      Objective #A (Patient Action)    Status: New - Date: 10/26/23      Patient will Increase interest, engagement, and pleasure in doing things.    Intervention(s)  Therapist will teach emotional regulation skills.   .    Objective #B  Patient will Feel less tired and more energy during the day .    Status: New - Date: 10/26/23      Intervention(s)  Therapist will teach about healthy boundaries.   .    Objective #C  Patient will Decrease frequency and intensity of feeling down, depressed, hopeless.  Status: New - Date: 10/26/23      Intervention(s)  Therapist will teach distraction skills.   .      Patient has reviewed and agreed to the above plan.      Yelitza Schuster, [facility]  October 26, 2023

## 2024-01-25 ENCOUNTER — VIRTUAL VISIT (OUTPATIENT)
Dept: PSYCHOLOGY | Facility: CLINIC | Age: 64
End: 2024-01-25
Payer: COMMERCIAL

## 2024-01-25 DIAGNOSIS — F33.1 MODERATE EPISODE OF RECURRENT MAJOR DEPRESSIVE DISORDER (H): ICD-10-CM

## 2024-01-25 DIAGNOSIS — F41.1 GAD (GENERALIZED ANXIETY DISORDER): Primary | ICD-10-CM

## 2024-01-25 PROCEDURE — 90837 PSYTX W PT 60 MINUTES: CPT | Mod: 95 | Performed by: COUNSELOR

## 2024-01-25 ASSESSMENT — PATIENT HEALTH QUESTIONNAIRE - PHQ9
SUM OF ALL RESPONSES TO PHQ QUESTIONS 1-9: 8
SUM OF ALL RESPONSES TO PHQ QUESTIONS 1-9: 8
10. IF YOU CHECKED OFF ANY PROBLEMS, HOW DIFFICULT HAVE THESE PROBLEMS MADE IT FOR YOU TO DO YOUR WORK, TAKE CARE OF THINGS AT HOME, OR GET ALONG WITH OTHER PEOPLE: SOMEWHAT DIFFICULT

## 2024-01-25 NOTE — PROGRESS NOTES
[facility]                                     Progress Note    Patient Name: Karlee Emery  Date: 24         Service Type: Individual      Session Start Time: 200 pm  Session End Time: 258 pm     Session Length: 58 minutes    Session #: 12  Attendees: Client attended alone    Service Modality:  Video Visit:      Provider verified identity through the following two step process.  Patient provided:  Patient  and Patient address    Telemedicine Visit: The patient's condition can be safely assessed and treated via synchronous audio and visual telemedicine encounter.      Reason for Telemedicine Visit: Services only offered telehealth    Originating Site (Patient Location): Patient's home    Distant Site (Provider Location): Provider Remote Setting- Home Office    Consent:  The patient/guardian has verbally consented to: the potential risks and benefits of telemedicine (video visit) versus in person care; bill my insurance or make self-payment for services provided; and responsibility for payment of non-covered services.     Patient would like the video invitation sent by:  My Chart    Mode of Communication:  Video Conference via Amwell    Distant Location (Provider):  Off-site    As the provider I attest to compliance with applicable laws and regulations related to telemedicine.    DATA  Interactive Complexity: No  Crisis: No        Progress Since Last Session (Related to Symptoms / Goals / Homework):   Symptoms: Improving    Homework: Partially completed      Episode of Care Goals: Minimal progress - ACTION (Actively working towards change); Intervened by reinforcing change plan / affirming steps taken     Current / Ongoing Stressors and Concerns:   Aging concerns with mother     Treatment Objective(s) Addressed in This Session:   Identify negative self-talk and behaviors: challenge core beliefs, myths, and actions       Intervention:   DBT    Assessments completed prior to  visit:  The following assessments were completed by patient for this visit:  PHQ9:       11/29/2023     7:15 PM 12/7/2023     1:52 PM 12/14/2023     1:23 PM 12/21/2023     1:48 PM 1/10/2024     4:50 PM 1/18/2024     1:50 PM 1/25/2024     1:58 PM   PHQ-9 SCORE   PHQ-9 Total Score MyChart 7 (Mild depression) 8 (Mild depression) 7 (Mild depression) 7 (Mild depression) 7 (Mild depression) 7 (Mild depression) 8 (Mild depression)   PHQ-9 Total Score 7 8 7 7 7 7 8     GAD7:       12/15/2020     9:40 PM 3/10/2021    10:01 PM 10/19/2021     9:55 AM 4/17/2022     7:58 PM 12/29/2022    12:24 PM 10/5/2023    11:38 AM 12/21/2023     1:49 PM   DANIEL-7 SCORE   Total Score 4 (minimal anxiety) 1 (minimal anxiety) 5 (mild anxiety) 3 (minimal anxiety) 4 (minimal anxiety) 4 (minimal anxiety) 6 (mild anxiety)   Total Score 4 1 5 3 4 4 6          10/10/2023    12:59 PM 1/10/2024     4:52 PM 1/18/2024     1:52 PM   PROMIS-10 Scores Only   Global Mental Health Score 6 9 8    8   Global Physical Health Score 14 15 13    13   PROMIS TOTAL - SUBSCORES 20 24 21    21        ASSESSMENT: Current Emotional / Mental Status (status of significant symptoms):   Risk status (Self / Other harm or suicidal ideation)   Patient denies current fears or concerns for personal safety.   Patient denies current or recent suicidal ideation or behaviors.   Patient denies current or recent homicidal ideation or behaviors.   Patient denies current or recent self injurious behavior or ideation.   Patient denies other safety concerns.   Patient reports there has been no change in risk factors since their last session.     Patient reports there has been no change in protective factors since their last session.     A safety and risk management plan has been developed including: Patient consented to co-developed safety plan on 10/12/23.  Safety and risk management plan was reviewed.   Patient agreed to use safety plan should any safety concerns arise.  A copy was made  available to the patient.     Appearance:   Appropriate    Eye Contact:   Good    Psychomotor Behavior: Normal    Attitude:   Cooperative  Interested   Orientation:   All   Speech    Rate / Production: Normal     Volume:  Normal    Mood:    Normal   Affect:    Appropriate    Thought Content:  Clear    Thought Form:  Coherent  Logical    Insight:    Good      Medication Review:   No changes to current psychiatric medication(s)     Medication Compliance:   Yes     Changes in Health Issues:   None reported     Chemical Use Review:   Substance Use: Chemical use reviewed, no active concerns identified      Tobacco Use: No current tobacco use.      Diagnosis:  1. DANIEL (generalized anxiety disorder)    2. Moderate episode of recurrent major depressive disorder (H)                              Collateral Reports Completed:   Not Applicable    PLAN: (Patient Tasks / Therapist Tasks / Other)  Patient to reflect on thoughts of letting family down in regards to parenting until next session.    Yelitza Schuster [facility]                                            Individual Treatment Plan    Patient's Name: Karlee Emery  YOB: 1960    Date of Creation: 10/26/23  Date Treatment Plan Last Reviewed/Revised: 1/25/24    DSM5 Diagnoses: 296.32 (F33.1) Major Depressive Disorder, Recurrent Episode, Moderate _ and With anxious distress or 300.02 (F41.1) Generalized Anxiety Disorder  Psychosocial / Contextual Factors: Familial conflict  PROMIS (reviewed every 90 days): completed 1/18/24    Referral / Collaboration:  Referral to another professional/service is not indicated at this time..    Anticipated number of session for this episode of care: 9-12 sessions  Anticipation frequency of session: Weekly  Anticipated Duration of each session: 38-52 minutes  Treatment plan will be reviewed in 90 days or when goals have been changed.       MeasurableTreatment Goal(s) related to diagnosis / functional impairment(s)  Goal 1: Patient  will develop and implement strategies related to anxiety.    I will know I've met my goal when I will be able to regulate myself.      Objective #A (Patient Action)    Patient will identify 4 fears / thoughts that contribute to feeling anxious.  Status: Completed 1/25/24    Intervention(s)  Therapist will teach emotional recognition/identification.   .    Objective #B  Patient will use relaxation strategies 2 times per day to reduce the physical symptoms of anxiety.  Status: Continued 1/25/24    Intervention(s)  Therapist will teach relaxation strategies and mindfulness .    Objective #C  Patient will attend and participate in social or recreational activities   .  Status: Continued 1/25/24    Intervention(s)  Therapist will teach skills related to engagement .      Goal 2: Patient will develop and implement strategies related to depression.    I will know I've met my goal when I feel better about myself.      Objective #A     Status:Continued 1/25/24    Patient will Increase interest, engagement, and pleasure in doing things.    Intervention(s)  Therapist will teach emotional regulation skills.   .    Objective #B  Patient will Feel less tired and more energy during the day .    Status: Continued 1/25/24    Intervention(s)  Therapist will teach about healthy boundaries.   .    Objective #C  Patient will Decrease frequency and intensity of feeling down, depressed, hopeless.  Status: Continued 1/25/24    Intervention(s)  Therapist will teach distraction skills.   .      Patient has reviewed and agreed to the above plan.      Yelitza Schuster, [facility]  October 26, 2023

## 2024-02-01 ENCOUNTER — VIRTUAL VISIT (OUTPATIENT)
Dept: PSYCHOLOGY | Facility: CLINIC | Age: 64
End: 2024-02-01
Payer: COMMERCIAL

## 2024-02-01 DIAGNOSIS — F41.1 GAD (GENERALIZED ANXIETY DISORDER): Primary | ICD-10-CM

## 2024-02-01 DIAGNOSIS — F33.1 MODERATE EPISODE OF RECURRENT MAJOR DEPRESSIVE DISORDER (H): ICD-10-CM

## 2024-02-01 PROCEDURE — 90837 PSYTX W PT 60 MINUTES: CPT | Mod: 95 | Performed by: COUNSELOR

## 2024-02-01 NOTE — PROGRESS NOTES
[facility]                                     Progress Note    Patient Name: Karlee Emery  Date: 24         Service Type: Individual      Session Start Time: 200 pm  Session End Time: 300 pm     Session Length: 60 minutes    Session #: 13  Attendees: Client attended alone    Service Modality:  Video Visit:      Provider verified identity through the following two step process.  Patient provided:  Patient  and Patient address    Telemedicine Visit: The patient's condition can be safely assessed and treated via synchronous audio and visual telemedicine encounter.      Reason for Telemedicine Visit: Services only offered telehealth    Originating Site (Patient Location): Patient's home    Distant Site (Provider Location): Provider Remote Setting- Home Office    Consent:  The patient/guardian has verbally consented to: the potential risks and benefits of telemedicine (video visit) versus in person care; bill my insurance or make self-payment for services provided; and responsibility for payment of non-covered services.     Patient would like the video invitation sent by:  My Chart    Mode of Communication:  Video Conference via Amwell    Distant Location (Provider):  Off-site    As the provider I attest to compliance with applicable laws and regulations related to telemedicine.    DATA  Interactive Complexity: No  Crisis: No        Progress Since Last Session (Related to Symptoms / Goals / Homework):   Symptoms: Improving    Homework: Partially completed      Episode of Care Goals: Minimal progress - ACTION (Actively working towards change); Intervened by reinforcing change plan / affirming steps taken     Current / Ongoing Stressors and Concerns:   Childhood experiences and feelings of shame     Treatment Objective(s) Addressed in This Session:   Identify negative self-talk and behaviors: challenge core beliefs, myths, and actions       Intervention:   DBT    Assessments completed  prior to visit:  The following assessments were completed by patient for this visit:  PHQ9:       11/29/2023     7:15 PM 12/7/2023     1:52 PM 12/14/2023     1:23 PM 12/21/2023     1:48 PM 1/10/2024     4:50 PM 1/18/2024     1:50 PM 1/25/2024     1:58 PM   PHQ-9 SCORE   PHQ-9 Total Score MyChart 7 (Mild depression) 8 (Mild depression) 7 (Mild depression) 7 (Mild depression) 7 (Mild depression) 7 (Mild depression) 8 (Mild depression)   PHQ-9 Total Score 7 8 7 7 7 7 8     GAD7:       12/15/2020     9:40 PM 3/10/2021    10:01 PM 10/19/2021     9:55 AM 4/17/2022     7:58 PM 12/29/2022    12:24 PM 10/5/2023    11:38 AM 12/21/2023     1:49 PM   DANIEL-7 SCORE   Total Score 4 (minimal anxiety) 1 (minimal anxiety) 5 (mild anxiety) 3 (minimal anxiety) 4 (minimal anxiety) 4 (minimal anxiety) 6 (mild anxiety)   Total Score 4 1 5 3 4 4 6          10/10/2023    12:59 PM 1/10/2024     4:52 PM 1/18/2024     1:52 PM   PROMIS-10 Scores Only   Global Mental Health Score 6 9 8    8   Global Physical Health Score 14 15 13    13   PROMIS TOTAL - SUBSCORES 20 24 21    21        ASSESSMENT: Current Emotional / Mental Status (status of significant symptoms):   Risk status (Self / Other harm or suicidal ideation)   Patient denies current fears or concerns for personal safety.   Patient denies current or recent suicidal ideation or behaviors.   Patient denies current or recent homicidal ideation or behaviors.   Patient denies current or recent self injurious behavior or ideation.   Patient denies other safety concerns.   Patient reports there has been no change in risk factors since their last session.     Patient reports there has been no change in protective factors since their last session.     A safety and risk management plan has been developed including: Patient consented to co-developed safety plan on 10/12/23.  Safety and risk management plan was reviewed.   Patient agreed to use safety plan should any safety concerns arise.  A copy was  made available to the patient.     Appearance:   Appropriate    Eye Contact:   Good    Psychomotor Behavior: Normal    Attitude:   Cooperative  Interested   Orientation:   All   Speech    Rate / Production: Normal     Volume:  Normal    Mood:    Normal   Affect:    Appropriate    Thought Content:  Clear    Thought Form:  Coherent  Logical    Insight:    Good      Medication Review:   No changes to current psychiatric medication(s)     Medication Compliance:   Yes     Changes in Health Issues:   None reported     Chemical Use Review:   Substance Use: Chemical use reviewed, no active concerns identified      Tobacco Use: No current tobacco use.      Diagnosis:  1. DANIEL (generalized anxiety disorder)    2. Moderate episode of recurrent major depressive disorder (H)                                Collateral Reports Completed:   Not Applicable    PLAN: (Patient Tasks / Therapist Tasks / Other)  Patient to explore feelings of shame from father until next session.    Yelitza Schuster, [facility]                                            Individual Treatment Plan    Patient's Name: Karlee Emery  YOB: 1960    Date of Creation: 10/26/23  Date Treatment Plan Last Reviewed/Revised: 1/25/24    DSM5 Diagnoses: 296.32 (F33.1) Major Depressive Disorder, Recurrent Episode, Moderate _ and With anxious distress or 300.02 (F41.1) Generalized Anxiety Disorder  Psychosocial / Contextual Factors: Familial conflict  PROMIS (reviewed every 90 days): completed 1/18/24    Referral / Collaboration:  Referral to another professional/service is not indicated at this time..    Anticipated number of session for this episode of care: 9-12 sessions  Anticipation frequency of session: Weekly  Anticipated Duration of each session: 38-52 minutes  Treatment plan will be reviewed in 90 days or when goals have been changed.       MeasurableTreatment Goal(s) related to diagnosis / functional impairment(s)  Goal 1: Patient will develop and  implement strategies related to anxiety.    I will know I've met my goal when I will be able to regulate myself.      Objective #A (Patient Action)    Patient will identify 4 fears / thoughts that contribute to feeling anxious.  Status: Completed 1/25/24    Intervention(s)  Therapist will teach emotional recognition/identification.   .    Objective #B  Patient will use relaxation strategies 2 times per day to reduce the physical symptoms of anxiety.  Status: Continued 1/25/24    Intervention(s)  Therapist will teach relaxation strategies and mindfulness .    Objective #C  Patient will attend and participate in social or recreational activities   .  Status: Continued 1/25/24    Intervention(s)  Therapist will teach skills related to engagement .      Goal 2: Patient will develop and implement strategies related to depression.    I will know I've met my goal when I feel better about myself.      Objective #A     Status:Continued 1/25/24    Patient will Increase interest, engagement, and pleasure in doing things.    Intervention(s)  Therapist will teach emotional regulation skills.   .    Objective #B  Patient will Feel less tired and more energy during the day .    Status: Continued 1/25/24    Intervention(s)  Therapist will teach about healthy boundaries.   .    Objective #C  Patient will Decrease frequency and intensity of feeling down, depressed, hopeless.  Status: Continued 1/25/24    Intervention(s)  Therapist will teach distraction skills.   .      Patient has reviewed and agreed to the above plan.      Yelitza Schuster, Psychiatric  October 26, 2023

## 2024-02-08 ENCOUNTER — VIRTUAL VISIT (OUTPATIENT)
Dept: PSYCHOLOGY | Facility: CLINIC | Age: 64
End: 2024-02-08
Payer: COMMERCIAL

## 2024-02-08 DIAGNOSIS — F41.1 GAD (GENERALIZED ANXIETY DISORDER): Primary | ICD-10-CM

## 2024-02-08 DIAGNOSIS — F33.1 MODERATE EPISODE OF RECURRENT MAJOR DEPRESSIVE DISORDER (H): ICD-10-CM

## 2024-02-08 PROCEDURE — 90837 PSYTX W PT 60 MINUTES: CPT | Mod: 95 | Performed by: COUNSELOR

## 2024-02-09 NOTE — PROGRESS NOTES
[facility]                                     Progress Note    Patient Name: Karlee Emery  Date: 24         Service Type: Individual      Session Start Time: 200 pm  Session End Time: 253 pm     Session Length: 53 minutes    Session #: 14  Attendees: Client attended alone    Service Modality:  Video Visit:      Provider verified identity through the following two step process.  Patient provided:  Patient  and Patient address    Telemedicine Visit: The patient's condition can be safely assessed and treated via synchronous audio and visual telemedicine encounter.      Reason for Telemedicine Visit: Services only offered telehealth    Originating Site (Patient Location): Patient's home    Distant Site (Provider Location): Provider Remote Setting- Home Office    Consent:  The patient/guardian has verbally consented to: the potential risks and benefits of telemedicine (video visit) versus in person care; bill my insurance or make self-payment for services provided; and responsibility for payment of non-covered services.     Patient would like the video invitation sent by:  My Chart    Mode of Communication:  Video Conference via Amwell    Distant Location (Provider):  Off-site    As the provider I attest to compliance with applicable laws and regulations related to telemedicine.    DATA  Interactive Complexity: No  Crisis: No        Progress Since Last Session (Related to Symptoms / Goals / Homework):   Symptoms: Improving    Homework: Partially completed      Episode of Care Goals: Minimal progress - ACTION (Actively working towards change); Intervened by reinforcing change plan / affirming steps taken     Current / Ongoing Stressors and Concerns:   Managing moms aging health issues     Treatment Objective(s) Addressed in This Session:   Identify negative self-talk and behaviors: challenge core beliefs, myths, and actions       Intervention:   DBT    Assessments completed prior to  visit:  The following assessments were completed by patient for this visit:  PHQ9:       11/29/2023     7:15 PM 12/7/2023     1:52 PM 12/14/2023     1:23 PM 12/21/2023     1:48 PM 1/10/2024     4:50 PM 1/18/2024     1:50 PM 1/25/2024     1:58 PM   PHQ-9 SCORE   PHQ-9 Total Score MyChart 7 (Mild depression) 8 (Mild depression) 7 (Mild depression) 7 (Mild depression) 7 (Mild depression) 7 (Mild depression) 8 (Mild depression)   PHQ-9 Total Score 7 8 7 7 7 7 8     GAD7:       12/15/2020     9:40 PM 3/10/2021    10:01 PM 10/19/2021     9:55 AM 4/17/2022     7:58 PM 12/29/2022    12:24 PM 10/5/2023    11:38 AM 12/21/2023     1:49 PM   DANIEL-7 SCORE   Total Score 4 (minimal anxiety) 1 (minimal anxiety) 5 (mild anxiety) 3 (minimal anxiety) 4 (minimal anxiety) 4 (minimal anxiety) 6 (mild anxiety)   Total Score 4 1 5 3 4 4 6          10/10/2023    12:59 PM 1/10/2024     4:52 PM 1/18/2024     1:52 PM   PROMIS-10 Scores Only   Global Mental Health Score 6 9 8    8   Global Physical Health Score 14 15 13    13   PROMIS TOTAL - SUBSCORES 20 24 21    21        ASSESSMENT: Current Emotional / Mental Status (status of significant symptoms):   Risk status (Self / Other harm or suicidal ideation)   Patient denies current fears or concerns for personal safety.   Patient denies current or recent suicidal ideation or behaviors.   Patient denies current or recent homicidal ideation or behaviors.   Patient denies current or recent self injurious behavior or ideation.   Patient denies other safety concerns.   Patient reports there has been no change in risk factors since their last session.     Patient reports there has been no change in protective factors since their last session.     A safety and risk management plan has been developed including: Patient consented to co-developed safety plan on 10/12/23.  Safety and risk management plan was reviewed.   Patient agreed to use safety plan should any safety concerns arise.  A copy was made  available to the patient.     Appearance:   Appropriate    Eye Contact:   Good    Psychomotor Behavior: Normal    Attitude:   Cooperative  Interested   Orientation:   All   Speech    Rate / Production: Normal     Volume:  Normal    Mood:    Normal   Affect:    Appropriate    Thought Content:  Clear    Thought Form:  Coherent  Logical    Insight:    Good      Medication Review:   No changes to current psychiatric medication(s)     Medication Compliance:   Yes     Changes in Health Issues:   None reported     Chemical Use Review:   Substance Use: Chemical use reviewed, no active concerns identified      Tobacco Use: No current tobacco use.      Diagnosis:  1. DANIEL (generalized anxiety disorder)    2. Moderate episode of recurrent major depressive disorder (H)                                  Collateral Reports Completed:   Not Applicable    PLAN: (Patient Tasks / Therapist Tasks / Other)  Patient to practice staying in the present moment until next session in relationships with their son.    Yelitza Schuster, [facility]                                            Individual Treatment Plan    Patient's Name: Karlee Emery  YOB: 1960    Date of Creation: 10/26/23  Date Treatment Plan Last Reviewed/Revised: 1/25/24    DSM5 Diagnoses: 296.32 (F33.1) Major Depressive Disorder, Recurrent Episode, Moderate _ and With anxious distress or 300.02 (F41.1) Generalized Anxiety Disorder  Psychosocial / Contextual Factors: Familial conflict  PROMIS (reviewed every 90 days): completed 1/18/24    Referral / Collaboration:  Referral to another professional/service is not indicated at this time..    Anticipated number of session for this episode of care: 9-12 sessions  Anticipation frequency of session: Weekly  Anticipated Duration of each session: 38-52 minutes  Treatment plan will be reviewed in 90 days or when goals have been changed.       MeasurableTreatment Goal(s) related to diagnosis / functional impairment(s)  Goal 1:  Patient will develop and implement strategies related to anxiety.    I will know I've met my goal when I will be able to regulate myself.      Objective #A (Patient Action)    Patient will identify 4 fears / thoughts that contribute to feeling anxious.  Status: Completed 1/25/24    Intervention(s)  Therapist will teach emotional recognition/identification.   .    Objective #B  Patient will use relaxation strategies 2 times per day to reduce the physical symptoms of anxiety.  Status: Continued 1/25/24    Intervention(s)  Therapist will teach relaxation strategies and mindfulness .    Objective #C  Patient will attend and participate in social or recreational activities   .  Status: Continued 1/25/24    Intervention(s)  Therapist will teach skills related to engagement .      Goal 2: Patient will develop and implement strategies related to depression.    I will know I've met my goal when I feel better about myself.      Objective #A     Status:Continued 1/25/24    Patient will Increase interest, engagement, and pleasure in doing things.    Intervention(s)  Therapist will teach emotional regulation skills.   .    Objective #B  Patient will Feel less tired and more energy during the day .    Status: Continued 1/25/24    Intervention(s)  Therapist will teach about healthy boundaries.   .    Objective #C  Patient will Decrease frequency and intensity of feeling down, depressed, hopeless.  Status: Continued 1/25/24    Intervention(s)  Therapist will teach distraction skills.   .      Patient has reviewed and agreed to the above plan.      Yelitza Schuster, [facility]  October 26, 2023

## 2024-02-15 ENCOUNTER — VIRTUAL VISIT (OUTPATIENT)
Dept: PSYCHOLOGY | Facility: CLINIC | Age: 64
End: 2024-02-15
Payer: COMMERCIAL

## 2024-02-15 DIAGNOSIS — F41.1 GAD (GENERALIZED ANXIETY DISORDER): Primary | ICD-10-CM

## 2024-02-15 DIAGNOSIS — F33.1 MODERATE EPISODE OF RECURRENT MAJOR DEPRESSIVE DISORDER (H): ICD-10-CM

## 2024-02-15 PROCEDURE — 90837 PSYTX W PT 60 MINUTES: CPT | Mod: 95 | Performed by: COUNSELOR

## 2024-02-15 ASSESSMENT — PATIENT HEALTH QUESTIONNAIRE - PHQ9
10. IF YOU CHECKED OFF ANY PROBLEMS, HOW DIFFICULT HAVE THESE PROBLEMS MADE IT FOR YOU TO DO YOUR WORK, TAKE CARE OF THINGS AT HOME, OR GET ALONG WITH OTHER PEOPLE: SOMEWHAT DIFFICULT
SUM OF ALL RESPONSES TO PHQ QUESTIONS 1-9: 13
SUM OF ALL RESPONSES TO PHQ QUESTIONS 1-9: 13

## 2024-02-15 NOTE — PROGRESS NOTES
[facility]                                     Progress Note    Patient Name: Karlee Emery  Date: 2/15/24         Service Type: Individual      Session Start Time: 200 pm  Session End Time: 255 pm     Session Length: 55 minutes    Session #: 15  Attendees: Client attended alone    Service Modality:  Video Visit:      Provider verified identity through the following two step process.  Patient provided:  Patient  and Patient address    Telemedicine Visit: The patient's condition can be safely assessed and treated via synchronous audio and visual telemedicine encounter.      Reason for Telemedicine Visit: Services only offered telehealth    Originating Site (Patient Location): Patient's home    Distant Site (Provider Location): Provider Remote Setting- Home Office    Consent:  The patient/guardian has verbally consented to: the potential risks and benefits of telemedicine (video visit) versus in person care; bill my insurance or make self-payment for services provided; and responsibility for payment of non-covered services.     Patient would like the video invitation sent by:  My Chart    Mode of Communication:  Video Conference via Amwell    Distant Location (Provider):  Off-site    As the provider I attest to compliance with applicable laws and regulations related to telemedicine.    DATA  Interactive Complexity: No  Crisis: No        Progress Since Last Session (Related to Symptoms / Goals / Homework):   Symptoms: Improving    Homework: Partially completed      Episode of Care Goals: Minimal progress - ACTION (Actively working towards change); Intervened by reinforcing change plan / affirming steps taken     Current / Ongoing Stressors and Concerns:   Managing moms aging health issues     Treatment Objective(s) Addressed in This Session:   Identify negative self-talk and behaviors: challenge core beliefs, myths, and actions       Intervention:   DBT    Assessments completed prior to  visit:  The following assessments were completed by patient for this visit:  PHQ9:       12/7/2023     1:52 PM 12/14/2023     1:23 PM 12/21/2023     1:48 PM 1/10/2024     4:50 PM 1/18/2024     1:50 PM 1/25/2024     1:58 PM 2/15/2024    11:52 AM   PHQ-9 SCORE   PHQ-9 Total Score MyChart 8 (Mild depression) 7 (Mild depression) 7 (Mild depression) 7 (Mild depression) 7 (Mild depression) 8 (Mild depression) 13 (Moderate depression)   PHQ-9 Total Score 8 7 7 7 7 8 13     GAD7:       12/15/2020     9:40 PM 3/10/2021    10:01 PM 10/19/2021     9:55 AM 4/17/2022     7:58 PM 12/29/2022    12:24 PM 10/5/2023    11:38 AM 12/21/2023     1:49 PM   DANIEL-7 SCORE   Total Score 4 (minimal anxiety) 1 (minimal anxiety) 5 (mild anxiety) 3 (minimal anxiety) 4 (minimal anxiety) 4 (minimal anxiety) 6 (mild anxiety)   Total Score 4 1 5 3 4 4 6          10/10/2023    12:59 PM 1/10/2024     4:52 PM 1/18/2024     1:52 PM   PROMIS-10 Scores Only   Global Mental Health Score 6 9 8    8   Global Physical Health Score 14 15 13    13   PROMIS TOTAL - SUBSCORES 20 24 21    21        ASSESSMENT: Current Emotional / Mental Status (status of significant symptoms):   Risk status (Self / Other harm or suicidal ideation)   Patient denies current fears or concerns for personal safety.   Patient denies current or recent suicidal ideation or behaviors.   Patient denies current or recent homicidal ideation or behaviors.   Patient denies current or recent self injurious behavior or ideation.   Patient denies other safety concerns.   Patient reports there has been no change in risk factors since their last session.     Patient reports there has been no change in protective factors since their last session.     A safety and risk management plan has been developed including: Patient consented to co-developed safety plan on 10/12/23.  Safety and risk management plan was reviewed.   Patient agreed to use safety plan should any safety concerns arise.  A copy was  made available to the patient.     Appearance:   Appropriate    Eye Contact:   Good    Psychomotor Behavior: Normal    Attitude:   Cooperative  Interested   Orientation:   All   Speech    Rate / Production: Normal     Volume:  Normal    Mood:    Normal   Affect:    Appropriate    Thought Content:  Clear    Thought Form:  Coherent  Logical    Insight:    Good      Medication Review:   No changes to current psychiatric medication(s)     Medication Compliance:   Yes     Changes in Health Issues:   None reported     Chemical Use Review:   Substance Use: Chemical use reviewed, no active concerns identified      Tobacco Use: No current tobacco use.      Diagnosis:  1. DANIEL (generalized anxiety disorder)    2. Moderate episode of recurrent major depressive disorder (H)                                    Collateral Reports Completed:   Not Applicable    PLAN: (Patient Tasks / Therapist Tasks / Other)  Patient to practice staying in the present moment and resting when feeling overwhelmed until the next session.    Yelitza Schuster, [facility]                                            Individual Treatment Plan    Patient's Name: Karlee Emery  YOB: 1960    Date of Creation: 10/26/23  Date Treatment Plan Last Reviewed/Revised: 1/25/24    DSM5 Diagnoses: 296.32 (F33.1) Major Depressive Disorder, Recurrent Episode, Moderate _ and With anxious distress or 300.02 (F41.1) Generalized Anxiety Disorder  Psychosocial / Contextual Factors: Familial conflict  PROMIS (reviewed every 90 days): completed 1/18/24    Referral / Collaboration:  Referral to another professional/service is not indicated at this time..    Anticipated number of session for this episode of care: 9-12 sessions  Anticipation frequency of session: Weekly  Anticipated Duration of each session: 38-52 minutes  Treatment plan will be reviewed in 90 days or when goals have been changed.       MeasurableTreatment Goal(s) related to diagnosis / functional  impairment(s)  Goal 1: Patient will develop and implement strategies related to anxiety.    I will know I've met my goal when I will be able to regulate myself.      Objective #A (Patient Action)    Patient will identify 4 fears / thoughts that contribute to feeling anxious.  Status: Completed 1/25/24    Intervention(s)  Therapist will teach emotional recognition/identification.   .    Objective #B  Patient will use relaxation strategies 2 times per day to reduce the physical symptoms of anxiety.  Status: Continued 1/25/24    Intervention(s)  Therapist will teach relaxation strategies and mindfulness .    Objective #C  Patient will attend and participate in social or recreational activities   .  Status: Continued 1/25/24    Intervention(s)  Therapist will teach skills related to engagement .      Goal 2: Patient will develop and implement strategies related to depression.    I will know I've met my goal when I feel better about myself.      Objective #A     Status:Continued 1/25/24    Patient will Increase interest, engagement, and pleasure in doing things.    Intervention(s)  Therapist will teach emotional regulation skills.   .    Objective #B  Patient will Feel less tired and more energy during the day .    Status: Continued 1/25/24    Intervention(s)  Therapist will teach about healthy boundaries.   .    Objective #C  Patient will Decrease frequency and intensity of feeling down, depressed, hopeless.  Status: Continued 1/25/24    Intervention(s)  Therapist will teach distraction skills.   .      Patient has reviewed and agreed to the above plan.      Yelitza Schuster, [facility]  October 26, 2023

## 2024-02-22 ENCOUNTER — VIRTUAL VISIT (OUTPATIENT)
Dept: PSYCHOLOGY | Facility: CLINIC | Age: 64
End: 2024-02-22
Payer: COMMERCIAL

## 2024-02-22 DIAGNOSIS — F41.1 GAD (GENERALIZED ANXIETY DISORDER): Primary | ICD-10-CM

## 2024-02-22 DIAGNOSIS — F33.1 MODERATE EPISODE OF RECURRENT MAJOR DEPRESSIVE DISORDER (H): ICD-10-CM

## 2024-02-22 PROCEDURE — 90832 PSYTX W PT 30 MINUTES: CPT | Mod: 95 | Performed by: COUNSELOR

## 2024-02-23 NOTE — PROGRESS NOTES
[facility]                                     Progress Note    Patient Name: Karlee Emery  Date: 24         Service Type: Individual      Session Start Time: 200 pm  Session End Time: 230 pm     Session Length: 30 minutes    Session #: 16  Attendees: Client attended alone    Service Modality:  Video Visit:      Provider verified identity through the following two step process.  Patient provided:  Patient  and Patient address    Telemedicine Visit: The patient's condition can be safely assessed and treated via synchronous audio and visual telemedicine encounter.      Reason for Telemedicine Visit: Services only offered telehealth    Originating Site (Patient Location): Patient's home    Distant Site (Provider Location): Provider Remote Setting- Home Office    Consent:  The patient/guardian has verbally consented to: the potential risks and benefits of telemedicine (video visit) versus in person care; bill my insurance or make self-payment for services provided; and responsibility for payment of non-covered services.     Patient would like the video invitation sent by:  My Chart    Mode of Communication:  Video Conference via Amwell    Distant Location (Provider):  Off-site    As the provider I attest to compliance with applicable laws and regulations related to telemedicine.    DATA  Interactive Complexity: No  Crisis: No        Progress Since Last Session (Related to Symptoms / Goals / Homework):   Symptoms: Improving    Homework: Partially completed      Episode of Care Goals: Minimal progress - ACTION (Actively working towards change); Intervened by reinforcing change plan / affirming steps taken     Current / Ongoing Stressors and Concerns:   Managing moms aging health issues     Treatment Objective(s) Addressed in This Session:   Identify negative self-talk and behaviors: challenge core beliefs, myths, and actions       Intervention:   DBT    Assessments completed prior to  visit:  The following assessments were completed by patient for this visit:  PHQ9:       12/7/2023     1:52 PM 12/14/2023     1:23 PM 12/21/2023     1:48 PM 1/10/2024     4:50 PM 1/18/2024     1:50 PM 1/25/2024     1:58 PM 2/15/2024    11:52 AM   PHQ-9 SCORE   PHQ-9 Total Score MyChart 8 (Mild depression) 7 (Mild depression) 7 (Mild depression) 7 (Mild depression) 7 (Mild depression) 8 (Mild depression) 13 (Moderate depression)   PHQ-9 Total Score 8 7 7 7 7 8 13     GAD7:       12/15/2020     9:40 PM 3/10/2021    10:01 PM 10/19/2021     9:55 AM 4/17/2022     7:58 PM 12/29/2022    12:24 PM 10/5/2023    11:38 AM 12/21/2023     1:49 PM   DANIEL-7 SCORE   Total Score 4 (minimal anxiety) 1 (minimal anxiety) 5 (mild anxiety) 3 (minimal anxiety) 4 (minimal anxiety) 4 (minimal anxiety) 6 (mild anxiety)   Total Score 4 1 5 3 4 4 6          10/10/2023    12:59 PM 1/10/2024     4:52 PM 1/18/2024     1:52 PM   PROMIS-10 Scores Only   Global Mental Health Score 6 9 8    8   Global Physical Health Score 14 15 13    13   PROMIS TOTAL - SUBSCORES 20 24 21    21        ASSESSMENT: Current Emotional / Mental Status (status of significant symptoms):   Risk status (Self / Other harm or suicidal ideation)   Patient denies current fears or concerns for personal safety.   Patient denies current or recent suicidal ideation or behaviors.   Patient denies current or recent homicidal ideation or behaviors.   Patient denies current or recent self injurious behavior or ideation.   Patient denies other safety concerns.   Patient reports there has been no change in risk factors since their last session.     Patient reports there has been no change in protective factors since their last session.     A safety and risk management plan has been developed including: Patient consented to co-developed safety plan on 10/12/23.  Safety and risk management plan was reviewed.   Patient agreed to use safety plan should any safety concerns arise.  A copy was  made available to the patient.     Appearance:   Appropriate    Eye Contact:   Good    Psychomotor Behavior: Normal    Attitude:   Cooperative  Interested   Orientation:   All   Speech    Rate / Production: Normal     Volume:  Normal    Mood:    Normal   Affect:    Appropriate    Thought Content:  Clear    Thought Form:  Coherent  Logical    Insight:    Good      Medication Review:   No changes to current psychiatric medication(s)     Medication Compliance:   Yes     Changes in Health Issues:   None reported     Chemical Use Review:   Substance Use: Chemical use reviewed, no active concerns identified      Tobacco Use: No current tobacco use.      Diagnosis:  1. DANIEL (generalized anxiety disorder)    2. Moderate episode of recurrent major depressive disorder (H)                                      Collateral Reports Completed:   Not Applicable    PLAN: (Patient Tasks / Therapist Tasks / Other)  Patient to practice self care until next session.    Yelitza Schuster [facility]                                            Individual Treatment Plan    Patient's Name: Karlee Emery  YOB: 1960    Date of Creation: 10/26/23  Date Treatment Plan Last Reviewed/Revised: 1/25/24    DSM5 Diagnoses: 296.32 (F33.1) Major Depressive Disorder, Recurrent Episode, Moderate _ and With anxious distress or 300.02 (F41.1) Generalized Anxiety Disorder  Psychosocial / Contextual Factors: Familial conflict  PROMIS (reviewed every 90 days): completed 1/18/24    Referral / Collaboration:  Referral to another professional/service is not indicated at this time..    Anticipated number of session for this episode of care: 9-12 sessions  Anticipation frequency of session: Weekly  Anticipated Duration of each session: 38-52 minutes  Treatment plan will be reviewed in 90 days or when goals have been changed.       MeasurableTreatment Goal(s) related to diagnosis / functional impairment(s)  Goal 1: Patient will develop and implement  strategies related to anxiety.    I will know I've met my goal when I will be able to regulate myself.      Objective #A (Patient Action)    Patient will identify 4 fears / thoughts that contribute to feeling anxious.  Status: Completed 1/25/24    Intervention(s)  Therapist will teach emotional recognition/identification.   .    Objective #B  Patient will use relaxation strategies 2 times per day to reduce the physical symptoms of anxiety.  Status: Continued 1/25/24    Intervention(s)  Therapist will teach relaxation strategies and mindfulness .    Objective #C  Patient will attend and participate in social or recreational activities   .  Status: Continued 1/25/24    Intervention(s)  Therapist will teach skills related to engagement .      Goal 2: Patient will develop and implement strategies related to depression.    I will know I've met my goal when I feel better about myself.      Objective #A     Status:Continued 1/25/24    Patient will Increase interest, engagement, and pleasure in doing things.    Intervention(s)  Therapist will teach emotional regulation skills.   .    Objective #B  Patient will Feel less tired and more energy during the day .    Status: Continued 1/25/24    Intervention(s)  Therapist will teach about healthy boundaries.   .    Objective #C  Patient will Decrease frequency and intensity of feeling down, depressed, hopeless.  Status: Continued 1/25/24    Intervention(s)  Therapist will teach distraction skills.   .      Patient has reviewed and agreed to the above plan.      Yelitza Schuster [facility]  October 26, 2023

## 2024-02-29 ENCOUNTER — VIRTUAL VISIT (OUTPATIENT)
Dept: PSYCHOLOGY | Facility: CLINIC | Age: 64
End: 2024-02-29
Payer: COMMERCIAL

## 2024-02-29 DIAGNOSIS — F41.1 GAD (GENERALIZED ANXIETY DISORDER): Primary | ICD-10-CM

## 2024-02-29 DIAGNOSIS — F33.1 MODERATE EPISODE OF RECURRENT MAJOR DEPRESSIVE DISORDER (H): ICD-10-CM

## 2024-02-29 PROCEDURE — 90837 PSYTX W PT 60 MINUTES: CPT | Mod: 95 | Performed by: COUNSELOR

## 2024-02-29 NOTE — PROGRESS NOTES
[facility]                                     Progress Note    Patient Name: Karlee Emery  Date: 24         Service Type: Individual      Session Start Time: 200 pm  Session End Time: 254 pm     Session Length: 54 minutes    Session #: 17  Attendees: Client attended alone    Service Modality:  Video Visit:      Provider verified identity through the following two step process.  Patient provided:  Patient  and Patient address    Telemedicine Visit: The patient's condition can be safely assessed and treated via synchronous audio and visual telemedicine encounter.      Reason for Telemedicine Visit: Services only offered telehealth    Originating Site (Patient Location): Patient's home    Distant Site (Provider Location): Provider Remote Setting- Home Office    Consent:  The patient/guardian has verbally consented to: the potential risks and benefits of telemedicine (video visit) versus in person care; bill my insurance or make self-payment for services provided; and responsibility for payment of non-covered services.     Patient would like the video invitation sent by:  My Chart    Mode of Communication:  Video Conference via Amwell    Distant Location (Provider):  Off-site    As the provider I attest to compliance with applicable laws and regulations related to telemedicine.    DATA  Interactive Complexity: No  Crisis: No        Progress Since Last Session (Related to Symptoms / Goals / Homework):   Symptoms: Improving    Homework: Partially completed      Episode of Care Goals: Minimal progress - ACTION (Actively working towards change); Intervened by reinforcing change plan / affirming steps taken     Current / Ongoing Stressors and Concerns:   Family conflict in regards to aging mother     Treatment Objective(s) Addressed in This Session:   Identify negative self-talk and behaviors: challenge core beliefs, myths, and actions       Intervention:   DBT    Assessments completed  prior to visit:  The following assessments were completed by patient for this visit:  PHQ9:       12/14/2023     1:23 PM 12/21/2023     1:48 PM 1/10/2024     4:50 PM 1/18/2024     1:50 PM 1/25/2024     1:58 PM 2/15/2024    11:52 AM 2/29/2024     1:38 PM   PHQ-9 SCORE   PHQ-9 Total Score MyChart 7 (Mild depression) 7 (Mild depression) 7 (Mild depression) 7 (Mild depression) 8 (Mild depression) 13 (Moderate depression) 7 (Mild depression)   PHQ-9 Total Score 7 7 7 7 8 13 7     GAD7:       12/15/2020     9:40 PM 3/10/2021    10:01 PM 10/19/2021     9:55 AM 4/17/2022     7:58 PM 12/29/2022    12:24 PM 10/5/2023    11:38 AM 12/21/2023     1:49 PM   DANIEL-7 SCORE   Total Score 4 (minimal anxiety) 1 (minimal anxiety) 5 (mild anxiety) 3 (minimal anxiety) 4 (minimal anxiety) 4 (minimal anxiety) 6 (mild anxiety)   Total Score 4 1 5 3 4 4 6          10/10/2023    12:59 PM 1/10/2024     4:52 PM 1/18/2024     1:52 PM   PROMIS-10 Scores Only   Global Mental Health Score 6 9 8    8   Global Physical Health Score 14 15 13    13   PROMIS TOTAL - SUBSCORES 20 24 21    21        ASSESSMENT: Current Emotional / Mental Status (status of significant symptoms):   Risk status (Self / Other harm or suicidal ideation)   Patient denies current fears or concerns for personal safety.   Patient denies current or recent suicidal ideation or behaviors.   Patient denies current or recent homicidal ideation or behaviors.   Patient denies current or recent self injurious behavior or ideation.   Patient denies other safety concerns.   Patient reports there has been no change in risk factors since their last session.     Patient reports there has been no change in protective factors since their last session.     A safety and risk management plan has been developed including: Patient consented to co-developed safety plan on 10/12/23.  Safety and risk management plan was reviewed.   Patient agreed to use safety plan should any safety concerns arise.  A  copy was made available to the patient.     Appearance:   Appropriate    Eye Contact:   Good    Psychomotor Behavior: Normal    Attitude:   Cooperative  Interested   Orientation:   All   Speech    Rate / Production: Normal     Volume:  Normal    Mood:    Normal   Affect:    Appropriate    Thought Content:  Clear    Thought Form:  Coherent  Logical    Insight:    Good      Medication Review:   No changes to current psychiatric medication(s)     Medication Compliance:   Yes     Changes in Health Issues:   None reported     Chemical Use Review:   Substance Use: Chemical use reviewed, no active concerns identified      Tobacco Use: No current tobacco use.      Diagnosis:  1. DANIEL (generalized anxiety disorder)    2. Moderate episode of recurrent major depressive disorder (H)                                        Collateral Reports Completed:   Not Applicable    PLAN: (Patient Tasks / Therapist Tasks / Other)  Patient to utilize taking breaks and self care until next session.    Yelitza Schuster, [facility]                                            Individual Treatment Plan    Patient's Name: Karlee Emery  YOB: 1960    Date of Creation: 10/26/23  Date Treatment Plan Last Reviewed/Revised: 1/25/24    DSM5 Diagnoses: 296.32 (F33.1) Major Depressive Disorder, Recurrent Episode, Moderate _ and With anxious distress or 300.02 (F41.1) Generalized Anxiety Disorder  Psychosocial / Contextual Factors: Familial conflict  PROMIS (reviewed every 90 days): completed 1/18/24    Referral / Collaboration:  Referral to another professional/service is not indicated at this time..    Anticipated number of session for this episode of care: 9-12 sessions  Anticipation frequency of session: Weekly  Anticipated Duration of each session: 38-52 minutes  Treatment plan will be reviewed in 90 days or when goals have been changed.       MeasurableTreatment Goal(s) related to diagnosis / functional impairment(s)  Goal 1: Patient will  develop and implement strategies related to anxiety.    I will know I've met my goal when I will be able to regulate myself.      Objective #A (Patient Action)    Patient will identify 4 fears / thoughts that contribute to feeling anxious.  Status: Completed 1/25/24    Intervention(s)  Therapist will teach emotional recognition/identification.   .    Objective #B  Patient will use relaxation strategies 2 times per day to reduce the physical symptoms of anxiety.  Status: Continued 1/25/24    Intervention(s)  Therapist will teach relaxation strategies and mindfulness .    Objective #C  Patient will attend and participate in social or recreational activities   .  Status: Continued 1/25/24    Intervention(s)  Therapist will teach skills related to engagement .      Goal 2: Patient will develop and implement strategies related to depression.    I will know I've met my goal when I feel better about myself.      Objective #A     Status:Continued 1/25/24    Patient will Increase interest, engagement, and pleasure in doing things.    Intervention(s)  Therapist will teach emotional regulation skills.   .    Objective #B  Patient will Feel less tired and more energy during the day .    Status: Continued 1/25/24    Intervention(s)  Therapist will teach about healthy boundaries.   .    Objective #C  Patient will Decrease frequency and intensity of feeling down, depressed, hopeless.  Status: Continued 1/25/24    Intervention(s)  Therapist will teach distraction skills.   .      Patient has reviewed and agreed to the above plan.      Yelitza Schuster, [facility]  October 26, 2023

## 2024-03-07 ENCOUNTER — VIRTUAL VISIT (OUTPATIENT)
Dept: PSYCHOLOGY | Facility: CLINIC | Age: 64
End: 2024-03-07
Payer: COMMERCIAL

## 2024-03-07 DIAGNOSIS — F41.1 GAD (GENERALIZED ANXIETY DISORDER): Primary | ICD-10-CM

## 2024-03-07 DIAGNOSIS — F33.1 MODERATE EPISODE OF RECURRENT MAJOR DEPRESSIVE DISORDER (H): ICD-10-CM

## 2024-03-07 PROCEDURE — 90834 PSYTX W PT 45 MINUTES: CPT | Mod: 95 | Performed by: COUNSELOR

## 2024-03-08 NOTE — PROGRESS NOTES
[facility]                                     Progress Note    Patient Name: Karlee Emery  Date: 3/7/24         Service Type: Individual      Session Start Time: 200 pm  Session End Time: 245 pm     Session Length: 45 minutes    Session #: 18  Attendees: Client attended alone    Service Modality:  Video Visit:      Provider verified identity through the following two step process.  Patient provided:  Patient  and Patient address    Telemedicine Visit: The patient's condition can be safely assessed and treated via synchronous audio and visual telemedicine encounter.      Reason for Telemedicine Visit: Services only offered telehealth    Originating Site (Patient Location): Patient's home    Distant Site (Provider Location): Provider Remote Setting- Home Office    Consent:  The patient/guardian has verbally consented to: the potential risks and benefits of telemedicine (video visit) versus in person care; bill my insurance or make self-payment for services provided; and responsibility for payment of non-covered services.     Patient would like the video invitation sent by:  My Chart    Mode of Communication:  Video Conference via Amwell    Distant Location (Provider):  Off-site    As the provider I attest to compliance with applicable laws and regulations related to telemedicine.    DATA  Interactive Complexity: No  Crisis: No        Progress Since Last Session (Related to Symptoms / Goals / Homework):   Symptoms: Improving    Homework: Partially completed      Episode of Care Goals: Minimal progress - ACTION (Actively working towards change); Intervened by reinforcing change plan / affirming steps taken     Current / Ongoing Stressors and Concerns:   Aging mother, family conflict, managing marriage     Treatment Objective(s) Addressed in This Session:   Identify negative self-talk and behaviors: challenge core beliefs, myths, and actions       Intervention:   DBT    Assessments  completed prior to visit:  The following assessments were completed by patient for this visit:  PHQ9:       12/21/2023     1:48 PM 1/10/2024     4:50 PM 1/18/2024     1:50 PM 1/25/2024     1:58 PM 2/15/2024    11:52 AM 2/29/2024     1:38 PM 3/7/2024     1:49 PM   PHQ-9 SCORE   PHQ-9 Total Score MyChart 7 (Mild depression) 7 (Mild depression) 7 (Mild depression) 8 (Mild depression) 13 (Moderate depression) 7 (Mild depression) 7 (Mild depression)   PHQ-9 Total Score 7 7 7 8 13 7 7     GAD7:       12/15/2020     9:40 PM 3/10/2021    10:01 PM 10/19/2021     9:55 AM 4/17/2022     7:58 PM 12/29/2022    12:24 PM 10/5/2023    11:38 AM 12/21/2023     1:49 PM   DANIEL-7 SCORE   Total Score 4 (minimal anxiety) 1 (minimal anxiety) 5 (mild anxiety) 3 (minimal anxiety) 4 (minimal anxiety) 4 (minimal anxiety) 6 (mild anxiety)   Total Score 4 1 5 3 4 4 6          10/10/2023    12:59 PM 1/10/2024     4:52 PM 1/18/2024     1:52 PM   PROMIS-10 Scores Only   Global Mental Health Score 6 9 8    8   Global Physical Health Score 14 15 13    13   PROMIS TOTAL - SUBSCORES 20 24 21    21        ASSESSMENT: Current Emotional / Mental Status (status of significant symptoms):   Risk status (Self / Other harm or suicidal ideation)   Patient denies current fears or concerns for personal safety.   Patient denies current or recent suicidal ideation or behaviors.   Patient denies current or recent homicidal ideation or behaviors.   Patient denies current or recent self injurious behavior or ideation.   Patient denies other safety concerns.   Patient reports there has been no change in risk factors since their last session.     Patient reports there has been no change in protective factors since their last session.     A safety and risk management plan has been developed including: Patient consented to co-developed safety plan on 10/12/23.  Safety and risk management plan was reviewed.   Patient agreed to use safety plan should any safety concerns  arise.  A copy was made available to the patient.     Appearance:   Appropriate    Eye Contact:   Good    Psychomotor Behavior: Normal    Attitude:   Cooperative  Interested   Orientation:   All   Speech    Rate / Production: Normal     Volume:  Normal    Mood:    Normal   Affect:    Appropriate    Thought Content:  Clear    Thought Form:  Coherent  Logical    Insight:    Good      Medication Review:   No changes to current psychiatric medication(s)     Medication Compliance:   Yes     Changes in Health Issues:   None reported     Chemical Use Review:   Substance Use: Chemical use reviewed, no active concerns identified      Tobacco Use: No current tobacco use.      Diagnosis:  1. DANIEL (generalized anxiety disorder)    2. Moderate episode of recurrent major depressive disorder (H)                                          Collateral Reports Completed:   Not Applicable    PLAN: (Patient Tasks / Therapist Tasks / Other)  Patient to discuss emotions with wife and engage in movement until next session.    Yelitza Schuster, [facility]                                            Individual Treatment Plan    Patient's Name: Karlee Emery  YOB: 1960    Date of Creation: 10/26/23  Date Treatment Plan Last Reviewed/Revised: 1/25/24    DSM5 Diagnoses: 296.32 (F33.1) Major Depressive Disorder, Recurrent Episode, Moderate _ and With anxious distress or 300.02 (F41.1) Generalized Anxiety Disorder  Psychosocial / Contextual Factors: Familial conflict  PROMIS (reviewed every 90 days): completed 1/18/24    Referral / Collaboration:  Referral to another professional/service is not indicated at this time..    Anticipated number of session for this episode of care: 9-12 sessions  Anticipation frequency of session: Weekly  Anticipated Duration of each session: 38-52 minutes  Treatment plan will be reviewed in 90 days or when goals have been changed.       MeasurableTreatment Goal(s) related to diagnosis / functional  impairment(s)  Goal 1: Patient will develop and implement strategies related to anxiety.    I will know I've met my goal when I will be able to regulate myself.      Objective #A (Patient Action)    Patient will identify 4 fears / thoughts that contribute to feeling anxious.  Status: Completed 1/25/24    Intervention(s)  Therapist will teach emotional recognition/identification.   .    Objective #B  Patient will use relaxation strategies 2 times per day to reduce the physical symptoms of anxiety.  Status: Continued 1/25/24    Intervention(s)  Therapist will teach relaxation strategies and mindfulness .    Objective #C  Patient will attend and participate in social or recreational activities   .  Status: Continued 1/25/24    Intervention(s)  Therapist will teach skills related to engagement .      Goal 2: Patient will develop and implement strategies related to depression.    I will know I've met my goal when I feel better about myself.      Objective #A     Status:Continued 1/25/24    Patient will Increase interest, engagement, and pleasure in doing things.    Intervention(s)  Therapist will teach emotional regulation skills.   .    Objective #B  Patient will Feel less tired and more energy during the day .    Status: Continued 1/25/24    Intervention(s)  Therapist will teach about healthy boundaries.   .    Objective #C  Patient will Decrease frequency and intensity of feeling down, depressed, hopeless.  Status: Continued 1/25/24    Intervention(s)  Therapist will teach distraction skills.   .      Patient has reviewed and agreed to the above plan.      Yelitza Schuster, [facility]  October 26, 2023

## 2024-03-14 ENCOUNTER — VIRTUAL VISIT (OUTPATIENT)
Dept: PSYCHOLOGY | Facility: CLINIC | Age: 64
End: 2024-03-14
Payer: COMMERCIAL

## 2024-03-14 DIAGNOSIS — F41.1 GAD (GENERALIZED ANXIETY DISORDER): Primary | ICD-10-CM

## 2024-03-14 DIAGNOSIS — F33.1 MODERATE EPISODE OF RECURRENT MAJOR DEPRESSIVE DISORDER (H): ICD-10-CM

## 2024-03-14 PROCEDURE — 90837 PSYTX W PT 60 MINUTES: CPT | Mod: 95 | Performed by: COUNSELOR

## 2024-03-14 NOTE — PROGRESS NOTES
[facility]                                     Progress Note    Patient Name: Karlee Emery  Date: 3/14/14         Service Type: Individual      Session Start Time: 200 pm  Session End Time: 255 pm     Session Length: 55 minutes    Session #: 19  Attendees: Client attended alone    Service Modality:  Video Visit:      Provider verified identity through the following two step process.  Patient provided:  Patient  and Patient address    Telemedicine Visit: The patient's condition can be safely assessed and treated via synchronous audio and visual telemedicine encounter.      Reason for Telemedicine Visit: Services only offered telehealth    Originating Site (Patient Location): Patient's home    Distant Site (Provider Location): Provider Remote Setting- Home Office    Consent:  The patient/guardian has verbally consented to: the potential risks and benefits of telemedicine (video visit) versus in person care; bill my insurance or make self-payment for services provided; and responsibility for payment of non-covered services.     Patient would like the video invitation sent by:  My Chart    Mode of Communication:  Video Conference via Amwell    Distant Location (Provider):  Off-site    As the provider I attest to compliance with applicable laws and regulations related to telemedicine.    DATA  Interactive Complexity: No  Crisis: No        Progress Since Last Session (Related to Symptoms / Goals / Homework):   Symptoms: Improving    Homework: Partially completed      Episode of Care Goals: Minimal progress - ACTION (Actively working towards change); Intervened by reinforcing change plan / affirming steps taken     Current / Ongoing Stressors and Concerns:   Family dynamics with aging parent     Treatment Objective(s) Addressed in This Session:   Identify negative self-talk and behaviors: challenge core beliefs, myths, and actions       Intervention:   DBT    Assessments completed prior to  visit:  The following assessments were completed by patient for this visit:  PHQ9:       1/10/2024     4:50 PM 1/18/2024     1:50 PM 1/25/2024     1:58 PM 2/15/2024    11:52 AM 2/29/2024     1:38 PM 3/7/2024     1:49 PM 3/14/2024     1:56 PM   PHQ-9 SCORE   PHQ-9 Total Score MyChart 7 (Mild depression) 7 (Mild depression) 8 (Mild depression) 13 (Moderate depression) 7 (Mild depression) 7 (Mild depression) 7 (Mild depression)   PHQ-9 Total Score 7 7 8 13 7 7 7     GAD7:       12/15/2020     9:40 PM 3/10/2021    10:01 PM 10/19/2021     9:55 AM 4/17/2022     7:58 PM 12/29/2022    12:24 PM 10/5/2023    11:38 AM 12/21/2023     1:49 PM   DANIEL-7 SCORE   Total Score 4 (minimal anxiety) 1 (minimal anxiety) 5 (mild anxiety) 3 (minimal anxiety) 4 (minimal anxiety) 4 (minimal anxiety) 6 (mild anxiety)   Total Score 4 1 5 3 4 4 6          10/10/2023    12:59 PM 1/10/2024     4:52 PM 1/18/2024     1:52 PM   PROMIS-10 Scores Only   Global Mental Health Score 6 9 8    8   Global Physical Health Score 14 15 13    13   PROMIS TOTAL - SUBSCORES 20 24 21    21        ASSESSMENT: Current Emotional / Mental Status (status of significant symptoms):   Risk status (Self / Other harm or suicidal ideation)   Patient denies current fears or concerns for personal safety.   Patient denies current or recent suicidal ideation or behaviors.   Patient denies current or recent homicidal ideation or behaviors.   Patient denies current or recent self injurious behavior or ideation.   Patient denies other safety concerns.   Patient reports there has been no change in risk factors since their last session.     Patient reports there has been no change in protective factors since their last session.     A safety and risk management plan has been developed including: Patient consented to co-developed safety plan on 10/12/23.  Safety and risk management plan was reviewed.   Patient agreed to use safety plan should any safety concerns arise.  A copy was made  available to the patient.     Appearance:   Appropriate    Eye Contact:   Good    Psychomotor Behavior: Normal    Attitude:   Cooperative  Interested   Orientation:   All   Speech    Rate / Production: Normal     Volume:  Normal    Mood:    Normal   Affect:    Appropriate    Thought Content:  Clear    Thought Form:  Coherent  Logical    Insight:    Good      Medication Review:   No changes to current psychiatric medication(s)     Medication Compliance:   Yes     Changes in Health Issues:   None reported     Chemical Use Review:   Substance Use: Chemical use reviewed, no active concerns identified      Tobacco Use: No current tobacco use.      Diagnosis:  1. DANIEL (generalized anxiety disorder)    2. Moderate episode of recurrent major depressive disorder (H)                                            Collateral Reports Completed:   Not Applicable    PLAN: (Patient Tasks / Therapist Tasks / Other)  Patient to practice talking a pause before responding and continue to work within family dynamics until next session.    Yelitza Schuster, [facility]                                            Individual Treatment Plan    Patient's Name: Karlee Emery  YOB: 1960    Date of Creation: 10/26/23  Date Treatment Plan Last Reviewed/Revised: 1/25/24    DSM5 Diagnoses: 296.32 (F33.1) Major Depressive Disorder, Recurrent Episode, Moderate _ and With anxious distress or 300.02 (F41.1) Generalized Anxiety Disorder  Psychosocial / Contextual Factors: Familial conflict  PROMIS (reviewed every 90 days): completed 1/18/24    Referral / Collaboration:  Referral to another professional/service is not indicated at this time..    Anticipated number of session for this episode of care: 9-12 sessions  Anticipation frequency of session: Weekly  Anticipated Duration of each session: 38-52 minutes  Treatment plan will be reviewed in 90 days or when goals have been changed.       MeasurableTreatment Goal(s) related to diagnosis /  functional impairment(s)  Goal 1: Patient will develop and implement strategies related to anxiety.    I will know I've met my goal when I will be able to regulate myself.      Objective #A (Patient Action)    Patient will identify 4 fears / thoughts that contribute to feeling anxious.  Status: Completed 1/25/24    Intervention(s)  Therapist will teach emotional recognition/identification.   .    Objective #B  Patient will use relaxation strategies 2 times per day to reduce the physical symptoms of anxiety.  Status: Continued 1/25/24    Intervention(s)  Therapist will teach relaxation strategies and mindfulness .    Objective #C  Patient will attend and participate in social or recreational activities   .  Status: Continued 1/25/24    Intervention(s)  Therapist will teach skills related to engagement .      Goal 2: Patient will develop and implement strategies related to depression.    I will know I've met my goal when I feel better about myself.      Objective #A     Status:Continued 1/25/24    Patient will Increase interest, engagement, and pleasure in doing things.    Intervention(s)  Therapist will teach emotional regulation skills.   .    Objective #B  Patient will Feel less tired and more energy during the day .    Status: Continued 1/25/24    Intervention(s)  Therapist will teach about healthy boundaries.   .    Objective #C  Patient will Decrease frequency and intensity of feeling down, depressed, hopeless.  Status: Continued 1/25/24    Intervention(s)  Therapist will teach distraction skills.   .      Patient has reviewed and agreed to the above plan.      Yelitza Schuster, [facility]  October 26, 2023

## 2024-03-21 ENCOUNTER — VIRTUAL VISIT (OUTPATIENT)
Dept: PSYCHOLOGY | Facility: CLINIC | Age: 64
End: 2024-03-21
Payer: COMMERCIAL

## 2024-03-21 DIAGNOSIS — F33.1 MODERATE EPISODE OF RECURRENT MAJOR DEPRESSIVE DISORDER (H): ICD-10-CM

## 2024-03-21 DIAGNOSIS — F41.1 GAD (GENERALIZED ANXIETY DISORDER): Primary | ICD-10-CM

## 2024-03-21 PROCEDURE — 90837 PSYTX W PT 60 MINUTES: CPT | Mod: 95 | Performed by: COUNSELOR

## 2024-03-21 ASSESSMENT — PATIENT HEALTH QUESTIONNAIRE - PHQ9
SUM OF ALL RESPONSES TO PHQ QUESTIONS 1-9: 12
SUM OF ALL RESPONSES TO PHQ QUESTIONS 1-9: 12
10. IF YOU CHECKED OFF ANY PROBLEMS, HOW DIFFICULT HAVE THESE PROBLEMS MADE IT FOR YOU TO DO YOUR WORK, TAKE CARE OF THINGS AT HOME, OR GET ALONG WITH OTHER PEOPLE: SOMEWHAT DIFFICULT

## 2024-03-21 NOTE — PROGRESS NOTES
[facility]                                     Progress Note    Patient Name: Karlee Emery  Date: 3/21/24         Service Type: Individual      Session Start Time: 200 pm  Session End Time: 256 pm     Session Length: 56 minutes    Session #: 20  Attendees: Client attended alone    Service Modality:  Video Visit:      Provider verified identity through the following two step process.  Patient provided:  Patient  and Patient address    Telemedicine Visit: The patient's condition can be safely assessed and treated via synchronous audio and visual telemedicine encounter.      Reason for Telemedicine Visit: Services only offered telehealth    Originating Site (Patient Location): Patient's home    Distant Site (Provider Location): Provider Remote Setting- Home Office    Consent:  The patient/guardian has verbally consented to: the potential risks and benefits of telemedicine (video visit) versus in person care; bill my insurance or make self-payment for services provided; and responsibility for payment of non-covered services.     Patient would like the video invitation sent by:  My Chart    Mode of Communication:  Video Conference via Amwell    Distant Location (Provider):  Off-site    As the provider I attest to compliance with applicable laws and regulations related to telemedicine.    DATA  Interactive Complexity: No  Crisis: No        Progress Since Last Session (Related to Symptoms / Goals / Homework):   Symptoms: Improving    Homework: Partially completed      Episode of Care Goals: Minimal progress - ACTION (Actively working towards change); Intervened by reinforcing change plan / affirming steps taken     Current / Ongoing Stressors and Concerns:   Managing familial relationships with an aging parent     Treatment Objective(s) Addressed in This Session:   Identify negative self-talk and behaviors: challenge core beliefs, myths, and actions       Intervention:   DBT    Assessments  completed prior to visit:  The following assessments were completed by patient for this visit:  PHQ9:       1/18/2024     1:50 PM 1/25/2024     1:58 PM 2/15/2024    11:52 AM 2/29/2024     1:38 PM 3/7/2024     1:49 PM 3/14/2024     1:56 PM 3/21/2024     1:57 PM   PHQ-9 SCORE   PHQ-9 Total Score MyChart 7 (Mild depression) 8 (Mild depression) 13 (Moderate depression) 7 (Mild depression) 7 (Mild depression) 7 (Mild depression) 12 (Moderate depression)   PHQ-9 Total Score 7 8 13 7 7 7 12     GAD7:       12/15/2020     9:40 PM 3/10/2021    10:01 PM 10/19/2021     9:55 AM 4/17/2022     7:58 PM 12/29/2022    12:24 PM 10/5/2023    11:38 AM 12/21/2023     1:49 PM   DANIEL-7 SCORE   Total Score 4 (minimal anxiety) 1 (minimal anxiety) 5 (mild anxiety) 3 (minimal anxiety) 4 (minimal anxiety) 4 (minimal anxiety) 6 (mild anxiety)   Total Score 4 1 5 3 4 4 6          10/10/2023    12:59 PM 1/10/2024     4:52 PM 1/18/2024     1:52 PM   PROMIS-10 Scores Only   Global Mental Health Score 6 9 8    8   Global Physical Health Score 14 15 13    13   PROMIS TOTAL - SUBSCORES 20 24 21    21        ASSESSMENT: Current Emotional / Mental Status (status of significant symptoms):   Risk status (Self / Other harm or suicidal ideation)   Patient denies current fears or concerns for personal safety.   Patient denies current or recent suicidal ideation or behaviors.   Patient denies current or recent homicidal ideation or behaviors.   Patient denies current or recent self injurious behavior or ideation.   Patient denies other safety concerns.   Patient reports there has been no change in risk factors since their last session.     Patient reports there has been no change in protective factors since their last session.     A safety and risk management plan has been developed including: Patient consented to co-developed safety plan on 10/12/23.  Safety and risk management plan was reviewed.   Patient agreed to use safety plan should any safety concerns  arise.  A copy was made available to the patient.     Appearance:   Appropriate    Eye Contact:   Good    Psychomotor Behavior: Normal    Attitude:   Cooperative  Interested   Orientation:   All   Speech    Rate / Production: Normal     Volume:  Normal    Mood:    Normal   Affect:    Appropriate    Thought Content:  Clear    Thought Form:  Coherent  Logical    Insight:    Good      Medication Review:   No changes to current psychiatric medication(s)     Medication Compliance:   Yes     Changes in Health Issues:   None reported     Chemical Use Review:   Substance Use: Chemical use reviewed, no active concerns identified      Tobacco Use: No current tobacco use.      Diagnosis:  1. DANIEL (generalized anxiety disorder)    2. Moderate episode of recurrent major depressive disorder (H)                                            Collateral Reports Completed:   Not Applicable    PLAN: (Patient Tasks / Therapist Tasks / Other)  Patient to manage expectations within family until next session.      Yelitza Schuster [facility]                                            Individual Treatment Plan    Patient's Name: Karlee Emery  YOB: 1960    Date of Creation: 10/26/23  Date Treatment Plan Last Reviewed/Revised: 1/25/24    DSM5 Diagnoses: 296.32 (F33.1) Major Depressive Disorder, Recurrent Episode, Moderate _ and With anxious distress or 300.02 (F41.1) Generalized Anxiety Disorder  Psychosocial / Contextual Factors: Familial conflict  PROMIS (reviewed every 90 days): completed 1/18/24    Referral / Collaboration:  Referral to another professional/service is not indicated at this time..    Anticipated number of session for this episode of care: 9-12 sessions  Anticipation frequency of session: Weekly  Anticipated Duration of each session: 38-52 minutes  Treatment plan will be reviewed in 90 days or when goals have been changed.       MeasurableTreatment Goal(s) related to diagnosis / functional impairment(s)  Goal 1:  Patient will develop and implement strategies related to anxiety.    I will know I've met my goal when I will be able to regulate myself.      Objective #A (Patient Action)    Patient will identify 4 fears / thoughts that contribute to feeling anxious.  Status: Completed 1/25/24    Intervention(s)  Therapist will teach emotional recognition/identification.   .    Objective #B  Patient will use relaxation strategies 2 times per day to reduce the physical symptoms of anxiety.  Status: Continued 1/25/24    Intervention(s)  Therapist will teach relaxation strategies and mindfulness .    Objective #C  Patient will attend and participate in social or recreational activities   .  Status: Continued 1/25/24    Intervention(s)  Therapist will teach skills related to engagement .      Goal 2: Patient will develop and implement strategies related to depression.    I will know I've met my goal when I feel better about myself.      Objective #A     Status:Continued 1/25/24    Patient will Increase interest, engagement, and pleasure in doing things.    Intervention(s)  Therapist will teach emotional regulation skills.   .    Objective #B  Patient will Feel less tired and more energy during the day .    Status: Continued 1/25/24    Intervention(s)  Therapist will teach about healthy boundaries.   .    Objective #C  Patient will Decrease frequency and intensity of feeling down, depressed, hopeless.  Status: Continued 1/25/24    Intervention(s)  Therapist will teach distraction skills.   .      Patient has reviewed and agreed to the above plan.      Yelitza Schuster, [facility]  October 26, 2023

## 2024-03-31 DIAGNOSIS — E11.9 TYPE 2 DIABETES MELLITUS WITHOUT COMPLICATION, WITHOUT LONG-TERM CURRENT USE OF INSULIN (H): ICD-10-CM

## 2024-04-01 RX ORDER — ORAL SEMAGLUTIDE 3 MG/1
TABLET ORAL
Qty: 90 TABLET | Refills: 0 | Status: SHIPPED | OUTPATIENT
Start: 2024-04-01 | End: 2024-06-03

## 2024-04-04 ENCOUNTER — VIRTUAL VISIT (OUTPATIENT)
Dept: PSYCHOLOGY | Facility: CLINIC | Age: 64
End: 2024-04-04
Payer: COMMERCIAL

## 2024-04-04 DIAGNOSIS — F33.1 MODERATE EPISODE OF RECURRENT MAJOR DEPRESSIVE DISORDER (H): ICD-10-CM

## 2024-04-04 DIAGNOSIS — F41.1 GAD (GENERALIZED ANXIETY DISORDER): Primary | ICD-10-CM

## 2024-04-04 PROCEDURE — 90837 PSYTX W PT 60 MINUTES: CPT | Mod: 95 | Performed by: COUNSELOR

## 2024-04-04 NOTE — PROGRESS NOTES
[facility]                                     Progress Note    Patient Name: Karlee Emery  Date: 24         Service Type: Individual      Session Start Time: 200 pm  Session End Time: 300 pm     Session Length: 60 minutes    Session #: 21  Attendees: Client attended alone    Service Modality:  Video Visit:      Provider verified identity through the following two step process.  Patient provided:  Patient  and Patient address    Telemedicine Visit: The patient's condition can be safely assessed and treated via synchronous audio and visual telemedicine encounter.      Reason for Telemedicine Visit: Services only offered telehealth    Originating Site (Patient Location): Patient's home    Distant Site (Provider Location): Provider Remote Setting- Home Office    Consent:  The patient/guardian has verbally consented to: the potential risks and benefits of telemedicine (video visit) versus in person care; bill my insurance or make self-payment for services provided; and responsibility for payment of non-covered services.     Patient would like the video invitation sent by:  My Chart    Mode of Communication:  Video Conference via Amwell    Distant Location (Provider):  Off-site    As the provider I attest to compliance with applicable laws and regulations related to telemedicine.    DATA  Interactive Complexity: No  Crisis: No        Progress Since Last Session (Related to Symptoms / Goals / Homework):   Symptoms: Improving    Homework: Partially completed      Episode of Care Goals: Minimal progress - ACTION (Actively working towards change); Intervened by reinforcing change plan / affirming steps taken     Current / Ongoing Stressors and Concerns:   Moms physical health issues and next steps     Treatment Objective(s) Addressed in This Session:   Identify negative self-talk and behaviors: challenge core beliefs, myths, and actions       Intervention:   DBT - PLEASED    Assessments  completed prior to visit:  The following assessments were completed by patient for this visit:  PHQ9:       1/25/2024     1:58 PM 2/15/2024    11:52 AM 2/29/2024     1:38 PM 3/7/2024     1:49 PM 3/14/2024     1:56 PM 3/21/2024     1:57 PM 4/4/2024     1:57 PM   PHQ-9 SCORE   PHQ-9 Total Score MyChart 8 (Mild depression) 13 (Moderate depression) 7 (Mild depression) 7 (Mild depression) 7 (Mild depression) 12 (Moderate depression) 7 (Mild depression)   PHQ-9 Total Score 8 13 7 7 7 12 7     GAD7:       12/15/2020     9:40 PM 3/10/2021    10:01 PM 10/19/2021     9:55 AM 4/17/2022     7:58 PM 12/29/2022    12:24 PM 10/5/2023    11:38 AM 12/21/2023     1:49 PM   DANIEL-7 SCORE   Total Score 4 (minimal anxiety) 1 (minimal anxiety) 5 (mild anxiety) 3 (minimal anxiety) 4 (minimal anxiety) 4 (minimal anxiety) 6 (mild anxiety)   Total Score 4 1 5 3 4 4 6          10/10/2023    12:59 PM 1/10/2024     4:52 PM 1/18/2024     1:52 PM   PROMIS-10 Scores Only   Global Mental Health Score 6 9 8    8   Global Physical Health Score 14 15 13    13   PROMIS TOTAL - SUBSCORES 20 24 21    21        ASSESSMENT: Current Emotional / Mental Status (status of significant symptoms):   Risk status (Self / Other harm or suicidal ideation)   Patient denies current fears or concerns for personal safety.   Patient denies current or recent suicidal ideation or behaviors.   Patient denies current or recent homicidal ideation or behaviors.   Patient denies current or recent self injurious behavior or ideation.   Patient denies other safety concerns.   Patient reports there has been no change in risk factors since their last session.     Patient reports there has been no change in protective factors since their last session.     A safety and risk management plan has been developed including: Patient consented to co-developed safety plan on 10/12/23.  Safety and risk management plan was reviewed.   Patient agreed to use safety plan should any safety concerns  arise.  A copy was made available to the patient.     Appearance:   Appropriate    Eye Contact:   Good    Psychomotor Behavior: Normal    Attitude:   Cooperative  Interested   Orientation:   All   Speech    Rate / Production: Normal     Volume:  Normal    Mood:    Normal   Affect:    Appropriate    Thought Content:  Clear    Thought Form:  Coherent  Logical    Insight:    Good      Medication Review:   No changes to current psychiatric medication(s)     Medication Compliance:   Yes     Changes in Health Issues:   None reported     Chemical Use Review:   Substance Use: Chemical use reviewed, no active concerns identified      Tobacco Use: No current tobacco use.      Diagnosis:  1. DANIEL (generalized anxiety disorder)    2. Moderate episode of recurrent major depressive disorder (H)                                              Collateral Reports Completed:   Not Applicable    PLAN: (Patient Tasks / Therapist Tasks / Other)  Patient to practice rest/sleep hygiene until next session.      Yelitza Schuster [facility]                                            Individual Treatment Plan    Patient's Name: Karlee Emery  YOB: 1960    Date of Creation: 10/26/23  Date Treatment Plan Last Reviewed/Revised: 1/25/24    DSM5 Diagnoses: 296.32 (F33.1) Major Depressive Disorder, Recurrent Episode, Moderate _ and With anxious distress or 300.02 (F41.1) Generalized Anxiety Disorder  Psychosocial / Contextual Factors: Familial conflict  PROMIS (reviewed every 90 days): completed 1/18/24    Referral / Collaboration:  Referral to another professional/service is not indicated at this time..    Anticipated number of session for this episode of care: 9-12 sessions  Anticipation frequency of session: Weekly  Anticipated Duration of each session: 38-52 minutes  Treatment plan will be reviewed in 90 days or when goals have been changed.       MeasurableTreatment Goal(s) related to diagnosis / functional impairment(s)  Goal 1:  Patient will develop and implement strategies related to anxiety.    I will know I've met my goal when I will be able to regulate myself.      Objective #A (Patient Action)    Patient will identify 4 fears / thoughts that contribute to feeling anxious.  Status: Completed 1/25/24    Intervention(s)  Therapist will teach emotional recognition/identification.   .    Objective #B  Patient will use relaxation strategies 2 times per day to reduce the physical symptoms of anxiety.  Status: Continued 1/25/24    Intervention(s)  Therapist will teach relaxation strategies and mindfulness .    Objective #C  Patient will attend and participate in social or recreational activities   .  Status: Continued 1/25/24    Intervention(s)  Therapist will teach skills related to engagement .      Goal 2: Patient will develop and implement strategies related to depression.    I will know I've met my goal when I feel better about myself.      Objective #A     Status:Continued 1/25/24    Patient will Increase interest, engagement, and pleasure in doing things.    Intervention(s)  Therapist will teach emotional regulation skills.   .    Objective #B  Patient will Feel less tired and more energy during the day .    Status: Continued 1/25/24    Intervention(s)  Therapist will teach about healthy boundaries.   .    Objective #C  Patient will Decrease frequency and intensity of feeling down, depressed, hopeless.  Status: Continued 1/25/24    Intervention(s)  Therapist will teach distraction skills.   .      Patient has reviewed and agreed to the above plan.      Yelitza Schuster, [facility]  October 26, 2023

## 2024-04-08 ENCOUNTER — MYC MEDICAL ADVICE (OUTPATIENT)
Dept: FAMILY MEDICINE | Facility: CLINIC | Age: 64
End: 2024-04-08
Payer: COMMERCIAL

## 2024-04-08 ENCOUNTER — TELEPHONE (OUTPATIENT)
Dept: FAMILY MEDICINE | Facility: CLINIC | Age: 64
End: 2024-04-08
Payer: COMMERCIAL

## 2024-04-08 NOTE — TELEPHONE ENCOUNTER
General Call    Contacts         Type Contact Phone/Fax    04/08/2024 06:41 PM CDT IB (Incoming) Yun Emery (Self)           Reason for Call:  PA needed     What are your questions or concerns:  Patient states she needs PA for [employer]. Was advised of time frame, but would like it done as soon as able as she is completely out-

## 2024-04-09 NOTE — TELEPHONE ENCOUNTER
She could increase metformin to 2 tabs twice daily with food while awaiting for prior auth response.    Veronica Solis MD  [facility]

## 2024-04-11 ENCOUNTER — VIRTUAL VISIT (OUTPATIENT)
Dept: PSYCHOLOGY | Facility: CLINIC | Age: 64
End: 2024-04-11
Payer: COMMERCIAL

## 2024-04-11 DIAGNOSIS — F41.1 GAD (GENERALIZED ANXIETY DISORDER): Primary | ICD-10-CM

## 2024-04-11 DIAGNOSIS — F33.1 MODERATE EPISODE OF RECURRENT MAJOR DEPRESSIVE DISORDER (H): ICD-10-CM

## 2024-04-11 PROCEDURE — 90834 PSYTX W PT 45 MINUTES: CPT | Mod: 95 | Performed by: COUNSELOR

## 2024-04-11 ASSESSMENT — PATIENT HEALTH QUESTIONNAIRE - PHQ9
SUM OF ALL RESPONSES TO PHQ QUESTIONS 1-9: 7
SUM OF ALL RESPONSES TO PHQ QUESTIONS 1-9: 7

## 2024-04-11 NOTE — PROGRESS NOTES
[facility]                                     Progress Note    Patient Name: Karlee Emery  Date: 24         Service Type: Individual      Session Start Time: 200 pm  Session End Time: 250 pm     Session Length: 50 minutes    Session #: 22  Attendees: Client attended alone    Service Modality:  Video Visit:      Provider verified identity through the following two step process.  Patient provided:  Patient  and Patient address    Telemedicine Visit: The patient's condition can be safely assessed and treated via synchronous audio and visual telemedicine encounter.      Reason for Telemedicine Visit: Services only offered telehealth    Originating Site (Patient Location): Patient's home    Distant Site (Provider Location): Provider Remote Setting- Home Office    Consent:  The patient/guardian has verbally consented to: the potential risks and benefits of telemedicine (video visit) versus in person care; bill my insurance or make self-payment for services provided; and responsibility for payment of non-covered services.     Patient would like the video invitation sent by:  My Chart    Mode of Communication:  Video Conference via Amwell    Distant Location (Provider):  Off-site    As the provider I attest to compliance with applicable laws and regulations related to telemedicine.    DATA  Interactive Complexity: No  Crisis: No        Progress Since Last Session (Related to Symptoms / Goals / Homework):   Symptoms: Improving    Homework: Partially completed      Episode of Care Goals: Minimal progress - ACTION (Actively working towards change); Intervened by reinforcing change plan / affirming steps taken     Current / Ongoing Stressors and Concerns:   Mom struggling with Alzheimers      Treatment Objective(s) Addressed in This Session:   Identify negative self-talk and behaviors: challenge core beliefs, myths, and actions       Intervention:   DBT - PLEASED    Assessments completed  prior to visit:  The following assessments were completed by patient for this visit:  PHQ9:       2/15/2024    11:52 AM 2/29/2024     1:38 PM 3/7/2024     1:49 PM 3/14/2024     1:56 PM 3/21/2024     1:57 PM 4/4/2024     1:57 PM 4/11/2024     1:48 PM   PHQ-9 SCORE   PHQ-9 Total Score MyChart 13 (Moderate depression) 7 (Mild depression) 7 (Mild depression) 7 (Mild depression) 12 (Moderate depression) 7 (Mild depression) 7 (Mild depression)   PHQ-9 Total Score 13 7 7 7 12 7 7     GAD7:       12/15/2020     9:40 PM 3/10/2021    10:01 PM 10/19/2021     9:55 AM 4/17/2022     7:58 PM 12/29/2022    12:24 PM 10/5/2023    11:38 AM 12/21/2023     1:49 PM   DANIEL-7 SCORE   Total Score 4 (minimal anxiety) 1 (minimal anxiety) 5 (mild anxiety) 3 (minimal anxiety) 4 (minimal anxiety) 4 (minimal anxiety) 6 (mild anxiety)   Total Score 4 1 5 3 4 4 6          10/10/2023    12:59 PM 1/10/2024     4:52 PM 1/18/2024     1:52 PM   PROMIS-10 Scores Only   Global Mental Health Score 6 9 8    8   Global Physical Health Score 14 15 13    13   PROMIS TOTAL - SUBSCORES 20 24 21    21        ASSESSMENT: Current Emotional / Mental Status (status of significant symptoms):   Risk status (Self / Other harm or suicidal ideation)   Patient denies current fears or concerns for personal safety.   Patient denies current or recent suicidal ideation or behaviors.   Patient denies current or recent homicidal ideation or behaviors.   Patient denies current or recent self injurious behavior or ideation.   Patient denies other safety concerns.   Patient reports there has been no change in risk factors since their last session.     Patient reports there has been no change in protective factors since their last session.     A safety and risk management plan has been developed including: Patient consented to co-developed safety plan on 10/12/23.  Safety and risk management plan was reviewed.   Patient agreed to use safety plan should any safety concerns arise.  A  copy was made available to the patient.     Appearance:   Appropriate    Eye Contact:   Good    Psychomotor Behavior: Normal    Attitude:   Cooperative  Interested   Orientation:   All   Speech    Rate / Production: Normal     Volume:  Normal    Mood:    Normal   Affect:    Appropriate    Thought Content:  Clear    Thought Form:  Coherent  Logical    Insight:    Good      Medication Review:   No changes to current psychiatric medication(s)     Medication Compliance:   Yes     Changes in Health Issues:   None reported     Chemical Use Review:   Substance Use: Chemical use reviewed, no active concerns identified      Tobacco Use: No current tobacco use.      Diagnosis:  1. DANIEL (generalized anxiety disorder)    2. Moderate episode of recurrent major depressive disorder (H)                                              Collateral Reports Completed:   Not Applicable    PLAN: (Patient Tasks / Therapist Tasks / Other)  Patient to spend time with mom this evening and to remind self of need for next steps until next session.      Yelitza Schuster, [facility]                                            Individual Treatment Plan    Patient's Name: Karlee Emery  YOB: 1960    Date of Creation: 10/26/23  Date Treatment Plan Last Reviewed/Revised: 1/25/24    DSM5 Diagnoses: 296.32 (F33.1) Major Depressive Disorder, Recurrent Episode, Moderate _ and With anxious distress or 300.02 (F41.1) Generalized Anxiety Disorder  Psychosocial / Contextual Factors: Familial conflict  PROMIS (reviewed every 90 days): completed 1/18/24    Referral / Collaboration:  Referral to another professional/service is not indicated at this time..    Anticipated number of session for this episode of care: 9-12 sessions  Anticipation frequency of session: Weekly  Anticipated Duration of each session: 38-52 minutes  Treatment plan will be reviewed in 90 days or when goals have been changed.       MeasurableTreatment Goal(s) related to diagnosis /  functional impairment(s)  Goal 1: Patient will develop and implement strategies related to anxiety.    I will know I've met my goal when I will be able to regulate myself.      Objective #A (Patient Action)    Patient will identify 4 fears / thoughts that contribute to feeling anxious.  Status: Completed 1/25/24    Intervention(s)  Therapist will teach emotional recognition/identification.   .    Objective #B  Patient will use relaxation strategies 2 times per day to reduce the physical symptoms of anxiety.  Status: Continued 1/25/24    Intervention(s)  Therapist will teach relaxation strategies and mindfulness .    Objective #C  Patient will attend and participate in social or recreational activities   .  Status: Continued 1/25/24    Intervention(s)  Therapist will teach skills related to engagement .      Goal 2: Patient will develop and implement strategies related to depression.    I will know I've met my goal when I feel better about myself.      Objective #A     Status:Continued 1/25/24    Patient will Increase interest, engagement, and pleasure in doing things.    Intervention(s)  Therapist will teach emotional regulation skills.   .    Objective #B  Patient will Feel less tired and more energy during the day .    Status: Continued 1/25/24    Intervention(s)  Therapist will teach about healthy boundaries.   .    Objective #C  Patient will Decrease frequency and intensity of feeling down, depressed, hopeless.  Status: Continued 1/25/24    Intervention(s)  Therapist will teach distraction skills.   .      Patient has reviewed and agreed to the above plan.      Yelitza Schuster, [facility]  October 26, 2023

## 2024-04-18 ENCOUNTER — VIRTUAL VISIT (OUTPATIENT)
Dept: PSYCHOLOGY | Facility: CLINIC | Age: 64
End: 2024-04-18
Payer: COMMERCIAL

## 2024-04-18 DIAGNOSIS — F41.1 GAD (GENERALIZED ANXIETY DISORDER): Primary | ICD-10-CM

## 2024-04-18 DIAGNOSIS — F33.1 MODERATE EPISODE OF RECURRENT MAJOR DEPRESSIVE DISORDER (H): ICD-10-CM

## 2024-04-18 PROCEDURE — 90837 PSYTX W PT 60 MINUTES: CPT | Mod: 95 | Performed by: COUNSELOR

## 2024-04-18 ASSESSMENT — PATIENT HEALTH QUESTIONNAIRE - PHQ9
SUM OF ALL RESPONSES TO PHQ QUESTIONS 1-9: 9
10. IF YOU CHECKED OFF ANY PROBLEMS, HOW DIFFICULT HAVE THESE PROBLEMS MADE IT FOR YOU TO DO YOUR WORK, TAKE CARE OF THINGS AT HOME, OR GET ALONG WITH OTHER PEOPLE: SOMEWHAT DIFFICULT
SUM OF ALL RESPONSES TO PHQ QUESTIONS 1-9: 9

## 2024-04-19 NOTE — PROGRESS NOTES
[facility]                                     Progress Note    Patient Name: Karlee Emery  Date: 24         Service Type: Individual      Session Start Time: 200 pm  Session End Time: 255 pm     Session Length: 55 minutes    Session #: 23  Attendees: Client attended alone    Service Modality:  Video Visit:      Provider verified identity through the following two step process.  Patient provided:  Patient  and Patient address    Telemedicine Visit: The patient's condition can be safely assessed and treated via synchronous audio and visual telemedicine encounter.      Reason for Telemedicine Visit: Services only offered telehealth    Originating Site (Patient Location): Patient's home    Distant Site (Provider Location): Provider Remote Setting- Home Office    Consent:  The patient/guardian has verbally consented to: the potential risks and benefits of telemedicine (video visit) versus in person care; bill my insurance or make self-payment for services provided; and responsibility for payment of non-covered services.     Patient would like the video invitation sent by:  My Chart    Mode of Communication:  Video Conference via Amwell    Distant Location (Provider):  Off-site    As the provider I attest to compliance with applicable laws and regulations related to telemedicine.    DATA  Interactive Complexity: No  Crisis: No        Progress Since Last Session (Related to Symptoms / Goals / Homework):   Symptoms: Improving    Homework: Partially completed      Episode of Care Goals: Minimal progress - ACTION (Actively working towards change); Intervened by reinforcing change plan / affirming steps taken     Current / Ongoing Stressors and Concerns:   Struggling with moving mother into memory care     Treatment Objective(s) Addressed in This Session:   Identify negative self-talk and behaviors: challenge core beliefs, myths, and actions       Intervention:   DBT - PLEASED    Assessments  completed prior to visit:  The following assessments were completed by patient for this visit:  PHQ9:       2/29/2024     1:38 PM 3/7/2024     1:49 PM 3/14/2024     1:56 PM 3/21/2024     1:57 PM 4/4/2024     1:57 PM 4/11/2024     1:48 PM 4/18/2024     1:51 PM   PHQ-9 SCORE   PHQ-9 Total Score MyChart 7 (Mild depression) 7 (Mild depression) 7 (Mild depression) 12 (Moderate depression) 7 (Mild depression) 7 (Mild depression) 9 (Mild depression)   PHQ-9 Total Score 7 7 7 12 7 7 9     GAD7:       12/15/2020     9:40 PM 3/10/2021    10:01 PM 10/19/2021     9:55 AM 4/17/2022     7:58 PM 12/29/2022    12:24 PM 10/5/2023    11:38 AM 12/21/2023     1:49 PM   DANIEL-7 SCORE   Total Score 4 (minimal anxiety) 1 (minimal anxiety) 5 (mild anxiety) 3 (minimal anxiety) 4 (minimal anxiety) 4 (minimal anxiety) 6 (mild anxiety)   Total Score 4 1 5 3 4 4 6          10/10/2023    12:59 PM 1/10/2024     4:52 PM 1/18/2024     1:52 PM   PROMIS-10 Scores Only   Global Mental Health Score 6 9 8    8   Global Physical Health Score 14 15 13    13   PROMIS TOTAL - SUBSCORES 20 24 21    21        ASSESSMENT: Current Emotional / Mental Status (status of significant symptoms):   Risk status (Self / Other harm or suicidal ideation)   Patient denies current fears or concerns for personal safety.   Patient denies current or recent suicidal ideation or behaviors.   Patient denies current or recent homicidal ideation or behaviors.   Patient denies current or recent self injurious behavior or ideation.   Patient denies other safety concerns.   Patient reports there has been no change in risk factors since their last session.     Patient reports there has been no change in protective factors since their last session.     A safety and risk management plan has been developed including: Patient consented to co-developed safety plan on 10/12/23.  Safety and risk management plan was reviewed.   Patient agreed to use safety plan should any safety concerns arise.   A copy was made available to the patient.     Appearance:   Appropriate    Eye Contact:   Good    Psychomotor Behavior: Normal    Attitude:   Cooperative  Interested   Orientation:   All   Speech    Rate / Production: Normal     Volume:  Normal    Mood:    Normal   Affect:    Appropriate    Thought Content:  Clear    Thought Form:  Coherent  Logical    Insight:    Good      Medication Review:   No changes to current psychiatric medication(s)     Medication Compliance:   Yes     Changes in Health Issues:   None reported     Chemical Use Review:   Substance Use: Chemical use reviewed, no active concerns identified      Tobacco Use: No current tobacco use.      Diagnosis:  1. DANIEL (generalized anxiety disorder)    2. Moderate episode of recurrent major depressive disorder (H)                                              Collateral Reports Completed:   Not Applicable    PLAN: (Patient Tasks / Therapist Tasks / Other)  Patient to challenge negative self talk and focus on positive experiences with mom until next session.      Yelitza Schuster, [facility]                                            Individual Treatment Plan    Patient's Name: Karlee Emery  YOB: 1960    Date of Creation: 10/26/23  Date Treatment Plan Last Reviewed/Revised: 1/25/24    DSM5 Diagnoses: 296.32 (F33.1) Major Depressive Disorder, Recurrent Episode, Moderate _ and With anxious distress or 300.02 (F41.1) Generalized Anxiety Disorder  Psychosocial / Contextual Factors: Familial conflict  PROMIS (reviewed every 90 days): completed 1/18/24    Referral / Collaboration:  Referral to another professional/service is not indicated at this time..    Anticipated number of session for this episode of care: 9-12 sessions  Anticipation frequency of session: Weekly  Anticipated Duration of each session: 38-52 minutes  Treatment plan will be reviewed in 90 days or when goals have been changed.       MeasurableTreatment Goal(s) related to diagnosis /  functional impairment(s)  Goal 1: Patient will develop and implement strategies related to anxiety.    I will know I've met my goal when I will be able to regulate myself.      Objective #A (Patient Action)    Patient will identify 4 fears / thoughts that contribute to feeling anxious.  Status: Completed 1/25/24    Intervention(s)  Therapist will teach emotional recognition/identification.   .    Objective #B  Patient will use relaxation strategies 2 times per day to reduce the physical symptoms of anxiety.  Status: Continued 1/25/24    Intervention(s)  Therapist will teach relaxation strategies and mindfulness .    Objective #C  Patient will attend and participate in social or recreational activities   .  Status: Continued 1/25/24    Intervention(s)  Therapist will teach skills related to engagement .      Goal 2: Patient will develop and implement strategies related to depression.    I will know I've met my goal when I feel better about myself.      Objective #A     Status:Continued 1/25/24    Patient will Increase interest, engagement, and pleasure in doing things.    Intervention(s)  Therapist will teach emotional regulation skills.   .    Objective #B  Patient will Feel less tired and more energy during the day .    Status: Continued 1/25/24    Intervention(s)  Therapist will teach about healthy boundaries.   .    Objective #C  Patient will Decrease frequency and intensity of feeling down, depressed, hopeless.  Status: Continued 1/25/24    Intervention(s)  Therapist will teach distraction skills.   .      Patient has reviewed and agreed to the above plan.      Yelitza Schuster, [facility]  October 26, 2023

## 2024-04-22 NOTE — TELEPHONE ENCOUNTER
Prior Authorization Approval    Medication: RYBELSUS 3 MG PO TABS  Authorization Effective Date: 3/23/2024  Authorization Expiration Date: 4/22/2025  Approved Dose/Quantity:   Reference #:     Insurance Company: [employer] - Phone [phone] Fax [phone]  Expected CoPay: $    CoPay Card Available:      Financial Assistance Needed:   Which Pharmacy is filling the prescription: [place], [place] - [address]  Pharmacy Notified: YES  Patient Notified: **Instructed pharmacy to notify patient when script is ready to /ship.**

## 2024-04-25 ENCOUNTER — VIRTUAL VISIT (OUTPATIENT)
Dept: PSYCHOLOGY | Facility: CLINIC | Age: 64
End: 2024-04-25
Payer: COMMERCIAL

## 2024-04-25 DIAGNOSIS — F41.1 GAD (GENERALIZED ANXIETY DISORDER): Primary | ICD-10-CM

## 2024-04-25 DIAGNOSIS — F33.1 MODERATE EPISODE OF RECURRENT MAJOR DEPRESSIVE DISORDER (H): ICD-10-CM

## 2024-04-25 PROCEDURE — 90834 PSYTX W PT 45 MINUTES: CPT | Mod: 95 | Performed by: COUNSELOR

## 2024-04-25 ASSESSMENT — PATIENT HEALTH QUESTIONNAIRE - PHQ9
SUM OF ALL RESPONSES TO PHQ QUESTIONS 1-9: 11
10. IF YOU CHECKED OFF ANY PROBLEMS, HOW DIFFICULT HAVE THESE PROBLEMS MADE IT FOR YOU TO DO YOUR WORK, TAKE CARE OF THINGS AT HOME, OR GET ALONG WITH OTHER PEOPLE: SOMEWHAT DIFFICULT
SUM OF ALL RESPONSES TO PHQ QUESTIONS 1-9: 11

## 2024-04-25 NOTE — PROGRESS NOTES
[facility]                                     Progress Note    Patient Name: Karlee Emery  Date: 24         Service Type: Individual      Session Start Time: 200 pm  Session End Time: 245 pm     Session Length: 45 minutes    Session #: 24  Attendees: Client attended alone    Service Modality:  Video Visit:      Provider verified identity through the following two step process.  Patient provided:  Patient  and Patient address    Telemedicine Visit: The patient's condition can be safely assessed and treated via synchronous audio and visual telemedicine encounter.      Reason for Telemedicine Visit: Services only offered telehealth    Originating Site (Patient Location): Patient's home    Distant Site (Provider Location): Provider Remote Setting- Home Office    Consent:  The patient/guardian has verbally consented to: the potential risks and benefits of telemedicine (video visit) versus in person care; bill my insurance or make self-payment for services provided; and responsibility for payment of non-covered services.     Patient would like the video invitation sent by:  My Chart    Mode of Communication:  Video Conference via Amwell    Distant Location (Provider):  Off-site    As the provider I attest to compliance with applicable laws and regulations related to telemedicine.    DATA  Interactive Complexity: No  Crisis: No        Progress Since Last Session (Related to Symptoms / Goals / Homework):   Symptoms: Improving    Homework: Partially completed      Episode of Care Goals: Minimal progress - ACTION (Actively working towards change); Intervened by reinforcing change plan / affirming steps taken     Current / Ongoing Stressors and Concerns:   Managing memory loss of mother and move into memory care     Treatment Objective(s) Addressed in This Session:   Identify negative self-talk and behaviors: challenge core beliefs, myths, and actions       Intervention:   DBT -  PLEASED    Assessments completed prior to visit:  The following assessments were completed by patient for this visit:  PHQ9:       3/7/2024     1:49 PM 3/14/2024     1:56 PM 3/21/2024     1:57 PM 4/4/2024     1:57 PM 4/11/2024     1:48 PM 4/18/2024     1:51 PM 4/25/2024     1:48 PM   PHQ-9 SCORE   PHQ-9 Total Score MyChart 7 (Mild depression) 7 (Mild depression) 12 (Moderate depression) 7 (Mild depression) 7 (Mild depression) 9 (Mild depression) 11 (Moderate depression)   PHQ-9 Total Score 7 7 12 7 7 9 11     GAD7:       12/15/2020     9:40 PM 3/10/2021    10:01 PM 10/19/2021     9:55 AM 4/17/2022     7:58 PM 12/29/2022    12:24 PM 10/5/2023    11:38 AM 12/21/2023     1:49 PM   DANIEL-7 SCORE   Total Score 4 (minimal anxiety) 1 (minimal anxiety) 5 (mild anxiety) 3 (minimal anxiety) 4 (minimal anxiety) 4 (minimal anxiety) 6 (mild anxiety)   Total Score 4 1 5 3 4 4 6          10/10/2023    12:59 PM 1/10/2024     4:52 PM 1/18/2024     1:52 PM   PROMIS-10 Scores Only   Global Mental Health Score 6 9 8    8   Global Physical Health Score 14 15 13    13   PROMIS TOTAL - SUBSCORES 20 24 21    21        ASSESSMENT: Current Emotional / Mental Status (status of significant symptoms):   Risk status (Self / Other harm or suicidal ideation)   Patient denies current fears or concerns for personal safety.   Patient denies current or recent suicidal ideation or behaviors.   Patient denies current or recent homicidal ideation or behaviors.   Patient denies current or recent self injurious behavior or ideation.   Patient denies other safety concerns.   Patient reports there has been no change in risk factors since their last session.     Patient reports there has been no change in protective factors since their last session.     A safety and risk management plan has been developed including: Patient consented to co-developed safety plan on 10/12/23.  Safety and risk management plan was reviewed.   Patient agreed to use safety plan  should any safety concerns arise.  A copy was made available to the patient.     Appearance:   Appropriate    Eye Contact:   Good    Psychomotor Behavior: Normal    Attitude:   Cooperative  Interested   Orientation:   All   Speech    Rate / Production: Normal     Volume:  Normal    Mood:    Normal   Affect:    Appropriate    Thought Content:  Clear    Thought Form:  Coherent  Logical    Insight:    Good      Medication Review:   No changes to current psychiatric medication(s)     Medication Compliance:   Yes     Changes in Health Issues:   None reported     Chemical Use Review:   Substance Use: Chemical use reviewed, no active concerns identified      Tobacco Use: No current tobacco use.      Diagnosis:  1. DANIEL (generalized anxiety disorder)    2. Moderate episode of recurrent major depressive disorder (H)                                              Collateral Reports Completed:   Not Applicable    PLAN: (Patient Tasks / Therapist Tasks / Other)  Patient to reach out for support and lean on family until next session.      Yelitza Schuster, [facility]                                            Individual Treatment Plan    Patient's Name: Karlee Emery  YOB: 1960    Date of Creation: 10/26/23  Date Treatment Plan Last Reviewed/Revised: 1/25/24    DSM5 Diagnoses: 296.32 (F33.1) Major Depressive Disorder, Recurrent Episode, Moderate _ and With anxious distress or 300.02 (F41.1) Generalized Anxiety Disorder  Psychosocial / Contextual Factors: Familial conflict  PROMIS (reviewed every 90 days): completed 1/18/24    Referral / Collaboration:  Referral to another professional/service is not indicated at this time..    Anticipated number of session for this episode of care: 9-12 sessions  Anticipation frequency of session: Weekly  Anticipated Duration of each session: 38-52 minutes  Treatment plan will be reviewed in 90 days or when goals have been changed.       MeasurableTreatment Goal(s) related to diagnosis  / functional impairment(s)  Goal 1: Patient will develop and implement strategies related to anxiety.    I will know I've met my goal when I will be able to regulate myself.      Objective #A (Patient Action)    Patient will identify 4 fears / thoughts that contribute to feeling anxious.  Status: Completed 1/25/24    Intervention(s)  Therapist will teach emotional recognition/identification.   .    Objective #B  Patient will use relaxation strategies 2 times per day to reduce the physical symptoms of anxiety.  Status: Continued 1/25/24    Intervention(s)  Therapist will teach relaxation strategies and mindfulness .    Objective #C  Patient will attend and participate in social or recreational activities   .  Status: Continued 1/25/24    Intervention(s)  Therapist will teach skills related to engagement .      Goal 2: Patient will develop and implement strategies related to depression.    I will know I've met my goal when I feel better about myself.      Objective #A     Status:Continued 1/25/24    Patient will Increase interest, engagement, and pleasure in doing things.    Intervention(s)  Therapist will teach emotional regulation skills.   .    Objective #B  Patient will Feel less tired and more energy during the day .    Status: Continued 1/25/24    Intervention(s)  Therapist will teach about healthy boundaries.   .    Objective #C  Patient will Decrease frequency and intensity of feeling down, depressed, hopeless.  Status: Continued 1/25/24    Intervention(s)  Therapist will teach distraction skills.   .      Patient has reviewed and agreed to the above plan.      Yelitza Schuster, [facility]  October 26, 2023

## 2024-05-09 ENCOUNTER — VIRTUAL VISIT (OUTPATIENT)
Dept: PSYCHOLOGY | Facility: CLINIC | Age: 64
End: 2024-05-09
Payer: COMMERCIAL

## 2024-05-09 DIAGNOSIS — F33.1 MODERATE EPISODE OF RECURRENT MAJOR DEPRESSIVE DISORDER (H): ICD-10-CM

## 2024-05-09 DIAGNOSIS — F41.1 GAD (GENERALIZED ANXIETY DISORDER): Primary | ICD-10-CM

## 2024-05-09 PROCEDURE — 90837 PSYTX W PT 60 MINUTES: CPT | Mod: 95 | Performed by: COUNSELOR

## 2024-05-09 ASSESSMENT — ANXIETY QUESTIONNAIRES
7. FEELING AFRAID AS IF SOMETHING AWFUL MIGHT HAPPEN: MORE THAN HALF THE DAYS
4. TROUBLE RELAXING: SEVERAL DAYS
7. FEELING AFRAID AS IF SOMETHING AWFUL MIGHT HAPPEN: MORE THAN HALF THE DAYS
GAD7 TOTAL SCORE: 9
2. NOT BEING ABLE TO STOP OR CONTROL WORRYING: MORE THAN HALF THE DAYS
GAD7 TOTAL SCORE: 9
IF YOU CHECKED OFF ANY PROBLEMS ON THIS QUESTIONNAIRE, HOW DIFFICULT HAVE THESE PROBLEMS MADE IT FOR YOU TO DO YOUR WORK, TAKE CARE OF THINGS AT HOME, OR GET ALONG WITH OTHER PEOPLE: SOMEWHAT DIFFICULT
2. NOT BEING ABLE TO STOP OR CONTROL WORRYING: MORE THAN HALF THE DAYS
1. FEELING NERVOUS, ANXIOUS, OR ON EDGE: SEVERAL DAYS
IF YOU CHECKED OFF ANY PROBLEMS ON THIS QUESTIONNAIRE, HOW DIFFICULT HAVE THESE PROBLEMS MADE IT FOR YOU TO DO YOUR WORK, TAKE CARE OF THINGS AT HOME, OR GET ALONG WITH OTHER PEOPLE: SOMEWHAT DIFFICULT
GAD7 TOTAL SCORE: 9
7. FEELING AFRAID AS IF SOMETHING AWFUL MIGHT HAPPEN: MORE THAN HALF THE DAYS
5. BEING SO RESTLESS THAT IT IS HARD TO SIT STILL: NOT AT ALL
7. FEELING AFRAID AS IF SOMETHING AWFUL MIGHT HAPPEN: MORE THAN HALF THE DAYS
1. FEELING NERVOUS, ANXIOUS, OR ON EDGE: SEVERAL DAYS
GAD7 TOTAL SCORE: 9
6. BECOMING EASILY ANNOYED OR IRRITABLE: MORE THAN HALF THE DAYS
4. TROUBLE RELAXING: SEVERAL DAYS
GAD7 TOTAL SCORE: 9
GAD7 TOTAL SCORE: 9
3. WORRYING TOO MUCH ABOUT DIFFERENT THINGS: SEVERAL DAYS
6. BECOMING EASILY ANNOYED OR IRRITABLE: MORE THAN HALF THE DAYS
8. IF YOU CHECKED OFF ANY PROBLEMS, HOW DIFFICULT HAVE THESE MADE IT FOR YOU TO DO YOUR WORK, TAKE CARE OF THINGS AT HOME, OR GET ALONG WITH OTHER PEOPLE?: SOMEWHAT DIFFICULT
5. BEING SO RESTLESS THAT IT IS HARD TO SIT STILL: NOT AT ALL
3. WORRYING TOO MUCH ABOUT DIFFERENT THINGS: SEVERAL DAYS
8. IF YOU CHECKED OFF ANY PROBLEMS, HOW DIFFICULT HAVE THESE MADE IT FOR YOU TO DO YOUR WORK, TAKE CARE OF THINGS AT HOME, OR GET ALONG WITH OTHER PEOPLE?: SOMEWHAT DIFFICULT

## 2024-05-09 NOTE — PROGRESS NOTES
[facility]                                     Progress Note    Patient Name: Karlee Emery  Date: 24         Service Type: Individual      Session Start Time: 200 pm  Session End Time: 255 pm     Session Length: 55 minutes    Session #: 25  Attendees: Client attended alone    Service Modality:  Video Visit:      Provider verified identity through the following two step process.  Patient provided:  Patient  and Patient address    Telemedicine Visit: The patient's condition can be safely assessed and treated via synchronous audio and visual telemedicine encounter.      Reason for Telemedicine Visit: Services only offered telehealth    Originating Site (Patient Location): Patient's home    Distant Site (Provider Location): Provider Remote Setting- Home Office    Consent:  The patient/guardian has verbally consented to: the potential risks and benefits of telemedicine (video visit) versus in person care; bill my insurance or make self-payment for services provided; and responsibility for payment of non-covered services.     Patient would like the video invitation sent by:  My Chart    Mode of Communication:  Video Conference via Amwell    Distant Location (Provider):  Off-site    As the provider I attest to compliance with applicable laws and regulations related to telemedicine.    DATA  Interactive Complexity: No  Crisis: No        Progress Since Last Session (Related to Symptoms / Goals / Homework):   Symptoms: Improving    Homework: Partially completed      Episode of Care Goals: Minimal progress - ACTION (Actively working towards change); Intervened by reinforcing change plan / affirming steps taken     Current / Ongoing Stressors and Concerns:   Mother moved into memory care     Treatment Objective(s) Addressed in This Session:   Identify negative self-talk and behaviors: challenge core beliefs, myths, and actions       Intervention:   DBT - PLEASED    Assessments completed prior  to visit:  The following assessments were completed by patient for this visit:  PHQ9:       3/14/2024     1:56 PM 3/21/2024     1:57 PM 4/4/2024     1:57 PM 4/11/2024     1:48 PM 4/18/2024     1:51 PM 4/25/2024     1:48 PM 5/9/2024     1:05 PM   PHQ-9 SCORE   PHQ-9 Total Score MyChart 7 (Mild depression) 12 (Moderate depression) 7 (Mild depression) 7 (Mild depression) 9 (Mild depression) 11 (Moderate depression) 8 (Mild depression)   PHQ-9 Total Score 7 12 7 7 9 11 8     GAD7:       3/10/2021    10:01 PM 10/19/2021     9:55 AM 4/17/2022     7:58 PM 12/29/2022    12:24 PM 10/5/2023    11:38 AM 12/21/2023     1:49 PM 5/9/2024     1:07 PM   DANIEL-7 SCORE   Total Score 1 (minimal anxiety) 5 (mild anxiety) 3 (minimal anxiety) 4 (minimal anxiety) 4 (minimal anxiety) 6 (mild anxiety) 9 (mild anxiety)   Total Score 1 5 3 4 4 6 9    9          10/10/2023    12:59 PM 1/10/2024     4:52 PM 1/18/2024     1:52 PM   PROMIS-10 Scores Only   Global Mental Health Score 6 9 8    8   Global Physical Health Score 14 15 13    13   PROMIS TOTAL - SUBSCORES 20 24 21    21        ASSESSMENT: Current Emotional / Mental Status (status of significant symptoms):   Risk status (Self / Other harm or suicidal ideation)   Patient denies current fears or concerns for personal safety.   Patient denies current or recent suicidal ideation or behaviors.   Patient denies current or recent homicidal ideation or behaviors.   Patient denies current or recent self injurious behavior or ideation.   Patient denies other safety concerns.   Patient reports there has been no change in risk factors since their last session.     Patient reports there has been no change in protective factors since their last session.     A safety and risk management plan has been developed including: Patient consented to co-developed safety plan on 10/12/23.  Safety and risk management plan was reviewed.   Patient agreed to use safety plan should any safety concerns arise.  A copy  was made available to the patient.     Appearance:   Appropriate    Eye Contact:   Good    Psychomotor Behavior: Normal    Attitude:   Cooperative  Interested   Orientation:   All   Speech    Rate / Production: Normal     Volume:  Normal    Mood:    Normal   Affect:    Appropriate    Thought Content:  Clear    Thought Form:  Coherent  Logical    Insight:    Good      Medication Review:   No changes to current psychiatric medication(s)     Medication Compliance:   Yes     Changes in Health Issues:   None reported     Chemical Use Review:   Substance Use: Chemical use reviewed, no active concerns identified      Tobacco Use: No current tobacco use.      Diagnosis:  1. DANIEL (generalized anxiety disorder)    2. Moderate episode of recurrent major depressive disorder (H)                                              Collateral Reports Completed:   Not Applicable    PLAN: (Patient Tasks / Therapist Tasks / Other)  Patient to practice self care and utilize resources until next session.      Yelitza Schuster, [facility]                                            Individual Treatment Plan    Patient's Name: Karlee Emery  YOB: 1960    Date of Creation: 10/26/23  Date Treatment Plan Last Reviewed/Revised: 1/25/24    DSM5 Diagnoses: 296.32 (F33.1) Major Depressive Disorder, Recurrent Episode, Moderate _ and With anxious distress or 300.02 (F41.1) Generalized Anxiety Disorder  Psychosocial / Contextual Factors: Familial conflict  PROMIS (reviewed every 90 days): completed 1/18/24    Referral / Collaboration:  Referral to another professional/service is not indicated at this time..    Anticipated number of session for this episode of care: 9-12 sessions  Anticipation frequency of session: Weekly  Anticipated Duration of each session: 38-52 minutes  Treatment plan will be reviewed in 90 days or when goals have been changed.       MeasurableTreatment Goal(s) related to diagnosis / functional impairment(s)  Goal 1:  Patient will develop and implement strategies related to anxiety.    I will know I've met my goal when I will be able to regulate myself.      Objective #A (Patient Action)    Patient will identify 4 fears / thoughts that contribute to feeling anxious.  Status: Completed 1/25/24    Intervention(s)  Therapist will teach emotional recognition/identification.   .    Objective #B  Patient will use relaxation strategies 2 times per day to reduce the physical symptoms of anxiety.  Status: Continued 1/25/24    Intervention(s)  Therapist will teach relaxation strategies and mindfulness .    Objective #C  Patient will attend and participate in social or recreational activities   .  Status: Continued 1/25/24    Intervention(s)  Therapist will teach skills related to engagement .      Goal 2: Patient will develop and implement strategies related to depression.    I will know I've met my goal when I feel better about myself.      Objective #A     Status:Continued 1/25/24    Patient will Increase interest, engagement, and pleasure in doing things.    Intervention(s)  Therapist will teach emotional regulation skills.   .    Objective #B  Patient will Feel less tired and more energy during the day .    Status: Continued 1/25/24    Intervention(s)  Therapist will teach about healthy boundaries.   .    Objective #C  Patient will Decrease frequency and intensity of feeling down, depressed, hopeless.  Status: Continued 1/25/24    Intervention(s)  Therapist will teach distraction skills.   .      Patient has reviewed and agreed to the above plan.      Yelitza Schuster, [facility]  October 26, 2023

## 2024-05-16 ENCOUNTER — VIRTUAL VISIT (OUTPATIENT)
Dept: PSYCHOLOGY | Facility: CLINIC | Age: 64
End: 2024-05-16
Payer: COMMERCIAL

## 2024-05-16 DIAGNOSIS — F33.1 MODERATE EPISODE OF RECURRENT MAJOR DEPRESSIVE DISORDER (H): Primary | ICD-10-CM

## 2024-05-16 PROCEDURE — 90837 PSYTX W PT 60 MINUTES: CPT | Mod: 95 | Performed by: COUNSELOR

## 2024-05-16 ASSESSMENT — PATIENT HEALTH QUESTIONNAIRE - PHQ9
10. IF YOU CHECKED OFF ANY PROBLEMS, HOW DIFFICULT HAVE THESE PROBLEMS MADE IT FOR YOU TO DO YOUR WORK, TAKE CARE OF THINGS AT HOME, OR GET ALONG WITH OTHER PEOPLE: SOMEWHAT DIFFICULT
SUM OF ALL RESPONSES TO PHQ QUESTIONS 1-9: 11
SUM OF ALL RESPONSES TO PHQ QUESTIONS 1-9: 11

## 2024-05-16 NOTE — PROGRESS NOTES
[facility]                                     Progress Note    Patient Name: Karlee Emery  Date: 24         Service Type: Individual      Session Start Time: 200 pm  Session End Time: 254 pm     Session Length: 54 minutes    Session #: 26  Attendees: Client attended alone    Service Modality:  Video Visit:      Provider verified identity through the following two step process.  Patient provided:  Patient  and Patient address    Telemedicine Visit: The patient's condition can be safely assessed and treated via synchronous audio and visual telemedicine encounter.      Reason for Telemedicine Visit: Services only offered telehealth    Originating Site (Patient Location): Patient's home    Distant Site (Provider Location): Provider Remote Setting- Home Office    Consent:  The patient/guardian has verbally consented to: the potential risks and benefits of telemedicine (video visit) versus in person care; bill my insurance or make self-payment for services provided; and responsibility for payment of non-covered services.     Patient would like the video invitation sent by:  My Chart    Mode of Communication:  Video Conference via Amwell    Distant Location (Provider):  Off-site    As the provider I attest to compliance with applicable laws and regulations related to telemedicine.    DATA  Interactive Complexity: No  Crisis: No        Progress Since Last Session (Related to Symptoms / Goals / Homework):   Symptoms: Improving    Homework: Partially completed      Episode of Care Goals: Minimal progress - ACTION (Actively working towards change); Intervened by reinforcing change plan / affirming steps taken     Current / Ongoing Stressors and Concerns:   Grief surrounding mothers health decline     Treatment Objective(s) Addressed in This Session:   Identify negative self-talk and behaviors: challenge core beliefs, myths, and actions       Intervention:   DBT - PLEASED    Assessments  completed prior to visit:  The following assessments were completed by patient for this visit:  PHQ9:       3/21/2024     1:57 PM 4/4/2024     1:57 PM 4/11/2024     1:48 PM 4/18/2024     1:51 PM 4/25/2024     1:48 PM 5/9/2024     1:05 PM 5/16/2024     1:58 PM   PHQ-9 SCORE   PHQ-9 Total Score MyChart 12 (Moderate depression) 7 (Mild depression) 7 (Mild depression) 9 (Mild depression) 11 (Moderate depression) 8 (Mild depression) 11 (Moderate depression)   PHQ-9 Total Score 12 7 7 9 11 8 11     GAD7:       3/10/2021    10:01 PM 10/19/2021     9:55 AM 4/17/2022     7:58 PM 12/29/2022    12:24 PM 10/5/2023    11:38 AM 12/21/2023     1:49 PM 5/9/2024     1:07 PM   DANIEL-7 SCORE   Total Score 1 (minimal anxiety) 5 (mild anxiety) 3 (minimal anxiety) 4 (minimal anxiety) 4 (minimal anxiety) 6 (mild anxiety) 9 (mild anxiety)   Total Score 1 5 3 4 4 6 9    9          10/10/2023    12:59 PM 1/10/2024     4:52 PM 1/18/2024     1:52 PM   PROMIS-10 Scores Only   Global Mental Health Score 6 9 8    8   Global Physical Health Score 14 15 13    13   PROMIS TOTAL - SUBSCORES 20 24 21    21        ASSESSMENT: Current Emotional / Mental Status (status of significant symptoms):   Risk status (Self / Other harm or suicidal ideation)   Patient denies current fears or concerns for personal safety.   Patient denies current or recent suicidal ideation or behaviors.   Patient denies current or recent homicidal ideation or behaviors.   Patient denies current or recent self injurious behavior or ideation.   Patient denies other safety concerns.   Patient reports there has been no change in risk factors since their last session.     Patient reports there has been no change in protective factors since their last session.     A safety and risk management plan has been developed including: Patient consented to co-developed safety plan on 10/12/23.  Safety and risk management plan was reviewed.   Patient agreed to use safety plan should any safety  concerns arise.  A copy was made available to the patient.     Appearance:   Appropriate    Eye Contact:   Good    Psychomotor Behavior: Normal    Attitude:   Cooperative  Interested   Orientation:   All   Speech    Rate / Production: Normal     Volume:  Normal    Mood:    Normal   Affect:    Appropriate    Thought Content:  Clear    Thought Form:  Coherent  Logical    Insight:    Good      Medication Review:   No changes to current psychiatric medication(s)     Medication Compliance:   Yes     Changes in Health Issues:   None reported     Chemical Use Review:   Substance Use: Chemical use reviewed, no active concerns identified      Tobacco Use: No current tobacco use.      Diagnosis:  1. Moderate episode of recurrent major depressive disorder (H)                                              Collateral Reports Completed:   Not Applicable    PLAN: (Patient Tasks / Therapist Tasks / Other)  Patient to prioritize own needs and spend time with mother until next session.      Yelitza Schuster, [facility]                                            Individual Treatment Plan    Patient's Name: Karlee Emery  YOB: 1960    Date of Creation: 10/26/23  Date Treatment Plan Last Reviewed/Revised: 1/25/24    DSM5 Diagnoses: 296.32 (F33.1) Major Depressive Disorder, Recurrent Episode, Moderate _ and With anxious distress or 300.02 (F41.1) Generalized Anxiety Disorder  Psychosocial / Contextual Factors: Familial conflict  PROMIS (reviewed every 90 days): completed 1/18/24    Referral / Collaboration:  Referral to another professional/service is not indicated at this time..    Anticipated number of session for this episode of care: 9-12 sessions  Anticipation frequency of session: Weekly  Anticipated Duration of each session: 38-52 minutes  Treatment plan will be reviewed in 90 days or when goals have been changed.       MeasurableTreatment Goal(s) related to diagnosis / functional impairment(s)  Goal 1: Patient will  develop and implement strategies related to anxiety.    I will know I've met my goal when I will be able to regulate myself.      Objective #A (Patient Action)    Patient will identify 4 fears / thoughts that contribute to feeling anxious.  Status: Completed 1/25/24    Intervention(s)  Therapist will teach emotional recognition/identification.   .    Objective #B  Patient will use relaxation strategies 2 times per day to reduce the physical symptoms of anxiety.  Status: Continued 1/25/24    Intervention(s)  Therapist will teach relaxation strategies and mindfulness .    Objective #C  Patient will attend and participate in social or recreational activities   .  Status: Continued 1/25/24    Intervention(s)  Therapist will teach skills related to engagement .      Goal 2: Patient will develop and implement strategies related to depression.    I will know I've met my goal when I feel better about myself.      Objective #A     Status:Continued 1/25/24    Patient will Increase interest, engagement, and pleasure in doing things.    Intervention(s)  Therapist will teach emotional regulation skills.   .    Objective #B  Patient will Feel less tired and more energy during the day .    Status: Continued 1/25/24    Intervention(s)  Therapist will teach about healthy boundaries.   .    Objective #C  Patient will Decrease frequency and intensity of feeling down, depressed, hopeless.  Status: Continued 1/25/24    Intervention(s)  Therapist will teach distraction skills.   .      Patient has reviewed and agreed to the above plan.      Yelitza Schuster, [facility]  October 26, 2023

## 2024-05-23 ENCOUNTER — VIRTUAL VISIT (OUTPATIENT)
Dept: PSYCHOLOGY | Facility: CLINIC | Age: 64
End: 2024-05-23
Payer: COMMERCIAL

## 2024-05-23 DIAGNOSIS — F41.1 GAD (GENERALIZED ANXIETY DISORDER): ICD-10-CM

## 2024-05-23 DIAGNOSIS — F33.1 MODERATE EPISODE OF RECURRENT MAJOR DEPRESSIVE DISORDER (H): Primary | ICD-10-CM

## 2024-05-23 PROCEDURE — 99207 PR NO BILLABLE SERVICE THIS VISIT: CPT | Mod: 95 | Performed by: COUNSELOR

## 2024-05-24 NOTE — PROGRESS NOTES
Writer met with patient briefly on this date.  They stated they are not feeling well and just woke up.  Patient stated they are not up to meeting for the session and would like to cancel.  Writer acknowledged and will plan to meet with patient next week.

## 2024-05-30 ENCOUNTER — VIRTUAL VISIT (OUTPATIENT)
Dept: PSYCHOLOGY | Facility: CLINIC | Age: 64
End: 2024-05-30
Payer: COMMERCIAL

## 2024-05-30 DIAGNOSIS — F41.1 GAD (GENERALIZED ANXIETY DISORDER): ICD-10-CM

## 2024-05-30 DIAGNOSIS — F33.1 MODERATE EPISODE OF RECURRENT MAJOR DEPRESSIVE DISORDER (H): Primary | ICD-10-CM

## 2024-05-30 PROCEDURE — 90837 PSYTX W PT 60 MINUTES: CPT | Mod: 95 | Performed by: COUNSELOR

## 2024-05-30 NOTE — PROGRESS NOTES
[facility]                                     Progress Note    Patient Name: Karlee Emery  Date: 24         Service Type: Individual      Session Start Time: 200 pm  Session End Time: 305 pm     Session Length: 65 minutes    Session #: 27  Attendees: Client attended alone    Service Modality:  Video Visit:      Provider verified identity through the following two step process.  Patient provided:  Patient  and Patient address    Telemedicine Visit: The patient's condition can be safely assessed and treated via synchronous audio and visual telemedicine encounter.      Reason for Telemedicine Visit: Services only offered telehealth    Originating Site (Patient Location): Patient's home    Distant Site (Provider Location): Provider Remote Setting- Home Office    Consent:  The patient/guardian has verbally consented to: the potential risks and benefits of telemedicine (video visit) versus in person care; bill my insurance or make self-payment for services provided; and responsibility for payment of non-covered services.     Patient would like the video invitation sent by:  My Chart    Mode of Communication:  Video Conference via Amwell    Distant Location (Provider):  Off-site    As the provider I attest to compliance with applicable laws and regulations related to telemedicine.    DATA  Interactive Complexity: No  Crisis: No        Progress Since Last Session (Related to Symptoms / Goals / Homework):   Symptoms: Improving    Homework: Partially completed      Episode of Care Goals: Minimal progress - ACTION (Actively working towards change); Intervened by reinforcing change plan / affirming steps taken     Current / Ongoing Stressors and Concerns:   Death of mother     Treatment Objective(s) Addressed in This Session:   Identify negative self-talk and behaviors: challenge core beliefs, myths, and actions       Intervention:   DBT - PLEASED    Assessments completed prior to visit:  The  following assessments were completed by patient for this visit:  PHQ9:       4/11/2024     1:48 PM 4/18/2024     1:51 PM 4/25/2024     1:48 PM 5/9/2024     1:05 PM 5/16/2024     1:58 PM 5/22/2024     7:06 PM 5/30/2024     1:06 PM   PHQ-9 SCORE   PHQ-9 Total Score MyChart 7 (Mild depression) 9 (Mild depression) 11 (Moderate depression) 8 (Mild depression) 11 (Moderate depression) 13 (Moderate depression) 7 (Mild depression)   PHQ-9 Total Score 7 9 11 8 11 13 7     GAD7:       3/10/2021    10:01 PM 10/19/2021     9:55 AM 4/17/2022     7:58 PM 12/29/2022    12:24 PM 10/5/2023    11:38 AM 12/21/2023     1:49 PM 5/9/2024     1:07 PM   DANIEL-7 SCORE   Total Score 1 (minimal anxiety) 5 (mild anxiety) 3 (minimal anxiety) 4 (minimal anxiety) 4 (minimal anxiety) 6 (mild anxiety) 9 (mild anxiety)   Total Score 1 5 3 4 4 6 9    9          10/10/2023    12:59 PM 1/10/2024     4:52 PM 1/18/2024     1:52 PM   PROMIS-10 Scores Only   Global Mental Health Score 6 9 8    8   Global Physical Health Score 14 15 13    13   PROMIS TOTAL - SUBSCORES 20 24 21    21        ASSESSMENT: Current Emotional / Mental Status (status of significant symptoms):   Risk status (Self / Other harm or suicidal ideation)   Patient denies current fears or concerns for personal safety.   Patient denies current or recent suicidal ideation or behaviors.   Patient denies current or recent homicidal ideation or behaviors.   Patient denies current or recent self injurious behavior or ideation.   Patient denies other safety concerns.   Patient reports there has been no change in risk factors since their last session.     Patient reports there has been no change in protective factors since their last session.     A safety and risk management plan has been developed including: Patient consented to co-developed safety plan on 10/12/23.  Safety and risk management plan was reviewed.   Patient agreed to use safety plan should any safety concerns arise.  A copy was made  available to the patient.     Appearance:   Appropriate    Eye Contact:   Good    Psychomotor Behavior: Normal    Attitude:   Cooperative  Interested   Orientation:   All   Speech    Rate / Production: Normal     Volume:  Normal    Mood:    Normal   Affect:    Appropriate    Thought Content:  Clear    Thought Form:  Coherent  Logical    Insight:    Good      Medication Review:   No changes to current psychiatric medication(s)     Medication Compliance:   Yes     Changes in Health Issues:   None reported     Chemical Use Review:   Substance Use: Chemical use reviewed, no active concerns identified      Tobacco Use: No current tobacco use.      Diagnosis:  1. Moderate episode of recurrent major depressive disorder (H)    2. DANIEL (generalized anxiety disorder)                                              Collateral Reports Completed:   Not Applicable    PLAN: (Patient Tasks / Therapist Tasks / Other)  Patient to prioritize self and sit with feelings to be able to process through grief until next session.      Yelitza Schuster, [facility]                                            Individual Treatment Plan    Patient's Name: Karlee Emery  YOB: 1960    Date of Creation: 10/26/23  Date Treatment Plan Last Reviewed/Revised:5/30/24    DSM5 Diagnoses: 296.32 (F33.1) Major Depressive Disorder, Recurrent Episode, Moderate _ and With anxious distress or 300.02 (F41.1) Generalized Anxiety Disorder  Psychosocial / Contextual Factors: Familial conflict  PROMIS (reviewed every 90 days): completed 1/18/24    Referral / Collaboration:  Referral to another professional/service is not indicated at this time..    Anticipated number of session for this episode of care: 9-12 sessions  Anticipation frequency of session: Weekly  Anticipated Duration of each session: 38-52 minutes  Treatment plan will be reviewed in 90 days or when goals have been changed.       MeasurableTreatment Goal(s) related to diagnosis / functional  impairment(s)  Goal 1: Patient will develop and implement strategies related to anxiety.    I will know I've met my goal when I will be able to regulate myself.      Objective #A (Patient Action)    Patient will identify 4 fears / thoughts that contribute to feeling anxious.  Status: Completed 1/25/24    Intervention(s)  Therapist will teach emotional recognition/identification.   .    Objective #B  Patient will use relaxation strategies 2 times per day to reduce the physical symptoms of anxiety.  Status: Continued 5/30/24    Intervention(s)  Therapist will teach relaxation strategies and mindfulness .    Objective #C  Patient will attend and participate in social or recreational activities   .  Status: Continued 5/30/24    Intervention(s)  Therapist will teach skills related to engagement .      Goal 2: Patient will develop and implement strategies related to depression.    I will know I've met my goal when I feel better about myself.      Objective #A     Status:Completed 5/30/24    Patient will Increase interest, engagement, and pleasure in doing things.    Intervention(s)  Therapist will teach emotional regulation skills.   .    Objective #B  Patient will Feel less tired and more energy during the day .    Status: Continued 5/30/24    Intervention(s)  Therapist will teach about healthy boundaries.   .    Objective #C  Patient will Decrease frequency and intensity of feeling down, depressed, hopeless.  Status: Continued 5/30/24    Intervention(s)  Therapist will teach distraction skills.   .      Patient has reviewed and agreed to the above plan.      Yelitza Schuster, [facility]  October 26, 2023

## 2024-06-01 ENCOUNTER — HEALTH MAINTENANCE LETTER (OUTPATIENT)
Age: 64
End: 2024-06-01

## 2024-06-03 ENCOUNTER — OFFICE VISIT (OUTPATIENT)
Dept: FAMILY MEDICINE | Facility: CLINIC | Age: 64
End: 2024-06-03
Attending: FAMILY MEDICINE
Payer: COMMERCIAL

## 2024-06-03 VITALS
BODY MASS INDEX: 33 KG/M2 | OXYGEN SATURATION: 97 % | SYSTOLIC BLOOD PRESSURE: 116 MMHG | HEIGHT: 61 IN | WEIGHT: 174.8 LBS | RESPIRATION RATE: 20 BRPM | HEART RATE: 87 BPM | TEMPERATURE: 97.8 F | DIASTOLIC BLOOD PRESSURE: 70 MMHG

## 2024-06-03 DIAGNOSIS — Z23 NEED FOR VACCINATION: ICD-10-CM

## 2024-06-03 DIAGNOSIS — Z78.0 ASYMPTOMATIC POSTMENOPAUSAL STATUS: ICD-10-CM

## 2024-06-03 DIAGNOSIS — Z12.31 ENCOUNTER FOR SCREENING MAMMOGRAM FOR BREAST CANCER: ICD-10-CM

## 2024-06-03 DIAGNOSIS — E11.9 TYPE 2 DIABETES MELLITUS WITHOUT COMPLICATION, WITHOUT LONG-TERM CURRENT USE OF INSULIN (H): Primary | ICD-10-CM

## 2024-06-03 DIAGNOSIS — Z51.81 MEDICATION MONITORING ENCOUNTER: ICD-10-CM

## 2024-06-03 LAB
BASOPHILS # BLD AUTO: 0.1 10E3/UL (ref 0–0.2)
BASOPHILS NFR BLD AUTO: 1 %
EOSINOPHIL # BLD AUTO: 0.5 10E3/UL (ref 0–0.7)
EOSINOPHIL NFR BLD AUTO: 6 %
ERYTHROCYTE [DISTWIDTH] IN BLOOD BY AUTOMATED COUNT: 12 % (ref 10–15)
HBA1C MFR BLD: 6.4 % (ref 0–5.6)
HCT VFR BLD AUTO: 41.2 % (ref 35–47)
HGB BLD-MCNC: 13.5 G/DL (ref 11.7–15.7)
IMM GRANULOCYTES # BLD: 0 10E3/UL
IMM GRANULOCYTES NFR BLD: 0 %
LYMPHOCYTES # BLD AUTO: 1.8 10E3/UL (ref 0.8–5.3)
LYMPHOCYTES NFR BLD AUTO: 24 %
MCH RBC QN AUTO: 31.4 PG (ref 26.5–33)
MCHC RBC AUTO-ENTMCNC: 32.8 G/DL (ref 31.5–36.5)
MCV RBC AUTO: 96 FL (ref 78–100)
MONOCYTES # BLD AUTO: 0.6 10E3/UL (ref 0–1.3)
MONOCYTES NFR BLD AUTO: 8 %
NEUTROPHILS # BLD AUTO: 4.6 10E3/UL (ref 1.6–8.3)
NEUTROPHILS NFR BLD AUTO: 62 %
PLATELET # BLD AUTO: 269 10E3/UL (ref 150–450)
RBC # BLD AUTO: 4.3 10E6/UL (ref 3.8–5.2)
WBC # BLD AUTO: 7.5 10E3/UL (ref 4–11)

## 2024-06-03 PROCEDURE — 85025 COMPLETE CBC W/AUTO DIFF WBC: CPT | Performed by: FAMILY MEDICINE

## 2024-06-03 PROCEDURE — 99214 OFFICE O/P EST MOD 30 MIN: CPT | Mod: 25 | Performed by: FAMILY MEDICINE

## 2024-06-03 PROCEDURE — 99207 PR FOOT EXAM NO CHARGE: CPT | Performed by: FAMILY MEDICINE

## 2024-06-03 PROCEDURE — 83036 HEMOGLOBIN GLYCOSYLATED A1C: CPT | Performed by: FAMILY MEDICINE

## 2024-06-03 PROCEDURE — 36415 COLL VENOUS BLD VENIPUNCTURE: CPT | Performed by: FAMILY MEDICINE

## 2024-06-03 PROCEDURE — 91320 SARSCV2 VAC 30MCG TRS-SUC IM: CPT | Performed by: FAMILY MEDICINE

## 2024-06-03 PROCEDURE — 82570 ASSAY OF URINE CREATININE: CPT | Performed by: FAMILY MEDICINE

## 2024-06-03 PROCEDURE — 90480 ADMN SARSCOV2 VAC 1/ONLY CMP: CPT | Performed by: FAMILY MEDICINE

## 2024-06-03 PROCEDURE — 90677 PCV20 VACCINE IM: CPT | Performed by: FAMILY MEDICINE

## 2024-06-03 PROCEDURE — 82043 UR ALBUMIN QUANTITATIVE: CPT | Performed by: FAMILY MEDICINE

## 2024-06-03 PROCEDURE — 90471 IMMUNIZATION ADMIN: CPT | Performed by: FAMILY MEDICINE

## 2024-06-03 RX ORDER — METFORMIN HCL 500 MG
TABLET, EXTENDED RELEASE 24 HR ORAL
Qty: 270 TABLET | Refills: 1 | Status: SHIPPED | OUTPATIENT
Start: 2024-06-03

## 2024-06-03 RX ORDER — ORAL SEMAGLUTIDE 3 MG/1
3 TABLET ORAL DAILY
Qty: 30 TABLET | Refills: 5 | Status: SHIPPED | OUTPATIENT
Start: 2024-06-03

## 2024-06-03 ASSESSMENT — PAIN SCALES - GENERAL: PAINLEVEL: NO PAIN (0)

## 2024-06-03 NOTE — PROGRESS NOTES
Assessment & Plan     Type 2 diabetes mellitus without complication, without long-term current use of insulin (H)  stable/well controlled - Continue current medication regimen.   - Albumin Random Urine Quantitative with Creat Ratio; Future  - HEMOGLOBIN A1C; Future  - metFORMIN (GLUCOPHAGE XR) 500 MG 24 hr tablet; Take one tablet (500 mg) by mouth with food in the morning and two tablets (1,000 mg) with dinner in the evening.  - Semaglutide (RYBELSUS) 3 MG tablet; Take 3 mg by mouth daily  - FOOT EXAM    Medication monitoring encounter  Valtrex  - CBC with Platelets & Differential    Encounter for screening mammogram for breast cancer  - MA Screening Bilateral w/ Lonny; Future    Asymptomatic postmenopausal status  - DEXA HIP/PELVIS/SPINE - Future; Future    Need for vaccination  - Pneumococcal 20 Valent Conjugate (PCV20)  - COVID-19 12+ (2023-24) (PFIZER)    The longitudinal plan of care for the diagnosis(es)/condition(s) as documented were addressed during this visit. Due to the added complexity in care, I will continue to support Yun in the subsequent management and with ongoing continuity of care.    Follow-Up in 5 months for medicare annual wellness visit (preventive visit).    Naif Malcolm is a 63 year old, presenting for the following health issues:  Diabetes (Follow up)      6/3/2024    10:28 AM   Additional Questions   Roomed by Lisa   Accompanied by alone         6/3/2024    10:28 AM   Patient Reported Additional Medications   Patient reports taking the following new medications none     Via the Health Maintenance questionnaire, the patient has reported the following services have been completed -Eye Exam: [facility] 2023-12-11, this information has been sent to the abstraction team.  History of Present Illness       Diabetes:   She presents for follow up of diabetes.    She is not checking blood glucose.         She has no concerns regarding her diabetes at this time.   She is not  "experiencing numbness or burning in feet, excessive thirst, blurry vision, weight changes or redness, sores or blisters on feet. The patient has had a diabetic eye exam in the last 12 months. Eye exam performed on dec 2023. Location of last eye exam [facility].        She eats 2-3 servings of fruits and vegetables daily.She consumes 0 sweetened beverage(s) daily.She exercises with enough effort to increase her heart rate 10 to 19 minutes per day.  She exercises with enough effort to increase her heart rate 3 or less days per week.   She is taking medications regularly.     Grief - her mother just  of dementia.  Was in hospice care in [place].      Diabetes Follow-up    Was off of Rybelsus for a month due to prior auth backlog. Resumed it and definitely noted it suppressed her appetite when she started back on it.  BP Readings from Last 2 Encounters:   24 116/70   10/05/23 137/87     Hemoglobin A1C (%)   Date Value   2024 6.4 (H)   10/05/2023 6.6 (H)   2021 7.0 (H)   10/26/2020 7.9 (H)     LDL Cholesterol Calculated (mg/dL)   Date Value   10/05/2023 77   2022 106 (H)   2020 105 (H)   04/15/2019 187 (H)     Wt Readings from Last 3 Encounters:   24 79.3 kg (174 lb 12.8 oz)   10/05/23 76.7 kg (169 lb)   23 79.8 kg (176 lb)            Objective    /70 (BP Location: Right arm, Patient Position: Sitting, Cuff Size: Adult Regular)   Pulse 87   Temp 97.8  F (36.6  C) (Temporal)   Resp 20   Ht 1.549 m (5' 1\")   Wt 79.3 kg (174 lb 12.8 oz)   LMP 03/15/2013 (LMP Unknown)   SpO2 97%   BMI 33.03 kg/m    Body mass index is 33.03 kg/m .  Physical Exam   GENERAL: healthy, alert and no distress  EYES: Eyes grossly normal to inspection, conjunctivae and sclerae normal  RESP: lungs clear to auscultation - no rales, rhonchi or wheezes  CV: regular rate and rhythm, normal S1 S2, no S3 or S4, no murmur, click or rub  DIABETIC FOOT EXAM:  Bilateral feet examined.  No " lesions or deformities noted.  Skin is warm and dry.  Pedal pulses are intact.  Sensation is intact to nylon filament exam.     Results for orders placed or performed in visit on 06/03/24   HEMOGLOBIN A1C     Status: Abnormal   Result Value Ref Range    Hemoglobin A1C 6.4 (H) 0.0 - 5.6 %   CBC with platelets and differential     Status: None   Result Value Ref Range    WBC Count 7.5 4.0 - 11.0 10e3/uL    RBC Count 4.30 3.80 - 5.20 10e6/uL    Hemoglobin 13.5 11.7 - 15.7 g/dL    Hematocrit 41.2 35.0 - 47.0 %    MCV 96 78 - 100 fL    MCH 31.4 26.5 - 33.0 pg    MCHC 32.8 31.5 - 36.5 g/dL    RDW 12.0 10.0 - 15.0 %    Platelet Count 269 150 - 450 10e3/uL    % Neutrophils 62 %    % Lymphocytes 24 %    % Monocytes 8 %    % Eosinophils 6 %    % Basophils 1 %    % Immature Granulocytes 0 %    Absolute Neutrophils 4.6 1.6 - 8.3 10e3/uL    Absolute Lymphocytes 1.8 0.8 - 5.3 10e3/uL    Absolute Monocytes 0.6 0.0 - 1.3 10e3/uL    Absolute Eosinophils 0.5 0.0 - 0.7 10e3/uL    Absolute Basophils 0.1 0.0 - 0.2 10e3/uL    Absolute Immature Granulocytes 0.0 <=0.4 10e3/uL   CBC with Platelets & Differential     Status: None    Narrative    The following orders were created for panel order CBC with Platelets & Differential.  Procedure                               Abnormality         Status                     ---------                               -----------         ------                     CBC with platelets and d...[[id number]]                      Final result                 Please view results for these tests on the individual orders.           Signed Electronically by: Veronica Solis MD

## 2024-06-03 NOTE — NURSING NOTE
Prior to immunization administration, verified patients identity using patient s name and date of birth. Please see Immunization Activity for additional information.     Screening Questionnaire for Adult Immunization    Are you sick today?   No   Do you have allergies to medications, food, a vaccine component or latex?   No   Have you ever had a serious reaction after receiving a vaccination?   No   Do you have a long-term health problem with heart, lung, kidney, or metabolic disease (e.g., diabetes), asthma, a blood disorder, no spleen, complement component deficiency, a cochlear implant, or a spinal fluid leak?  Are you on long-term aspirin therapy?   No   Do you have cancer, leukemia, HIV/AIDS, or any other immune system problem?   No   Do you have a parent, brother, or sister with an immune system problem?   No   In the past 3 months, have you taken medications that affect  your immune system, such as prednisone, other steroids, or anticancer drugs; drugs for the treatment of rheumatoid arthritis, Crohn s disease, or psoriasis; or have you had radiation treatments?   No   Have you had a seizure, or a brain or other nervous system problem?   No   During the past year, have you received a transfusion of blood or blood    products, or been given immune (gamma) globulin or antiviral drug?   No   For women: Are you pregnant or is there a chance you could become       pregnant during the next month?   No   Have you received any vaccinations in the past 4 weeks?   No     Immunization questionnaire answers were all negative.      Patient instructed to remain in clinic for 15 minutes afterwards, and to report any adverse reactions.     Screening performed by Lisa Bradford MA on 6/3/2024 at 11:45 AM.

## 2024-06-04 LAB
CREAT UR-MCNC: 205 MG/DL
MICROALBUMIN UR-MCNC: 12.7 MG/L
MICROALBUMIN/CREAT UR: 6.2 MG/G CR (ref 0–25)

## 2024-06-06 ENCOUNTER — VIRTUAL VISIT (OUTPATIENT)
Dept: PSYCHOLOGY | Facility: CLINIC | Age: 64
End: 2024-06-06
Payer: COMMERCIAL

## 2024-06-06 DIAGNOSIS — F33.1 MODERATE EPISODE OF RECURRENT MAJOR DEPRESSIVE DISORDER (H): Primary | ICD-10-CM

## 2024-06-06 DIAGNOSIS — F41.1 GAD (GENERALIZED ANXIETY DISORDER): ICD-10-CM

## 2024-06-06 PROCEDURE — 90832 PSYTX W PT 30 MINUTES: CPT | Mod: 95 | Performed by: COUNSELOR

## 2024-06-07 NOTE — PROGRESS NOTES
[facility]                                     Progress Note    Patient Name: Karlee Emery  Date: 24         Service Type: Individual      Session Start Time: 200 pm  Session End Time: 220 pm     Session Length: 20 minutes    Session #: 28  Attendees: Client attended alone    Service Modality:  Video Visit:      Provider verified identity through the following two step process.  Patient provided:  Patient  and Patient address    Telemedicine Visit: The patient's condition can be safely assessed and treated via synchronous audio and visual telemedicine encounter.      Reason for Telemedicine Visit: Services only offered telehealth    Originating Site (Patient Location): Patient's home    Distant Site (Provider Location): Provider Remote Setting- Home Office    Consent:  The patient/guardian has verbally consented to: the potential risks and benefits of telemedicine (video visit) versus in person care; bill my insurance or make self-payment for services provided; and responsibility for payment of non-covered services.     Patient would like the video invitation sent by:  My Chart    Mode of Communication:  Video Conference via Amwell    Distant Location (Provider):  Off-site    As the provider I attest to compliance with applicable laws and regulations related to telemedicine.    DATA  Interactive Complexity: No  Crisis: No        Progress Since Last Session (Related to Symptoms / Goals / Homework):   Symptoms: Improving    Homework: Partially completed      Episode of Care Goals: Minimal progress - ACTION (Actively working towards change); Intervened by reinforcing change plan / affirming steps taken     Current / Ongoing Stressors and Concerns:   Moving through  proceedings, illness     Treatment Objective(s) Addressed in This Session:   Identify negative self-talk and behaviors: challenge core beliefs, myths, and actions       Intervention:   DBT - PLEASED    Assessments  completed prior to visit:  The following assessments were completed by patient for this visit:  PHQ9:       4/18/2024     1:51 PM 4/25/2024     1:48 PM 5/9/2024     1:05 PM 5/16/2024     1:58 PM 5/22/2024     7:06 PM 5/30/2024     1:06 PM 6/6/2024     1:48 PM   PHQ-9 SCORE   PHQ-9 Total Score MyChart 9 (Mild depression) 11 (Moderate depression) 8 (Mild depression) 11 (Moderate depression) 13 (Moderate depression) 7 (Mild depression) 13 (Moderate depression)   PHQ-9 Total Score 9 11 8 11 13 7 13     GAD7:       3/10/2021    10:01 PM 10/19/2021     9:55 AM 4/17/2022     7:58 PM 12/29/2022    12:24 PM 10/5/2023    11:38 AM 12/21/2023     1:49 PM 5/9/2024     1:07 PM   DANIEL-7 SCORE   Total Score 1 (minimal anxiety) 5 (mild anxiety) 3 (minimal anxiety) 4 (minimal anxiety) 4 (minimal anxiety) 6 (mild anxiety) 9 (mild anxiety)   Total Score 1 5 3 4 4 6 9    9          10/10/2023    12:59 PM 1/10/2024     4:52 PM 1/18/2024     1:52 PM   PROMIS-10 Scores Only   Global Mental Health Score 6 9 8    8   Global Physical Health Score 14 15 13    13   PROMIS TOTAL - SUBSCORES 20 24 21    21        ASSESSMENT: Current Emotional / Mental Status (status of significant symptoms):   Risk status (Self / Other harm or suicidal ideation)   Patient denies current fears or concerns for personal safety.   Patient denies current or recent suicidal ideation or behaviors.   Patient denies current or recent homicidal ideation or behaviors.   Patient denies current or recent self injurious behavior or ideation.   Patient denies other safety concerns.   Patient reports there has been no change in risk factors since their last session.     Patient reports there has been no change in protective factors since their last session.     A safety and risk management plan has been developed including: Patient consented to co-developed safety plan on 10/12/23.  Safety and risk management plan was reviewed.   Patient agreed to use safety plan should any  safety concerns arise.  A copy was made available to the patient.     Appearance:   Appropriate    Eye Contact:   Good    Psychomotor Behavior: Normal    Attitude:   Cooperative  Interested   Orientation:   All   Speech    Rate / Production: Normal     Volume:  Normal    Mood:    Normal   Affect:    Appropriate    Thought Content:  Clear    Thought Form:  Coherent  Logical    Insight:    Good      Medication Review:   No changes to current psychiatric medication(s)     Medication Compliance:   Yes     Changes in Health Issues:   None reported     Chemical Use Review:   Substance Use: Chemical use reviewed, no active concerns identified      Tobacco Use: No current tobacco use.      Diagnosis:  1. Moderate episode of recurrent major depressive disorder (H)    2. DANIEL (generalized anxiety disorder)                                              Collateral Reports Completed:   Not Applicable    PLAN: (Patient Tasks / Therapist Tasks / Other)  Patient to rest and relax, practice mindfulness until next session.      Yelitza Schuster, [facility]                                            Individual Treatment Plan    Patient's Name: Karlee Emery  YOB: 1960    Date of Creation: 10/26/23  Date Treatment Plan Last Reviewed/Revised:5/30/24    DSM5 Diagnoses: 296.32 (F33.1) Major Depressive Disorder, Recurrent Episode, Moderate _ and With anxious distress or 300.02 (F41.1) Generalized Anxiety Disorder  Psychosocial / Contextual Factors: Familial conflict  PROMIS (reviewed every 90 days): completed 1/18/24    Referral / Collaboration:  Referral to another professional/service is not indicated at this time..    Anticipated number of session for this episode of care: 9-12 sessions  Anticipation frequency of session: Weekly  Anticipated Duration of each session: 38-52 minutes  Treatment plan will be reviewed in 90 days or when goals have been changed.       MeasurableTreatment Goal(s) related to diagnosis / functional  impairment(s)  Goal 1: Patient will develop and implement strategies related to anxiety.    I will know I've met my goal when I will be able to regulate myself.      Objective #A (Patient Action)    Patient will identify 4 fears / thoughts that contribute to feeling anxious.  Status: Completed 1/25/24    Intervention(s)  Therapist will teach emotional recognition/identification.   .    Objective #B  Patient will use relaxation strategies 2 times per day to reduce the physical symptoms of anxiety.  Status: Continued 5/30/24    Intervention(s)  Therapist will teach relaxation strategies and mindfulness .    Objective #C  Patient will attend and participate in social or recreational activities   .  Status: Continued 5/30/24    Intervention(s)  Therapist will teach skills related to engagement .      Goal 2: Patient will develop and implement strategies related to depression.    I will know I've met my goal when I feel better about myself.      Objective #A     Status:Completed 5/30/24    Patient will Increase interest, engagement, and pleasure in doing things.    Intervention(s)  Therapist will teach emotional regulation skills.   .    Objective #B  Patient will Feel less tired and more energy during the day .    Status: Continued 5/30/24    Intervention(s)  Therapist will teach about healthy boundaries.   .    Objective #C  Patient will Decrease frequency and intensity of feeling down, depressed, hopeless.  Status: Continued 5/30/24    Intervention(s)  Therapist will teach distraction skills.   .      Patient has reviewed and agreed to the above plan.      Yelitza Schuster, [facility]  October 26, 2023

## 2024-06-12 ENCOUNTER — ANCILLARY PROCEDURE (OUTPATIENT)
Dept: GENERAL RADIOLOGY | Facility: CLINIC | Age: 64
End: 2024-06-12
Attending: NURSE PRACTITIONER
Payer: COMMERCIAL

## 2024-06-12 ENCOUNTER — OFFICE VISIT (OUTPATIENT)
Dept: URGENT CARE | Facility: URGENT CARE | Age: 64
End: 2024-06-12
Payer: COMMERCIAL

## 2024-06-12 VITALS
TEMPERATURE: 98.9 F | DIASTOLIC BLOOD PRESSURE: 84 MMHG | OXYGEN SATURATION: 96 % | WEIGHT: 175.5 LBS | HEART RATE: 92 BPM | SYSTOLIC BLOOD PRESSURE: 127 MMHG | BODY MASS INDEX: 33.16 KG/M2

## 2024-06-12 DIAGNOSIS — J18.9 PNEUMONIA OF LEFT LOWER LOBE DUE TO INFECTIOUS ORGANISM: ICD-10-CM

## 2024-06-12 DIAGNOSIS — J01.40 ACUTE NON-RECURRENT PANSINUSITIS: ICD-10-CM

## 2024-06-12 DIAGNOSIS — R05.1 ACUTE COUGH: ICD-10-CM

## 2024-06-12 DIAGNOSIS — R06.2 WHEEZING: ICD-10-CM

## 2024-06-12 DIAGNOSIS — R05.1 ACUTE COUGH: Primary | ICD-10-CM

## 2024-06-12 PROCEDURE — 99214 OFFICE O/P EST MOD 30 MIN: CPT | Performed by: NURSE PRACTITIONER

## 2024-06-12 PROCEDURE — 71046 X-RAY EXAM CHEST 2 VIEWS: CPT | Mod: TC | Performed by: RADIOLOGY

## 2024-06-12 RX ORDER — AZITHROMYCIN 250 MG/1
TABLET, FILM COATED ORAL
Qty: 6 TABLET | Refills: 0 | Status: SHIPPED | OUTPATIENT
Start: 2024-06-12 | End: 2024-06-17

## 2024-06-12 RX ORDER — ALBUTEROL SULFATE 90 UG/1
2 AEROSOL, METERED RESPIRATORY (INHALATION) EVERY 6 HOURS PRN
Qty: 18 G | Refills: 0 | Status: SHIPPED | OUTPATIENT
Start: 2024-06-12 | End: 2024-06-22

## 2024-06-13 NOTE — PATIENT INSTRUCTIONS
Augmentin and zpak for LLL pneumonia and sinusitis  Radiologist read as normal, however, I still would treat as such given comparison to prior x-ray as well as clinical symptoms  Albuterol for wheeze  Rest! Your body needs more rest to heal.  Drink plenty of fluids (warm fluids like tea or soup are soothing and reduce cough)  Sit in the bathroom with a hot shower running and breathe in the steam.  Honey may soothe your sore throat and help manage your cough- may take straight or in warm water with lemon juice.  Avoid smoke (cigarettes, bonfires, fireplace, wood burning stoves).  Take Tylenol or an NSAID such as ibuprofen or naproxen as needed for pain.  Delsym (dextromethorphan polistirex) is an over the counter cough medication that lasts 12 hours.   Mucinex or Robitussin (guiafenesin) thin mucus and may help it to loosen more quickly  Good handwashing is the best way to prevent spread of germs  Present to emergency room if you develop trouble breathing, swallowing or cough-up blood.  Follow up with your primary care provider if symptoms worsen or fail to improve as expected.

## 2024-06-20 ENCOUNTER — VIRTUAL VISIT (OUTPATIENT)
Dept: PSYCHOLOGY | Facility: CLINIC | Age: 64
End: 2024-06-20
Payer: COMMERCIAL

## 2024-06-20 DIAGNOSIS — F33.1 MODERATE EPISODE OF RECURRENT MAJOR DEPRESSIVE DISORDER (H): Primary | ICD-10-CM

## 2024-06-20 DIAGNOSIS — F41.1 GAD (GENERALIZED ANXIETY DISORDER): ICD-10-CM

## 2024-06-20 PROCEDURE — 90837 PSYTX W PT 60 MINUTES: CPT | Mod: 95 | Performed by: COUNSELOR

## 2024-06-20 ASSESSMENT — PATIENT HEALTH QUESTIONNAIRE - PHQ9
SUM OF ALL RESPONSES TO PHQ QUESTIONS 1-9: 17
SUM OF ALL RESPONSES TO PHQ QUESTIONS 1-9: 17

## 2024-06-20 NOTE — PROGRESS NOTES
[facility]                                     Progress Note    Patient Name: Karlee Emery  Date: 24         Service Type: Individual      Session Start Time: 200 pm  Session End Time: 300 pm     Session Length: 60 minutes    Session #: 29  Attendees: Client attended alone    Service Modality:  Video Visit:      Provider verified identity through the following two step process.  Patient provided:  Patient  and Patient address    Telemedicine Visit: The patient's condition can be safely assessed and treated via synchronous audio and visual telemedicine encounter.      Reason for Telemedicine Visit: Services only offered telehealth    Originating Site (Patient Location): Patient's home    Distant Site (Provider Location): Provider Remote Setting- Home Office    Consent:  The patient/guardian has verbally consented to: the potential risks and benefits of telemedicine (video visit) versus in person care; bill my insurance or make self-payment for services provided; and responsibility for payment of non-covered services.     Patient would like the video invitation sent by:  My Chart    Mode of Communication:  Video Conference via Amwell    Distant Location (Provider):  Off-site    As the provider I attest to compliance with applicable laws and regulations related to telemedicine.    DATA  Interactive Complexity: No  Crisis: No        Progress Since Last Session (Related to Symptoms / Goals / Homework):   Symptoms: Improving    Homework: Partially completed      Episode of Care Goals: Minimal progress - ACTION (Actively working towards change); Intervened by reinforcing change plan / affirming steps taken     Current / Ongoing Stressors and Concerns:   Familial conflict     Treatment Objective(s) Addressed in This Session:   Identify negative self-talk and behaviors: challenge core beliefs, myths, and actions       Intervention:   DBT - PLEASED    Assessments completed prior to  visit:  The following assessments were completed by patient for this visit:  PHQ9:       4/25/2024     1:48 PM 5/9/2024     1:05 PM 5/16/2024     1:58 PM 5/22/2024     7:06 PM 5/30/2024     1:06 PM 6/6/2024     1:48 PM 6/20/2024     1:57 PM   PHQ-9 SCORE   PHQ-9 Total Score MyChart 11 (Moderate depression) 8 (Mild depression) 11 (Moderate depression) 13 (Moderate depression) 7 (Mild depression) 13 (Moderate depression) 17 (Moderately severe depression)   PHQ-9 Total Score 11 8 11 13 7 13 17     GAD7:       3/10/2021    10:01 PM 10/19/2021     9:55 AM 4/17/2022     7:58 PM 12/29/2022    12:24 PM 10/5/2023    11:38 AM 12/21/2023     1:49 PM 5/9/2024     1:07 PM   DANIEL-7 SCORE   Total Score 1 (minimal anxiety) 5 (mild anxiety) 3 (minimal anxiety) 4 (minimal anxiety) 4 (minimal anxiety) 6 (mild anxiety) 9 (mild anxiety)   Total Score 1 5 3 4 4 6 9    9          10/10/2023    12:59 PM 1/10/2024     4:52 PM 1/18/2024     1:52 PM   PROMIS-10 Scores Only   Global Mental Health Score 6 9 8    8   Global Physical Health Score 14 15 13    13   PROMIS TOTAL - SUBSCORES 20 24 21    21        ASSESSMENT: Current Emotional / Mental Status (status of significant symptoms):   Risk status (Self / Other harm or suicidal ideation)   Patient denies current fears or concerns for personal safety.   Patient denies current or recent suicidal ideation or behaviors.   Patient denies current or recent homicidal ideation or behaviors.   Patient denies current or recent self injurious behavior or ideation.   Patient denies other safety concerns.   Patient reports there has been no change in risk factors since their last session.     Patient reports there has been no change in protective factors since their last session.     A safety and risk management plan has been developed including: Patient consented to co-developed safety plan on 10/12/23.  Safety and risk management plan was reviewed.   Patient agreed to use safety plan should any safety  concerns arise.  A copy was made available to the patient.     Appearance:   Appropriate    Eye Contact:   Good    Psychomotor Behavior: Normal    Attitude:   Cooperative  Interested   Orientation:   All   Speech    Rate / Production: Normal     Volume:  Normal    Mood:    Normal   Affect:    Appropriate    Thought Content:  Clear    Thought Form:  Coherent  Logical    Insight:    Good      Medication Review:   No changes to current psychiatric medication(s)     Medication Compliance:   Yes     Changes in Health Issues:   None reported     Chemical Use Review:   Substance Use: Chemical use reviewed, no active concerns identified      Tobacco Use: No current tobacco use.      Diagnosis:  1. Moderate episode of recurrent major depressive disorder (H)    2. DANIEL (generalized anxiety disorder)                Collateral Reports Completed:   Not Applicable    PLAN: (Patient Tasks / Therapist Tasks / Other)  Patient to consider boundaries and limits with son until next session.      Yelitza Schuster, [facility]                                            Individual Treatment Plan    Patient's Name: Karlee Emery  YOB: 1960    Date of Creation: 10/26/23  Date Treatment Plan Last Reviewed/Revised:5/30/24    DSM5 Diagnoses: 296.32 (F33.1) Major Depressive Disorder, Recurrent Episode, Moderate _ and With anxious distress or 300.02 (F41.1) Generalized Anxiety Disorder  Psychosocial / Contextual Factors: Familial conflict  PROMIS (reviewed every 90 days): completed 1/18/24    Referral / Collaboration:  Referral to another professional/service is not indicated at this time..    Anticipated number of session for this episode of care: 9-12 sessions  Anticipation frequency of session: Weekly  Anticipated Duration of each session: 38-52 minutes  Treatment plan will be reviewed in 90 days or when goals have been changed.       MeasurableTreatment Goal(s) related to diagnosis / functional impairment(s)  Goal 1: Patient will  develop and implement strategies related to anxiety.    I will know I've met my goal when I will be able to regulate myself.      Objective #A (Patient Action)    Patient will identify 4 fears / thoughts that contribute to feeling anxious.  Status: Completed 1/25/24    Intervention(s)  Therapist will teach emotional recognition/identification.   .    Objective #B  Patient will use relaxation strategies 2 times per day to reduce the physical symptoms of anxiety.  Status: Continued 5/30/24    Intervention(s)  Therapist will teach relaxation strategies and mindfulness .    Objective #C  Patient will attend and participate in social or recreational activities   .  Status: Continued 5/30/24    Intervention(s)  Therapist will teach skills related to engagement .      Goal 2: Patient will develop and implement strategies related to depression.    I will know I've met my goal when I feel better about myself.      Objective #A     Status:Completed 5/30/24    Patient will Increase interest, engagement, and pleasure in doing things.    Intervention(s)  Therapist will teach emotional regulation skills.   .    Objective #B  Patient will Feel less tired and more energy during the day .    Status: Continued 5/30/24    Intervention(s)  Therapist will teach about healthy boundaries.   .    Objective #C  Patient will Decrease frequency and intensity of feeling down, depressed, hopeless.  Status: Continued 5/30/24    Intervention(s)  Therapist will teach distraction skills.   .      Patient has reviewed and agreed to the above plan.      Yelitza Schuster, [facility]  October 26, 2023

## 2024-06-27 ENCOUNTER — VIRTUAL VISIT (OUTPATIENT)
Dept: PSYCHOLOGY | Facility: CLINIC | Age: 64
End: 2024-06-27
Payer: COMMERCIAL

## 2024-06-27 DIAGNOSIS — F41.1 GAD (GENERALIZED ANXIETY DISORDER): ICD-10-CM

## 2024-06-27 DIAGNOSIS — F33.1 MODERATE EPISODE OF RECURRENT MAJOR DEPRESSIVE DISORDER (H): Primary | ICD-10-CM

## 2024-06-27 PROCEDURE — 90837 PSYTX W PT 60 MINUTES: CPT | Mod: 95 | Performed by: COUNSELOR

## 2024-06-27 ASSESSMENT — PATIENT HEALTH QUESTIONNAIRE - PHQ9
SUM OF ALL RESPONSES TO PHQ QUESTIONS 1-9: 14
SUM OF ALL RESPONSES TO PHQ QUESTIONS 1-9: 14

## 2024-06-27 NOTE — PROGRESS NOTES
[facility]                                     Progress Note    Patient Name: Karlee Emery  Date: 24         Service Type: Individual      Session Start Time: 200 pm  Session End Time: 300 pm     Session Length: 60 minutes    Session #: 30  Attendees: Client attended alone    Service Modality:  Video Visit:      Provider verified identity through the following two step process.  Patient provided:  Patient  and Patient address    Telemedicine Visit: The patient's condition can be safely assessed and treated via synchronous audio and visual telemedicine encounter.      Reason for Telemedicine Visit: Services only offered telehealth    Originating Site (Patient Location): Patient's home    Distant Site (Provider Location): Provider Remote Setting- Home Office    Consent:  The patient/guardian has verbally consented to: the potential risks and benefits of telemedicine (video visit) versus in person care; bill my insurance or make self-payment for services provided; and responsibility for payment of non-covered services.     Patient would like the video invitation sent by:  My Chart    Mode of Communication:  Video Conference via Amwell    Distant Location (Provider):  Off-site    As the provider I attest to compliance with applicable laws and regulations related to telemedicine.    DATA  Interactive Complexity: No  Crisis: No        Progress Since Last Session (Related to Symptoms / Goals / Homework):   Symptoms: Improving    Homework: Partially completed      Episode of Care Goals: Minimal progress - ACTION (Actively working towards change); Intervened by reinforcing change plan / affirming steps taken     Current / Ongoing Stressors and Concerns:   Grief and loss     Treatment Objective(s) Addressed in This Session:   Identify negative self-talk and behaviors: challenge core beliefs, myths, and actions       Intervention:   DBT - PLEASED    Assessments completed prior to visit:  The  following assessments were completed by patient for this visit:  PHQ9:       5/9/2024     1:05 PM 5/16/2024     1:58 PM 5/22/2024     7:06 PM 5/30/2024     1:06 PM 6/6/2024     1:48 PM 6/20/2024     1:57 PM 6/27/2024     1:58 PM   PHQ-9 SCORE   PHQ-9 Total Score MyChart 8 (Mild depression) 11 (Moderate depression) 13 (Moderate depression) 7 (Mild depression) 13 (Moderate depression) 17 (Moderately severe depression) 14 (Moderate depression)   PHQ-9 Total Score 8 11 13 7 13 17 14     GAD7:       3/10/2021    10:01 PM 10/19/2021     9:55 AM 4/17/2022     7:58 PM 12/29/2022    12:24 PM 10/5/2023    11:38 AM 12/21/2023     1:49 PM 5/9/2024     1:07 PM   DANIEL-7 SCORE   Total Score 1 (minimal anxiety) 5 (mild anxiety) 3 (minimal anxiety) 4 (minimal anxiety) 4 (minimal anxiety) 6 (mild anxiety) 9 (mild anxiety)   Total Score 1 5 3 4 4 6 9    9          10/10/2023    12:59 PM 1/10/2024     4:52 PM 1/18/2024     1:52 PM   PROMIS-10 Scores Only   Global Mental Health Score 6 9 8    8   Global Physical Health Score 14 15 13    13   PROMIS TOTAL - SUBSCORES 20 24 21    21        ASSESSMENT: Current Emotional / Mental Status (status of significant symptoms):   Risk status (Self / Other harm or suicidal ideation)   Patient denies current fears or concerns for personal safety.   Patient denies current or recent suicidal ideation or behaviors.   Patient denies current or recent homicidal ideation or behaviors.   Patient denies current or recent self injurious behavior or ideation.   Patient denies other safety concerns.   Patient reports there has been no change in risk factors since their last session.     Patient reports there has been no change in protective factors since their last session.     A safety and risk management plan has been developed including: Patient consented to co-developed safety plan on 10/12/23.  Safety and risk management plan was reviewed.   Patient agreed to use safety plan should any safety concerns  arise.  A copy was made available to the patient.     Appearance:   Appropriate    Eye Contact:   Good    Psychomotor Behavior: Normal    Attitude:   Cooperative  Interested   Orientation:   All   Speech    Rate / Production: Normal     Volume:  Normal    Mood:    Normal   Affect:    Appropriate    Thought Content:  Clear    Thought Form:  Coherent  Logical    Insight:    Good      Medication Review:   No changes to current psychiatric medication(s)     Medication Compliance:   Yes     Changes in Health Issues:   None reported     Chemical Use Review:   Substance Use: Chemical use reviewed, no active concerns identified      Tobacco Use: No current tobacco use.      Diagnosis:  1. Moderate episode of recurrent major depressive disorder (H)    2. DANIEL (generalized anxiety disorder)                Collateral Reports Completed:   Not Applicable    PLAN: (Patient Tasks / Therapist Tasks / Other)  Patient to give self neo and process feelings regarding death of mother until next session.      Yelitza Schuster, [facility]                                            Individual Treatment Plan    Patient's Name: Karlee Emery  YOB: 1960    Date of Creation: 10/26/23  Date Treatment Plan Last Reviewed/Revised:5/30/24    DSM5 Diagnoses: 296.32 (F33.1) Major Depressive Disorder, Recurrent Episode, Moderate _ and With anxious distress or 300.02 (F41.1) Generalized Anxiety Disorder  Psychosocial / Contextual Factors: Familial conflict  PROMIS (reviewed every 90 days): completed 1/18/24    Referral / Collaboration:  Referral to another professional/service is not indicated at this time..    Anticipated number of session for this episode of care: 9-12 sessions  Anticipation frequency of session: Weekly  Anticipated Duration of each session: 38-52 minutes  Treatment plan will be reviewed in 90 days or when goals have been changed.       MeasurableTreatment Goal(s) related to diagnosis / functional impairment(s)  Goal 1:  Patient will develop and implement strategies related to anxiety.    I will know I've met my goal when I will be able to regulate myself.      Objective #A (Patient Action)    Patient will identify 4 fears / thoughts that contribute to feeling anxious.  Status: Completed 1/25/24    Intervention(s)  Therapist will teach emotional recognition/identification.   .    Objective #B  Patient will use relaxation strategies 2 times per day to reduce the physical symptoms of anxiety.  Status: Continued 5/30/24    Intervention(s)  Therapist will teach relaxation strategies and mindfulness .    Objective #C  Patient will attend and participate in social or recreational activities   .  Status: Continued 5/30/24    Intervention(s)  Therapist will teach skills related to engagement .      Goal 2: Patient will develop and implement strategies related to depression.    I will know I've met my goal when I feel better about myself.      Objective #A     Status:Completed 5/30/24    Patient will Increase interest, engagement, and pleasure in doing things.    Intervention(s)  Therapist will teach emotional regulation skills.   .    Objective #B  Patient will Feel less tired and more energy during the day .    Status: Continued 5/30/24    Intervention(s)  Therapist will teach about healthy boundaries.   .    Objective #C  Patient will Decrease frequency and intensity of feeling down, depressed, hopeless.  Status: Continued 5/30/24    Intervention(s)  Therapist will teach distraction skills.   .      Patient has reviewed and agreed to the above plan.      Yelitza Schuster, [facility]  October 26, 2023

## 2024-07-11 ENCOUNTER — VIRTUAL VISIT (OUTPATIENT)
Dept: PSYCHOLOGY | Facility: CLINIC | Age: 64
End: 2024-07-11
Payer: COMMERCIAL

## 2024-07-11 DIAGNOSIS — F33.1 MODERATE EPISODE OF RECURRENT MAJOR DEPRESSIVE DISORDER (H): Primary | ICD-10-CM

## 2024-07-11 DIAGNOSIS — F41.1 GAD (GENERALIZED ANXIETY DISORDER): ICD-10-CM

## 2024-07-11 PROCEDURE — 90837 PSYTX W PT 60 MINUTES: CPT | Mod: 95 | Performed by: COUNSELOR

## 2024-07-11 NOTE — PROGRESS NOTES
[facility]                                     Progress Note    Patient Name: Karlee Emery  Date: 24         Service Type: Individual      Session Start Time: 200 pm  Session End Time: 255 pm     Session Length: 55 minutes    Session #: 31  Attendees: Client attended alone    Service Modality:  Video Visit:      Provider verified identity through the following two step process.  Patient provided:  Patient  and Patient address    Telemedicine Visit: The patient's condition can be safely assessed and treated via synchronous audio and visual telemedicine encounter.      Reason for Telemedicine Visit: Services only offered telehealth    Originating Site (Patient Location): Patient's home    Distant Site (Provider Location): Provider Remote Setting- Home Office    Consent:  The patient/guardian has verbally consented to: the potential risks and benefits of telemedicine (video visit) versus in person care; bill my insurance or make self-payment for services provided; and responsibility for payment of non-covered services.     Patient would like the video invitation sent by:  My Chart    Mode of Communication:  Video Conference via Amwell    Distant Location (Provider):  Off-site    As the provider I attest to compliance with applicable laws and regulations related to telemedicine.    DATA  Interactive Complexity: No  Crisis: No        Progress Since Last Session (Related to Symptoms / Goals / Homework):   Symptoms: Improving    Homework: Partially completed      Episode of Care Goals: Minimal progress - ACTION (Actively working towards change); Intervened by reinforcing change plan / affirming steps taken     Current / Ongoing Stressors and Concerns:   Relationship conflict     Treatment Objective(s) Addressed in This Session:   Identify negative self-talk and behaviors: challenge core beliefs, myths, and actions       Intervention:   DBT - PLEASED    Assessments completed prior to  visit:  The following assessments were completed by patient for this visit:  PHQ9:       5/16/2024     1:58 PM 5/22/2024     7:06 PM 5/30/2024     1:06 PM 6/6/2024     1:48 PM 6/20/2024     1:57 PM 6/27/2024     1:58 PM 7/11/2024     1:45 PM   PHQ-9 SCORE   PHQ-9 Total Score MyChart 11 (Moderate depression) 13 (Moderate depression) 7 (Mild depression) 13 (Moderate depression) 17 (Moderately severe depression) 14 (Moderate depression) 7 (Mild depression)   PHQ-9 Total Score 11 13 7 13 17 14 7     GAD7:       3/10/2021    10:01 PM 10/19/2021     9:55 AM 4/17/2022     7:58 PM 12/29/2022    12:24 PM 10/5/2023    11:38 AM 12/21/2023     1:49 PM 5/9/2024     1:07 PM   DANIEL-7 SCORE   Total Score 1 (minimal anxiety) 5 (mild anxiety) 3 (minimal anxiety) 4 (minimal anxiety) 4 (minimal anxiety) 6 (mild anxiety) 9 (mild anxiety)   Total Score 1 5 3 4 4 6 9    9          10/10/2023    12:59 PM 1/10/2024     4:52 PM 1/18/2024     1:52 PM   PROMIS-10 Scores Only   Global Mental Health Score 6 9 8    8   Global Physical Health Score 14 15 13    13   PROMIS TOTAL - SUBSCORES 20 24 21    21        ASSESSMENT: Current Emotional / Mental Status (status of significant symptoms):   Risk status (Self / Other harm or suicidal ideation)   Patient denies current fears or concerns for personal safety.   Patient denies current or recent suicidal ideation or behaviors.   Patient denies current or recent homicidal ideation or behaviors.   Patient denies current or recent self injurious behavior or ideation.   Patient denies other safety concerns.   Patient reports there has been no change in risk factors since their last session.     Patient reports there has been no change in protective factors since their last session.     A safety and risk management plan has been developed including: Patient consented to co-developed safety plan on 10/12/23.  Safety and risk management plan was reviewed.   Patient agreed to use safety plan should any safety  concerns arise.  A copy was made available to the patient.     Appearance:   Appropriate    Eye Contact:   Good    Psychomotor Behavior: Normal    Attitude:   Cooperative  Interested   Orientation:   All   Speech    Rate / Production: Normal     Volume:  Normal    Mood:    Normal   Affect:    Appropriate    Thought Content:  Clear    Thought Form:  Coherent  Logical    Insight:    Good      Medication Review:   No changes to current psychiatric medication(s)     Medication Compliance:   Yes     Changes in Health Issues:   None reported     Chemical Use Review:   Substance Use: Chemical use reviewed, no active concerns identified      Tobacco Use: No current tobacco use.      Diagnosis:  1. Moderate episode of recurrent major depressive disorder (H)    2. DANIEL (generalized anxiety disorder)                Collateral Reports Completed:   Not Applicable    PLAN: (Patient Tasks / Therapist Tasks / Other)  Patient to find times for connection with partner until next session.      Yelitza Schuster, [facility]                                            Individual Treatment Plan    Patient's Name: Karlee Emery  YOB: 1960    Date of Creation: 10/26/23  Date Treatment Plan Last Reviewed/Revised:5/30/24    DSM5 Diagnoses: 296.32 (F33.1) Major Depressive Disorder, Recurrent Episode, Moderate _ and With anxious distress or 300.02 (F41.1) Generalized Anxiety Disorder  Psychosocial / Contextual Factors: Familial conflict  PROMIS (reviewed every 90 days): completed 1/18/24    Referral / Collaboration:  Referral to another professional/service is not indicated at this time..    Anticipated number of session for this episode of care: 9-12 sessions  Anticipation frequency of session: Weekly  Anticipated Duration of each session: 38-52 minutes  Treatment plan will be reviewed in 90 days or when goals have been changed.       MeasurableTreatment Goal(s) related to diagnosis / functional impairment(s)  Goal 1: Patient will  develop and implement strategies related to anxiety.    I will know I've met my goal when I will be able to regulate myself.      Objective #A (Patient Action)    Patient will identify 4 fears / thoughts that contribute to feeling anxious.  Status: Completed 1/25/24    Intervention(s)  Therapist will teach emotional recognition/identification.   .    Objective #B  Patient will use relaxation strategies 2 times per day to reduce the physical symptoms of anxiety.  Status: Continued 5/30/24    Intervention(s)  Therapist will teach relaxation strategies and mindfulness .    Objective #C  Patient will attend and participate in social or recreational activities   .  Status: Continued 5/30/24    Intervention(s)  Therapist will teach skills related to engagement .      Goal 2: Patient will develop and implement strategies related to depression.    I will know I've met my goal when I feel better about myself.      Objective #A     Status:Completed 5/30/24    Patient will Increase interest, engagement, and pleasure in doing things.    Intervention(s)  Therapist will teach emotional regulation skills.   .    Objective #B  Patient will Feel less tired and more energy during the day .    Status: Continued 5/30/24    Intervention(s)  Therapist will teach about healthy boundaries.   .    Objective #C  Patient will Decrease frequency and intensity of feeling down, depressed, hopeless.  Status: Continued 5/30/24    Intervention(s)  Therapist will teach distraction skills.   .      Patient has reviewed and agreed to the above plan.      Yelitza Schuster, [facility]  October 26, 2023

## 2024-07-25 ENCOUNTER — VIRTUAL VISIT (OUTPATIENT)
Dept: PSYCHOLOGY | Facility: CLINIC | Age: 64
End: 2024-07-25
Payer: COMMERCIAL

## 2024-07-25 DIAGNOSIS — F41.1 GAD (GENERALIZED ANXIETY DISORDER): ICD-10-CM

## 2024-07-25 DIAGNOSIS — F33.1 MODERATE EPISODE OF RECURRENT MAJOR DEPRESSIVE DISORDER (H): Primary | ICD-10-CM

## 2024-07-25 PROCEDURE — 90837 PSYTX W PT 60 MINUTES: CPT | Mod: 95 | Performed by: COUNSELOR

## 2024-07-25 NOTE — PROGRESS NOTES
[facility]                                     Progress Note    Patient Name: Karlee Emery  Date: 24         Service Type: Individual      Session Start Time: 200 pm  Session End Time: 255 pm     Session Length: 55 minutes    Session #: 32  Attendees: Client attended alone    Service Modality:  Video Visit:      Provider verified identity through the following two step process.  Patient provided:  Patient  and Patient address    Telemedicine Visit: The patient's condition can be safely assessed and treated via synchronous audio and visual telemedicine encounter.      Reason for Telemedicine Visit: Services only offered telehealth    Originating Site (Patient Location): Patient's home    Distant Site (Provider Location): Provider Remote Setting- Home Office    Consent:  The patient/guardian has verbally consented to: the potential risks and benefits of telemedicine (video visit) versus in person care; bill my insurance or make self-payment for services provided; and responsibility for payment of non-covered services.     Patient would like the video invitation sent by:  My Chart    Mode of Communication:  Video Conference via Amwell    Distant Location (Provider):  Off-site    As the provider I attest to compliance with applicable laws and regulations related to telemedicine.    DATA  Interactive Complexity: No  Crisis: No        Progress Since Last Session (Related to Symptoms / Goals / Homework):   Symptoms: Improving    Homework: Partially completed      Episode of Care Goals: Minimal progress - ACTION (Actively working towards change); Intervened by reinforcing change plan / affirming steps taken     Current / Ongoing Stressors and Concerns:   Grieving loss of mother     Treatment Objective(s) Addressed in This Session:   Identify negative self-talk and behaviors: challenge core beliefs, myths, and actions       Intervention:   DBT - PLEASED    Assessments completed prior to  visit:  The following assessments were completed by patient for this visit:  PHQ9:       5/22/2024     7:06 PM 5/30/2024     1:06 PM 6/6/2024     1:48 PM 6/20/2024     1:57 PM 6/27/2024     1:58 PM 7/11/2024     1:45 PM 7/25/2024     1:22 PM   PHQ-9 SCORE   PHQ-9 Total Score MyChart 13 (Moderate depression) 7 (Mild depression) 13 (Moderate depression) 17 (Moderately severe depression) 14 (Moderate depression) 7 (Mild depression) 7 (Mild depression)   PHQ-9 Total Score 13 7 13 17 14 7 7     GAD7:       3/10/2021    10:01 PM 10/19/2021     9:55 AM 4/17/2022     7:58 PM 12/29/2022    12:24 PM 10/5/2023    11:38 AM 12/21/2023     1:49 PM 5/9/2024     1:07 PM   DANIEL-7 SCORE   Total Score 1 (minimal anxiety) 5 (mild anxiety) 3 (minimal anxiety) 4 (minimal anxiety) 4 (minimal anxiety) 6 (mild anxiety) 9 (mild anxiety)   Total Score 1 5 3 4 4 6 9    9          10/10/2023    12:59 PM 1/10/2024     4:52 PM 1/18/2024     1:52 PM   PROMIS-10 Scores Only   Global Mental Health Score 6 9 8    8   Global Physical Health Score 14 15 13    13   PROMIS TOTAL - SUBSCORES 20 24 21    21        ASSESSMENT: Current Emotional / Mental Status (status of significant symptoms):   Risk status (Self / Other harm or suicidal ideation)   Patient denies current fears or concerns for personal safety.   Patient denies current or recent suicidal ideation or behaviors.   Patient denies current or recent homicidal ideation or behaviors.   Patient denies current or recent self injurious behavior or ideation.   Patient denies other safety concerns.   Patient reports there has been no change in risk factors since their last session.     Patient reports there has been no change in protective factors since their last session.     A safety and risk management plan has been developed including: Patient consented to co-developed safety plan on 10/12/23.  Safety and risk management plan was reviewed.   Patient agreed to use safety plan should any safety  concerns arise.  A copy was made available to the patient.     Appearance:   Appropriate    Eye Contact:   Good    Psychomotor Behavior: Normal    Attitude:   Cooperative  Interested   Orientation:   All   Speech    Rate / Production: Normal     Volume:  Normal    Mood:    Normal   Affect:    Appropriate    Thought Content:  Clear    Thought Form:  Coherent  Logical    Insight:    Good      Medication Review:   No changes to current psychiatric medication(s)     Medication Compliance:   Yes     Changes in Health Issues:   None reported     Chemical Use Review:   Substance Use: Chemical use reviewed, no active concerns identified      Tobacco Use: No current tobacco use.      Diagnosis:  1. Moderate episode of recurrent major depressive disorder (H)    2. DANIEL (generalized anxiety disorder)                Collateral Reports Completed:   Not Applicable    PLAN: (Patient Tasks / Therapist Tasks / Other)  Patient to find times to connect with partner and reflect on grief until next session.      Yelitza Schuster, [facility]                                            Individual Treatment Plan    Patient's Name: Karlee Emery  YOB: 1960    Date of Creation: 10/26/23  Date Treatment Plan Last Reviewed/Revised:5/30/24    DSM5 Diagnoses: 296.32 (F33.1) Major Depressive Disorder, Recurrent Episode, Moderate _ and With anxious distress or 300.02 (F41.1) Generalized Anxiety Disorder  Psychosocial / Contextual Factors: Familial conflict  PROMIS (reviewed every 90 days): completed 1/18/24    Referral / Collaboration:  Referral to another professional/service is not indicated at this time..    Anticipated number of session for this episode of care: 9-12 sessions  Anticipation frequency of session: Weekly  Anticipated Duration of each session: 38-52 minutes  Treatment plan will be reviewed in 90 days or when goals have been changed.       MeasurableTreatment Goal(s) related to diagnosis / functional impairment(s)  Goal  1: Patient will develop and implement strategies related to anxiety.    I will know I've met my goal when I will be able to regulate myself.      Objective #A (Patient Action)    Patient will identify 4 fears / thoughts that contribute to feeling anxious.  Status: Completed 1/25/24    Intervention(s)  Therapist will teach emotional recognition/identification.   .    Objective #B  Patient will use relaxation strategies 2 times per day to reduce the physical symptoms of anxiety.  Status: Continued 5/30/24    Intervention(s)  Therapist will teach relaxation strategies and mindfulness .    Objective #C  Patient will attend and participate in social or recreational activities   .  Status: Continued 5/30/24    Intervention(s)  Therapist will teach skills related to engagement .      Goal 2: Patient will develop and implement strategies related to depression.    I will know I've met my goal when I feel better about myself.      Objective #A     Status:Completed 5/30/24    Patient will Increase interest, engagement, and pleasure in doing things.    Intervention(s)  Therapist will teach emotional regulation skills.   .    Objective #B  Patient will Feel less tired and more energy during the day .    Status: Continued 5/30/24    Intervention(s)  Therapist will teach about healthy boundaries.   .    Objective #C  Patient will Decrease frequency and intensity of feeling down, depressed, hopeless.  Status: Continued 5/30/24    Intervention(s)  Therapist will teach distraction skills.   .      Patient has reviewed and agreed to the above plan.      Yelitza Schuster, [facility]  October 26, 2023

## 2024-08-08 ENCOUNTER — VIRTUAL VISIT (OUTPATIENT)
Dept: PSYCHOLOGY | Facility: CLINIC | Age: 64
End: 2024-08-08
Payer: COMMERCIAL

## 2024-08-08 DIAGNOSIS — F41.1 GAD (GENERALIZED ANXIETY DISORDER): ICD-10-CM

## 2024-08-08 DIAGNOSIS — F33.1 MODERATE EPISODE OF RECURRENT MAJOR DEPRESSIVE DISORDER (H): Primary | ICD-10-CM

## 2024-08-08 PROCEDURE — 90837 PSYTX W PT 60 MINUTES: CPT | Mod: 95 | Performed by: COUNSELOR

## 2024-08-08 NOTE — PROGRESS NOTES
[facility]                                     Progress Note    Patient Name: Karlee Emery  Date: 24         Service Type: Individual      Session Start Time: 200 pm  Session End Time: 255 pm     Session Length: 55 minutes    Session #: 33  Attendees: Client attended alone    Service Modality:  Video Visit:      Provider verified identity through the following two step process.  Patient provided:  Patient  and Patient address    Telemedicine Visit: The patient's condition can be safely assessed and treated via synchronous audio and visual telemedicine encounter.      Reason for Telemedicine Visit: Services only offered telehealth    Originating Site (Patient Location): Patient's home    Distant Site (Provider Location): Provider Remote Setting- Home Office    Consent:  The patient/guardian has verbally consented to: the potential risks and benefits of telemedicine (video visit) versus in person care; bill my insurance or make self-payment for services provided; and responsibility for payment of non-covered services.     Patient would like the video invitation sent by:  My Chart    Mode of Communication:  Video Conference via Amwell    Distant Location (Provider):  Off-site    As the provider I attest to compliance with applicable laws and regulations related to telemedicine.    DATA  Interactive Complexity: No  Crisis: No        Progress Since Last Session (Related to Symptoms / Goals / Homework):   Symptoms: Improving    Homework: Partially completed      Episode of Care Goals: Minimal progress - ACTION (Actively working towards change); Intervened by reinforcing change plan / affirming steps taken     Current / Ongoing Stressors and Concerns:   Managing relationship with son, loss of mother     Treatment Objective(s) Addressed in This Session:   Identify negative self-talk and behaviors: challenge core beliefs, myths, and actions       Intervention:   DBT - PLEASED    Assessments  completed prior to visit:  The following assessments were completed by patient for this visit:  PHQ9:       5/30/2024     1:06 PM 6/6/2024     1:48 PM 6/20/2024     1:57 PM 6/27/2024     1:58 PM 7/11/2024     1:45 PM 7/25/2024     1:22 PM 8/8/2024     1:37 PM   PHQ-9 SCORE   PHQ-9 Total Score MyChart 7 (Mild depression) 13 (Moderate depression) 17 (Moderately severe depression) 14 (Moderate depression) 7 (Mild depression) 7 (Mild depression) 7 (Mild depression)   PHQ-9 Total Score 7 13 17 14 7 7 7     GAD7:       3/10/2021    10:01 PM 10/19/2021     9:55 AM 4/17/2022     7:58 PM 12/29/2022    12:24 PM 10/5/2023    11:38 AM 12/21/2023     1:49 PM 5/9/2024     1:07 PM   DANIEL-7 SCORE   Total Score 1 (minimal anxiety) 5 (mild anxiety) 3 (minimal anxiety) 4 (minimal anxiety) 4 (minimal anxiety) 6 (mild anxiety) 9 (mild anxiety)   Total Score 1 5 3 4 4 6 9    9          10/10/2023    12:59 PM 1/10/2024     4:52 PM 1/18/2024     1:52 PM   PROMIS-10 Scores Only   Global Mental Health Score 6 9 8    8   Global Physical Health Score 14 15 13    13   PROMIS TOTAL - SUBSCORES 20 24 21    21        ASSESSMENT: Current Emotional / Mental Status (status of significant symptoms):   Risk status (Self / Other harm or suicidal ideation)   Patient denies current fears or concerns for personal safety.   Patient denies current or recent suicidal ideation or behaviors.   Patient denies current or recent homicidal ideation or behaviors.   Patient denies current or recent self injurious behavior or ideation.   Patient denies other safety concerns.   Patient reports there has been no change in risk factors since their last session.     Patient reports there has been no change in protective factors since their last session.     A safety and risk management plan has been developed including: Patient consented to co-developed safety plan on 10/12/23.  Safety and risk management plan was reviewed.   Patient agreed to use safety plan should any  safety concerns arise.  A copy was made available to the patient.     Appearance:   Appropriate    Eye Contact:   Good    Psychomotor Behavior: Normal    Attitude:   Cooperative  Interested   Orientation:   All   Speech    Rate / Production: Normal     Volume:  Normal    Mood:    Normal   Affect:    Appropriate    Thought Content:  Clear    Thought Form:  Coherent  Logical    Insight:    Good      Medication Review:   No changes to current psychiatric medication(s)     Medication Compliance:   Yes     Changes in Health Issues:   None reported     Chemical Use Review:   Substance Use: Chemical use reviewed, no active concerns identified      Tobacco Use: No current tobacco use.      Diagnosis:  1. Moderate episode of recurrent major depressive disorder (H)    2. DANIEL (generalized anxiety disorder)                Collateral Reports Completed:   Not Applicable    PLAN: (Patient Tasks / Therapist Tasks / Other)  Patient to challenge negative thoughts until next session.      Yelitza Schuster [facility]                                            Individual Treatment Plan    Patient's Name: Karlee Emery  YOB: 1960    Date of Creation: 10/26/23  Date Treatment Plan Last Reviewed/Revised:5/30/24    DSM5 Diagnoses: 296.32 (F33.1) Major Depressive Disorder, Recurrent Episode, Moderate _ and With anxious distress or 300.02 (F41.1) Generalized Anxiety Disorder  Psychosocial / Contextual Factors: Familial conflict  PROMIS (reviewed every 90 days): completed 1/18/24    Referral / Collaboration:  Referral to another professional/service is not indicated at this time..    Anticipated number of session for this episode of care: 9-12 sessions  Anticipation frequency of session: Weekly  Anticipated Duration of each session: 38-52 minutes  Treatment plan will be reviewed in 90 days or when goals have been changed.       MeasurableTreatment Goal(s) related to diagnosis / functional impairment(s)  Goal 1: Patient will  develop and implement strategies related to anxiety.    I will know I've met my goal when I will be able to regulate myself.      Objective #A (Patient Action)    Patient will identify 4 fears / thoughts that contribute to feeling anxious.  Status: Completed 1/25/24    Intervention(s)  Therapist will teach emotional recognition/identification.   .    Objective #B  Patient will use relaxation strategies 2 times per day to reduce the physical symptoms of anxiety.  Status: Continued 5/30/24    Intervention(s)  Therapist will teach relaxation strategies and mindfulness .    Objective #C  Patient will attend and participate in social or recreational activities   .  Status: Continued 5/30/24    Intervention(s)  Therapist will teach skills related to engagement .      Goal 2: Patient will develop and implement strategies related to depression.    I will know I've met my goal when I feel better about myself.      Objective #A     Status:Completed 5/30/24    Patient will Increase interest, engagement, and pleasure in doing things.    Intervention(s)  Therapist will teach emotional regulation skills.   .    Objective #B  Patient will Feel less tired and more energy during the day .    Status: Continued 5/30/24    Intervention(s)  Therapist will teach about healthy boundaries.   .    Objective #C  Patient will Decrease frequency and intensity of feeling down, depressed, hopeless.  Status: Continued 5/30/24    Intervention(s)  Therapist will teach distraction skills.   .      Patient has reviewed and agreed to the above plan.      Yelitza Schuster, [facility]  October 26, 2023

## 2024-08-22 ENCOUNTER — VIRTUAL VISIT (OUTPATIENT)
Dept: PSYCHOLOGY | Facility: CLINIC | Age: 64
End: 2024-08-22
Payer: COMMERCIAL

## 2024-08-22 DIAGNOSIS — F41.1 GAD (GENERALIZED ANXIETY DISORDER): ICD-10-CM

## 2024-08-22 DIAGNOSIS — F33.1 MODERATE EPISODE OF RECURRENT MAJOR DEPRESSIVE DISORDER (H): Primary | ICD-10-CM

## 2024-08-22 PROCEDURE — 90837 PSYTX W PT 60 MINUTES: CPT | Mod: 95 | Performed by: COUNSELOR

## 2024-08-22 NOTE — PROGRESS NOTES
[facility]                                     Progress Note    Patient Name: Karlee Emery  Date: 24         Service Type: Individual      Session Start Time: 200 pm  Session End Time: 255 pm     Session Length: 55 minutes    Session #: 34  Attendees: Client attended alone    Service Modality:  Video Visit:      Provider verified identity through the following two step process.  Patient provided:  Patient  and Patient address    Telemedicine Visit: The patient's condition can be safely assessed and treated via synchronous audio and visual telemedicine encounter.      Reason for Telemedicine Visit: Services only offered telehealth    Originating Site (Patient Location): Patient's home    Distant Site (Provider Location): Provider Remote Setting- Home Office    Consent:  The patient/guardian has verbally consented to: the potential risks and benefits of telemedicine (video visit) versus in person care; bill my insurance or make self-payment for services provided; and responsibility for payment of non-covered services.     Patient would like the video invitation sent by:  My Chart    Mode of Communication:  Video Conference via Amwell    Distant Location (Provider):  Off-site    As the provider I attest to compliance with applicable laws and regulations related to telemedicine.    DATA  Interactive Complexity: No  Crisis: No        Progress Since Last Session (Related to Symptoms / Goals / Homework):   Symptoms: Improving    Homework: Partially completed      Episode of Care Goals: Minimal progress - ACTION (Actively working towards change); Intervened by reinforcing change plan / affirming steps taken     Current / Ongoing Stressors and Concerns:   Grief and loss     Treatment Objective(s) Addressed in This Session:   Identify negative self-talk and behaviors: challenge core beliefs, myths, and actions       Intervention:   DBT - PLEASED    Assessments completed prior to visit:  The  following assessments were completed by patient for this visit:  PHQ9:       6/6/2024     1:48 PM 6/20/2024     1:57 PM 6/27/2024     1:58 PM 7/11/2024     1:45 PM 7/25/2024     1:22 PM 8/8/2024     1:37 PM 8/22/2024     1:03 PM   PHQ-9 SCORE   PHQ-9 Total Score MyChart 13 (Moderate depression) 17 (Moderately severe depression) 14 (Moderate depression) 7 (Mild depression) 7 (Mild depression) 7 (Mild depression) 10 (Moderate depression)   PHQ-9 Total Score 13 17 14 7 7 7 10     GAD7:       3/10/2021    10:01 PM 10/19/2021     9:55 AM 4/17/2022     7:58 PM 12/29/2022    12:24 PM 10/5/2023    11:38 AM 12/21/2023     1:49 PM 5/9/2024     1:07 PM   DANIEL-7 SCORE   Total Score 1 (minimal anxiety) 5 (mild anxiety) 3 (minimal anxiety) 4 (minimal anxiety) 4 (minimal anxiety) 6 (mild anxiety) 9 (mild anxiety)   Total Score 1 5 3 4 4 6 9    9          10/10/2023    12:59 PM 1/10/2024     4:52 PM 1/18/2024     1:52 PM   PROMIS-10 Scores Only   Global Mental Health Score 6 9 8    8   Global Physical Health Score 14 15 13    13   PROMIS TOTAL - SUBSCORES 20 24 21    21        ASSESSMENT: Current Emotional / Mental Status (status of significant symptoms):   Risk status (Self / Other harm or suicidal ideation)   Patient denies current fears or concerns for personal safety.   Patient denies current or recent suicidal ideation or behaviors.   Patient denies current or recent homicidal ideation or behaviors.   Patient denies current or recent self injurious behavior or ideation.   Patient denies other safety concerns.   Patient reports there has been no change in risk factors since their last session.     Patient reports there has been no change in protective factors since their last session.     A safety and risk management plan has been developed including: Patient consented to co-developed safety plan on 10/12/23.  Safety and risk management plan was reviewed.   Patient agreed to use safety plan should any safety concerns arise.  A  copy was made available to the patient.     Appearance:   Appropriate    Eye Contact:   Good    Psychomotor Behavior: Normal    Attitude:   Cooperative  Interested   Orientation:   All   Speech    Rate / Production: Normal     Volume:  Normal    Mood:    Normal   Affect:    Appropriate    Thought Content:  Clear    Thought Form:  Coherent  Logical    Insight:    Good      Medication Review:   No changes to current psychiatric medication(s)     Medication Compliance:   Yes     Changes in Health Issues:   None reported     Chemical Use Review:   Substance Use: Chemical use reviewed, no active concerns identified      Tobacco Use: No current tobacco use.      Diagnosis:  1. Moderate episode of recurrent major depressive disorder (H)    2. DANIEL (generalized anxiety disorder)                Collateral Reports Completed:   Not Applicable    PLAN: (Patient Tasks / Therapist Tasks / Other)  Patient to stay present and process through feelings of grief until next session.      Yelitza Schuster, [facility]                                            Individual Treatment Plan    Patient's Name: Karlee Emery  YOB: 1960    Date of Creation: 10/26/23  Date Treatment Plan Last Reviewed/Revised:5/30/24    DSM5 Diagnoses: 296.32 (F33.1) Major Depressive Disorder, Recurrent Episode, Moderate _ and With anxious distress or 300.02 (F41.1) Generalized Anxiety Disorder  Psychosocial / Contextual Factors: Familial conflict  PROMIS (reviewed every 90 days): completed 1/18/24    Referral / Collaboration:  Referral to another professional/service is not indicated at this time..    Anticipated number of session for this episode of care: 9-12 sessions  Anticipation frequency of session: Weekly  Anticipated Duration of each session: 38-52 minutes  Treatment plan will be reviewed in 90 days or when goals have been changed.       MeasurableTreatment Goal(s) related to diagnosis / functional impairment(s)  Goal 1: Patient will develop  and implement strategies related to anxiety.    I will know I've met my goal when I will be able to regulate myself.      Objective #A (Patient Action)    Patient will identify 4 fears / thoughts that contribute to feeling anxious.  Status: Completed 1/25/24    Intervention(s)  Therapist will teach emotional recognition/identification.   .    Objective #B  Patient will use relaxation strategies 2 times per day to reduce the physical symptoms of anxiety.  Status: Continued 5/30/24    Intervention(s)  Therapist will teach relaxation strategies and mindfulness .    Objective #C  Patient will attend and participate in social or recreational activities   .  Status: Continued 5/30/24    Intervention(s)  Therapist will teach skills related to engagement .      Goal 2: Patient will develop and implement strategies related to depression.    I will know I've met my goal when I feel better about myself.      Objective #A     Status:Completed 5/30/24    Patient will Increase interest, engagement, and pleasure in doing things.    Intervention(s)  Therapist will teach emotional regulation skills.   .    Objective #B  Patient will Feel less tired and more energy during the day .    Status: Continued 5/30/24    Intervention(s)  Therapist will teach about healthy boundaries.   .    Objective #C  Patient will Decrease frequency and intensity of feeling down, depressed, hopeless.  Status: Continued 5/30/24    Intervention(s)  Therapist will teach distraction skills.   .      Patient has reviewed and agreed to the above plan.      Yelitza Schuster, [facility]  October 26, 2023

## 2024-08-28 ASSESSMENT — PATIENT HEALTH QUESTIONNAIRE - PHQ9
SUM OF ALL RESPONSES TO PHQ QUESTIONS 1-9: 10
10. IF YOU CHECKED OFF ANY PROBLEMS, HOW DIFFICULT HAVE THESE PROBLEMS MADE IT FOR YOU TO DO YOUR WORK, TAKE CARE OF THINGS AT HOME, OR GET ALONG WITH OTHER PEOPLE: SOMEWHAT DIFFICULT
SUM OF ALL RESPONSES TO PHQ QUESTIONS 1-9: 10

## 2024-08-29 ENCOUNTER — VIRTUAL VISIT (OUTPATIENT)
Dept: PSYCHOLOGY | Facility: CLINIC | Age: 64
End: 2024-08-29
Payer: COMMERCIAL

## 2024-08-29 DIAGNOSIS — F33.1 MODERATE EPISODE OF RECURRENT MAJOR DEPRESSIVE DISORDER (H): Primary | ICD-10-CM

## 2024-08-29 DIAGNOSIS — F41.1 GAD (GENERALIZED ANXIETY DISORDER): ICD-10-CM

## 2024-08-29 PROCEDURE — 90837 PSYTX W PT 60 MINUTES: CPT | Mod: 95 | Performed by: COUNSELOR

## 2024-08-29 NOTE — PROGRESS NOTES
[facility]                                     Progress Note    Patient Name: Karlee Emery  Date: 24         Service Type: Individual      Session Start Time: 200 pm  Session End Time: 253 pm     Session Length: 53 minutes    Session #: 35  Attendees: Client attended alone    Service Modality:  Video Visit:      Provider verified identity through the following two step process.  Patient provided:  Patient  and Patient address    Telemedicine Visit: The patient's condition can be safely assessed and treated via synchronous audio and visual telemedicine encounter.      Reason for Telemedicine Visit: Services only offered telehealth    Originating Site (Patient Location): Patient's home    Distant Site (Provider Location): Provider Remote Setting- Home Office    Consent:  The patient/guardian has verbally consented to: the potential risks and benefits of telemedicine (video visit) versus in person care; bill my insurance or make self-payment for services provided; and responsibility for payment of non-covered services.     Patient would like the video invitation sent by:  My Chart    Mode of Communication:  Video Conference via Amwell    Distant Location (Provider):  Off-site    As the provider I attest to compliance with applicable laws and regulations related to telemedicine.    DATA  Interactive Complexity: No  Crisis: No        Progress Since Last Session (Related to Symptoms / Goals / Homework):   Symptoms: Improving    Homework: Partially completed      Episode of Care Goals: Minimal progress - ACTION (Actively working towards change); Intervened by reinforcing change plan / affirming steps taken     Current / Ongoing Stressors and Concerns:   Mood shift, irritation, lack of sleep     Treatment Objective(s) Addressed in This Session:   Identify negative self-talk and behaviors: challenge core beliefs, myths, and actions       Intervention:   DBT - PLEASED    Assessments  completed prior to visit:  The following assessments were completed by patient for this visit:  PHQ9:       6/20/2024     1:57 PM 6/27/2024     1:58 PM 7/11/2024     1:45 PM 7/25/2024     1:22 PM 8/8/2024     1:37 PM 8/22/2024     1:03 PM 8/28/2024     6:18 PM   PHQ-9 SCORE   PHQ-9 Total Score MyChart 17 (Moderately severe depression) 14 (Moderate depression) 7 (Mild depression) 7 (Mild depression) 7 (Mild depression) 10 (Moderate depression) 10 (Moderate depression)   PHQ-9 Total Score 17 14 7 7 7 10 10     GAD7:       3/10/2021    10:01 PM 10/19/2021     9:55 AM 4/17/2022     7:58 PM 12/29/2022    12:24 PM 10/5/2023    11:38 AM 12/21/2023     1:49 PM 5/9/2024     1:07 PM   DANIEL-7 SCORE   Total Score 1 (minimal anxiety) 5 (mild anxiety) 3 (minimal anxiety) 4 (minimal anxiety) 4 (minimal anxiety) 6 (mild anxiety) 9 (mild anxiety)   Total Score 1 5 3 4 4 6 9    9          10/10/2023    12:59 PM 1/10/2024     4:52 PM 1/18/2024     1:52 PM   PROMIS-10 Scores Only   Global Mental Health Score 6 9 8    8   Global Physical Health Score 14 15 13    13   PROMIS TOTAL - SUBSCORES 20 24 21    21        ASSESSMENT: Current Emotional / Mental Status (status of significant symptoms):   Risk status (Self / Other harm or suicidal ideation)   Patient denies current fears or concerns for personal safety.   Patient denies current or recent suicidal ideation or behaviors.   Patient denies current or recent homicidal ideation or behaviors.   Patient denies current or recent self injurious behavior or ideation.   Patient denies other safety concerns.   Patient reports there has been no change in risk factors since their last session.     Patient reports there has been no change in protective factors since their last session.     A safety and risk management plan has been developed including: Patient consented to co-developed safety plan on 10/12/23.  Safety and risk management plan was reviewed.   Patient agreed to use safety plan should  any safety concerns arise.  A copy was made available to the patient.     Appearance:   Appropriate    Eye Contact:   Good    Psychomotor Behavior: Normal    Attitude:   Cooperative  Interested   Orientation:   All   Speech    Rate / Production: Normal     Volume:  Normal    Mood:    Normal   Affect:    Appropriate    Thought Content:  Clear    Thought Form:  Coherent  Logical    Insight:    Good      Medication Review:   No changes to current psychiatric medication(s)     Medication Compliance:   Yes     Changes in Health Issues:   None reported     Chemical Use Review:   Substance Use: Chemical use reviewed, no active concerns identified      Tobacco Use: No current tobacco use.      Diagnosis:  1. Moderate episode of recurrent major depressive disorder (H)    2. DANIEL (generalized anxiety disorder)                Collateral Reports Completed:   Not Applicable    PLAN: (Patient Tasks / Therapist Tasks / Other)  Patient to manage irritation with acceptance and regulate sleep until next session.      Yelitza Schuster, [facility]                                            Individual Treatment Plan    Patient's Name: Karlee Emery  YOB: 1960    Date of Creation: 10/26/23  Date Treatment Plan Last Reviewed/Revised:5/30/24    DSM5 Diagnoses: 296.32 (F33.1) Major Depressive Disorder, Recurrent Episode, Moderate _ and With anxious distress or 300.02 (F41.1) Generalized Anxiety Disorder  Psychosocial / Contextual Factors: Familial conflict  PROMIS (reviewed every 90 days): completed 1/18/24    Referral / Collaboration:  Referral to another professional/service is not indicated at this time..    Anticipated number of session for this episode of care: 9-12 sessions  Anticipation frequency of session: Weekly  Anticipated Duration of each session: 38-52 minutes  Treatment plan will be reviewed in 90 days or when goals have been changed.       MeasurableTreatment Goal(s) related to diagnosis / functional  impairment(s)  Goal 1: Patient will develop and implement strategies related to anxiety.    I will know I've met my goal when I will be able to regulate myself.      Objective #A (Patient Action)    Patient will identify 4 fears / thoughts that contribute to feeling anxious.  Status: Completed 1/25/24    Intervention(s)  Therapist will teach emotional recognition/identification.   .    Objective #B  Patient will use relaxation strategies 2 times per day to reduce the physical symptoms of anxiety.  Status: Continued 5/30/24    Intervention(s)  Therapist will teach relaxation strategies and mindfulness .    Objective #C  Patient will attend and participate in social or recreational activities   .  Status: Continued 5/30/24    Intervention(s)  Therapist will teach skills related to engagement .      Goal 2: Patient will develop and implement strategies related to depression.    I will know I've met my goal when I feel better about myself.      Objective #A     Status:Completed 5/30/24    Patient will Increase interest, engagement, and pleasure in doing things.    Intervention(s)  Therapist will teach emotional regulation skills.   .    Objective #B  Patient will Feel less tired and more energy during the day .    Status: Continued 5/30/24    Intervention(s)  Therapist will teach about healthy boundaries.   .    Objective #C  Patient will Decrease frequency and intensity of feeling down, depressed, hopeless.  Status: Continued 5/30/24    Intervention(s)  Therapist will teach distraction skills.   .      Patient has reviewed and agreed to the above plan.      Yelitza Schuster, [facility]  October 26, 2023

## 2024-09-12 ENCOUNTER — VIRTUAL VISIT (OUTPATIENT)
Dept: PSYCHOLOGY | Facility: CLINIC | Age: 64
End: 2024-09-12
Payer: COMMERCIAL

## 2024-09-12 DIAGNOSIS — F41.1 GAD (GENERALIZED ANXIETY DISORDER): ICD-10-CM

## 2024-09-12 DIAGNOSIS — F33.1 MODERATE EPISODE OF RECURRENT MAJOR DEPRESSIVE DISORDER (H): Primary | ICD-10-CM

## 2024-09-12 PROCEDURE — 90837 PSYTX W PT 60 MINUTES: CPT | Mod: 95 | Performed by: COUNSELOR

## 2024-09-12 ASSESSMENT — PATIENT HEALTH QUESTIONNAIRE - PHQ9
SUM OF ALL RESPONSES TO PHQ QUESTIONS 1-9: 16
SUM OF ALL RESPONSES TO PHQ QUESTIONS 1-9: 16
10. IF YOU CHECKED OFF ANY PROBLEMS, HOW DIFFICULT HAVE THESE PROBLEMS MADE IT FOR YOU TO DO YOUR WORK, TAKE CARE OF THINGS AT HOME, OR GET ALONG WITH OTHER PEOPLE: VERY DIFFICULT

## 2024-09-12 ASSESSMENT — ANXIETY QUESTIONNAIRES
7. FEELING AFRAID AS IF SOMETHING AWFUL MIGHT HAPPEN: SEVERAL DAYS
GAD7 TOTAL SCORE: 6
7. FEELING AFRAID AS IF SOMETHING AWFUL MIGHT HAPPEN: SEVERAL DAYS
GAD7 TOTAL SCORE: 6
8. IF YOU CHECKED OFF ANY PROBLEMS, HOW DIFFICULT HAVE THESE MADE IT FOR YOU TO DO YOUR WORK, TAKE CARE OF THINGS AT HOME, OR GET ALONG WITH OTHER PEOPLE?: SOMEWHAT DIFFICULT
GAD7 TOTAL SCORE: 6
GAD7 TOTAL SCORE: 6
8. IF YOU CHECKED OFF ANY PROBLEMS, HOW DIFFICULT HAVE THESE MADE IT FOR YOU TO DO YOUR WORK, TAKE CARE OF THINGS AT HOME, OR GET ALONG WITH OTHER PEOPLE?: SOMEWHAT DIFFICULT
GAD7 TOTAL SCORE: 6

## 2024-09-12 NOTE — PROGRESS NOTES
[facility]                                     Progress Note    Patient Name: Karlee Emery  Date: 24         Service Type: Individual      Session Start Time: 200 pm  Session End Time: 255 pm     Session Length: 55 minutes    Session #: 36  Attendees: Client attended alone    Service Modality:  Video Visit:      Provider verified identity through the following two step process.  Patient provided:  Patient  and Patient address    Telemedicine Visit: The patient's condition can be safely assessed and treated via synchronous audio and visual telemedicine encounter.      Reason for Telemedicine Visit: Services only offered telehealth    Originating Site (Patient Location): Patient's home    Distant Site (Provider Location): Provider Remote Setting- Home Office    Consent:  The patient/guardian has verbally consented to: the potential risks and benefits of telemedicine (video visit) versus in person care; bill my insurance or make self-payment for services provided; and responsibility for payment of non-covered services.     Patient would like the video invitation sent by:  My Chart    Mode of Communication:  Video Conference via Amwell    Distant Location (Provider):  Off-site    As the provider I attest to compliance with applicable laws and regulations related to telemedicine.    DATA  Interactive Complexity: No  Crisis: No        Progress Since Last Session (Related to Symptoms / Goals / Homework):   Symptoms: Improving    Homework: Partially completed      Episode of Care Goals: Minimal progress - ACTION (Actively working towards change); Intervened by reinforcing change plan / affirming steps taken     Current / Ongoing Stressors and Concerns:   Medical issues     Treatment Objective(s) Addressed in This Session:   Identify negative self-talk and behaviors: challenge core beliefs, myths, and actions       Intervention:   DBT - PLEASED    Assessments completed prior to visit:  The  following assessments were completed by patient for this visit:  PHQ9:       6/27/2024     1:58 PM 7/11/2024     1:45 PM 7/25/2024     1:22 PM 8/8/2024     1:37 PM 8/22/2024     1:03 PM 8/28/2024     6:18 PM 9/12/2024     1:06 PM   PHQ-9 SCORE   PHQ-9 Total Score MyChart 14 (Moderate depression) 7 (Mild depression) 7 (Mild depression) 7 (Mild depression) 10 (Moderate depression) 10 (Moderate depression) 16 (Moderately severe depression)   PHQ-9 Total Score 14 7 7 7 10 10 16     GAD7:       10/19/2021     9:55 AM 4/17/2022     7:58 PM 12/29/2022    12:24 PM 10/5/2023    11:38 AM 12/21/2023     1:49 PM 5/9/2024     1:07 PM 9/12/2024     1:08 PM   DANIEL-7 SCORE   Total Score 5 (mild anxiety) 3 (minimal anxiety) 4 (minimal anxiety) 4 (minimal anxiety) 6 (mild anxiety) 9 (mild anxiety) 6 (mild anxiety)   Total Score 5 3 4 4 6 9    9 6    6          10/10/2023    12:59 PM 1/10/2024     4:52 PM 1/18/2024     1:52 PM   PROMIS-10 Scores Only   Global Mental Health Score 6 9 8    8   Global Physical Health Score 14 15 13    13   PROMIS TOTAL - SUBSCORES 20 24 21    21        ASSESSMENT: Current Emotional / Mental Status (status of significant symptoms):   Risk status (Self / Other harm or suicidal ideation)   Patient denies current fears or concerns for personal safety.   Patient denies current or recent suicidal ideation or behaviors.   Patient denies current or recent homicidal ideation or behaviors.   Patient denies current or recent self injurious behavior or ideation.   Patient denies other safety concerns.   Patient reports there has been no change in risk factors since their last session.     Patient reports there has been no change in protective factors since their last session.     A safety and risk management plan has been developed including: Patient consented to co-developed safety plan on 10/12/23.  Safety and risk management plan was reviewed.   Patient agreed to use safety plan should any safety concerns arise.   A copy was made available to the patient.     Appearance:   Appropriate    Eye Contact:   Good    Psychomotor Behavior: Normal    Attitude:   Cooperative  Interested   Orientation:   All   Speech    Rate / Production: Normal     Volume:  Normal    Mood:    Normal   Affect:    Appropriate    Thought Content:  Clear    Thought Form:  Coherent  Logical    Insight:    Good      Medication Review:   No changes to current psychiatric medication(s)     Medication Compliance:   Yes     Changes in Health Issues:   None reported     Chemical Use Review:   Substance Use: Chemical use reviewed, no active concerns identified      Tobacco Use: No current tobacco use.      Diagnosis:  1. Moderate episode of recurrent major depressive disorder (H)    2. DANIEL (generalized anxiety disorder)                Collateral Reports Completed:   Not Applicable    PLAN: (Patient Tasks / Therapist Tasks / Other)  Patient to practice self care and more intentional in regards to decision making until next session.      Yelitza Schuster, [facility]                                            Individual Treatment Plan    Patient's Name: Karlee Emery  YOB: 1960    Date of Creation: 10/26/23  Date Treatment Plan Last Reviewed/Revised:5/30/24    DSM5 Diagnoses: 296.32 (F33.1) Major Depressive Disorder, Recurrent Episode, Moderate _ and With anxious distress or 300.02 (F41.1) Generalized Anxiety Disorder  Psychosocial / Contextual Factors: Familial conflict  PROMIS (reviewed every 90 days): completed 1/18/24    Referral / Collaboration:  Referral to another professional/service is not indicated at this time..    Anticipated number of session for this episode of care: 9-12 sessions  Anticipation frequency of session: Weekly  Anticipated Duration of each session: 38-52 minutes  Treatment plan will be reviewed in 90 days or when goals have been changed.       MeasurableTreatment Goal(s) related to diagnosis / functional impairment(s)  Goal 1:  Patient will develop and implement strategies related to anxiety.    I will know I've met my goal when I will be able to regulate myself.      Objective #A (Patient Action)    Patient will identify 4 fears / thoughts that contribute to feeling anxious.  Status: Completed 1/25/24    Intervention(s)  Therapist will teach emotional recognition/identification.   .    Objective #B  Patient will use relaxation strategies 2 times per day to reduce the physical symptoms of anxiety.  Status: Continued 5/30/24    Intervention(s)  Therapist will teach relaxation strategies and mindfulness .    Objective #C  Patient will attend and participate in social or recreational activities   .  Status: Continued 5/30/24    Intervention(s)  Therapist will teach skills related to engagement .      Goal 2: Patient will develop and implement strategies related to depression.    I will know I've met my goal when I feel better about myself.      Objective #A     Status:Completed 5/30/24    Patient will Increase interest, engagement, and pleasure in doing things.    Intervention(s)  Therapist will teach emotional regulation skills.   .    Objective #B  Patient will Feel less tired and more energy during the day .    Status: Continued 5/30/24    Intervention(s)  Therapist will teach about healthy boundaries.   .    Objective #C  Patient will Decrease frequency and intensity of feeling down, depressed, hopeless.  Status: Continued 5/30/24    Intervention(s)  Therapist will teach distraction skills.   .      Patient has reviewed and agreed to the above plan.      Yelitza Schuster, [facility]  October 26, 2023

## 2024-09-19 ENCOUNTER — VIRTUAL VISIT (OUTPATIENT)
Dept: PSYCHOLOGY | Facility: CLINIC | Age: 64
End: 2024-09-19
Payer: COMMERCIAL

## 2024-09-19 DIAGNOSIS — F41.1 GAD (GENERALIZED ANXIETY DISORDER): ICD-10-CM

## 2024-09-19 DIAGNOSIS — F33.1 MODERATE EPISODE OF RECURRENT MAJOR DEPRESSIVE DISORDER (H): Primary | ICD-10-CM

## 2024-09-19 PROCEDURE — 90834 PSYTX W PT 45 MINUTES: CPT | Mod: 95 | Performed by: COUNSELOR

## 2024-09-19 NOTE — PROGRESS NOTES
[facility]                                     Progress Note    Patient Name: Karlee Emery  Date: 24         Service Type: Individual      Session Start Time: 200 pm  Session End Time: 250 pm     Session Length: 50 minutes    Session #: 37  Attendees: Client attended alone    Service Modality:  Video Visit:      Provider verified identity through the following two step process.  Patient provided:  Patient  and Patient address    Telemedicine Visit: The patient's condition can be safely assessed and treated via synchronous audio and visual telemedicine encounter.      Reason for Telemedicine Visit: Services only offered telehealth    Originating Site (Patient Location): Patient's home    Distant Site (Provider Location): Provider Remote Setting- Home Office    Consent:  The patient/guardian has verbally consented to: the potential risks and benefits of telemedicine (video visit) versus in person care; bill my insurance or make self-payment for services provided; and responsibility for payment of non-covered services.     Patient would like the video invitation sent by:  My Chart    Mode of Communication:  Video Conference via Amwell    Distant Location (Provider):  Off-site    As the provider I attest to compliance with applicable laws and regulations related to telemedicine.    DATA  Interactive Complexity: No  Crisis: No        Progress Since Last Session (Related to Symptoms / Goals / Homework):   Symptoms: Improving    Homework: Partially completed      Episode of Care Goals: Minimal progress - ACTION (Actively working towards change); Intervened by reinforcing change plan / affirming steps taken     Current / Ongoing Stressors and Concerns:   Physical health - fatigue, lack of motivation     Treatment Objective(s) Addressed in This Session:   Identify negative self-talk and behaviors: challenge core beliefs, myths, and actions       Intervention:   DBT - PLEASED    Assessments  completed prior to visit:  The following assessments were completed by patient for this visit:  PHQ9:       7/11/2024     1:45 PM 7/25/2024     1:22 PM 8/8/2024     1:37 PM 8/22/2024     1:03 PM 8/28/2024     6:18 PM 9/12/2024     1:06 PM 9/19/2024     1:39 PM   PHQ-9 SCORE   PHQ-9 Total Score MyChart 7 (Mild depression) 7 (Mild depression) 7 (Mild depression) 10 (Moderate depression) 10 (Moderate depression) 16 (Moderately severe depression) 11 (Moderate depression)   PHQ-9 Total Score 7 7 7 10 10 16 11     GAD7:       10/19/2021     9:55 AM 4/17/2022     7:58 PM 12/29/2022    12:24 PM 10/5/2023    11:38 AM 12/21/2023     1:49 PM 5/9/2024     1:07 PM 9/12/2024     1:08 PM   DANIEL-7 SCORE   Total Score 5 (mild anxiety) 3 (minimal anxiety) 4 (minimal anxiety) 4 (minimal anxiety) 6 (mild anxiety) 9 (mild anxiety) 6 (mild anxiety)   Total Score 5 3 4 4 6 9    9 6    6          10/10/2023    12:59 PM 1/10/2024     4:52 PM 1/18/2024     1:52 PM   PROMIS-10 Scores Only   Global Mental Health Score 6 9 8    8   Global Physical Health Score 14 15 13    13   PROMIS TOTAL - SUBSCORES 20 24 21    21        ASSESSMENT: Current Emotional / Mental Status (status of significant symptoms):   Risk status (Self / Other harm or suicidal ideation)   Patient denies current fears or concerns for personal safety.   Patient denies current or recent suicidal ideation or behaviors.   Patient denies current or recent homicidal ideation or behaviors.   Patient denies current or recent self injurious behavior or ideation.   Patient denies other safety concerns.   Patient reports there has been no change in risk factors since their last session.     Patient reports there has been no change in protective factors since their last session.     A safety and risk management plan has been developed including: Patient consented to co-developed safety plan on 10/12/23.  Safety and risk management plan was reviewed.   Patient agreed to use safety plan  should any safety concerns arise.  A copy was made available to the patient.     Appearance:   Appropriate    Eye Contact:   Good    Psychomotor Behavior: Normal    Attitude:   Cooperative  Interested   Orientation:   All   Speech    Rate / Production: Normal     Volume:  Normal    Mood:    Normal   Affect:    Appropriate    Thought Content:  Clear    Thought Form:  Coherent  Logical    Insight:    Good      Medication Review:   No changes to current psychiatric medication(s)     Medication Compliance:   Yes     Changes in Health Issues:   None reported     Chemical Use Review:   Substance Use: Chemical use reviewed, no active concerns identified      Tobacco Use: No current tobacco use.      Diagnosis:  1. Moderate episode of recurrent major depressive disorder (H)    2. DANIEL (generalized anxiety disorder)                  Collateral Reports Completed:   Not Applicable    PLAN: (Patient Tasks / Therapist Tasks / Other)  Patient to engage in social activities and stay present until next session.      Yelitza Schuster, [facility]                                            Individual Treatment Plan    Patient's Name: Karlee Emery  YOB: 1960    Date of Creation: 10/26/23  Date Treatment Plan Last Reviewed/Revised:9/19/24    DSM5 Diagnoses: 296.32 (F33.1) Major Depressive Disorder, Recurrent Episode, Moderate _ and With anxious distress or 300.02 (F41.1) Generalized Anxiety Disorder  Psychosocial / Contextual Factors: Familial conflict  PROMIS (reviewed every 90 days): completed 1/18/24    Referral / Collaboration:  Referral to another professional/service is not indicated at this time..    Anticipated number of session for this episode of care: 9-12 sessions  Anticipation frequency of session: Weekly  Anticipated Duration of each session: 38-52 minutes  Treatment plan will be reviewed in 90 days or when goals have been changed.       MeasurableTreatment Goal(s) related to diagnosis / functional  impairment(s)  Goal 1: Patient will develop and implement strategies related to anxiety.    I will know I've met my goal when I will be able to regulate myself.      Objective #A (Patient Action)    Patient will identify 4 fears / thoughts that contribute to feeling anxious.  Status: Completed 1/25/24    Intervention(s)  Therapist will teach emotional recognition/identification.   .    Objective #B  Patient will use relaxation strategies 2 times per day to reduce the physical symptoms of anxiety.  Status: Continued 9/19/24    Intervention(s)  Therapist will teach relaxation strategies and mindfulness .    Objective #C  Patient will attend and participate in social or recreational activities   .  Status: Continued 9/19/24    Intervention(s)  Therapist will teach skills related to engagement .      Goal 2: Patient will develop and implement strategies related to depression.    I will know I've met my goal when I feel better about myself.      Objective #A     Status:Completed 5/30/24    Patient will Increase interest, engagement, and pleasure in doing things.    Intervention(s)  Therapist will teach emotional regulation skills.   .    Objective #B  Patient will Feel less tired and more energy during the day .    Status: Continued 9/19/24    Intervention(s)  Therapist will teach about healthy boundaries.   .    Objective #C  Patient will Decrease frequency and intensity of feeling down, depressed, hopeless.  Status: Continued 9/19/24    Intervention(s)  Therapist will teach distraction skills.   .      Patient has reviewed and agreed to the above plan.      Yelitza Schuster, [facility]  October 26, 2023

## 2024-09-26 ENCOUNTER — VIRTUAL VISIT (OUTPATIENT)
Dept: PSYCHOLOGY | Facility: CLINIC | Age: 64
End: 2024-09-26
Payer: COMMERCIAL

## 2024-09-26 DIAGNOSIS — F33.1 MODERATE EPISODE OF RECURRENT MAJOR DEPRESSIVE DISORDER (H): Primary | ICD-10-CM

## 2024-09-26 DIAGNOSIS — F41.1 GAD (GENERALIZED ANXIETY DISORDER): ICD-10-CM

## 2024-09-26 PROCEDURE — 90837 PSYTX W PT 60 MINUTES: CPT | Mod: 95 | Performed by: COUNSELOR

## 2024-09-26 ASSESSMENT — ANXIETY QUESTIONNAIRES
GAD7 TOTAL SCORE: 6
7. FEELING AFRAID AS IF SOMETHING AWFUL MIGHT HAPPEN: SEVERAL DAYS
2. NOT BEING ABLE TO STOP OR CONTROL WORRYING: SEVERAL DAYS
6. BECOMING EASILY ANNOYED OR IRRITABLE: SEVERAL DAYS
GAD7 TOTAL SCORE: 6
3. WORRYING TOO MUCH ABOUT DIFFERENT THINGS: SEVERAL DAYS
5. BEING SO RESTLESS THAT IT IS HARD TO SIT STILL: NOT AT ALL
4. TROUBLE RELAXING: NOT AT ALL
IF YOU CHECKED OFF ANY PROBLEMS ON THIS QUESTIONNAIRE, HOW DIFFICULT HAVE THESE PROBLEMS MADE IT FOR YOU TO DO YOUR WORK, TAKE CARE OF THINGS AT HOME, OR GET ALONG WITH OTHER PEOPLE: SOMEWHAT DIFFICULT
8. IF YOU CHECKED OFF ANY PROBLEMS, HOW DIFFICULT HAVE THESE MADE IT FOR YOU TO DO YOUR WORK, TAKE CARE OF THINGS AT HOME, OR GET ALONG WITH OTHER PEOPLE?: SOMEWHAT DIFFICULT
8. IF YOU CHECKED OFF ANY PROBLEMS, HOW DIFFICULT HAVE THESE MADE IT FOR YOU TO DO YOUR WORK, TAKE CARE OF THINGS AT HOME, OR GET ALONG WITH OTHER PEOPLE?: SOMEWHAT DIFFICULT
1. FEELING NERVOUS, ANXIOUS, OR ON EDGE: MORE THAN HALF THE DAYS
GAD7 TOTAL SCORE: 6
7. FEELING AFRAID AS IF SOMETHING AWFUL MIGHT HAPPEN: SEVERAL DAYS

## 2024-09-26 NOTE — PROGRESS NOTES
"[facility]                                     Progress Note    Patient Name: Karlee Emery  Date: 24         Service Type: Individual      Session Start Time: 200 pm  Session End Time: 259 pm     Session Length: 59 minutes    Session #: 38  Attendees: Client attended alone    Service Modality:  Video Visit:      Provider verified identity through the following two step process.  Patient provided:  Patient  and Patient address    Telemedicine Visit: The patient's condition can be safely assessed and treated via synchronous audio and visual telemedicine encounter.      Reason for Telemedicine Visit: Services only offered telehealth    Originating Site (Patient Location): Patient's home    Distant Site (Provider Location): Provider Remote Setting- Home Office    Consent:  The patient/guardian has verbally consented to: the potential risks and benefits of telemedicine (video visit) versus in person care; bill my insurance or make self-payment for services provided; and responsibility for payment of non-covered services.     Patient would like the video invitation sent by:  My Chart    Mode of Communication:  Video Conference via Amwell    Distant Location (Provider):  Off-site    As the provider I attest to compliance with applicable laws and regulations related to telemedicine.    DATA  Interactive Complexity: No  Crisis: No        Progress Since Last Session (Related to Symptoms / Goals / Homework):   Symptoms: Improving    Homework: Partially completed      Episode of Care Goals: Minimal progress - ACTION (Actively working towards change); Intervened by reinforcing change plan / affirming steps taken     Current / Ongoing Stressors and Concerns:   \"Laziness\", lack of motivation     Treatment Objective(s) Addressed in This Session:   Identify negative self-talk and behaviors: challenge core beliefs, myths, and actions       Intervention:   DBT - PLEASED    Assessments completed prior " Telephone Encounter by Audra Redman RN at 06/15/18 08:56 AM     Author:  Audra Redman RN Service:  (none) Author Type:  Registered Nurse     Filed:  06/15/18 09:03 AM Encounter Date:  6/13/2018 Status:  Signed     :  Audra Redman RN (Registered Nurse)            patient is called and told that pcp will be back on the 23rd and if his disability department needs this verified,   they can call us.  patient will call back on the 23rd for status of signature[KM1.1M]        Revision History        User Key Date/Time User Provider Type Action    > KM1.1 06/15/18 09:03 AM Audra Redman RN Registered Nurse Sign    M - Manual             to visit:  The following assessments were completed by patient for this visit:  PHQ9:       7/25/2024     1:22 PM 8/8/2024     1:37 PM 8/22/2024     1:03 PM 8/28/2024     6:18 PM 9/12/2024     1:06 PM 9/19/2024     1:39 PM 9/26/2024    12:40 PM   PHQ-9 SCORE   PHQ-9 Total Score MyChart 7 (Mild depression) 7 (Mild depression) 10 (Moderate depression) 10 (Moderate depression) 16 (Moderately severe depression) 11 (Moderate depression) 10 (Moderate depression)   PHQ-9 Total Score 7 7 10 10 16 11 10     GAD7:       4/17/2022     7:58 PM 12/29/2022    12:24 PM 10/5/2023    11:38 AM 12/21/2023     1:49 PM 5/9/2024     1:07 PM 9/12/2024     1:08 PM 9/26/2024    12:45 PM   DANIEL-7 SCORE   Total Score 3 (minimal anxiety) 4 (minimal anxiety) 4 (minimal anxiety) 6 (mild anxiety) 9 (mild anxiety) 6 (mild anxiety) 6 (mild anxiety)   Total Score 3 4 4 6 9    9 6    6 6    6          10/10/2023    12:59 PM 1/10/2024     4:52 PM 1/18/2024     1:52 PM   PROMIS-10 Scores Only   Global Mental Health Score 6 9 8    8   Global Physical Health Score 14 15 13    13   PROMIS TOTAL - SUBSCORES 20 24 21    21        ASSESSMENT: Current Emotional / Mental Status (status of significant symptoms):   Risk status (Self / Other harm or suicidal ideation)   Patient denies current fears or concerns for personal safety.   Patient denies current or recent suicidal ideation or behaviors.   Patient denies current or recent homicidal ideation or behaviors.   Patient denies current or recent self injurious behavior or ideation.   Patient denies other safety concerns.   Patient reports there has been no change in risk factors since their last session.     Patient reports there has been no change in protective factors since their last session.     A safety and risk management plan has been developed including: Patient consented to co-developed safety plan on 10/12/23.  Safety and risk management plan was reviewed.   Patient agreed to use safety plan should any  safety concerns arise.  A copy was made available to the patient.     Appearance:   Appropriate    Eye Contact:   Good    Psychomotor Behavior: Normal    Attitude:   Cooperative  Interested   Orientation:   All   Speech    Rate / Production: Normal     Volume:  Normal    Mood:    Normal   Affect:    Appropriate    Thought Content:  Clear    Thought Form:  Coherent  Logical    Insight:    Good      Medication Review:   No changes to current psychiatric medication(s)     Medication Compliance:   Yes     Changes in Health Issues:   None reported     Chemical Use Review:   Substance Use: Chemical use reviewed, no active concerns identified      Tobacco Use: No current tobacco use.      Diagnosis:  1. Moderate episode of recurrent major depressive disorder (H)    2. DANIEL (generalized anxiety disorder)                  Collateral Reports Completed:   Not Applicable    PLAN: (Patient Tasks / Therapist Tasks / Other)  Patient to reflect on personal goal to bring to next session.      Yelitza Schuster, [facility]                                            Individual Treatment Plan    Patient's Name: Karlee Emery  YOB: 1960    Date of Creation: 10/26/23  Date Treatment Plan Last Reviewed/Revised:9/19/24    DSM5 Diagnoses: 296.32 (F33.1) Major Depressive Disorder, Recurrent Episode, Moderate _ and With anxious distress or 300.02 (F41.1) Generalized Anxiety Disorder  Psychosocial / Contextual Factors: Familial conflict  PROMIS (reviewed every 90 days): completed 1/18/24    Referral / Collaboration:  Referral to another professional/service is not indicated at this time..    Anticipated number of session for this episode of care: 9-12 sessions  Anticipation frequency of session: Weekly  Anticipated Duration of each session: 38-52 minutes  Treatment plan will be reviewed in 90 days or when goals have been changed.       MeasurableTreatment Goal(s) related to diagnosis / functional impairment(s)  Goal 1: Patient will  develop and implement strategies related to anxiety.    I will know I've met my goal when I will be able to regulate myself.      Objective #A (Patient Action)    Patient will identify 4 fears / thoughts that contribute to feeling anxious.  Status: Completed 1/25/24    Intervention(s)  Therapist will teach emotional recognition/identification.   .    Objective #B  Patient will use relaxation strategies 2 times per day to reduce the physical symptoms of anxiety.  Status: Continued 9/19/24    Intervention(s)  Therapist will teach relaxation strategies and mindfulness .    Objective #C  Patient will attend and participate in social or recreational activities   .  Status: Continued 9/19/24    Intervention(s)  Therapist will teach skills related to engagement .      Goal 2: Patient will develop and implement strategies related to depression.    I will know I've met my goal when I feel better about myself.      Objective #A     Status:Completed 5/30/24    Patient will Increase interest, engagement, and pleasure in doing things.    Intervention(s)  Therapist will teach emotional regulation skills.   .    Objective #B  Patient will Feel less tired and more energy during the day .    Status: Continued 9/19/24    Intervention(s)  Therapist will teach about healthy boundaries.   .    Objective #C  Patient will Decrease frequency and intensity of feeling down, depressed, hopeless.  Status: Continued 9/19/24    Intervention(s)  Therapist will teach distraction skills.   .      Patient has reviewed and agreed to the above plan.      Yelitza Schuster, [facility]  October 26, 2023

## 2024-09-29 DIAGNOSIS — F33.41 RECURRENT MAJOR DEPRESSION IN PARTIAL REMISSION (H): ICD-10-CM

## 2024-09-29 DIAGNOSIS — F41.9 ANXIETY: ICD-10-CM

## 2024-09-29 RX ORDER — VENLAFAXINE HYDROCHLORIDE 150 MG/1
CAPSULE, EXTENDED RELEASE ORAL
Qty: 90 CAPSULE | Refills: 3 | Status: SHIPPED | OUTPATIENT
Start: 2024-09-29

## 2024-10-03 ENCOUNTER — VIRTUAL VISIT (OUTPATIENT)
Dept: PSYCHOLOGY | Facility: CLINIC | Age: 64
End: 2024-10-03
Payer: COMMERCIAL

## 2024-10-03 DIAGNOSIS — F33.1 MODERATE EPISODE OF RECURRENT MAJOR DEPRESSIVE DISORDER (H): Primary | ICD-10-CM

## 2024-10-03 DIAGNOSIS — F41.1 GAD (GENERALIZED ANXIETY DISORDER): ICD-10-CM

## 2024-10-03 PROCEDURE — 90837 PSYTX W PT 60 MINUTES: CPT | Mod: 95 | Performed by: COUNSELOR

## 2024-10-03 NOTE — PROGRESS NOTES
[facility]                                     Progress Note    Patient Name: Karlee Emery  Date: 10/3/24         Service Type: Individual      Session Start Time: 200 pm  Session End Time: 300 pm     Session Length: 60 minutes    Session #: 39  Attendees: Client attended alone    Service Modality:  Video Visit:      Provider verified identity through the following two step process.  Patient provided:  Patient  and Patient address    Telemedicine Visit: The patient's condition can be safely assessed and treated via synchronous audio and visual telemedicine encounter.      Reason for Telemedicine Visit: Services only offered telehealth    Originating Site (Patient Location): Patient's home    Distant Site (Provider Location): Provider Remote Setting- Home Office    Consent:  The patient/guardian has verbally consented to: the potential risks and benefits of telemedicine (video visit) versus in person care; bill my insurance or make self-payment for services provided; and responsibility for payment of non-covered services.     Patient would like the video invitation sent by:  My Chart    Mode of Communication:  Video Conference via Amwell    Distant Location (Provider):  Off-site    As the provider I attest to compliance with applicable laws and regulations related to telemedicine.    DATA  Interactive Complexity: No  Crisis: No        Progress Since Last Session (Related to Symptoms / Goals / Homework):   Symptoms: Improving    Homework: Partially completed      Episode of Care Goals: Minimal progress - ACTION (Actively working towards change); Intervened by reinforcing change plan / affirming steps taken     Current / Ongoing Stressors and Concerns:   Lack of motivation,managing social activities     Treatment Objective(s) Addressed in This Session:   Identify negative self-talk and behaviors: challenge core beliefs, myths, and actions       Intervention:   DBT - PLEASED    Assessments  completed prior to visit:  The following assessments were completed by patient for this visit:  PHQ9:       8/8/2024     1:37 PM 8/22/2024     1:03 PM 8/28/2024     6:18 PM 9/12/2024     1:06 PM 9/19/2024     1:39 PM 9/26/2024    12:40 PM 10/3/2024     1:43 PM   PHQ-9 SCORE   PHQ-9 Total Score MyChart 7 (Mild depression) 10 (Moderate depression) 10 (Moderate depression) 16 (Moderately severe depression) 11 (Moderate depression) 10 (Moderate depression) 7 (Mild depression)   PHQ-9 Total Score 7 10 10 16 11 10 7     GAD7:       4/17/2022     7:58 PM 12/29/2022    12:24 PM 10/5/2023    11:38 AM 12/21/2023     1:49 PM 5/9/2024     1:07 PM 9/12/2024     1:08 PM 9/26/2024    12:45 PM   DANIEL-7 SCORE   Total Score 3 (minimal anxiety) 4 (minimal anxiety) 4 (minimal anxiety) 6 (mild anxiety) 9 (mild anxiety) 6 (mild anxiety) 6 (mild anxiety)   Total Score 3 4 4 6 9    9 6    6 6    6          10/10/2023    12:59 PM 1/10/2024     4:52 PM 1/18/2024     1:52 PM   PROMIS-10 Scores Only   Global Mental Health Score 6 9 8    8   Global Physical Health Score 14 15 13    13   PROMIS TOTAL - SUBSCORES 20 24 21    21        ASSESSMENT: Current Emotional / Mental Status (status of significant symptoms):   Risk status (Self / Other harm or suicidal ideation)   Patient denies current fears or concerns for personal safety.   Patient denies current or recent suicidal ideation or behaviors.   Patient denies current or recent homicidal ideation or behaviors.   Patient denies current or recent self injurious behavior or ideation.   Patient denies other safety concerns.   Patient reports there has been no change in risk factors since their last session.     Patient reports there has been no change in protective factors since their last session.     A safety and risk management plan has been developed including: Patient consented to co-developed safety plan on 10/12/23.  Safety and risk management plan was reviewed.   Patient agreed to use safety  plan should any safety concerns arise.  A copy was made available to the patient.     Appearance:   Appropriate    Eye Contact:   Good    Psychomotor Behavior: Normal    Attitude:   Cooperative  Interested   Orientation:   All   Speech    Rate / Production: Normal     Volume:  Normal    Mood:    Normal   Affect:    Appropriate    Thought Content:  Clear    Thought Form:  Coherent  Logical    Insight:    Good      Medication Review:   No changes to current psychiatric medication(s)     Medication Compliance:   Yes     Changes in Health Issues:   None reported     Chemical Use Review:   Substance Use: Chemical use reviewed, no active concerns identified      Tobacco Use: No current tobacco use.      Diagnosis:  1. Moderate episode of recurrent major depressive disorder (H)    2. DANIEL (generalized anxiety disorder)                  Collateral Reports Completed:   Not Applicable    PLAN: (Patient Tasks / Therapist Tasks / Other)  Patient to reflect on goal of following through with social activities until next session.      Yelitza Schuster, [facility]                                            Individual Treatment Plan    Patient's Name: Karlee Emery  YOB: 1960    Date of Creation: 10/26/23  Date Treatment Plan Last Reviewed/Revised:9/19/24    DSM5 Diagnoses: 296.32 (F33.1) Major Depressive Disorder, Recurrent Episode, Moderate _ and With anxious distress or 300.02 (F41.1) Generalized Anxiety Disorder  Psychosocial / Contextual Factors: Familial conflict  PROMIS (reviewed every 90 days): completed 1/18/24    Referral / Collaboration:  Referral to another professional/service is not indicated at this time..    Anticipated number of session for this episode of care: 9-12 sessions  Anticipation frequency of session: Weekly  Anticipated Duration of each session: 38-52 minutes  Treatment plan will be reviewed in 90 days or when goals have been changed.       MeasurableTreatment Goal(s) related to diagnosis /  functional impairment(s)  Goal 1: Patient will develop and implement strategies related to anxiety.    I will know I've met my goal when I will be able to regulate myself.      Objective #A (Patient Action)    Patient will identify 4 fears / thoughts that contribute to feeling anxious.  Status: Completed 1/25/24    Intervention(s)  Therapist will teach emotional recognition/identification.   .    Objective #B  Patient will use relaxation strategies 2 times per day to reduce the physical symptoms of anxiety.  Status: Continued 9/19/24    Intervention(s)  Therapist will teach relaxation strategies and mindfulness .    Objective #C  Patient will attend and participate in social or recreational activities   .  Status: Continued 9/19/24    Intervention(s)  Therapist will teach skills related to engagement .      Goal 2: Patient will develop and implement strategies related to depression.    I will know I've met my goal when I feel better about myself.      Objective #A     Status:Completed 5/30/24    Patient will Increase interest, engagement, and pleasure in doing things.    Intervention(s)  Therapist will teach emotional regulation skills.   .    Objective #B  Patient will Feel less tired and more energy during the day .    Status: Continued 9/19/24    Intervention(s)  Therapist will teach about healthy boundaries.   .    Objective #C  Patient will Decrease frequency and intensity of feeling down, depressed, hopeless.  Status: Continued 9/19/24    Intervention(s)  Therapist will teach distraction skills.   .      Patient has reviewed and agreed to the above plan.      Yelitza Schuster, [facility]  October 26, 2023

## 2024-10-10 ENCOUNTER — VIRTUAL VISIT (OUTPATIENT)
Dept: PSYCHOLOGY | Facility: CLINIC | Age: 64
End: 2024-10-10
Payer: COMMERCIAL

## 2024-10-10 DIAGNOSIS — F41.1 GAD (GENERALIZED ANXIETY DISORDER): ICD-10-CM

## 2024-10-10 DIAGNOSIS — F33.1 MODERATE EPISODE OF RECURRENT MAJOR DEPRESSIVE DISORDER (H): Primary | ICD-10-CM

## 2024-10-10 PROCEDURE — 90837 PSYTX W PT 60 MINUTES: CPT | Mod: 95 | Performed by: COUNSELOR

## 2024-10-10 NOTE — PROGRESS NOTES
[facility]                                     Progress Note    Patient Name: Karlee Emery  Date: 10/10/24         Service Type: Individual      Session Start Time: 200 pm  Session End Time: 300 pm     Session Length: 60 minutes    Session #: 40  Attendees: Client attended alone    Service Modality:  Video Visit:      Provider verified identity through the following two step process.  Patient provided:  Patient  and Patient address    Telemedicine Visit: The patient's condition can be safely assessed and treated via synchronous audio and visual telemedicine encounter.      Reason for Telemedicine Visit: Services only offered telehealth    Originating Site (Patient Location): Patient's home    Distant Site (Provider Location): Provider Remote Setting- Home Office    Consent:  The patient/guardian has verbally consented to: the potential risks and benefits of telemedicine (video visit) versus in person care; bill my insurance or make self-payment for services provided; and responsibility for payment of non-covered services.     Patient would like the video invitation sent by:  My Chart    Mode of Communication:  Video Conference via Amwell    Distant Location (Provider):  Off-site    As the provider I attest to compliance with applicable laws and regulations related to telemedicine.    DATA  Interactive Complexity: No  Crisis: No        Progress Since Last Session (Related to Symptoms / Goals / Homework):   Symptoms: Improving    Homework: Partially completed      Episode of Care Goals: Minimal progress - ACTION (Actively working towards change); Intervened by reinforcing change plan / affirming steps taken     Current / Ongoing Stressors and Concerns:   Following through with social connections     Treatment Objective(s) Addressed in This Session:   Identify negative self-talk and behaviors: challenge core beliefs, myths, and actions       Intervention:   DBT - PLEASED    Assessments  completed prior to visit:  The following assessments were completed by patient for this visit:  PHQ9:       8/22/2024     1:03 PM 8/28/2024     6:18 PM 9/12/2024     1:06 PM 9/19/2024     1:39 PM 9/26/2024    12:40 PM 10/3/2024     1:43 PM 10/10/2024     1:14 PM   PHQ-9 SCORE   PHQ-9 Total Score MyChart 10 (Moderate depression) 10 (Moderate depression) 16 (Moderately severe depression) 11 (Moderate depression) 10 (Moderate depression) 7 (Mild depression) 7 (Mild depression)   PHQ-9 Total Score 10 10 16 11 10 7 7     GAD7:       4/17/2022     7:58 PM 12/29/2022    12:24 PM 10/5/2023    11:38 AM 12/21/2023     1:49 PM 5/9/2024     1:07 PM 9/12/2024     1:08 PM 9/26/2024    12:45 PM   DANIEL-7 SCORE   Total Score 3 (minimal anxiety) 4 (minimal anxiety) 4 (minimal anxiety) 6 (mild anxiety) 9 (mild anxiety) 6 (mild anxiety) 6 (mild anxiety)   Total Score 3 4 4 6 9    9 6    6 6    6          10/10/2023    12:59 PM 1/10/2024     4:52 PM 1/18/2024     1:52 PM   PROMIS-10 Scores Only   Global Mental Health Score 6 9 8    8   Global Physical Health Score 14 15 13    13   PROMIS TOTAL - SUBSCORES 20 24 21    21        ASSESSMENT: Current Emotional / Mental Status (status of significant symptoms):   Risk status (Self / Other harm or suicidal ideation)   Patient denies current fears or concerns for personal safety.   Patient denies current or recent suicidal ideation or behaviors.   Patient denies current or recent homicidal ideation or behaviors.   Patient denies current or recent self injurious behavior or ideation.   Patient denies other safety concerns.   Patient reports there has been no change in risk factors since their last session.     Patient reports there has been no change in protective factors since their last session.     A safety and risk management plan has been developed including: Patient consented to co-developed safety plan on 10/12/23.  Safety and risk management plan was reviewed.   Patient agreed to use  safety plan should any safety concerns arise.  A copy was made available to the patient.     Appearance:   Appropriate    Eye Contact:   Good    Psychomotor Behavior: Normal    Attitude:   Cooperative  Interested   Orientation:   All   Speech    Rate / Production: Normal     Volume:  Normal    Mood:    Normal   Affect:    Appropriate    Thought Content:  Clear    Thought Form:  Coherent  Logical    Insight:    Good      Medication Review:   No changes to current psychiatric medication(s)     Medication Compliance:   Yes     Changes in Health Issues:   None reported     Chemical Use Review:   Substance Use: Chemical use reviewed, no active concerns identified      Tobacco Use: No current tobacco use.      Diagnosis:  1. Moderate episode of recurrent major depressive disorder (H)    2. DANIEL (generalized anxiety disorder)                  Collateral Reports Completed:   Not Applicable    PLAN: (Patient Tasks / Therapist Tasks / Other)  Patient to continue to engage in 2 social activities per week until next session.      Yelitza Schuster, [facility]                                            Individual Treatment Plan    Patient's Name: Karlee Emery  YOB: 1960    Date of Creation: 10/26/23  Date Treatment Plan Last Reviewed/Revised:9/19/24    DSM5 Diagnoses: 296.32 (F33.1) Major Depressive Disorder, Recurrent Episode, Moderate _ and With anxious distress or 300.02 (F41.1) Generalized Anxiety Disorder  Psychosocial / Contextual Factors: Familial conflict  PROMIS (reviewed every 90 days): completed 1/18/24    Referral / Collaboration:  Referral to another professional/service is not indicated at this time..    Anticipated number of session for this episode of care: 9-12 sessions  Anticipation frequency of session: Weekly  Anticipated Duration of each session: 38-52 minutes  Treatment plan will be reviewed in 90 days or when goals have been changed.       MeasurableTreatment Goal(s) related to diagnosis /  functional impairment(s)  Goal 1: Patient will develop and implement strategies related to anxiety.    I will know I've met my goal when I will be able to regulate myself.      Objective #A (Patient Action)    Patient will identify 4 fears / thoughts that contribute to feeling anxious.  Status: Completed 1/25/24    Intervention(s)  Therapist will teach emotional recognition/identification.   .    Objective #B  Patient will use relaxation strategies 2 times per day to reduce the physical symptoms of anxiety.  Status: Continued 9/19/24    Intervention(s)  Therapist will teach relaxation strategies and mindfulness .    Objective #C  Patient will attend and participate in social or recreational activities   .  Status: Continued 9/19/24    Intervention(s)  Therapist will teach skills related to engagement .      Goal 2: Patient will develop and implement strategies related to depression.    I will know I've met my goal when I feel better about myself.      Objective #A     Status:Completed 5/30/24    Patient will Increase interest, engagement, and pleasure in doing things.    Intervention(s)  Therapist will teach emotional regulation skills.   .    Objective #B  Patient will Feel less tired and more energy during the day .    Status: Continued 9/19/24    Intervention(s)  Therapist will teach about healthy boundaries.   .    Objective #C  Patient will Decrease frequency and intensity of feeling down, depressed, hopeless.  Status: Continued 9/19/24    Intervention(s)  Therapist will teach distraction skills.   .      Patient has reviewed and agreed to the above plan.      Yelitza Schuster, [facility]  October 26, 2023

## 2024-10-16 ENCOUNTER — ANCILLARY PROCEDURE (OUTPATIENT)
Dept: MAMMOGRAPHY | Facility: CLINIC | Age: 64
End: 2024-10-16
Attending: FAMILY MEDICINE
Payer: COMMERCIAL

## 2024-10-16 DIAGNOSIS — Z12.31 ENCOUNTER FOR SCREENING MAMMOGRAM FOR BREAST CANCER: ICD-10-CM

## 2024-10-16 PROCEDURE — 77063 BREAST TOMOSYNTHESIS BI: CPT | Mod: GC | Performed by: RADIOLOGY

## 2024-10-16 PROCEDURE — 77067 SCR MAMMO BI INCL CAD: CPT | Mod: GC | Performed by: RADIOLOGY

## 2024-10-17 ENCOUNTER — VIRTUAL VISIT (OUTPATIENT)
Dept: PSYCHOLOGY | Facility: CLINIC | Age: 64
End: 2024-10-17
Payer: COMMERCIAL

## 2024-10-17 DIAGNOSIS — F41.1 GAD (GENERALIZED ANXIETY DISORDER): ICD-10-CM

## 2024-10-17 DIAGNOSIS — F33.1 MODERATE EPISODE OF RECURRENT MAJOR DEPRESSIVE DISORDER (H): Primary | ICD-10-CM

## 2024-10-17 PROCEDURE — 90837 PSYTX W PT 60 MINUTES: CPT | Mod: 95 | Performed by: COUNSELOR

## 2024-10-17 NOTE — PROGRESS NOTES
[facility]                                     Progress Note    Patient Name: Karlee Emery  Date: 10/17/24         Service Type: Individual      Session Start Time: 200 pm  Session End Time: 300 pm     Session Length: 60 minutes    Session #: 41  Attendees: Client attended alone    Service Modality:  Video Visit:      Provider verified identity through the following two step process.  Patient provided:  Patient  and Patient address    Telemedicine Visit: The patient's condition can be safely assessed and treated via synchronous audio and visual telemedicine encounter.      Reason for Telemedicine Visit: Services only offered telehealth    Originating Site (Patient Location): Patient's home    Distant Site (Provider Location): Provider Remote Setting- Home Office    Consent:  The patient/guardian has verbally consented to: the potential risks and benefits of telemedicine (video visit) versus in person care; bill my insurance or make self-payment for services provided; and responsibility for payment of non-covered services.     Patient would like the video invitation sent by:  My Chart    Mode of Communication:  Video Conference via Amwell    Distant Location (Provider):  Off-site    As the provider I attest to compliance with applicable laws and regulations related to telemedicine.    DATA  Interactive Complexity: No  Crisis: No        Progress Since Last Session (Related to Symptoms / Goals / Homework):   Symptoms: Improving    Homework: Partially completed      Episode of Care Goals: Minimal progress - ACTION (Actively working towards change); Intervened by reinforcing change plan / affirming steps taken     Current / Ongoing Stressors and Concerns:   Concerns with sons mental health     Treatment Objective(s) Addressed in This Session:   Identify negative self-talk and behaviors: challenge core beliefs, myths, and actions       Intervention:   DBT - PLEASED    Assessments completed  prior to visit:  The following assessments were completed by patient for this visit:  PHQ9:       8/28/2024     6:18 PM 9/12/2024     1:06 PM 9/19/2024     1:39 PM 9/26/2024    12:40 PM 10/3/2024     1:43 PM 10/10/2024     1:14 PM 10/16/2024     2:43 PM   PHQ-9 SCORE   PHQ-9 Total Score MyChart 10 (Moderate depression) 16 (Moderately severe depression) 11 (Moderate depression) 10 (Moderate depression) 7 (Mild depression) 7 (Mild depression) 7 (Mild depression)   PHQ-9 Total Score 10 16 11 10 7 7 7     GAD7:       4/17/2022     7:58 PM 12/29/2022    12:24 PM 10/5/2023    11:38 AM 12/21/2023     1:49 PM 5/9/2024     1:07 PM 9/12/2024     1:08 PM 9/26/2024    12:45 PM   DANIEL-7 SCORE   Total Score 3 (minimal anxiety) 4 (minimal anxiety) 4 (minimal anxiety) 6 (mild anxiety) 9 (mild anxiety) 6 (mild anxiety) 6 (mild anxiety)   Total Score 3 4 4 6 9    9 6    6 6    6          10/10/2023    12:59 PM 1/10/2024     4:52 PM 1/18/2024     1:52 PM   PROMIS-10 Scores Only   Global Mental Health Score 6 9 8    8   Global Physical Health Score 14 15 13    13   PROMIS TOTAL - SUBSCORES 20 24 21    21        ASSESSMENT: Current Emotional / Mental Status (status of significant symptoms):   Risk status (Self / Other harm or suicidal ideation)   Patient denies current fears or concerns for personal safety.   Patient denies current or recent suicidal ideation or behaviors.   Patient denies current or recent homicidal ideation or behaviors.   Patient denies current or recent self injurious behavior or ideation.   Patient denies other safety concerns.   Patient reports there has been no change in risk factors since their last session.     Patient reports there has been no change in protective factors since their last session.     A safety and risk management plan has been developed including: Patient consented to co-developed safety plan on 10/12/23.  Safety and risk management plan was reviewed.   Patient agreed to use safety plan should  any safety concerns arise.  A copy was made available to the patient.     Appearance:   Appropriate    Eye Contact:   Good    Psychomotor Behavior: Normal    Attitude:   Cooperative  Interested   Orientation:   All   Speech    Rate / Production: Normal     Volume:  Normal    Mood:    Normal   Affect:    Appropriate    Thought Content:  Clear    Thought Form:  Coherent  Logical    Insight:    Good      Medication Review:   No changes to current psychiatric medication(s)     Medication Compliance:   Yes     Changes in Health Issues:   None reported     Chemical Use Review:   Substance Use: Chemical use reviewed, no active concerns identified      Tobacco Use: No current tobacco use.      Diagnosis:  1. Moderate episode of recurrent major depressive disorder (H)    2. DANIEL (generalized anxiety disorder)                  Collateral Reports Completed:   Not Applicable    PLAN: (Patient Tasks / Therapist Tasks / Other)  Patient to reflect on boundaries and next steps for sons care until next session.      Yelitza Schuster, [facility]                                            Individual Treatment Plan    Patient's Name: Karlee Emery  YOB: 1960    Date of Creation: 10/26/23  Date Treatment Plan Last Reviewed/Revised:9/19/24    DSM5 Diagnoses: 296.32 (F33.1) Major Depressive Disorder, Recurrent Episode, Moderate _ and With anxious distress or 300.02 (F41.1) Generalized Anxiety Disorder  Psychosocial / Contextual Factors: Familial conflict  PROMIS (reviewed every 90 days): completed 1/18/24    Referral / Collaboration:  Referral to another professional/service is not indicated at this time..    Anticipated number of session for this episode of care: 9-12 sessions  Anticipation frequency of session: Weekly  Anticipated Duration of each session: 38-52 minutes  Treatment plan will be reviewed in 90 days or when goals have been changed.       MeasurableTreatment Goal(s) related to diagnosis / functional  impairment(s)  Goal 1: Patient will develop and implement strategies related to anxiety.    I will know I've met my goal when I will be able to regulate myself.      Objective #A (Patient Action)    Patient will identify 4 fears / thoughts that contribute to feeling anxious.  Status: Completed 1/25/24    Intervention(s)  Therapist will teach emotional recognition/identification.   .    Objective #B  Patient will use relaxation strategies 2 times per day to reduce the physical symptoms of anxiety.  Status: Continued 9/19/24    Intervention(s)  Therapist will teach relaxation strategies and mindfulness .    Objective #C  Patient will attend and participate in social or recreational activities   .  Status: Continued 9/19/24    Intervention(s)  Therapist will teach skills related to engagement .      Goal 2: Patient will develop and implement strategies related to depression.    I will know I've met my goal when I feel better about myself.      Objective #A     Status:Completed 5/30/24    Patient will Increase interest, engagement, and pleasure in doing things.    Intervention(s)  Therapist will teach emotional regulation skills.   .    Objective #B  Patient will Feel less tired and more energy during the day .    Status: Continued 9/19/24    Intervention(s)  Therapist will teach about healthy boundaries.   .    Objective #C  Patient will Decrease frequency and intensity of feeling down, depressed, hopeless.  Status: Continued 9/19/24    Intervention(s)  Therapist will teach distraction skills.   .      Patient has reviewed and agreed to the above plan.      Yelitza Schuster, [facility]  October 26, 2023

## 2024-10-24 ENCOUNTER — VIRTUAL VISIT (OUTPATIENT)
Dept: PSYCHOLOGY | Facility: CLINIC | Age: 64
End: 2024-10-24
Payer: COMMERCIAL

## 2024-10-24 DIAGNOSIS — F41.1 GAD (GENERALIZED ANXIETY DISORDER): ICD-10-CM

## 2024-10-24 DIAGNOSIS — F33.1 MODERATE EPISODE OF RECURRENT MAJOR DEPRESSIVE DISORDER (H): Primary | ICD-10-CM

## 2024-10-24 PROCEDURE — 90837 PSYTX W PT 60 MINUTES: CPT | Mod: 95 | Performed by: COUNSELOR

## 2024-10-24 ASSESSMENT — ANXIETY QUESTIONNAIRES
7. FEELING AFRAID AS IF SOMETHING AWFUL MIGHT HAPPEN: SEVERAL DAYS
GAD7 TOTAL SCORE: 6
6. BECOMING EASILY ANNOYED OR IRRITABLE: SEVERAL DAYS
2. NOT BEING ABLE TO STOP OR CONTROL WORRYING: SEVERAL DAYS
7. FEELING AFRAID AS IF SOMETHING AWFUL MIGHT HAPPEN: SEVERAL DAYS
8. IF YOU CHECKED OFF ANY PROBLEMS, HOW DIFFICULT HAVE THESE MADE IT FOR YOU TO DO YOUR WORK, TAKE CARE OF THINGS AT HOME, OR GET ALONG WITH OTHER PEOPLE?: SOMEWHAT DIFFICULT
1. FEELING NERVOUS, ANXIOUS, OR ON EDGE: SEVERAL DAYS
3. WORRYING TOO MUCH ABOUT DIFFERENT THINGS: SEVERAL DAYS
1. FEELING NERVOUS, ANXIOUS, OR ON EDGE: SEVERAL DAYS
6. BECOMING EASILY ANNOYED OR IRRITABLE: SEVERAL DAYS
IF YOU CHECKED OFF ANY PROBLEMS ON THIS QUESTIONNAIRE, HOW DIFFICULT HAVE THESE PROBLEMS MADE IT FOR YOU TO DO YOUR WORK, TAKE CARE OF THINGS AT HOME, OR GET ALONG WITH OTHER PEOPLE: SOMEWHAT DIFFICULT
2. NOT BEING ABLE TO STOP OR CONTROL WORRYING: SEVERAL DAYS
4. TROUBLE RELAXING: SEVERAL DAYS
IF YOU CHECKED OFF ANY PROBLEMS ON THIS QUESTIONNAIRE, HOW DIFFICULT HAVE THESE PROBLEMS MADE IT FOR YOU TO DO YOUR WORK, TAKE CARE OF THINGS AT HOME, OR GET ALONG WITH OTHER PEOPLE: SOMEWHAT DIFFICULT
5. BEING SO RESTLESS THAT IT IS HARD TO SIT STILL: NOT AT ALL
8. IF YOU CHECKED OFF ANY PROBLEMS, HOW DIFFICULT HAVE THESE MADE IT FOR YOU TO DO YOUR WORK, TAKE CARE OF THINGS AT HOME, OR GET ALONG WITH OTHER PEOPLE?: SOMEWHAT DIFFICULT
GAD7 TOTAL SCORE: 6
GAD7 TOTAL SCORE: 6
7. FEELING AFRAID AS IF SOMETHING AWFUL MIGHT HAPPEN: SEVERAL DAYS
4. TROUBLE RELAXING: SEVERAL DAYS
GAD7 TOTAL SCORE: 6
GAD7 TOTAL SCORE: 6
5. BEING SO RESTLESS THAT IT IS HARD TO SIT STILL: NOT AT ALL
3. WORRYING TOO MUCH ABOUT DIFFERENT THINGS: SEVERAL DAYS

## 2024-10-24 NOTE — PROGRESS NOTES
[facility]                                     Progress Note    Patient Name: Karlee Emery  Date: 10/24/24         Service Type: Individual      Session Start Time: 200 pm  Session End Time: 255 pm     Session Length: 55 minutes    Session #: 42  Attendees: Client attended alone    Service Modality:  Video Visit:      Provider verified identity through the following two step process.  Patient provided:  Patient  and Patient address    Telemedicine Visit: The patient's condition can be safely assessed and treated via synchronous audio and visual telemedicine encounter.      Reason for Telemedicine Visit: Services only offered telehealth    Originating Site (Patient Location): Patient's home    Distant Site (Provider Location): Provider Remote Setting- Home Office    Consent:  The patient/guardian has verbally consented to: the potential risks and benefits of telemedicine (video visit) versus in person care; bill my insurance or make self-payment for services provided; and responsibility for payment of non-covered services.     Patient would like the video invitation sent by:  My Chart    Mode of Communication:  Video Conference via Amwell    Distant Location (Provider):  Off-site    As the provider I attest to compliance with applicable laws and regulations related to telemedicine.    DATA  Interactive Complexity: No  Crisis: No        Progress Since Last Session (Related to Symptoms / Goals / Homework):   Symptoms: Improving    Homework: Partially completed      Episode of Care Goals: Minimal progress - ACTION (Actively working towards change); Intervened by reinforcing change plan / affirming steps taken     Current / Ongoing Stressors and Concerns:   Setting boundaries with son, relationship conflict     Treatment Objective(s) Addressed in This Session:   Identify negative self-talk and behaviors: challenge core beliefs, myths, and actions       Intervention:   DBT -  PLEASED    Assessments completed prior to visit:  The following assessments were completed by patient for this visit:  PHQ9:       9/12/2024     1:06 PM 9/19/2024     1:39 PM 9/26/2024    12:40 PM 10/3/2024     1:43 PM 10/10/2024     1:14 PM 10/16/2024     2:43 PM 10/24/2024     1:51 PM   PHQ-9 SCORE   PHQ-9 Total Score MyChart 16 (Moderately severe depression) 11 (Moderate depression) 10 (Moderate depression) 7 (Mild depression) 7 (Mild depression) 7 (Mild depression) 9 (Mild depression)   PHQ-9 Total Score 16 11 10 7 7 7 9        Patient-reported     GAD7:       12/29/2022    12:24 PM 10/5/2023    11:38 AM 12/21/2023     1:49 PM 5/9/2024     1:07 PM 9/12/2024     1:08 PM 9/26/2024    12:45 PM 10/24/2024     1:53 PM   DANIEL-7 SCORE   Total Score 4 (minimal anxiety) 4 (minimal anxiety) 6 (mild anxiety) 9 (mild anxiety) 6 (mild anxiety) 6 (mild anxiety) 6 (mild anxiety)   Total Score 4 4 6 9    9 6    6 6    6 6        Patient-reported    Multiple values from one day are sorted in reverse-chronological order          10/10/2023    12:59 PM 1/10/2024     4:52 PM 1/18/2024     1:52 PM   PROMIS-10 Scores Only   Global Mental Health Score 6 9 8    8   Global Physical Health Score 14 15 13    13   PROMIS TOTAL - SUBSCORES 20 24 21    21        ASSESSMENT: Current Emotional / Mental Status (status of significant symptoms):   Risk status (Self / Other harm or suicidal ideation)   Patient denies current fears or concerns for personal safety.   Patient denies current or recent suicidal ideation or behaviors.   Patient denies current or recent homicidal ideation or behaviors.   Patient denies current or recent self injurious behavior or ideation.   Patient denies other safety concerns.   Patient reports there has been no change in risk factors since their last session.     Patient reports there has been no change in protective factors since their last session.     A safety and risk management plan has been developed including:  Patient consented to co-developed safety plan on 10/12/23.  Safety and risk management plan was reviewed.   Patient agreed to use safety plan should any safety concerns arise.  A copy was made available to the patient.     Appearance:   Appropriate    Eye Contact:   Good    Psychomotor Behavior: Normal    Attitude:   Cooperative  Interested   Orientation:   All   Speech    Rate / Production: Normal     Volume:  Normal    Mood:    Normal   Affect:    Appropriate    Thought Content:  Clear    Thought Form:  Coherent  Logical    Insight:    Good      Medication Review:   No changes to current psychiatric medication(s)     Medication Compliance:   Yes     Changes in Health Issues:   None reported     Chemical Use Review:   Substance Use: Chemical use reviewed, no active concerns identified      Tobacco Use: No current tobacco use.      Diagnosis:  1. Moderate episode of recurrent major depressive disorder (H)    2. DANIEL (generalized anxiety disorder)                  Collateral Reports Completed:   Not Applicable    PLAN: (Patient Tasks / Therapist Tasks / Other)  Patient to reflect on own needs and boundaries until next session.      Yelitza Schuster, [facility]                                            Individual Treatment Plan    Patient's Name: Karlee Emery  YOB: 1960    Date of Creation: 10/26/23  Date Treatment Plan Last Reviewed/Revised:9/19/24    DSM5 Diagnoses: 296.32 (F33.1) Major Depressive Disorder, Recurrent Episode, Moderate _ and With anxious distress or 300.02 (F41.1) Generalized Anxiety Disorder  Psychosocial / Contextual Factors: Familial conflict  PROMIS (reviewed every 90 days): completed 1/18/24    Referral / Collaboration:  Referral to another professional/service is not indicated at this time..    Anticipated number of session for this episode of care: 9-12 sessions  Anticipation frequency of session: Weekly  Anticipated Duration of each session: 38-52 minutes  Treatment plan will be  reviewed in 90 days or when goals have been changed.       MeasurableTreatment Goal(s) related to diagnosis / functional impairment(s)  Goal 1: Patient will develop and implement strategies related to anxiety.    I will know I've met my goal when I will be able to regulate myself.      Objective #A (Patient Action)    Patient will identify 4 fears / thoughts that contribute to feeling anxious.  Status: Completed 1/25/24    Intervention(s)  Therapist will teach emotional recognition/identification.   .    Objective #B  Patient will use relaxation strategies 2 times per day to reduce the physical symptoms of anxiety.  Status: Continued 9/19/24    Intervention(s)  Therapist will teach relaxation strategies and mindfulness .    Objective #C  Patient will attend and participate in social or recreational activities   .  Status: Continued 9/19/24    Intervention(s)  Therapist will teach skills related to engagement .      Goal 2: Patient will develop and implement strategies related to depression.    I will know I've met my goal when I feel better about myself.      Objective #A     Status:Completed 5/30/24    Patient will Increase interest, engagement, and pleasure in doing things.    Intervention(s)  Therapist will teach emotional regulation skills.   .    Objective #B  Patient will Feel less tired and more energy during the day .    Status: Continued 9/19/24    Intervention(s)  Therapist will teach about healthy boundaries.   .    Objective #C  Patient will Decrease frequency and intensity of feeling down, depressed, hopeless.  Status: Continued 9/19/24    Intervention(s)  Therapist will teach distraction skills.   .      Patient has reviewed and agreed to the above plan.      Yelitza Schuster, [facility]  October 26, 2023

## 2024-10-31 ENCOUNTER — VIRTUAL VISIT (OUTPATIENT)
Dept: PSYCHOLOGY | Facility: CLINIC | Age: 64
End: 2024-10-31
Payer: COMMERCIAL

## 2024-10-31 DIAGNOSIS — F33.1 MODERATE EPISODE OF RECURRENT MAJOR DEPRESSIVE DISORDER (H): Primary | ICD-10-CM

## 2024-10-31 DIAGNOSIS — F41.1 GAD (GENERALIZED ANXIETY DISORDER): ICD-10-CM

## 2024-10-31 PROCEDURE — 90837 PSYTX W PT 60 MINUTES: CPT | Mod: 95 | Performed by: COUNSELOR

## 2024-10-31 NOTE — PROGRESS NOTES
[facility]                                     Progress Note    Patient Name: Karlee Emery  Date: 10/31/24         Service Type: Individual      Session Start Time: 203 pm  Session End Time: 258 pm     Session Length: 55 minutes    Session #: 43  Attendees: Client attended alone    Service Modality:  Video Visit:      Provider verified identity through the following two step process.  Patient provided:  Patient  and Patient address    Telemedicine Visit: The patient's condition can be safely assessed and treated via synchronous audio and visual telemedicine encounter.      Reason for Telemedicine Visit: Services only offered telehealth    Originating Site (Patient Location): Patient's home    Distant Site (Provider Location): Provider Remote Setting- Home Office    Consent:  The patient/guardian has verbally consented to: the potential risks and benefits of telemedicine (video visit) versus in person care; bill my insurance or make self-payment for services provided; and responsibility for payment of non-covered services.     Patient would like the video invitation sent by:  My Chart    Mode of Communication:  Video Conference via Amwell    Distant Location (Provider):  Off-site    As the provider I attest to compliance with applicable laws and regulations related to telemedicine.    DATA  Interactive Complexity: No  Crisis: No        Progress Since Last Session (Related to Symptoms / Goals / Homework):   Symptoms: Improving    Homework: Partially completed      Episode of Care Goals: Minimal progress - ACTION (Actively working towards change); Intervened by reinforcing change plan / affirming steps taken     Current / Ongoing Stressors and Concerns:   Relationship with son     Treatment Objective(s) Addressed in This Session:   Identify negative self-talk and behaviors: challenge core beliefs, myths, and actions       Intervention:   DBT - PLEASED    Assessments completed prior to  visit:  The following assessments were completed by patient for this visit:  PHQ9:       9/19/2024     1:39 PM 9/26/2024    12:40 PM 10/3/2024     1:43 PM 10/10/2024     1:14 PM 10/16/2024     2:43 PM 10/24/2024     1:51 PM 10/31/2024     1:31 PM   PHQ-9 SCORE   PHQ-9 Total Score MyChart 11 (Moderate depression) 10 (Moderate depression) 7 (Mild depression) 7 (Mild depression) 7 (Mild depression) 9 (Mild depression) 7 (Mild depression)   PHQ-9 Total Score 11 10 7 7 7 9  7        Patient-reported     GAD7:       12/29/2022    12:24 PM 10/5/2023    11:38 AM 12/21/2023     1:49 PM 5/9/2024     1:07 PM 9/12/2024     1:08 PM 9/26/2024    12:45 PM 10/24/2024     1:53 PM   DANIEL-7 SCORE   Total Score 4 (minimal anxiety) 4 (minimal anxiety) 6 (mild anxiety) 9 (mild anxiety) 6 (mild anxiety) 6 (mild anxiety) 6 (mild anxiety)   Total Score 4 4 6 9    9 6    6 6    6 6        Patient-reported    Multiple values from one day are sorted in reverse-chronological order          10/10/2023    12:59 PM 1/10/2024     4:52 PM 1/18/2024     1:52 PM   PROMIS-10 Scores Only   Global Mental Health Score 6 9 8    8   Global Physical Health Score 14 15 13    13   PROMIS TOTAL - SUBSCORES 20 24 21    21        ASSESSMENT: Current Emotional / Mental Status (status of significant symptoms):   Risk status (Self / Other harm or suicidal ideation)   Patient denies current fears or concerns for personal safety.   Patient denies current or recent suicidal ideation or behaviors.   Patient denies current or recent homicidal ideation or behaviors.   Patient denies current or recent self injurious behavior or ideation.   Patient denies other safety concerns.   Patient reports there has been no change in risk factors since their last session.     Patient reports there has been no change in protective factors since their last session.     A safety and risk management plan has been developed including: Patient consented to co-developed safety plan on  10/12/23.  Safety and risk management plan was reviewed.   Patient agreed to use safety plan should any safety concerns arise.  A copy was made available to the patient.     Appearance:   Appropriate    Eye Contact:   Good    Psychomotor Behavior: Normal    Attitude:   Cooperative  Interested   Orientation:   All   Speech    Rate / Production: Normal     Volume:  Normal    Mood:    Normal   Affect:    Appropriate    Thought Content:  Clear    Thought Form:  Coherent  Logical    Insight:    Good      Medication Review:   No changes to current psychiatric medication(s)     Medication Compliance:   Yes     Changes in Health Issues:   None reported     Chemical Use Review:   Substance Use: Chemical use reviewed, no active concerns identified      Tobacco Use: No current tobacco use.      Diagnosis:  1. Moderate episode of recurrent major depressive disorder (H)    2. DANIEL (generalized anxiety disorder)                  Collateral Reports Completed:   Not Applicable    PLAN: (Patient Tasks / Therapist Tasks / Other)  Patient to focus on sleep hygiene and what they can control until next session.      Yelitza Schuster, [facility]                                            Individual Treatment Plan    Patient's Name: Karlee Emery  YOB: 1960    Date of Creation: 10/26/23  Date Treatment Plan Last Reviewed/Revised:9/19/24    DSM5 Diagnoses: 296.32 (F33.1) Major Depressive Disorder, Recurrent Episode, Moderate _ and With anxious distress or 300.02 (F41.1) Generalized Anxiety Disorder  Psychosocial / Contextual Factors: Familial conflict  PROMIS (reviewed every 90 days): completed 1/18/24    Referral / Collaboration:  Referral to another professional/service is not indicated at this time..    Anticipated number of session for this episode of care: 9-12 sessions  Anticipation frequency of session: Weekly  Anticipated Duration of each session: 38-52 minutes  Treatment plan will be reviewed in 90 days or when goals  have been changed.       MeasurableTreatment Goal(s) related to diagnosis / functional impairment(s)  Goal 1: Patient will develop and implement strategies related to anxiety.    I will know I've met my goal when I will be able to regulate myself.      Objective #A (Patient Action)    Patient will identify 4 fears / thoughts that contribute to feeling anxious.  Status: Completed 1/25/24    Intervention(s)  Therapist will teach emotional recognition/identification.   .    Objective #B  Patient will use relaxation strategies 2 times per day to reduce the physical symptoms of anxiety.  Status: Continued 9/19/24    Intervention(s)  Therapist will teach relaxation strategies and mindfulness .    Objective #C  Patient will attend and participate in social or recreational activities   .  Status: Continued 9/19/24    Intervention(s)  Therapist will teach skills related to engagement .      Goal 2: Patient will develop and implement strategies related to depression.    I will know I've met my goal when I feel better about myself.      Objective #A     Status:Completed 5/30/24    Patient will Increase interest, engagement, and pleasure in doing things.    Intervention(s)  Therapist will teach emotional regulation skills.   .    Objective #B  Patient will Feel less tired and more energy during the day .    Status: Continued 9/19/24    Intervention(s)  Therapist will teach about healthy boundaries.   .    Objective #C  Patient will Decrease frequency and intensity of feeling down, depressed, hopeless.  Status: Continued 9/19/24    Intervention(s)  Therapist will teach distraction skills.   .      Patient has reviewed and agreed to the above plan.      Yelitza Schuster, [facility]  October 26, 2023

## 2024-11-03 SDOH — HEALTH STABILITY: PHYSICAL HEALTH: ON AVERAGE, HOW MANY DAYS PER WEEK DO YOU ENGAGE IN MODERATE TO STRENUOUS EXERCISE (LIKE A BRISK WALK)?: 2 DAYS

## 2024-11-03 SDOH — HEALTH STABILITY: PHYSICAL HEALTH: ON AVERAGE, HOW MANY MINUTES DO YOU ENGAGE IN EXERCISE AT THIS LEVEL?: 30 MIN

## 2024-11-03 ASSESSMENT — SOCIAL DETERMINANTS OF HEALTH (SDOH): HOW OFTEN DO YOU GET TOGETHER WITH FRIENDS OR RELATIVES?: ONCE A WEEK

## 2024-11-04 ENCOUNTER — OFFICE VISIT (OUTPATIENT)
Dept: FAMILY MEDICINE | Facility: CLINIC | Age: 64
End: 2024-11-04
Payer: COMMERCIAL

## 2024-11-04 VITALS
WEIGHT: 168 LBS | TEMPERATURE: 97.4 F | OXYGEN SATURATION: 97 % | DIASTOLIC BLOOD PRESSURE: 80 MMHG | RESPIRATION RATE: 12 BRPM | SYSTOLIC BLOOD PRESSURE: 108 MMHG | HEIGHT: 61 IN | BODY MASS INDEX: 31.72 KG/M2 | HEART RATE: 82 BPM

## 2024-11-04 DIAGNOSIS — H93.11 TINNITUS, RIGHT: ICD-10-CM

## 2024-11-04 DIAGNOSIS — F33.41 RECURRENT MAJOR DEPRESSION IN PARTIAL REMISSION (H): ICD-10-CM

## 2024-11-04 DIAGNOSIS — E11.9 TYPE 2 DIABETES MELLITUS WITHOUT COMPLICATION, WITHOUT LONG-TERM CURRENT USE OF INSULIN (H): ICD-10-CM

## 2024-11-04 DIAGNOSIS — E67.3 HYPERVITAMINOSIS D: ICD-10-CM

## 2024-11-04 DIAGNOSIS — R12 HEARTBURN: ICD-10-CM

## 2024-11-04 DIAGNOSIS — A60.00 HERPES SIMPLEX INFECTION OF GENITOURINARY SYSTEM: ICD-10-CM

## 2024-11-04 DIAGNOSIS — Z23 NEED FOR VACCINATION: ICD-10-CM

## 2024-11-04 DIAGNOSIS — F41.9 ANXIETY: ICD-10-CM

## 2024-11-04 DIAGNOSIS — R94.6 ABNORMAL THYROID FUNCTION TEST: ICD-10-CM

## 2024-11-04 DIAGNOSIS — Z00.00 ROUTINE GENERAL MEDICAL EXAMINATION AT A HEALTH CARE FACILITY: Primary | ICD-10-CM

## 2024-11-04 DIAGNOSIS — E78.00 PURE HYPERCHOLESTEROLEMIA: ICD-10-CM

## 2024-11-04 LAB
ALBUMIN SERPL BCG-MCNC: 4.2 G/DL (ref 3.5–5.2)
ALP SERPL-CCNC: 112 U/L (ref 40–150)
ALT SERPL W P-5'-P-CCNC: 34 U/L (ref 0–50)
ANION GAP SERPL CALCULATED.3IONS-SCNC: 10 MMOL/L (ref 7–15)
AST SERPL W P-5'-P-CCNC: 36 U/L (ref 0–45)
BILIRUB SERPL-MCNC: 0.4 MG/DL
BUN SERPL-MCNC: 12.1 MG/DL (ref 8–23)
CALCIUM SERPL-MCNC: 9 MG/DL (ref 8.8–10.4)
CHLORIDE SERPL-SCNC: 105 MMOL/L (ref 98–107)
CHOLEST SERPL-MCNC: 136 MG/DL
CREAT SERPL-MCNC: 0.89 MG/DL (ref 0.51–0.95)
EGFRCR SERPLBLD CKD-EPI 2021: 72 ML/MIN/1.73M2
EST. AVERAGE GLUCOSE BLD GHB EST-MCNC: 131 MG/DL
FASTING STATUS PATIENT QL REPORTED: YES
FASTING STATUS PATIENT QL REPORTED: YES
GLUCOSE SERPL-MCNC: 132 MG/DL (ref 70–99)
HBA1C MFR BLD: 6.2 % (ref 0–5.6)
HCO3 SERPL-SCNC: 28 MMOL/L (ref 22–29)
HDLC SERPL-MCNC: 61 MG/DL
LDLC SERPL CALC-MCNC: 66 MG/DL
NONHDLC SERPL-MCNC: 75 MG/DL
POTASSIUM SERPL-SCNC: 4.5 MMOL/L (ref 3.4–5.3)
PROT SERPL-MCNC: 6.7 G/DL (ref 6.4–8.3)
SODIUM SERPL-SCNC: 143 MMOL/L (ref 135–145)
T4 FREE SERPL-MCNC: 1.23 NG/DL (ref 0.9–1.7)
TRIGL SERPL-MCNC: 47 MG/DL
TSH SERPL DL<=0.005 MIU/L-ACNC: 5.04 UIU/ML (ref 0.3–4.2)
VIT D+METAB SERPL-MCNC: 65 NG/ML (ref 20–50)

## 2024-11-04 PROCEDURE — 90678 RSV VACC PREF BIVALENT IM: CPT | Performed by: FAMILY MEDICINE

## 2024-11-04 PROCEDURE — 84443 ASSAY THYROID STIM HORMONE: CPT | Performed by: FAMILY MEDICINE

## 2024-11-04 PROCEDURE — 80061 LIPID PANEL: CPT | Performed by: FAMILY MEDICINE

## 2024-11-04 PROCEDURE — 84100 ASSAY OF PHOSPHORUS: CPT | Performed by: FAMILY MEDICINE

## 2024-11-04 PROCEDURE — 82306 VITAMIN D 25 HYDROXY: CPT | Performed by: FAMILY MEDICINE

## 2024-11-04 PROCEDURE — 90472 IMMUNIZATION ADMIN EACH ADD: CPT | Performed by: FAMILY MEDICINE

## 2024-11-04 PROCEDURE — 99214 OFFICE O/P EST MOD 30 MIN: CPT | Mod: 25 | Performed by: FAMILY MEDICINE

## 2024-11-04 PROCEDURE — 90662 IIV NO PRSV INCREASED AG IM: CPT | Performed by: FAMILY MEDICINE

## 2024-11-04 PROCEDURE — 83735 ASSAY OF MAGNESIUM: CPT | Performed by: FAMILY MEDICINE

## 2024-11-04 PROCEDURE — 84439 ASSAY OF FREE THYROXINE: CPT | Performed by: FAMILY MEDICINE

## 2024-11-04 PROCEDURE — 83036 HEMOGLOBIN GLYCOSYLATED A1C: CPT | Performed by: FAMILY MEDICINE

## 2024-11-04 PROCEDURE — 99397 PER PM REEVAL EST PAT 65+ YR: CPT | Mod: 25 | Performed by: FAMILY MEDICINE

## 2024-11-04 PROCEDURE — 36415 COLL VENOUS BLD VENIPUNCTURE: CPT | Performed by: FAMILY MEDICINE

## 2024-11-04 PROCEDURE — 80053 COMPREHEN METABOLIC PANEL: CPT | Performed by: FAMILY MEDICINE

## 2024-11-04 PROCEDURE — 90471 IMMUNIZATION ADMIN: CPT | Performed by: FAMILY MEDICINE

## 2024-11-04 PROCEDURE — 90632 HEPA VACCINE ADULT IM: CPT | Performed by: FAMILY MEDICINE

## 2024-11-04 RX ORDER — VALACYCLOVIR HYDROCHLORIDE 500 MG/1
TABLET, FILM COATED ORAL
Qty: 90 TABLET | Refills: 3 | Status: SHIPPED | OUTPATIENT
Start: 2024-11-04

## 2024-11-04 RX ORDER — ORAL SEMAGLUTIDE 3 MG/1
3 TABLET ORAL DAILY
Qty: 30 TABLET | Refills: 5 | Status: SHIPPED | OUTPATIENT
Start: 2024-11-04

## 2024-11-04 RX ORDER — BUPROPION HYDROCHLORIDE 300 MG/1
300 TABLET ORAL EVERY MORNING
Qty: 90 TABLET | Refills: 3 | Status: SHIPPED | OUTPATIENT
Start: 2024-11-04

## 2024-11-04 RX ORDER — METFORMIN HYDROCHLORIDE 500 MG/1
1000 TABLET, EXTENDED RELEASE ORAL EVERY MORNING
Qty: 180 TABLET | Refills: 1 | Status: SHIPPED | OUTPATIENT
Start: 2024-11-04

## 2024-11-04 RX ORDER — VENLAFAXINE HYDROCHLORIDE 150 MG/1
CAPSULE, EXTENDED RELEASE ORAL
Qty: 90 CAPSULE | Refills: 3 | Status: SHIPPED | OUTPATIENT
Start: 2024-11-04

## 2024-11-04 RX ORDER — ATORVASTATIN CALCIUM 40 MG/1
40 TABLET, FILM COATED ORAL DAILY
Qty: 90 TABLET | Refills: 3 | Status: CANCELLED | OUTPATIENT
Start: 2024-11-04

## 2024-11-04 ASSESSMENT — PAIN SCALES - GENERAL: PAINLEVEL_OUTOF10: NO PAIN (0)

## 2024-11-04 NOTE — PATIENT INSTRUCTIONS
Patient Education   Preventive Care Advice   This is general advice given by our system to help you stay healthy. However, your care team may have specific advice just for you. Please talk to your care team about your preventive care needs.  Nutrition  Eat 5 or more servings of fruits and vegetables each day.  Try wheat bread, brown rice and whole grain pasta (instead of white bread, rice, and pasta).  Get enough calcium and vitamin D. Check the label on foods and aim for 100% of the RDA (recommended daily allowance).  Lifestyle  Exercise at least 150 minutes each week  (30 minutes a day, 5 days a week).  Do muscle strengthening activities 2 days a week. These help control your weight and prevent disease.  No smoking.  Wear sunscreen to prevent skin cancer.  Have a dental exam and cleaning every 6 months.  Yearly exams  See your health care team every year to talk about:  Any changes in your health.  Any medicines your care team has prescribed.  Preventive care, family planning, and ways to prevent chronic diseases.  Shots (vaccines)   HPV shots (up to age 26), if you've never had them before.  Hepatitis B shots (up to age 59), if you've never had them before.  COVID-19 shot: Get this shot when it's due.  Flu shot: Get a flu shot every year.  Tetanus shot: Get a tetanus shot every 10 years.  Pneumococcal, hepatitis A, and RSV shots: Ask your care team if you need these based on your risk.  Shingles shot (for age 50 and up)  General health tests  Diabetes screening:  Starting at age 35, Get screened for diabetes at least every 3 years.  If you are younger than age 35, ask your care team if you should be screened for diabetes.  Cholesterol test: At age 39, start having a cholesterol test every 5 years, or more often if advised.  Bone density scan (DEXA): At age 50, ask your care team if you should have this scan for osteoporosis (brittle bones).  Hepatitis C: Get tested at least once in your life.  STIs (sexually  transmitted infections)  Before age 24: Ask your care team if you should be screened for STIs.  After age 24: Get screened for STIs if you're at risk. You are at risk for STIs (including HIV) if:  You are sexually active with more than one person.  You don't use condoms every time.  You or a partner was diagnosed with a sexually transmitted infection.  If you are at risk for HIV, ask about PrEP medicine to prevent HIV.  Get tested for HIV at least once in your life, whether you are at risk for HIV or not.  Cancer screening tests  Cervical cancer screening: If you have a cervix, begin getting regular cervical cancer screening tests starting at age 21.  Breast cancer scan (mammogram): If you've ever had breasts, begin having regular mammograms starting at age 40. This is a scan to check for breast cancer.  Colon cancer screening: It is important to start screening for colon cancer at age 45.  Have a colonoscopy test every 10 years (or more often if you're at risk) Or, ask your provider about stool tests like a FIT test every year or Cologuard test every 3 years.  To learn more about your testing options, visit:   .  For help making a decision, visit:   https://bit.ly/nn07842.  Prostate cancer screening test: If you have a prostate, ask your care team if a prostate cancer screening test (PSA) at age 55 is right for you.  Lung cancer screening: If you are a current or former smoker ages 50 to 80, ask your care team if ongoing lung cancer screenings are right for you.  For informational purposes only. Not to replace the advice of your health care provider. Copyright   2023 [facility] [employer]. All rights reserved. Clinically reviewed by the [facility]itions Program. [employer] [id number] - REV 01/24.  Hearing Loss: Care Instructions  Overview     Hearing loss is a sudden or slow decrease in how well you hear. It can range from slight to profound. Permanent hearing loss can occur with aging. It also can  happen when you are exposed long-term to loud noise. Examples include listening to loud music, riding motorcycles, or being around other loud machines.  Hearing loss can affect your work and home life. It can make you feel lonely or depressed. You may feel that you have lost your independence. But hearing aids and other devices can help you hear better and feel connected to others.  Follow-up care is a key part of your treatment and safety. Be sure to make and go to all appointments, and call your doctor if you are having problems. It's also a good idea to know your test results and keep a list of the medicines you take.  How can you care for yourself at home?  Avoid loud noises whenever possible. This helps keep your hearing from getting worse.  Always wear hearing protection around loud noises.  Wear a hearing aid as directed.  A professional can help you pick a hearing aid that will work best for you.  You can also get hearing aids over the counter for mild to moderate hearing loss.  Have hearing tests as your doctor suggests. They can show whether your hearing has changed. Your hearing aid may need to be adjusted.  Use other devices as needed. These may include:  Telephone amplifiers and hearing aids that can connect to a television, stereo, radio, or microphone.  Devices that use lights or vibrations. These alert you to the doorbell, a ringing telephone, or a baby monitor.  Television closed-captioning. This shows the words at the bottom of the screen. Most new TVs can do this.  TTY (text telephone). This lets you type messages back and forth on the telephone instead of talking or listening. These devices are also called TDD. When messages are typed on the keyboard, they are sent over the phone line to a receiving TTY. The message is shown on a monitor.  Use text messaging, social media, and email if it is hard for you to communicate by telephone.  Try to learn a listening technique called speechreading. It is  "not lipreading. You pay attention to people's gestures, expressions, posture, and tone of voice. These clues can help you understand what a person is saying. Face the person you are talking to, and have them face you. Make sure the lighting is good. You need to see the other person's face clearly.  Think about counseling if you need help to adjust to your hearing loss.  When should you call for help?  Watch closely for changes in your health, and be sure to contact your doctor if:    You think your hearing is getting worse.     You have new symptoms, such as dizziness or nausea.   Where can you learn more?  Go to https://www.[employer].net/patiented  Enter R798 in the search box to learn more about \"Hearing Loss: Care Instructions.\"  Current as of: September 27, 2023  Content Version: 14.2 2024 [employer].   Care instructions adapted under license by your healthcare professional. If you have questions about a medical condition or this instruction, always ask your healthcare professional. [employer] disclaims any warranty or liability for your use of this information.    Bladder Training: Care Instructions  Your Care Instructions     Bladder training is used to treat urge incontinence and stress incontinence. Urge incontinence means that the need to urinate comes on so fast that you can't get to a toilet in time. Stress incontinence means that you leak urine because of pressure on your bladder. For example, it may happen when you laugh, cough, or lift something heavy.  Bladder training can increase how long you can wait before you have to urinate. It can also help your bladder hold more urine. And it can give you better control over the urge to urinate.  It is important to remember that bladder training takes a few weeks to a few months to make a difference. You may not see results right away, but don't give up.  Follow-up care is a key part of your treatment and safety. Be sure to make " and go to all appointments, and call your doctor if you are having problems. It's also a good idea to know your test results and keep a list of the medicines you take.  How can you care for yourself at home?  Work with your doctor to come up with a bladder training program that is right for you. You may use one or more of the following methods.  Delayed urination  In the beginning, try to keep from urinating for 5 minutes after you first feel the need to go.  While you wait, take deep, slow breaths to relax. Kegel exercises can also help you delay the need to go to the bathroom.  After some practice, when you can easily wait 5 minutes to urinate, try to wait 10 minutes before you urinate.  Slowly increase the waiting period until you are able to control when you have to urinate.  Scheduled urination  Empty your bladder when you first wake up in the morning.  Schedule times throughout the day when you will urinate.  Start by going to the bathroom every hour, even if you don't need to go.  Slowly increase the time between trips to the bathroom.  When you have found a schedule that works well for you, keep doing it.  If you wake up during the night and have to urinate, do it. Apply your schedule to waking hours only.  Kegel exercises  These tighten and strengthen pelvic muscles, which can help you control the flow of urine. (If doing these exercises causes pain, stop doing them and talk with your doctor.) To do Kegel exercises:  Squeeze your muscles as if you were trying not to pass gas. Or squeeze your muscles as if you were stopping the flow of urine. Your belly, legs, and buttocks shouldn't move.  Hold the squeeze for 3 seconds, then relax for 5 to 10 seconds.  Start with 3 seconds, then add 1 second each week until you are able to squeeze for 10 seconds.  Repeat the exercise 10 times a session. Do 3 to 8 sessions a day.  When should you call for help?  Watch closely for changes in your health, and be sure to  "contact your doctor if:    Your incontinence is getting worse.     You do not get better as expected.   Where can you learn more?  Go to https://www.[employer].net/patiented  Enter V684 in the search box to learn more about \"Bladder Training: Care Instructions.\"  Current as of: November 15, 2023  Content Version: 14.2 2024 [facility] [employer].   Care instructions adapted under license by your healthcare professional. If you have questions about a medical condition or this instruction, always ask your healthcare professional. [employer] disclaims any warranty or liability for your use of this information.       "

## 2024-11-04 NOTE — NURSING NOTE
Prior to immunization administration, verified patients identity using patient s name and date of birth. Please see Immunization Activity for additional information.     Screening Questionnaire for Adult Immunization    Are you sick today?   No   Do you have allergies to medications, food, a vaccine component or latex?   Yes   Have you ever had a serious reaction after receiving a vaccination?   No   Do you have a long-term health problem with heart, lung, kidney, or metabolic disease (e.g., diabetes), asthma, a blood disorder, no spleen, complement component deficiency, a cochlear implant, or a spinal fluid leak?  Are you on long-term aspirin therapy?   No   Do you have cancer, leukemia, HIV/AIDS, or any other immune system problem?   No   Do you have a parent, brother, or sister with an immune system problem?   No   In the past 3 months, have you taken medications that affect  your immune system, such as prednisone, other steroids, or anticancer drugs; drugs for the treatment of rheumatoid arthritis, Crohn s disease, or psoriasis; or have you had radiation treatments?   No   Have you had a seizure, or a brain or other nervous system problem?   No   During the past year, have you received a transfusion of blood or blood    products, or been given immune (gamma) globulin or antiviral drug?   No   For women: Are you pregnant or is there a chance you could become       pregnant during the next month?   No   Have you received any vaccinations in the past 4 weeks?   No     Immunization questionnaire was positive for at least one answer.  Notified Dr. Solis.      Patient instructed to remain in clinic for 15 minutes afterwards, and to report any adverse reactions.     Screening performed by Anne-Marie Valentin MA on 11/4/2024 at 10:18 AM.

## 2024-11-05 RX ORDER — ATORVASTATIN CALCIUM 40 MG/1
40 TABLET, FILM COATED ORAL DAILY
Qty: 90 TABLET | Refills: 3 | Status: SHIPPED | OUTPATIENT
Start: 2024-11-05

## 2024-11-05 NOTE — RESULT ENCOUNTER NOTE
[facility]. Pulmonary, Critical Care, and Sleep Medicine Name: Sourav Zhong MRN: [id number] : 1958 Hospital: [facility] Date: 3/5/2020  Admission Date: 2020 Chart and notes reviewed. Data reviewed. I have evaluated all findings. [x]I have reviewed the flowsheet and previous days notes. [x]The patient is unable to give any meaningful history or review of systems because the patient is: 
[x]Intubated [x]Sedated  
[]Unresponsive [x]The patient is critically ill on      
[x]Mechanical ventilation [x]Pressors []BiPAP [] Interval HPI: 
Patient is a 59 y.o. female with PMH of COPD (on 3LPM NC home O2), Sarcoidosis, HTN, T2DM,  Hep C, recent Spine Thoracic Laminectomy T6-T11 with Fusion (20) 2/2 compression fx of T9-T10 and spinal cord compression at T9 and T10 and spinal cord edema with subsequent paraparesis, who presented to [facility] ED on 20 c/o acute abdominal pain, SOB with associated constipation x4-5 days PTA, likely 2/2 aforementioned spinal surgery on 19. CT Chest/Abd/Pelvis showed intraperitoneal free air consistent with perforated viscus. Pt was initially on BiPAP but ultimately intubated in ED for worsening overall status and respiratory failure. Sepsis protocol initiated. General Surgery took pt for emergent ex lap. Pt presented to ICU on mechanical ventilation, s/p ex lap where patient was found to have sigmoid colon perforation, 2/2 constipation w/ large stool burden spilling into the peritoneum per Dr. Sandi Boucher. Pt received Albumin and pressors intraoperatively. Pt arrived to ICU on Levophed. Pt left w/ open abdomen, Dr. Sandi Boucher took pt back to OR on 3/1 for abdominal washout and closure. On 3/2, pt with several ~30-second episodes of sinus tach and Aflutter. Persisted after increasing sedation and Digoxin, required Amiodarone gtt.  Pt subsequently became hypotensive Job Malcolm,  Your Vitamin D level is a bit on the high side.  Excessive Vitamin D can lead to elevated levels of calcium in the blood and urine, kidney stones and weakened bones (bone demineralization).  I recommend cutting back on any supplemental Vitamin D you may be taking.  Take half of what you are currently taking.      Your lipid panel (cholesterol) results look great and I renewed your prescription for atorvastatin at the same dose with refills for another year.     Your TSH (thyroid function test) is slightly elevated with a normal T4.  This most likely represents subclinical hypothyroidism and does not require medication but we should monitor it closely - at least once a year or sooner if you notice changes such as worsening fatigue, constipation, or dry skin/hair/nails.     Everything else looks good!  Veronica Solis MD  requiring pressors, but is now off pressors. Her Amiodarone was held yesterday d/t bradycardia and she initially remained in NSR, however, began with tachyarrhythmic episodes again overnight. Cardiology consulted and pt restarted on Amiodarone gtt today (3/5). Subjective 03/05/20 Hospital Day: 6 Vent Day: 5 Overnight events: Pt with intermittent episodes of tachyarrhythmia in the 170s overnight. Did not resolve when vagal maneuver with suction was attempted. Potassium was replaced. Mentation/Activity: Sedated, responsive to pain stimulus. Respiratory/ Secretions: Intubated on mechanical ventilation. Hemodynamics: Now off vasopressors. Urine output, bowel: UOP nearly 3L yesterday with diuresis. Minimal volume raphael blood in ostomy bag. Diet: Trickle feeds will be advanced today per dietician. ROS:Review of systems not obtained due to patient factors. Events and notes from last 24 hours reviewed. Care plan discussed on multidisciplinary rounds. Patient Active Problem List  
Diagnosis Code  Diabetic neuropathy associated with type 2 diabetes mellitus ([facility]) E11.40  Venous insufficiency I87.2  Obesity, Class I, BMI 30-34.9 E66.9  Chronic back pain M54.9, G89.29  
 Generalized osteoarthritis of multiple sites M15.9  Bipolar affective disorder ([facility] F31.9  Abnormally low high density lipoprotein (HDL) cholesterol with hypertriglyceridemia E78.6, E78.1  Diastolic dysfunction without heart failure I51.89  Chronic obstructive pulmonary disease (COPD) ([facility] J44.9  Hypertensive heart disease without heart failure I11.9  Depression F32.9  History of back injury Z87.828  Type 2 diabetes mellitus with diabetic neuropathy, with long-term current use of insulin ([facility]) E11.40, Z79.4  Anxiety F41.9  Wears glasses Z97.3  History of hepatitis C Z86.19  
 Sickle cell trait ([facility]) D57.3  History of acute renal failure Z87.448  Hypothyroidism E03.9  Recurrent genital herpes A60.00  Sarcoidosis D86.9  Abscess of right arm L02.413  Hypoxemia requiring supplemental oxygen R09.02, Z99.81  
 Abnormal nuclear stress test R94.39  
 Cellulitis and abscess of hand L03.119, L02.519  
 Cellulitis and abscess of foot L03.119, L02.619  
 Cellulitis of arm, right L03. 113  
 Olecranon bursitis of right elbow M70.21  
 Contusion of left elbow S50. 02XA  Intravenous drug user F19.90  Right Achilles tendinitis M76.61  
 History of penicillin allergy Z88.0  Falls frequently R29.6  Gastroesophageal reflux disease with hiatal hernia K21.9, K44.9  Memory difficulty R41.3  Mixed connective tissue disease ([facility] M35.1  Impaired mobility and ADLs Z74.09  
 Hyperuricemia E79.0  History of vitamin D deficiency Z86.39  
 Urge urinary incontinence N39.41  Spinal cord compression ([facility]) G95.20  Compression fracture of body of thoracic vertebra ([facility]) S22.000A  Status post laminectomy with spinal fusion Z98.1  Acute blood loss as cause of postoperative anemia D62  
 History of fracture due to fall Z87.81  Chronic respiratory failure with hypoxia ([facility]) J96.11  
 Cellulitis of right forearm L03. 113  
 Gout M10.9  Menopause Z78.0  Long term current use of aspirin Z79.82  
 Opioid dependence ([facility]) F11.20  Tobacco use disorder F17.200  Sepsis ([facility] A41.9  Perforated viscus R19.8  Septic shock ([facility]) A41.9, R65.21 Vital Signs: 
Visit Vitals /64 Pulse (!) 111 Temp (!) 101.2 °F (38.4 °C) Resp 18 Ht 5' 4\" (1.626 m) Wt 88.1 kg (194 lb 3.6 oz) LMP 2012 (Exact Date) SpO2 97% BMI 33.34 kg/m² O2 Device: Ventilator, Endotracheal tube Temp (24hrs), Av.6 °F (37.6 °C), Min:98.4 °F (36.9 °C), Max:101.2 °F (38.4 °C) Intake/Output:  
Last shift:      701 - 03/05 1900 In: 210 Out: 0 Last 3 shifts: 1901 -  0700 In: 2553.4 [I.V.:2523.4] Out: 5825 [NCJPT:4289] Intake/Output Summary (Last 24 hours) at 3/5/2020 1106 Last data filed at 3/5/2020 7776 Gross per 24 hour Intake 1305.67 ml Output 2470 ml Net -1164.33 ml Ventilator Settings: 
Ventilator Mode: VC+ Respiratory Rate Resp Rate Observed: 27 Back-Up Rate: 16 Insp Time (sec): 1 sec Insp Flow (l/min): 1.15 l/min I:E Ratio: 1;2.3 Ventilator Volumes Vt Set (ml): 450 ml Vt Exhaled (Machine Breath) (ml): 502 ml Vt Spont (ml): 507 ml Ve Observed (l/min): 8.3 l/min Ventilator Pressures Pressure Support (cm H2O): 7 cm H2O 
PIP Observed (cm H2O): 16 cm H2O Plateau Pressure (cm H2O): 17 cm H2O 
MAP (cm H2O): 11 PEEP/VENT (cm H2O): 8 cm H20 Auto PEEP Observed (cm H2O): 0 cm H2O Current Facility-Administered Medications Medication Dose Route Frequency  hydrocortisone Sod Succ (PF) (SOLU-CORTEF) injection 50 mg  50 mg IntraVENous Q12H  
 insulin glargine (LANTUS) injection 10 Units  10 Units SubCUTAneous DAILY  multivit-folic acid-herbal 681 (WELLESSE PLUS) oral liquid 30 mL  30 mL Per NG tube DAILY  fluconazole (DIFLUCAN) 400mg/200 mL IVPB (premix)  400 mg IntraVENous Q24H  piperacillin-tazobactam (ZOSYN) 4.5 g in 0.9% sodium chloride (MBP/ADV) 100 mL MBP  4.5 g IntraVENous Q6H  
 propofol (DIPRIVAN) 10 mg/mL infusion  0-50 mcg/kg/min IntraVENous TITRATE  amiodarone (NEXTERONE) 360 mg in dextrose 200 mL (1.8 mg/mL) infusion  0.5-1 mg/min IntraVENous TITRATE  chlorhexidine (PERIDEX) 0.12 % mouthwash 10 mL  10 mL Oral Q12H  pantoprazole (PROTONIX) 40 mg in 0.9% sodium chloride 10 mL injection  40 mg IntraVENous Q12H  
 fentaNYL (PF) 900 mcg/30 ml infusion soln  0-200 mcg/hr IntraVENous TITRATE  albuterol-ipratropium (DUO-NEB) 2.5 MG-0.5 MG/3 ML  3 mL Nebulization Q4H RT  
 budesonide (PULMICORT) 500 mcg/2 ml nebulizer suspension  500 mcg Nebulization BID RT  
 insulin lispro (HUMALOG) injection   SubCUTAneous Q6H  
  NOREPINephrine (LEVOPHED) 32 mg in 5% dextrose 250 mL infusion  0.5-30 mcg/min IntraVENous TITRATE Telemetry: []Sinus [x]A-flutter []Paced []A-fib []Multiple PVCs Physical Exam:  
  
General: Intubated, sedated, NAD HEENT:  PERRL, mucosa moist 
Resp:  Symmetrical chest expansion, no accessory muscle use;  no rales/ wheezes noted CV:  S1, S2 present; regular rate GI:  Midline laparotomy incision, covered by dry dressing. Abdomen distended but non-rigid; absent bowel sounds. Ostomy bag noted to have minimal bloody output. Extremities: +2 pulses on all extremities; trace edema bilateral lower extremities. Bilateral upper extremity edema. Skin: Warm; normal turgor/cap refill; pt with ecchymotic area to the right upper arm surrounding her PIV site, suspect ecchymosis d/t BP cuff, unchanged from yesterday Neurologic:  Sedated, responsive to painful stimuli. Devices:  ETT, NGT, Central line R IJ, L Radial Arterial Line, Renae DATA: 
MAR reviewed and pertinent medications noted or modified as needed Labs: 
Recent Labs 03/05/20 
0500 03/04/20 
0410 03/03/20 
0440 WBC 13.4* 23.9* 26.1* HGB 8.4* 9.0* 9.5* HCT 25.5* 28.1* 28.6* PLT 77* 98* 168 Recent Labs 03/05/20 
7059 03/05/20 
0030 03/04/20 
1240 03/04/20 
0410 * 150* 146* 149*  
K 3.3* 2.9* 3.6 2.5*  
* 111 110 110 CO2 31 31 28 26 * 225* 234* 244* BUN 25* 22* 20* 19* CREA 1.04 0.94 0.86 0.84 CA 7.9* 7.7* 7.8* 7.7* MG 2.0  --  1.9 2.0 PHOS 2.1*  --  3.5 3.4 ALB 1.5* 1.6* 1.5* 1.7* SGOT 20 21 37 22 ALT 14 14 15 14 No results for input(s): PH, PCO2, PO2, HCO3, FIO2 in the last 72 hours. Recent Labs 03/05/20 
3336 03/04/20 
0407 03/03/20 
1018 FIO2I 45 55 60 HCO3I 31.8* 30.8* 25.8 PCO2I 44.6 46.8* 45.4* PHI 7.461* 7.426 7.367 PO2I 95 111* 96 Imaging: 
[x]   I have personally reviewed the patients radiographs and reports XR Results (most recent): 
 CXR Results  (Last 48 hours) 03/05/20 0556  XR CHEST PORT Final result Impression:  IMPRESSION:  
   
1. Unable to visualize the tip of the ETT and right IJ central line secondary  
to overlying thoracic fusion hardware. 2.  No pneumothorax. 3.  Similar lung base consolidations, though possibly increased on the left. Suspect there are underlying pleural effusions. Consolidations may reflect  
atelectasis, pneumonia, and/or pulmonary edema. 4.  At least interstitial and possible edema. Narrative:  PORTABLE CHEST RADIOGRAPH   
   
CPT CODE: 17284 INDICATION: Intubated. COMPARISON: 3/4/2020. FINDINGS:  
   
Frontal view of the chest obtained at 0458 hours. Incompletely assessed thoracic  
fusion. Tip of ET tube is not able to be visualized with overlying fusion  
hardware obscuring visualization. Right IJ central line, tip is also obscured by  
the hardware. Cardiomediastinal silhouette is unchanged. Lung base  
consolidations are not significantly changed, though possibly increased on the  
left. Perihilar haziness is unchanged. No pneumothorax. 03/04/20 0604  XR CHEST PORT Final result Impression:  IMPRESSION:  
1. Slightly improved right basilar airspace infiltrates. Persistent  
retrocardiac density and left lung parenchymal opacities. 2. Support lines and tubes in place as above. Narrative:  PORTABLE CHEST X-RAY  
   
CPT CODE: 19632 INDICATION: Endotracheal tube placement. COMPARISON: Plain film 3/3/2020. FINDINGS:   
Right jugular central line remains. Enteric tube remains, tip not included in  
the field-of-view. Endotracheal tube in place, tip obscured by superimposed thoracic spinal  
hardware on this obliquity. Suspect tip terminates approximately 2.6 cm proximal  
to the alyssa. No pneumothorax. Similar retrocardiac density on the left. Similar patchy airspace infiltrate in the lateral left lower lobe. Residual  
patchy dependent airspace opacities at the right base, subjectively minimally  
improved. No definite new process. CT Results (most recent): 
Results from [facility] [place] encounter on 02/29/20 CT CHEST ABD PELV W CONT Narrative CT SCAN OF THE CHEST, ABDOMEN AND PELVIS, WITH CONTRAST INDICATION: Above. Arrived with shortness of breath and abdominal pain. Septic 
shock. Hypoxia. History of sarcoidosis/COPD. Altered. Abdominal distention and 
tympani. Intraperitoneal free air on chest radiograph. Perforated viscus. COMPARISON: Chest CT 1/21/2010. No prior abdominopelvic CT in PACS. Report is 
noted in the electronic medical record (care everywhere) of previous noncontrast 
enhanced abdominopelvic CT performed elsewhere 7/21/2014. TECHNIQUE: With IV administration of 70 mL Isovue-300, without administration of 
oral contrast, helically acquired serial axial CT images through the chest, 
abdomen and pelvis were obtained. Additional coronal and sagittal reformation 
images were also performed. All CT scans at this facility are performed using dose optimization technique as 
appropriate to the performed exam, to include automated exposure control, 
adjustment of the mA and/or kV according to patient's size (Including 
appropriate matching for site-specific examinations), or use of iterative 
reconstruction technique. FINDINGS: 
 
CT chest:  
 
Endotracheal tube tip is in place cephalad to the level of the alyssa. Esophagogastric tube is noted, tip in the mid stomach. Severe pulmonary emphysematous disease again seen with extensive bullous changes 
most conspicuous in the upper lungs. Scattered scarring. There has been interval 
development of consolidative appearing lung opacities in the lower lobes 
bilaterally consistent with airspace disease likely in addition to atelectasis. The small stable subpleural pulmonary nodule described in the right lower lobe on the recent chest CT is again seen, slightly better visualized on the current 
study measuring approximately 4 mm, unchanged compared to the CT of 2/6/2018 
(axial 34). Multifocal chronic nodular pleuro-parenchymal scarring in the left 
upper lobe without significant interval change compared to the preceding chest 
CT or study of 2/6/2018. No definite suspicious new pulmonary nodule/mass identified compared to the 
preceding CT. No evidence of pathologic lymph node enlargement is seen. No evidence of pleural or significant pericardial effusion. CT abdomen/pelvis:  
 
Small ascites, best seen around the liver and in the anterior pelvis adjacent to 
the distal descending and sigmoid colon. The spleen is heterogeneous in attenuation and demonstrates several rounded 
hypodensities as detailed on the recent chest CT of 1/21/2020. Indeterminate attenuation masses in the kidneys bilaterally, 2.7 cm right kidney 
upper to midpole laterally, 2.1 cm left kidney interpolar region posteriorly. Further evaluation recommended, beginning with ultrasound might be helpful when 
clinically feasible if the corresponding lesions can be identified 
sonographically. Additional smaller subcentimeter renal hypodensities 
bilaterally, possibly cysts, too small to be specifically characterized. The liver, gallbladder, pancreas, and adrenal glands appear unremarkable. Scattered calcifications in the abdominal aorta and its major branches. The 
abdominal aorta enhances normally without abdominal aortic aneurysm. A few scattered small subcentimeter short axis lymph nodes bilaterally in the 
pelvis or retroperitoneum. No evidence of pathologic lymph node enlargement is 
seen. Moderate quantity of intraperitoneal free air in the nondependent anterior 
abdomen, consistent with perforated viscus. Additional scattered foci of free 
air elsewhere in the peritoneum and mesentery, including in the right upper quadrant near the gallbladder/duodenum, and scattered bilaterally in the 
mesenteric/peritoneal fat of the upper and lower abdomen. Grouped small foci of 
intraperitoneal free air are noted close to the colonic wall along the 
left/anterior side of the sigmoid colon (reference axial 112-119). The left 
hemicolon is diffusely mildly distended to the rectum containing gas, stool, and 
hyperdense material. Clinical correlation might be helpful regarding recent 
ingestion of hyperdense material. The rectum measures approximately 8.5 cm in 
caliber. The sigmoid measures approximately 7.3 cm in caliber on axial image 115. The site of the or free viscus is uncertain. Common sites could include 
duodenal or gastric perforation such as may be seen due to perforated ulcer. However, there is small focal hypoattenuation along the wall of the sigmoid 
colon on series 2 axial images 111-113 which could potentially reflect a small 
mural defect such as could be seen related to constipation, stercoral colitis, 
diverticular disease. The appendix is seen within the ascites in the right lower quadrant, assessment 
for stranding in the periappendiceal fat limited by the ascites, without gross 
abnormal thickening of the appendix seen. No gas within the appendix. The right 
hemicolon appears grossly unremarkable. There is mild diffuse mural thickening of small bowel loops and mild prominence 
of enhancement, nonspecific. No definite associated pneumatosis. No portal 
venous gas is seen. The SMA/SMV appear to maintain usual orientation. Broadly 
speaking differential considerations could include inflammatory, infectious, 
ischemic. Clinical correlation is recommended including with lactate 
measurement. Conceivably sequela of peritonitis in the setting of bowel 
perforation? Uterus is present. Small gas is noted within the uterus and vagina, nonspecific, 
can be seen as a physiologic finding. On review of bone windows, there is a superior endplate compression fracture 
with mildly to moderately decreased vertebral body height at L2. Bilateral 
spinal fusion rods spanning from Z2-B2-E70-T12. Severe compression fracture 
deformity again seen at T9. Compression fracture with rather pronounced 
decreased vertebral body height again seen at T5. Mild endplate indentations at 
several other levels including T4, T6, T10. The posterior midline fluid 
collection described on the preceding study was better visualized on the 
preceding study, extensive metallic hardware artifacts noted. Impression Impression: Moderate quantity of intraperitoneal free air. Findings are consistent with 
perforated viscus. The site of perforation is uncertain, as discussed above, 
possibly the sigmoid colon where there is apparent small subtle focal 
hypoattenuation along the wall as detailed above (reference axial images 111-113) close to region where several foci of free air are grouped. The left 
hemicolon is mildly distended and filled with hyperdense appearing stool. Mild diffuse mural thickening and enhancement of the small bowel as described. Small volume of ascites best seen around the liver and in the pelvis adjacent to 
the sigmoid. Bibasilar consolidations, right greater than left, suspicious for airspace 
disease/pneumonia. Indeterminate attenuation bilateral renal masses, follow-up evaluation 
recommended. Splenic hypodensities again seen as described on recent chest CT. Intubated. Severe pulmonary emphysematous disease and fibrotic changes. Multiple otherwise nonacute findings as detailed above. I have discussed these results directly with Dr. Tonya Roman in the ED at 12:20 p.m. 
in addition to the patient's consulted surgeon at 12:25 PM. IMPRESSION:  
· Acute on Chronic Respiratory Failure - Emergently intubated in ED for airway protection, 2/2 below. Now post-op and remains on mechanical ventilation. Chronically on 3L NC at home. · Acute Sigmoid Colon Perforation - s/p emergent ex-lap w/ drainage of intraabdominal stool and collections; sigmoid colectomy with Dr. Wicho Lobo on 02/29/20. Subsequent washout and abdominal closure on 03/01/20. · Staph bacteremia - Grew on BCx collected 3/3. ID following. · Hypokalemia - Likely 2/2 diuresis. Pt is on electrolyte replacement protocol. · Pyrexia - Intermittent low-grade fevers. 2/2 bacteremia, PNA, and UTI. · Intermittent Tachyarrhythmia - Pt w/ episodes of what appears to be Aflutter; episodes have not occurred when she is on the Amiodarone gtt · Septic Shock with MSOF - 2/2 above. No longer requiring pressors. · Peritonitis - 2/2 above - Sigmoid Colon Perforation with large intraabdominal stool burden. · Bilateral PNA - Sputum Cx collected 3/1 grew Moraxella catarrhalis. ID is following. · Leukocytosis w/ bandemia - Bandemia now resolved. WBC improving. · Lactic Acidosis - 2/2 above. Resolved. · Metabolic Acidosis - 2/2 above. Resolved. · Troponemia - demand ischemia, not an MI.  
· ELIF - Likely pre-renal, 2/2 above. Initial ELIF resolved. Pt w/ mild increase in BUN/Cr w/ diuresis. · UTI - UCx grew E.coli. · Lytes - Pt on electrolyte replacement protocol. · T2DM - Pt on Lantus 20u qhs at home. · Paraparesis, in wheelchair: Recent Hx of Spine Thoracic Laminectomy T6-T11 with Fusion (12/11/20) with Dr. Deisy Yeung 2/2 Acute compression fx of T9-T10 and spinal cord compression at T9 and T10 and spinal cord edema with subsequent paraparesis. · Hx of Sarcoidosis - Chronic steroid use. · Hx of COPD - Follows with Dr. Mira Sanchez in clinic. · Hx of Cocaine and Heroin Abuse - on methadone. Patient Active Problem List  
Diagnosis Code  Diabetic neuropathy associated with type 2 diabetes mellitus ([facility]) E11.40  Venous insufficiency I87.2  Obesity, Class I, BMI 30-34.9 E66.9  Chronic back pain M54.9, G89.29  
 Generalized osteoarthritis of multiple sites M15.9  Bipolar affective disorder ([facility] F31.9  Abnormally low high density lipoprotein (HDL) cholesterol with hypertriglyceridemia E78.6, E78.1  Diastolic dysfunction without heart failure I51.89  Chronic obstructive pulmonary disease (COPD) ([facility] J44.9  Hypertensive heart disease without heart failure I11.9  Depression F32.9  History of back injury Z87.828  Type 2 diabetes mellitus with diabetic neuropathy, with long-term current use of insulin ([facility]) E11.40, Z79.4  Anxiety F41.9  Wears glasses Z97.3  History of hepatitis C Z86.19  
 Sickle cell trait ([facility]) D57.3  History of acute renal failure Z87.448  Hypothyroidism E03.9  Recurrent genital herpes A60.00  Sarcoidosis D86.9  Abscess of right arm L02.413  Hypoxemia requiring supplemental oxygen R09.02, Z99.81  
 Abnormal nuclear stress test R94.39  
 Cellulitis and abscess of hand L03.119, L02.519  
 Cellulitis and abscess of foot L03.119, L02.619  
 Cellulitis of arm, right L03. 113  
 Olecranon bursitis of right elbow M70.21  
 Contusion of left elbow S50. 02XA  Intravenous drug user F19.90  Right Achilles tendinitis M76.61  
 History of penicillin allergy Z88.0  Falls frequently R29.6  Gastroesophageal reflux disease with hiatal hernia K21.9, K44.9  Memory difficulty R41.3  Mixed connective tissue disease ([facility] M35.1  Impaired mobility and ADLs Z74.09  
 Hyperuricemia E79.0  History of vitamin D deficiency Z86.39  
 Urge urinary incontinence N39.41  Spinal cord compression ([facility]) G95.20  Compression fracture of body of thoracic vertebra ([facility]) S22.000A  Status post laminectomy with spinal fusion Z98.1  Acute blood loss as cause of postoperative anemia D62  
 History of fracture due to fall Z87.81  
  Chronic respiratory failure with hypoxia ([facility]) J96.11  
 Cellulitis of right forearm L03. 113  
 Gout M10.9  Menopause Z78.0  Long term current use of aspirin Z79.82  
 Opioid dependence ([facility]) F11.20  Tobacco use disorder F17.200  Sepsis ([facility] [address] A41.9  Perforated viscus R19.8  Septic shock ([facility]) A41.9, R65.21  
 
  
RECOMMENDATIONS:  
Neuro: Wean sedation as tolerated. Post-op pain control. Pulm: Aspiration precautions, HOB>30'. VAP Bundle Titrate FiO2 O2 for SpO2 >88-92%; optimize bronchial hygiene. Duo-nebs and Pulmicort as scheduled. Albuterol nebs q6 PRN. Daily CXR and ABG while intubated. Wean FiO2 as tolerated. CVS: Off pressors. Monitor HD, MAP goal >65. Cardiology consulted and agrees with continuing amiodarone gtt. GI: Surgery following, midline incision wound vac now in place. Advance feeds per dietary. Monitor for ostomy output. Renal: UOP 2L overnight, appears to have been adequately diuresed. Will hold further diuresis for now. Trend Cr, UOP. Batool (2/29 - 5 days). Continue ABX for E.coli UTI. ID following. Hem/Onc: Platelets dropping. D/c heparin ppx. F/u HIT panel results. Trend H/H, monitor for s/o active bleeding. Daily CBC. I/D: Blood Cx grew Staph, Respiratory Cx grew Moraxella, Urine Cx grew E.coli. ID is following the patient and managing antimicrobial regimen. Pt spiking fever this AM. Pt is high risk for intraabdominal abscess. Repeat CT Abd/Pelv discussed with surgery, they would like to wait on imaging as abscess formation is a slow process and it would likely be difficult to visualize on CT at this time if it were present in the early stages. Trend WBCs and temperature curve. Metabolic: Continue to monitor with q12 BMP, mag, phos. Trend lytes and replace per protocol. Endocrine: Q6 glucoses. Now on Lantus and SSI. Avoid hypoglycemia. Musc/Skin: Monitor wound vac. Surgical incision care. Full Code Discussed in interdisciplinary rounds Best Practices/Safety Bundles: 
· Sepsis Bundle per Hospital Protocol · Glycemic control; avoid Hypoglycemia · IHI ICU Bundles: 
·      Central Line Bundle Followed , Rousseau Bundle Followed and Vent Bundle Followed, Vent Day 5 · Mech Vent patients/ Pulmonary pts:  
· VAP bundle, Aim to keep peak plateau pressure 57-52TY H2O 
· Aspiration Precautions - HOB >30' · Daily sedation holiday as indicated · SBT as tolerated/appropriate · Stress ulcer prophylaxis: Protonix · DVT prophylaxis: SCDs · Need for Lines, rousseau assessed. · Restraints need. High complexity decision making was performed during this consultation and evaluation. [x]       Pt is at high risk for further organ failure and dysfunction. Jonny Jenkins PA-C 
03/05/20 Pulmonary, Critical Care Medicine [address] [facility]

## 2024-11-06 LAB
MAGNESIUM SERPL-MCNC: 2 MG/DL (ref 1.7–2.3)
PHOSPHATE SERPL-MCNC: 4 MG/DL (ref 2.5–4.5)

## 2024-11-07 ENCOUNTER — VIRTUAL VISIT (OUTPATIENT)
Dept: PSYCHOLOGY | Facility: CLINIC | Age: 64
End: 2024-11-07
Payer: COMMERCIAL

## 2024-11-07 DIAGNOSIS — F33.1 MODERATE EPISODE OF RECURRENT MAJOR DEPRESSIVE DISORDER (H): Primary | ICD-10-CM

## 2024-11-07 DIAGNOSIS — F41.1 GAD (GENERALIZED ANXIETY DISORDER): ICD-10-CM

## 2024-11-07 PROCEDURE — 90837 PSYTX W PT 60 MINUTES: CPT | Mod: 95 | Performed by: COUNSELOR

## 2024-11-07 NOTE — PROGRESS NOTES
[facility]                                     Progress Note    Patient Name: Karlee Emery  Date: 24         Service Type: Individual      Session Start Time: 203 pm  Session End Time: 258 pm     Session Length: 55 minutes    Session #: 44  Attendees: Client attended alone    Service Modality:  Video Visit:      Provider verified identity through the following two step process.  Patient provided:  Patient  and Patient address    Telemedicine Visit: The patient's condition can be safely assessed and treated via synchronous audio and visual telemedicine encounter.      Reason for Telemedicine Visit: Services only offered telehealth    Originating Site (Patient Location): Patient's home    Distant Site (Provider Location): Provider Remote Setting- Home Office    Consent:  The patient/guardian has verbally consented to: the potential risks and benefits of telemedicine (video visit) versus in person care; bill my insurance or make self-payment for services provided; and responsibility for payment of non-covered services.     Patient would like the video invitation sent by:  My Chart    Mode of Communication:  Video Conference via Amwell    Distant Location (Provider):  Off-site    As the provider I attest to compliance with applicable laws and regulations related to telemedicine.    DATA  Interactive Complexity: No  Crisis: No        Progress Since Last Session (Related to Symptoms / Goals / Homework):   Symptoms: Improving    Homework: Partially completed      Episode of Care Goals: Minimal progress - ACTION (Actively working towards change); Intervened by reinforcing change plan / affirming steps taken     Current / Ongoing Stressors and Concerns:   Relationship with son and how to navigate dynamics and mental health     Treatment Objective(s) Addressed in This Session:   Identify negative self-talk and behaviors: challenge core beliefs, myths, and actions       Intervention:   DBT -  GIVE and FAST    Assessments completed prior to visit:  The following assessments were completed by patient for this visit:  PHQ9:       9/26/2024    12:40 PM 10/3/2024     1:43 PM 10/10/2024     1:14 PM 10/16/2024     2:43 PM 10/24/2024     1:51 PM 10/31/2024     1:31 PM 11/7/2024    11:01 AM   PHQ-9 SCORE   PHQ-9 Total Score MyChart 10 (Moderate depression) 7 (Mild depression) 7 (Mild depression) 7 (Mild depression) 9 (Mild depression) 7 (Mild depression) 7 (Mild depression)   PHQ-9 Total Score 10 7 7 7 9  7  7        Patient-reported     GAD7:       12/29/2022    12:24 PM 10/5/2023    11:38 AM 12/21/2023     1:49 PM 5/9/2024     1:07 PM 9/12/2024     1:08 PM 9/26/2024    12:45 PM 10/24/2024     1:53 PM   DANIEL-7 SCORE   Total Score 4 (minimal anxiety) 4 (minimal anxiety) 6 (mild anxiety) 9 (mild anxiety) 6 (mild anxiety) 6 (mild anxiety) 6 (mild anxiety)   Total Score 4 4 6 9    9 6    6 6    6 6        Patient-reported    Multiple values from one day are sorted in reverse-chronological order          10/10/2023    12:59 PM 1/10/2024     4:52 PM 1/18/2024     1:52 PM   PROMIS-10 Scores Only   Global Mental Health Score 6 9 8    8   Global Physical Health Score 14 15 13    13   PROMIS TOTAL - SUBSCORES 20 24 21    21        ASSESSMENT: Current Emotional / Mental Status (status of significant symptoms):   Risk status (Self / Other harm or suicidal ideation)   Patient denies current fears or concerns for personal safety.   Patient denies current or recent suicidal ideation or behaviors.   Patient denies current or recent homicidal ideation or behaviors.   Patient denies current or recent self injurious behavior or ideation.   Patient denies other safety concerns.   Patient reports there has been no change in risk factors since their last session.     Patient reports there has been no change in protective factors since their last session.     A safety and risk management plan has been developed including: Patient  consented to co-developed safety plan on 10/12/23.  Safety and risk management plan was reviewed.   Patient agreed to use safety plan should any safety concerns arise.  A copy was made available to the patient.     Appearance:   Appropriate    Eye Contact:   Good    Psychomotor Behavior: Normal    Attitude:   Cooperative  Interested   Orientation:   All   Speech    Rate / Production: Normal     Volume:  Normal    Mood:    Normal   Affect:    Appropriate    Thought Content:  Clear    Thought Form:  Coherent  Logical    Insight:    Good      Medication Review:   No changes to current psychiatric medication(s)     Medication Compliance:   Yes     Changes in Health Issues:   None reported     Chemical Use Review:   Substance Use: Chemical use reviewed, no active concerns identified      Tobacco Use: No current tobacco use.      Diagnosis:  1. Moderate episode of recurrent major depressive disorder (H)    2. DANIEL (generalized anxiety disorder)                  Collateral Reports Completed:   Not Applicable    PLAN: (Patient Tasks / Therapist Tasks / Other)  Patient to reflect on acceptance regarding regarding relationship with son until next session.      Yelitza Schuster, [facility]                                            Individual Treatment Plan    Patient's Name: Karlee Emery  YOB: 1960    Date of Creation: 10/26/23  Date Treatment Plan Last Reviewed/Revised:9/19/24    DSM5 Diagnoses: 296.32 (F33.1) Major Depressive Disorder, Recurrent Episode, Moderate _ and With anxious distress or 300.02 (F41.1) Generalized Anxiety Disorder  Psychosocial / Contextual Factors: Familial conflict  PROMIS (reviewed every 90 days): completed 1/18/24    Referral / Collaboration:  Referral to another professional/service is not indicated at this time..    Anticipated number of session for this episode of care: 9-12 sessions  Anticipation frequency of session: Weekly  Anticipated Duration of each session: 38-52  minutes  Treatment plan will be reviewed in 90 days or when goals have been changed.       MeasurableTreatment Goal(s) related to diagnosis / functional impairment(s)  Goal 1: Patient will develop and implement strategies related to anxiety.    I will know I've met my goal when I will be able to regulate myself.      Objective #A (Patient Action)    Patient will identify 4 fears / thoughts that contribute to feeling anxious.  Status: Completed 1/25/24    Intervention(s)  Therapist will teach emotional recognition/identification.   .    Objective #B  Patient will use relaxation strategies 2 times per day to reduce the physical symptoms of anxiety.  Status: Continued 9/19/24    Intervention(s)  Therapist will teach relaxation strategies and mindfulness .    Objective #C  Patient will attend and participate in social or recreational activities   .  Status: Continued 9/19/24    Intervention(s)  Therapist will teach skills related to engagement .      Goal 2: Patient will develop and implement strategies related to depression.    I will know I've met my goal when I feel better about myself.      Objective #A     Status:Completed 5/30/24    Patient will Increase interest, engagement, and pleasure in doing things.    Intervention(s)  Therapist will teach emotional regulation skills.   .    Objective #B  Patient will Feel less tired and more energy during the day .    Status: Continued 9/19/24    Intervention(s)  Therapist will teach about healthy boundaries.   .    Objective #C  Patient will Decrease frequency and intensity of feeling down, depressed, hopeless.  Status: Continued 9/19/24    Intervention(s)  Therapist will teach distraction skills.   .      Patient has reviewed and agreed to the above plan.      Yelitza Schuster, [facility]  October 26, 2023

## 2024-11-09 ENCOUNTER — LAB (OUTPATIENT)
Dept: LAB | Facility: CLINIC | Age: 64
End: 2024-11-09
Payer: COMMERCIAL

## 2024-11-09 DIAGNOSIS — E67.3 HYPERVITAMINOSIS D: ICD-10-CM

## 2024-11-09 LAB — PTH-INTACT SERPL-MCNC: 40 PG/ML (ref 15–65)

## 2024-11-09 PROCEDURE — 82306 VITAMIN D 25 HYDROXY: CPT

## 2024-11-09 PROCEDURE — 36415 COLL VENOUS BLD VENIPUNCTURE: CPT

## 2024-11-09 PROCEDURE — 83970 ASSAY OF PARATHORMONE: CPT

## 2024-11-13 LAB
DEPRECATED CALCIDIOL+CALCIFEROL SERPL-MC: 63 UG/L (ref 20–75)
VITAMIN D2 SERPL-MCNC: 5 UG/L
VITAMIN D3 SERPL-MCNC: 58 UG/L

## 2024-11-13 NOTE — RESULT ENCOUNTER NOTE
Job Malcolm,  OK, this confirms the relatively elevated Vitamin D level and a normal parathyroid hormone (PTH) level.      I can't explain the elevated level of Vitamin D, but I do feel we've ruled out any potential causes for concern.  Veronica Solis MD

## 2024-11-14 ENCOUNTER — ANCILLARY PROCEDURE (OUTPATIENT)
Dept: BONE DENSITY | Facility: CLINIC | Age: 64
End: 2024-11-14
Attending: FAMILY MEDICINE
Payer: COMMERCIAL

## 2024-11-14 DIAGNOSIS — Z78.0 ASYMPTOMATIC POSTMENOPAUSAL STATUS: ICD-10-CM

## 2024-11-14 PROCEDURE — 77080 DXA BONE DENSITY AXIAL: CPT | Performed by: INTERNAL MEDICINE

## 2024-11-17 PROBLEM — M85.89 OSTEOPENIA OF MULTIPLE SITES: Status: ACTIVE | Noted: 2024-11-17

## 2024-11-17 NOTE — RESULT ENCOUNTER NOTE
Job Malcolm,  The result of your recent DEXA scan is abnormal.  The density of your bones is thinner than expected. This is called low bone density (osteopenia) when there is mild to moderate thinning and osteoporosis when there  is moderate to severe thinning. This may put you at risk for a fracture.    You have low bone density (osteopenia) - but just barely.    I do not recommend prescription bone-preserving medication at this time, but we should continue to monitor this with a repeat DEXA scan in 5 years.    To help prevent further loss of bone, the following is recommended:    Take in adequate amounts of Calcium and Vitamin D. These are the building blocks for bones. Most post-menopausal women and older men will need 1200 mg of Calcium and 1000 international units (25 mcg) of Vitamin D daily.  It is best to get your calcium through your diet; if you get 3 servings of dairy products daily you should not need calcium supplements.      Exercise daily to keep your bones strong. Thirty minutes of moderate walking or other aerobic activity is recommended.    If you are a smoker, make an appointment to discuss smoking cessation.    If you have any questions or concerns, please schedule an appointment with me to further discuss these results.   Veronica Solis MD

## 2024-11-20 ENCOUNTER — TRANSFERRED RECORDS (OUTPATIENT)
Dept: HEALTH INFORMATION MANAGEMENT | Facility: CLINIC | Age: 64
End: 2024-11-20
Payer: COMMERCIAL

## 2024-11-20 LAB — RETINOPATHY: NEGATIVE

## 2024-11-21 ENCOUNTER — VIRTUAL VISIT (OUTPATIENT)
Dept: PSYCHOLOGY | Facility: CLINIC | Age: 64
End: 2024-11-21
Payer: COMMERCIAL

## 2024-11-21 DIAGNOSIS — F41.1 GAD (GENERALIZED ANXIETY DISORDER): ICD-10-CM

## 2024-11-21 DIAGNOSIS — F33.1 MODERATE EPISODE OF RECURRENT MAJOR DEPRESSIVE DISORDER (H): Primary | ICD-10-CM

## 2024-11-21 NOTE — PROGRESS NOTES
[facility]                                     Progress Note    Patient Name: Karlee Emery  Date: 24         Service Type: Individual      Session Start Time: 203 pm  Session End Time: 258 pm     Session Length: 55 minutes    Session #: 45  Attendees: Client attended alone    Service Modality:  Video Visit:      Provider verified identity through the following two step process.  Patient provided:  Patient  and Patient address    Telemedicine Visit: The patient's condition can be safely assessed and treated via synchronous audio and visual telemedicine encounter.      Reason for Telemedicine Visit: Services only offered telehealth    Originating Site (Patient Location): Patient's home    Distant Site (Provider Location): Provider Remote Setting- Home Office    Consent:  The patient/guardian has verbally consented to: the potential risks and benefits of telemedicine (video visit) versus in person care; bill my insurance or make self-payment for services provided; and responsibility for payment of non-covered services.     Patient would like the video invitation sent by:  My Chart    Mode of Communication:  Video Conference via Amwell    Distant Location (Provider):  Off-site    As the provider I attest to compliance with applicable laws and regulations related to telemedicine.    DATA  Interactive Complexity: No  Crisis: No        Progress Since Last Session (Related to Symptoms / Goals / Homework):   Symptoms: Improving    Homework: Partially completed      Episode of Care Goals: Minimal progress - ACTION (Actively working towards change); Intervened by reinforcing change plan / affirming steps taken     Current / Ongoing Stressors and Concerns:   Upcoming holidays, navigating relationships     Treatment Objective(s) Addressed in This Session:   Identify negative self-talk and behaviors: challenge core beliefs, myths, and actions       Intervention:   DBT - GIVE and  FAST    Assessments completed prior to visit:  The following assessments were completed by patient for this visit:  PHQ9:       10/3/2024     1:43 PM 10/10/2024     1:14 PM 10/16/2024     2:43 PM 10/24/2024     1:51 PM 10/31/2024     1:31 PM 11/7/2024    11:01 AM 11/21/2024     1:52 PM   PHQ-9 SCORE   PHQ-9 Total Score MyChart 7 (Mild depression) 7 (Mild depression) 7 (Mild depression) 9 (Mild depression) 7 (Mild depression) 7 (Mild depression) 7 (Mild depression)   PHQ-9 Total Score 7 7 7 9  7  7  7        Patient-reported     GAD7:       12/29/2022    12:24 PM 10/5/2023    11:38 AM 12/21/2023     1:49 PM 5/9/2024     1:07 PM 9/12/2024     1:08 PM 9/26/2024    12:45 PM 10/24/2024     1:53 PM   DANIEL-7 SCORE   Total Score 4 (minimal anxiety) 4 (minimal anxiety) 6 (mild anxiety) 9 (mild anxiety) 6 (mild anxiety) 6 (mild anxiety) 6 (mild anxiety)   Total Score 4 4 6 9    9 6    6 6    6 6        Patient-reported    Multiple values from one day are sorted in reverse-chronological order          10/10/2023    12:59 PM 1/10/2024     4:52 PM 1/18/2024     1:52 PM   PROMIS-10 Scores Only   Global Mental Health Score 6 9 8    8   Global Physical Health Score 14 15 13    13   PROMIS TOTAL - SUBSCORES 20 24 21    21        ASSESSMENT: Current Emotional / Mental Status (status of significant symptoms):   Risk status (Self / Other harm or suicidal ideation)   Patient denies current fears or concerns for personal safety.   Patient denies current or recent suicidal ideation or behaviors.   Patient denies current or recent homicidal ideation or behaviors.   Patient denies current or recent self injurious behavior or ideation.   Patient denies other safety concerns.   Patient reports there has been no change in risk factors since their last session.     Patient reports there has been no change in protective factors since their last session.     A safety and risk management plan has been developed including: Patient consented to  co-developed safety plan on 10/12/23.  Safety and risk management plan was reviewed.   Patient agreed to use safety plan should any safety concerns arise.  A copy was made available to the patient.     Appearance:   Appropriate    Eye Contact:   Good    Psychomotor Behavior: Normal    Attitude:   Cooperative  Interested   Orientation:   All   Speech    Rate / Production: Normal     Volume:  Normal    Mood:    Normal   Affect:    Appropriate    Thought Content:  Clear    Thought Form:  Coherent  Logical    Insight:    Good      Medication Review:   Changes to psychiatric medications, see updated Medication List in EPIC.      Medication Compliance:   Yes     Changes in Health Issues:   None reported     Chemical Use Review:   Substance Use: Chemical use reviewed, no active concerns identified      Tobacco Use: No current tobacco use.      Diagnosis:  1. Moderate episode of recurrent major depressive disorder (H)    2. DANIEL (generalized anxiety disorder)                  Collateral Reports Completed:   Not Applicable    PLAN: (Patient Tasks / Therapist Tasks / Other)  Patient to implement healthy meal strategies until next session and to stay present through the Holiday.      Yelitza Schuster [facility]                                            Individual Treatment Plan    Patient's Name: Karlee Emery  YOB: 1960    Date of Creation: 10/26/23  Date Treatment Plan Last Reviewed/Revised:9/19/24    DSM5 Diagnoses: 296.32 (F33.1) Major Depressive Disorder, Recurrent Episode, Moderate _ and With anxious distress or 300.02 (F41.1) Generalized Anxiety Disorder  Psychosocial / Contextual Factors: Familial conflict  PROMIS (reviewed every 90 days): completed 1/18/24    Referral / Collaboration:  Referral to another professional/service is not indicated at this time..    Anticipated number of session for this episode of care: 9-12 sessions  Anticipation frequency of session: Weekly  Anticipated Duration of each  session: 38-52 minutes  Treatment plan will be reviewed in 90 days or when goals have been changed.       MeasurableTreatment Goal(s) related to diagnosis / functional impairment(s)  Goal 1: Patient will develop and implement strategies related to anxiety.    I will know I've met my goal when I will be able to regulate myself.      Objective #A (Patient Action)    Patient will identify 4 fears / thoughts that contribute to feeling anxious.  Status: Completed 1/25/24    Intervention(s)  Therapist will teach emotional recognition/identification.   .    Objective #B  Patient will use relaxation strategies 2 times per day to reduce the physical symptoms of anxiety.  Status: Continued 9/19/24    Intervention(s)  Therapist will teach relaxation strategies and mindfulness .    Objective #C  Patient will attend and participate in social or recreational activities   .  Status: Continued 9/19/24    Intervention(s)  Therapist will teach skills related to engagement .      Goal 2: Patient will develop and implement strategies related to depression.    I will know I've met my goal when I feel better about myself.      Objective #A     Status:Completed 5/30/24    Patient will Increase interest, engagement, and pleasure in doing things.    Intervention(s)  Therapist will teach emotional regulation skills.   .    Objective #B  Patient will Feel less tired and more energy during the day .    Status: Continued 9/19/24    Intervention(s)  Therapist will teach about healthy boundaries.   .    Objective #C  Patient will Decrease frequency and intensity of feeling down, depressed, hopeless.  Status: Continued 9/19/24    Intervention(s)  Therapist will teach distraction skills.   .      Patient has reviewed and agreed to the above plan.      Yelitza Schuster, [facility]  October 26, 2023

## 2024-12-05 ENCOUNTER — VIRTUAL VISIT (OUTPATIENT)
Dept: PSYCHOLOGY | Facility: CLINIC | Age: 64
End: 2024-12-05
Payer: COMMERCIAL

## 2024-12-05 DIAGNOSIS — F41.1 GAD (GENERALIZED ANXIETY DISORDER): ICD-10-CM

## 2024-12-05 DIAGNOSIS — F33.1 MODERATE EPISODE OF RECURRENT MAJOR DEPRESSIVE DISORDER (H): Primary | ICD-10-CM

## 2024-12-06 NOTE — PROGRESS NOTES
[facility]                                     Progress Note    Patient Name: Karlee Emery  Date: 24         Service Type: Individual      Session Start Time: 203 pm  Session End Time: 258 pm     Session Length: 55 minutes    Session #: 46  Attendees: Client attended alone    Service Modality:  Video Visit:      Provider verified identity through the following two step process.  Patient provided:  Patient  and Patient address    Telemedicine Visit: The patient's condition can be safely assessed and treated via synchronous audio and visual telemedicine encounter.      Reason for Telemedicine Visit: Services only offered telehealth    Originating Site (Patient Location): Patient's home    Distant Site (Provider Location): Provider Remote Setting- Home Office    Consent:  The patient/guardian has verbally consented to: the potential risks and benefits of telemedicine (video visit) versus in person care; bill my insurance or make self-payment for services provided; and responsibility for payment of non-covered services.     Patient would like the video invitation sent by:  My Chart    Mode of Communication:  Video Conference via Amwell    Distant Location (Provider):  Off-site    As the provider I attest to compliance with applicable laws and regulations related to telemedicine.    DATA  Interactive Complexity: No  Crisis: No        Progress Since Last Session (Related to Symptoms / Goals / Homework):   Symptoms: Improving    Homework: Partially completed      Episode of Care Goals: Minimal progress - ACTION (Actively working towards change); Intervened by reinforcing change plan / affirming steps taken     Current / Ongoing Stressors and Concerns:   Grief and loss, stress of the future     Treatment Objective(s) Addressed in This Session:   Identify negative self-talk and behaviors: challenge core beliefs, myths, and actions       Intervention:   DBT - GIVE and FAST    Assessments  completed prior to visit:  The following assessments were completed by patient for this visit:  PHQ9:       10/10/2024     1:14 PM 10/16/2024     2:43 PM 10/24/2024     1:51 PM 10/31/2024     1:31 PM 11/7/2024    11:01 AM 11/21/2024     1:52 PM 12/5/2024     1:39 PM   PHQ-9 SCORE   PHQ-9 Total Score MyChart 7 (Mild depression) 7 (Mild depression) 9 (Mild depression) 7 (Mild depression) 7 (Mild depression) 7 (Mild depression) 7 (Mild depression)   PHQ-9 Total Score 7 7 9  7  7  7  7        Patient-reported     GAD7:       12/29/2022    12:24 PM 10/5/2023    11:38 AM 12/21/2023     1:49 PM 5/9/2024     1:07 PM 9/12/2024     1:08 PM 9/26/2024    12:45 PM 10/24/2024     1:53 PM   DANIEL-7 SCORE   Total Score 4 (minimal anxiety) 4 (minimal anxiety) 6 (mild anxiety) 9 (mild anxiety) 6 (mild anxiety) 6 (mild anxiety) 6 (mild anxiety)   Total Score 4 4 6 9    9 6    6 6    6 6        Patient-reported    Multiple values from one day are sorted in reverse-chronological order          10/10/2023    12:59 PM 1/10/2024     4:52 PM 1/18/2024     1:52 PM   PROMIS-10 Scores Only   Global Mental Health Score 6 9 8    8   Global Physical Health Score 14 15 13    13   PROMIS TOTAL - SUBSCORES 20 24 21    21        ASSESSMENT: Current Emotional / Mental Status (status of significant symptoms):   Risk status (Self / Other harm or suicidal ideation)   Patient denies current fears or concerns for personal safety.   Patient denies current or recent suicidal ideation or behaviors.   Patient denies current or recent homicidal ideation or behaviors.   Patient denies current or recent self injurious behavior or ideation.   Patient denies other safety concerns.   Patient reports there has been no change in risk factors since their last session.     Patient reports there has been no change in protective factors since their last session.     A safety and risk management plan has been developed including: Patient consented to co-developed safety plan  on 10/12/23.  Safety and risk management plan was reviewed.   Patient agreed to use safety plan should any safety concerns arise.  A copy was made available to the patient.     Appearance:   Appropriate    Eye Contact:   Good    Psychomotor Behavior: Normal    Attitude:   Cooperative  Interested   Orientation:   All   Speech    Rate / Production: Normal     Volume:  Normal    Mood:    Normal   Affect:    Appropriate    Thought Content:  Clear    Thought Form:  Coherent  Logical    Insight:    Good      Medication Review:   Changes to psychiatric medications, see updated Medication List in EPIC.      Medication Compliance:   Yes     Changes in Health Issues:   None reported     Chemical Use Review:   Substance Use: Chemical use reviewed, no active concerns identified      Tobacco Use: No current tobacco use.      Diagnosis:  1. Moderate episode of recurrent major depressive disorder (H)    2. DANIEL (generalized anxiety disorder)                  Collateral Reports Completed:   Not Applicable    PLAN: (Patient Tasks / Therapist Tasks / Other)  Patient to utilize breath work and grounding techniques until next session.      Yelitza Schuster, [facility]                                            Individual Treatment Plan    Patient's Name: Karlee Emery  YOB: 1960    Date of Creation: 10/26/23  Date Treatment Plan Last Reviewed/Revised:9/19/24    DSM5 Diagnoses: 296.32 (F33.1) Major Depressive Disorder, Recurrent Episode, Moderate _ and With anxious distress or 300.02 (F41.1) Generalized Anxiety Disorder  Psychosocial / Contextual Factors: Familial conflict  PROMIS (reviewed every 90 days): completed 1/18/24    Referral / Collaboration:  Referral to another professional/service is not indicated at this time..    Anticipated number of session for this episode of care: 9-12 sessions  Anticipation frequency of session: Weekly  Anticipated Duration of each session: 38-52 minutes  Treatment plan will be reviewed in  90 days or when goals have been changed.       MeasurableTreatment Goal(s) related to diagnosis / functional impairment(s)  Goal 1: Patient will develop and implement strategies related to anxiety.    I will know I've met my goal when I will be able to regulate myself.      Objective #A (Patient Action)    Patient will identify 4 fears / thoughts that contribute to feeling anxious.  Status: Completed 1/25/24    Intervention(s)  Therapist will teach emotional recognition/identification.   .    Objective #B  Patient will use relaxation strategies 2 times per day to reduce the physical symptoms of anxiety.  Status: Continued 9/19/24    Intervention(s)  Therapist will teach relaxation strategies and mindfulness .    Objective #C  Patient will attend and participate in social or recreational activities   .  Status: Continued 9/19/24    Intervention(s)  Therapist will teach skills related to engagement .      Goal 2: Patient will develop and implement strategies related to depression.    I will know I've met my goal when I feel better about myself.      Objective #A     Status:Completed 5/30/24    Patient will Increase interest, engagement, and pleasure in doing things.    Intervention(s)  Therapist will teach emotional regulation skills.   .    Objective #B  Patient will Feel less tired and more energy during the day .    Status: Continued 9/19/24    Intervention(s)  Therapist will teach about healthy boundaries.   .    Objective #C  Patient will Decrease frequency and intensity of feeling down, depressed, hopeless.  Status: Continued 9/19/24    Intervention(s)  Therapist will teach distraction skills.   .      Patient has reviewed and agreed to the above plan.      Yelitza Schuster, [facility]  October 26, 2023

## 2024-12-17 NOTE — TELEPHONE ENCOUNTER
"FUTURE VISIT INFORMATION      FUTURE VISIT INFORMATION:  Date: 3/12/25  Time: 1:30 PM  Location: [facility]   REFERRAL INFORMATION:  Referring provider:  Veronica Solis MD  Referring providers clinic:  [facility]   Reason for visit/diagnosis:  DX H93.11 (ICD-10-CM) - Tinnitus, right  REF'D by Veronica Solis MD  SCHEDULED W/ PT  CSC verified     RECORDS REQUESTED FROM      Clinic name Comments Records Status Imaging Status   [facility]  11/4/24 referral/ note- Veronica Solis MD Epic            \"Please notify/message CSS if patient completed outside imaging prior to scheduled appointment and/or any outside records that might have been missed at pre visit -Thanks\"  "

## 2025-01-13 ENCOUNTER — E-VISIT (OUTPATIENT)
Dept: URGENT CARE | Facility: CLINIC | Age: 65
End: 2025-01-13
Payer: COMMERCIAL

## 2025-01-13 DIAGNOSIS — L30.9 DERMATITIS: Primary | ICD-10-CM

## 2025-01-14 RX ORDER — TRIAMCINOLONE ACETONIDE 1 MG/G
OINTMENT TOPICAL 2 TIMES DAILY
Qty: 80 G | Refills: 1 | Status: SHIPPED | OUTPATIENT
Start: 2025-01-14

## 2025-01-16 ENCOUNTER — VIRTUAL VISIT (OUTPATIENT)
Dept: PSYCHOLOGY | Facility: CLINIC | Age: 65
End: 2025-01-16
Payer: COMMERCIAL

## 2025-01-16 DIAGNOSIS — F33.1 MODERATE EPISODE OF RECURRENT MAJOR DEPRESSIVE DISORDER (H): Primary | ICD-10-CM

## 2025-01-16 DIAGNOSIS — F41.1 GAD (GENERALIZED ANXIETY DISORDER): ICD-10-CM

## 2025-01-16 NOTE — PROGRESS NOTES
[facility]                                     Progress Note    Patient Name: Karlee Emery  Date: 25         Service Type: Individual      Session Start Time: 200 pm  Session End Time: 255 pm     Session Length: 55 minutes    Session #: 49  Attendees: Client attended alone    Service Modality:  Video Visit:      Provider verified identity through the following two step process.  Patient provided:  Patient  and Patient address    Telemedicine Visit: The patient's condition can be safely assessed and treated via synchronous audio and visual telemedicine encounter.      Reason for Telemedicine Visit: Services only offered telehealth    Originating Site (Patient Location): Patient's home    Distant Site (Provider Location): Provider Remote Setting- Home Office    Consent:  The patient/guardian has verbally consented to: the potential risks and benefits of telemedicine (video visit) versus in person care; bill my insurance or make self-payment for services provided; and responsibility for payment of non-covered services.     Patient would like the video invitation sent by:  My Chart    Mode of Communication:  Video Conference via Amwell    Distant Location (Provider):  Off-site    As the provider I attest to compliance with applicable laws and regulations related to telemedicine.    DATA  Interactive Complexity: Yes, visit entailed Interactive Complexity evidenced by:  -The need to manage maladaptive communication (related to, e.g., high anxiety, high reactivity, repeated questions, or disagreement) among participants that complicates delivery of care  Crisis: No        Progress Since Last Session (Related to Symptoms / Goals / Homework):   Symptoms: Improving    Homework: Partially completed      Episode of Care Goals: Satisfactory progress - ACTION (Actively working towards change); Intervened by reinforcing change plan / affirming steps taken     Current / Ongoing Stressors and  Concerns:   Lack of sleep     Treatment Objective(s) Addressed in This Session:   Identify negative self-talk and behaviors: challenge core beliefs, myths, and actions       Intervention:   DBT: GIVE and FAST    Assessments completed prior to visit:  The following assessments were completed by patient for this visit:  PHQ9:       10/16/2024     2:43 PM 10/24/2024     1:51 PM 10/31/2024     1:31 PM 11/7/2024    11:01 AM 11/21/2024     1:52 PM 12/5/2024     1:39 PM 12/19/2024     1:42 PM   PHQ-9 SCORE   PHQ-9 Total Score MyChart 7 (Mild depression) 9 (Mild depression) 7 (Mild depression) 7 (Mild depression) 7 (Mild depression) 7 (Mild depression) 7 (Mild depression)   PHQ-9 Total Score 7 9  7  7  7  7  7        Patient-reported     GAD7:       12/29/2022    12:24 PM 10/5/2023    11:38 AM 12/21/2023     1:49 PM 5/9/2024     1:07 PM 9/12/2024     1:08 PM 9/26/2024    12:45 PM 10/24/2024     1:53 PM   DANIEL-7 SCORE   Total Score 4 (minimal anxiety) 4 (minimal anxiety) 6 (mild anxiety) 9 (mild anxiety) 6 (mild anxiety) 6 (mild anxiety) 6 (mild anxiety)   Total Score 4 4 6 9    9 6    6 6    6 6        Patient-reported          10/10/2023    12:59 PM 1/10/2024     4:52 PM 1/18/2024     1:52 PM   PROMIS-10 Scores Only   Global Mental Health Score 6 9 8    8   Global Physical Health Score 14 15 13    13   PROMIS TOTAL - SUBSCORES 20 24 21    21        ASSESSMENT: Current Emotional / Mental Status (status of significant symptoms):   Risk status (Self / Other harm or suicidal ideation)   Patient denies current fears or concerns for personal safety.   Patient denies current or recent suicidal ideation or behaviors.   Patient denies current or recent homicidal ideation or behaviors.   Patient denies current or recent self injurious behavior or ideation.   Patient denies other safety concerns.   Patient reports there has been no change in risk factors since their last session.     Patient reports there has been no change in  protective factors since their last session.     A safety and risk management plan has been developed including: Patient consented to co-developed safety plan on 10/12/23.  Safety and risk management plan was reviewed.   Patient agreed to use safety plan should any safety concerns arise.  A copy was made available to the patient.     Appearance:   Appropriate    Eye Contact:   Good    Psychomotor Behavior: Normal    Attitude:   Cooperative  Interested   Orientation:   All   Speech    Rate / Production: Normal     Volume:  Normal    Mood:    Normal   Affect:    Appropriate    Thought Content:  Clear    Thought Form:  Coherent  Logical    Insight:    Good      Medication Review:   No changes to current psychiatric medication(s)     Medication Compliance:   Yes     Changes in Health Issues:   None reported     Chemical Use Review:   Substance Use: Chemical use reviewed, no active concerns identified      Tobacco Use: No current tobacco use.      Diagnosis:  1. Moderate episode of recurrent major depressive disorder (H)    2. DANIEL (generalized anxiety disorder)                      Collateral Reports Completed:   Not Applicable    PLAN: (Patient Tasks / Therapist Tasks / Other)  Patient to reflect on needs in relationships until next session.      Yelitza Schuster, [facility]                                            Individual Treatment Plan    Patient's Name: Karlee Emery  YOB: 1960    Date of Creation: 10/26/23  Date Treatment Plan Last Reviewed/Revised:9/19/24, 1/9/2025    DSM5 Diagnoses: 296.32 (F33.1) Major Depressive Disorder, Recurrent Episode, Moderate _ and With anxious distress or 300.02 (F41.1) Generalized Anxiety Disorder  Psychosocial / Contextual Factors: Familial conflict  PROMIS (reviewed every 90 days): completed 1/18/24    Referral / Collaboration:  Referral to another professional/service is not indicated at this time..    Anticipated number of session for this episode of care: 9-12  sessions  Anticipation frequency of session: Weekly  Anticipated Duration of each session: 38-52 minutes  Treatment plan will be reviewed in 90 days or when goals have been changed.       MeasurableTreatment Goal(s) related to diagnosis / functional impairment(s)  Goal 1: Patient will develop and implement strategies related to anxiety.    I will know I've met my goal when I will be able to regulate myself.      Objective #A (Patient Action)    Patient will identify 4 fears / thoughts that contribute to feeling anxious.  Status: Completed 1/25/24    Intervention(s)  Therapist will teach emotional recognition/identification.   .    Objective #B  Patient will use relaxation strategies 2 times per day to reduce the physical symptoms of anxiety.  Status: Continued 1/9/25    Intervention(s)  Therapist will teach relaxation strategies and mindfulness .    Objective #C  Patient will attend and participate in social or recreational activities   .  Status: Continued 1/9/25    Intervention(s)  Therapist will teach skills related to engagement .      Goal 2: Patient will develop and implement strategies related to depression.    I will know I've met my goal when I feel better about myself.      Objective #A     Status:Completed 5/30/24    Patient will Increase interest, engagement, and pleasure in doing things.    Intervention(s)  Therapist will teach emotional regulation skills.   .    Objective #B  Patient will Feel less tired and more energy during the day .    Status: Continued 1/9/25    Intervention(s)  Therapist will teach about healthy boundaries.   .    Objective #C  Patient will Decrease frequency and intensity of feeling down, depressed, hopeless.  Status: Continued 1/9/25    Intervention(s)  Therapist will teach distraction skills.   .      Patient has reviewed and agreed to the above plan.      Yelitza Schuster, [facility]  October 26, 2023

## 2025-01-30 ENCOUNTER — VIRTUAL VISIT (OUTPATIENT)
Dept: PSYCHOLOGY | Facility: CLINIC | Age: 65
End: 2025-01-30
Payer: COMMERCIAL

## 2025-01-30 DIAGNOSIS — F41.1 GAD (GENERALIZED ANXIETY DISORDER): ICD-10-CM

## 2025-01-30 DIAGNOSIS — F33.1 MODERATE EPISODE OF RECURRENT MAJOR DEPRESSIVE DISORDER (H): Primary | ICD-10-CM

## 2025-01-30 NOTE — PROGRESS NOTES
[facility]                                     Progress Note    Patient Name: Karlee Emery  Date: 25         Service Type: Individual      Session Start Time: 200 pm  Session End Time: 253 pm     Session Length: 53 minutes    Session #: 50  Attendees: Client attended alone    Service Modality:  Video Visit:      Provider verified identity through the following two step process.  Patient provided:  Patient  and Patient address    Telemedicine Visit: The patient's condition can be safely assessed and treated via synchronous audio and visual telemedicine encounter.      Reason for Telemedicine Visit: Services only offered telehealth    Originating Site (Patient Location): Patient's home    Distant Site (Provider Location): Provider Remote Setting- Home Office    Consent:  The patient/guardian has verbally consented to: the potential risks and benefits of telemedicine (video visit) versus in person care; bill my insurance or make self-payment for services provided; and responsibility for payment of non-covered services.     Patient would like the video invitation sent by:  My Chart    Mode of Communication:  Video Conference via Amwell    Distant Location (Provider):  Off-site    As the provider I attest to compliance with applicable laws and regulations related to telemedicine.    DATA  Interactive Complexity: No  Crisis: No  Extended Session (53+ minutes): PROLONGED SERVICE IN THE OUTPATIENT SETTING REQUIRING DIRECT (FACE-TO-FACE) PATIENT CONTACT BEYOND THE USUAL SERVICE:    - Patient's presenting concerns require more intensive intervention than could be completed within the usual service        Progress Since Last Session (Related to Symptoms / Goals / Homework):   Symptoms: Improving    Homework: Partially completed      Episode of Care Goals: Satisfactory progress - ACTION (Actively working towards change); Intervened by reinforcing change plan / affirming steps taken     Current  / Ongoing Stressors and Concerns:   Current political climate, relationship boundaries     Treatment Objective(s) Addressed in This Session:   Identify negative self-talk and behaviors: challenge core beliefs, myths, and actions       Intervention:   DBT: GIVE and FAST    Assessments completed prior to visit:  The following assessments were completed by patient for this visit:  PHQ9:       10/24/2024     1:51 PM 10/31/2024     1:31 PM 11/7/2024    11:01 AM 11/21/2024     1:52 PM 12/5/2024     1:39 PM 12/19/2024     1:42 PM 1/30/2025     1:44 PM   PHQ-9 SCORE   PHQ-9 Total Score MyChart 9 (Mild depression) 7 (Mild depression) 7 (Mild depression) 7 (Mild depression) 7 (Mild depression) 7 (Mild depression) 7 (Mild depression)   PHQ-9 Total Score 9  7  7  7  7  7  7        Patient-reported     GAD7:       12/29/2022    12:24 PM 10/5/2023    11:38 AM 12/21/2023     1:49 PM 5/9/2024     1:07 PM 9/12/2024     1:08 PM 9/26/2024    12:45 PM 10/24/2024     1:53 PM   DANIEL-7 SCORE   Total Score 4 (minimal anxiety) 4 (minimal anxiety) 6 (mild anxiety) 9 (mild anxiety) 6 (mild anxiety) 6 (mild anxiety) 6 (mild anxiety)   Total Score 4 4 6 9    9 6    6 6    6 6        Patient-reported          10/10/2023    12:59 PM 1/10/2024     4:52 PM 1/18/2024     1:52 PM   PROMIS-10 Scores Only   Global Mental Health Score 6 9 8    8   Global Physical Health Score 14 15 13    13   PROMIS TOTAL - SUBSCORES 20 24 21    21        ASSESSMENT: Current Emotional / Mental Status (status of significant symptoms):   Risk status (Self / Other harm or suicidal ideation)   Patient denies current fears or concerns for personal safety.   Patient denies current or recent suicidal ideation or behaviors.   Patient denies current or recent homicidal ideation or behaviors.   Patient denies current or recent self injurious behavior or ideation.   Patient denies other safety concerns.   Patient reports there has been no change in risk factors since their last  session.     Patient reports there has been no change in protective factors since their last session.     A safety and risk management plan has been developed including: Patient consented to co-developed safety plan on 10/12/23.  Safety and risk management plan was reviewed.   Patient agreed to use safety plan should any safety concerns arise.  A copy was made available to the patient.     Appearance:   Appropriate    Eye Contact:   Good    Psychomotor Behavior: Normal    Attitude:   Cooperative  Interested   Orientation:   All   Speech    Rate / Production: Normal     Volume:  Normal    Mood:    Normal   Affect:    Appropriate    Thought Content:  Clear    Thought Form:  Coherent  Logical    Insight:    Good      Medication Review:   No changes to current psychiatric medication(s)     Medication Compliance:   Yes     Changes in Health Issues:   None reported     Chemical Use Review:   Substance Use: Chemical use reviewed, no active concerns identified      Tobacco Use: No current tobacco use.      Diagnosis:  1. Moderate episode of recurrent major depressive disorder (H)    2. DANIEL (generalized anxiety disorder)                        Collateral Reports Completed:   Not Applicable    PLAN: (Patient Tasks / Therapist Tasks / Other)  Patient to continue to engage in movement and do things that bring harsh until next session.      Yelitza Schuster, [facility]                                            Individual Treatment Plan    Patient's Name: Karlee Emery  YOB: 1960    Date of Creation: 10/26/23  Date Treatment Plan Last Reviewed/Revised:9/19/24, 1/9/2025    DSM5 Diagnoses: 296.32 (F33.1) Major Depressive Disorder, Recurrent Episode, Moderate _ and With anxious distress or 300.02 (F41.1) Generalized Anxiety Disorder  Psychosocial / Contextual Factors: Familial conflict  PROMIS (reviewed every 90 days): completed 1/18/24    Referral / Collaboration:  Referral to another professional/service is not  indicated at this time..    Anticipated number of session for this episode of care: 9-12 sessions  Anticipation frequency of session: Weekly  Anticipated Duration of each session: 38-52 minutes  Treatment plan will be reviewed in 90 days or when goals have been changed.       MeasurableTreatment Goal(s) related to diagnosis / functional impairment(s)  Goal 1: Patient will develop and implement strategies related to anxiety.    I will know I've met my goal when I will be able to regulate myself.      Objective #A (Patient Action)    Patient will identify 4 fears / thoughts that contribute to feeling anxious.  Status: Completed 1/25/24    Intervention(s)  Therapist will teach emotional recognition/identification.   .    Objective #B  Patient will use relaxation strategies 2 times per day to reduce the physical symptoms of anxiety.  Status: Continued 1/9/25    Intervention(s)  Therapist will teach relaxation strategies and mindfulness .    Objective #C  Patient will attend and participate in social or recreational activities   .  Status: Continued 1/9/25    Intervention(s)  Therapist will teach skills related to engagement .      Goal 2: Patient will develop and implement strategies related to depression.    I will know I've met my goal when I feel better about myself.      Objective #A     Status:Completed 5/30/24    Patient will Increase interest, engagement, and pleasure in doing things.    Intervention(s)  Therapist will teach emotional regulation skills.   .    Objective #B  Patient will Feel less tired and more energy during the day .    Status: Continued 1/9/25    Intervention(s)  Therapist will teach about healthy boundaries.   .    Objective #C  Patient will Decrease frequency and intensity of feeling down, depressed, hopeless.  Status: Continued 1/9/25    Intervention(s)  Therapist will teach distraction skills.   .      Patient has reviewed and agreed to the above plan.      Yelitza Schuster, [facility]  October 26,  2023                                                        Answers submitted by the patient for this visit:  Patient Health Questionnaire (Submitted on 1/30/2025)  If you checked off any problems, how difficult have these problems made it for you to do your work, take care of things at home, or get along with other people?: Somewhat difficult  PHQ9 TOTAL SCORE: 7

## 2025-02-27 ENCOUNTER — VIRTUAL VISIT (OUTPATIENT)
Dept: PSYCHOLOGY | Facility: CLINIC | Age: 65
End: 2025-02-27
Payer: COMMERCIAL

## 2025-02-27 DIAGNOSIS — F41.1 GAD (GENERALIZED ANXIETY DISORDER): ICD-10-CM

## 2025-02-27 DIAGNOSIS — F33.1 MODERATE EPISODE OF RECURRENT MAJOR DEPRESSIVE DISORDER (H): Primary | ICD-10-CM

## 2025-02-27 NOTE — PROGRESS NOTES
[facility]                                     Progress Note    Patient Name: Karlee Emery  Date: 25         Service Type: Individual      Session Start Time: 200 pm  Session End Time: 253 pm     Session Length: 53 minutes    Session #: 52  Attendees: Client attended alone    Service Modality:  Video Visit:      Provider verified identity through the following two step process.  Patient provided:  Patient  and Patient address    Telemedicine Visit: The patient's condition can be safely assessed and treated via synchronous audio and visual telemedicine encounter.      Reason for Telemedicine Visit: Services only offered telehealth    Originating Site (Patient Location): Patient's home    Distant Site (Provider Location): Provider Remote Setting- Home Office    Consent:  The patient/guardian has verbally consented to: the potential risks and benefits of telemedicine (video visit) versus in person care; bill my insurance or make self-payment for services provided; and responsibility for payment of non-covered services.     Patient would like the video invitation sent by:  My Chart    Mode of Communication:  Video Conference via Amwell    Distant Location (Provider):  Off-site    As the provider I attest to compliance with applicable laws and regulations related to telemedicine.    DATA  Interactive Complexity: No  Crisis: No  Extended Session (53+ minutes): PROLONGED SERVICE IN THE OUTPATIENT SETTING REQUIRING DIRECT (FACE-TO-FACE) PATIENT CONTACT BEYOND THE USUAL SERVICE:    - Patient's presenting concerns require more intensive intervention than could be completed within the usual service        Progress Since Last Session (Related to Symptoms / Goals / Homework):   Symptoms: Improving    Homework: Partially completed      Episode of Care Goals: Satisfactory progress - ACTION (Actively working towards change); Intervened by reinforcing change plan / affirming steps taken     Current  / Ongoing Stressors and Concerns:   Political climate, ongoing tension within family     Treatment Objective(s) Addressed in This Session:   Identify negative self-talk and behaviors: challenge core beliefs, myths, and actions       Intervention:   DBT: GIVE and FAST - boundaries with son    Assessments completed prior to visit:  The following assessments were completed by patient for this visit:  PHQ9:       10/31/2024     1:31 PM 11/7/2024    11:01 AM 11/21/2024     1:52 PM 12/5/2024     1:39 PM 12/19/2024     1:42 PM 1/30/2025     1:44 PM 2/27/2025     1:39 PM   PHQ-9 SCORE   PHQ-9 Total Score MyChart 7 (Mild depression) 7 (Mild depression) 7 (Mild depression) 7 (Mild depression) 7 (Mild depression) 7 (Mild depression) 7 (Mild depression)   PHQ-9 Total Score 7  7  7  7  7  7  7        Patient-reported     GAD7:       12/29/2022    12:24 PM 10/5/2023    11:38 AM 12/21/2023     1:49 PM 5/9/2024     1:07 PM 9/12/2024     1:08 PM 9/26/2024    12:45 PM 10/24/2024     1:53 PM   DANIEL-7 SCORE   Total Score 4 (minimal anxiety) 4 (minimal anxiety) 6 (mild anxiety) 9 (mild anxiety) 6 (mild anxiety) 6 (mild anxiety) 6 (mild anxiety)   Total Score 4 4 6 9    9 6    6 6    6 6        Patient-reported          10/10/2023    12:59 PM 1/10/2024     4:52 PM 1/18/2024     1:52 PM   PROMIS-10 Scores Only   Global Mental Health Score 6 9 8    8   Global Physical Health Score 14 15 13    13   PROMIS TOTAL - SUBSCORES 20 24 21    21        ASSESSMENT: Current Emotional / Mental Status (status of significant symptoms):   Risk status (Self / Other harm or suicidal ideation)   Patient denies current fears or concerns for personal safety.   Patient denies current or recent suicidal ideation or behaviors.   Patient denies current or recent homicidal ideation or behaviors.   Patient denies current or recent self injurious behavior or ideation.   Patient denies other safety concerns.   Patient reports there has been no change in risk factors  since their last session.     Patient reports there has been no change in protective factors since their last session.     A safety and risk management plan has been developed including: Patient consented to co-developed safety plan on 10/12/23.  Safety and risk management plan was reviewed.   Patient agreed to use safety plan should any safety concerns arise.  A copy was made available to the patient.     Appearance:   Appropriate    Eye Contact:   Good    Psychomotor Behavior: Normal    Attitude:   Cooperative  Interested   Orientation:   All   Speech    Rate / Production: Normal/ Responsive Normal     Volume:  Normal    Mood:    Normal   Affect:    Appropriate    Thought Content:  Clear    Thought Form:  Coherent    Insight:    Good      Medication Review:   No changes to current psychiatric medication(s)     Medication Compliance:   Yes     Changes in Health Issues:   None reported     Chemical Use Review:   Substance Use: Chemical use reviewed, no active concerns identified      Tobacco Use: No current tobacco use.      Diagnosis:  1. Moderate episode of recurrent major depressive disorder (H)    2. DANIEL (generalized anxiety disorder)          Collateral Reports Completed:   Not Applicable    PLAN: (Patient Tasks / Therapist Tasks / Other)  Patient to have family meeting to discuss boundaries until next session.      Yelitza Schuster, [facility]                                            Individual Treatment Plan    Patient's Name: Karlee Emery  YOB: 1960    Date of Creation: 10/26/23  Date Treatment Plan Last Reviewed/Revised:9/19/24, 1/9/2025    DSM5 Diagnoses: 296.32 (F33.1) Major Depressive Disorder, Recurrent Episode, Moderate _ and With anxious distress or 300.02 (F41.1) Generalized Anxiety Disorder  Psychosocial / Contextual Factors: Familial conflict  PROMIS (reviewed every 90 days): completed 1/18/24    Referral / Collaboration:  Referral to another professional/service is not indicated at  this time..    Anticipated number of session for this episode of care: 9-12 sessions  Anticipation frequency of session: Weekly  Anticipated Duration of each session: 38-52 minutes  Treatment plan will be reviewed in 90 days or when goals have been changed.       MeasurableTreatment Goal(s) related to diagnosis / functional impairment(s)  Goal 1: Patient will develop and implement strategies related to anxiety.    I will know I've met my goal when I will be able to regulate myself.      Objective #A (Patient Action)    Patient will identify 4 fears / thoughts that contribute to feeling anxious.  Status: Completed 1/25/24    Intervention(s)  Therapist will teach emotional recognition/identification.   .    Objective #B  Patient will use relaxation strategies 2 times per day to reduce the physical symptoms of anxiety.  Status: Continued 1/9/25    Intervention(s)  Therapist will teach relaxation strategies and mindfulness .    Objective #C  Patient will attend and participate in social or recreational activities   .  Status: Continued 1/9/25    Intervention(s)  Therapist will teach skills related to engagement .      Goal 2: Patient will develop and implement strategies related to depression.    I will know I've met my goal when I feel better about myself.      Objective #A     Status:Completed 5/30/24    Patient will Increase interest, engagement, and pleasure in doing things.    Intervention(s)  Therapist will teach emotional regulation skills.   .    Objective #B  Patient will Feel less tired and more energy during the day .    Status: Continued 1/9/25    Intervention(s)  Therapist will teach about healthy boundaries.   .    Objective #C  Patient will Decrease frequency and intensity of feeling down, depressed, hopeless.  Status: Continued 1/9/25    Intervention(s)  Therapist will teach distraction skills.   .      Patient has reviewed and agreed to the above plan.      Yelitza Schuster [facility]  October 26, 2023                                                         Answers submitted by the patient for this visit:  Patient Health Questionnaire (Submitted on 1/30/2025)  If you checked off any problems, how difficult have these problems made it for you to do your work, take care of things at home, or get along with other people?: Somewhat difficult  PHQ9 TOTAL SCORE: 7

## 2025-03-12 ENCOUNTER — OFFICE VISIT (OUTPATIENT)
Dept: OTOLARYNGOLOGY | Facility: CLINIC | Age: 65
End: 2025-03-12
Payer: COMMERCIAL

## 2025-03-12 ENCOUNTER — PRE VISIT (OUTPATIENT)
Dept: OTOLARYNGOLOGY | Facility: CLINIC | Age: 65
End: 2025-03-12

## 2025-03-12 ENCOUNTER — OFFICE VISIT (OUTPATIENT)
Dept: AUDIOLOGY | Facility: CLINIC | Age: 65
End: 2025-03-12
Payer: COMMERCIAL

## 2025-03-12 VITALS
TEMPERATURE: 97.7 F | BODY MASS INDEX: 31.53 KG/M2 | DIASTOLIC BLOOD PRESSURE: 74 MMHG | SYSTOLIC BLOOD PRESSURE: 117 MMHG | WEIGHT: 167 LBS | HEIGHT: 61 IN | OXYGEN SATURATION: 96 %

## 2025-03-12 DIAGNOSIS — H93.11 TINNITUS, RIGHT: ICD-10-CM

## 2025-03-12 DIAGNOSIS — Z01.10 ENCOUNTER FOR HEARING TEST: Primary | ICD-10-CM

## 2025-03-12 DIAGNOSIS — H93.11 TINNITUS OF RIGHT EAR: Primary | ICD-10-CM

## 2025-03-12 DIAGNOSIS — H90.3 ASYMMETRICAL SENSORINEURAL HEARING LOSS: Primary | ICD-10-CM

## 2025-03-12 DIAGNOSIS — H90.3 ASYMMETRIC SNHL (SENSORINEURAL HEARING LOSS): ICD-10-CM

## 2025-03-12 ASSESSMENT — PAIN SCALES - GENERAL: PAINLEVEL_OUTOF10: NO PAIN (0)

## 2025-03-12 NOTE — PROGRESS NOTES
Otolaryngology Clinic  March 12, 2025    Chief Complaint:   Right tinnitus       History of Present Illness:   Karlee Emery is a 64 year old female who presents today for evaluation of right sided tinnitus.  Patient reports first noticing tinnitus approximately 6 months ago.  At first, this was intermittent but tinnitus now is constant.  Only hears tinnitus on the right side.  Reports a constant humming/static sound.  Denies any changes in hearing.  Patient does report occasional dizziness when laying down and rolling over in bed.  Patient denies any aural fullness, pain, drainage, history of ear infections or history of any ear surgeries.  Patient does have a history of noise exposure working around loud tools.  Patient now wears hearing protection but did not always wear hearing protection in the past.  Denies family history of hearing loss.    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 Re-referred to urology.     JAMES (obstructive sleep apnea) 09/03/2019    Other  abnormal glucose     Perennial allergic rhinitis 06/29/2020    Tinnitus        Past Surgical History:  Past Surgical History:   Procedure Laterality Date    ABDOMEN SURGERY      appenidix    EXCISE MASS LOWER EXTREMITY  3/28/2012    Procedure:EXCISE MASS LOWER EXTREMITY; Left Ankle Mass Excision ; Surgeon:KATTY HENSLEY; Location:[facility]    SLING BLADDER SUSPENSION WITH FASCIA ROSALEE      [facility] APPENDECTOMY  1997    [facility] TMJ RECONSTRUCTION  1986       Medications:  Current Outpatient Medications   Medication Sig Dispense Refill    albuterol (PROAIR HFA/PROVENTIL HFA/VENTOLIN HFA) 108 (90 Base) MCG/ACT inhaler Inhale 2 puffs into the lungs every 6 hours as needed for shortness of breath, wheezing or cough 18 g 0    atorvastatin (LIPITOR) 40 MG tablet Take 1 tablet (40 mg) by mouth daily. 90 tablet 3    blood glucose (ACCU-CHEK GUIDE) test strip 1 strip by In Vitro route daily (Patient not taking: Reported on 11/4/2024) 100 each 0    blood glucose (NO BRAND SPECIFIED) lancets standard Use to test blood sugar 1 time daily or as directed. (Patient not taking: Reported on 11/4/2024) 50 each 3    blood glucose monitoring (NO BRAND SPECIFIED) meter device kit Use to test blood sugar 1 time daily or as directed. Blood Glucose Monitor Brands: per insurance formulary. (Patient not taking: Reported on 11/4/2024) 1 kit 0    buPROPion (WELLBUTRIN XL) 300 MG 24 hr tablet Take 1 tablet (300 mg) by mouth every morning. 90 tablet 3    loratadine (CLARITIN) 10 MG tablet Take 1 tablet by mouth daily. 90 tablet 0    metFORMIN (GLUCOPHAGE XR) 500 MG 24 hr tablet Take 2 tablets (1,000 mg) by mouth every morning. with food. 180 tablet 1    Semaglutide (RYBELSUS) 3 MG tablet Take 3 mg by mouth daily. 30 tablet 5    triamcinolone (KENALOG) 0.1 % external ointment Apply topically 2 times daily. 80 g 1    valACYclovir (VALTREX) 500 MG tablet Take 1 Tablet (500 mg) by mouth once daily. 90 tablet 3    venlafaxine (EFFEXOR XR) 150 MG 24 hr  capsule Take 1 Capsule by mouth once daily with a meal. 90 capsule 3       Allergies:  Allergies   Allergen Reactions    Sulfa Antibiotics Nausea and Vomiting and Hives        Social History:  Social History     Tobacco Use    Smoking status: Former     Current packs/day: 0.00     Types: Cigarettes     Quit date: 1980     Years since quittin.2    Smokeless tobacco: Never   Vaping Use    Vaping status: Never Used   Substance Use Topics    Alcohol use: Not Currently     Alcohol/week: 2.0 standard drinks of alcohol    Drug use: No       ROS: 10 point ROS neg other than the symptoms noted above in the HPI.    Physical Exam:    LMP 03/15/2013 (LMP Unknown)      Constitutional:  The patient was unaccompanied, well-groomed, and in no acute distress.     Skin: Normal:  warm and pink without rash    Neurologic: Alert and oriented x 3.  CN's III-XII within normal limits.  Voice normal.    Psychiatric: The patient's affect was calm, cooperative, and appropriate.     Communication:  Normal; communicates verbally, normal voice quality.    Respiratory: Breathing comfortably without stridor or exertion of accessory muscles.    Ears: Pinnae and tragus non-tender.  EAC's and TM's were clear.        Audiogram: 3/12/2025 - data independently reviewed  Right ear: Normal sloping to moderately severe sensorineural hearing loss (asymmetry with right worse than left throughout all frequencies)  Left ear: Normal sloping to moderate at 4 kHz rising to normal sensorineural hearing loss  % at 60 dB  Acoustic Reflexes: Present in all conditions  Tympanograms: type A bilaterally    Assessment and Plan:  1. Tinnitus, right  Patient with 6-month history of right tinnitus.  Audiogram today does show an asymmetric right sensorineural hearing loss.  Explained to patient that this asymmetric hearing loss may be contributing to tinnitus symptoms.  Recommend that patient proceed with an MRI to evaluate asymmetry.  Reviewed tinnitus  management strategies including tinnitus masking and cognitive behavioral approaches.  Patient could also consider a right hearing aid to treat hearing loss which may improve tinnitus symptoms.  Patient will consider this but is not overly bothered by tinnitus at this time.    Patient will proceed with an MRI.  Will update patient with results once available.  Recommend that patient return to clinic in 1 year for repeat audiogram.  Sooner for any new, sudden changes in hearing or worsening of tinnitus symptoms.    Rosio Arizmendi DNP, APRN, CNP  Otolaryngology  Head & Neck Surgery  [phone]    30 minutes spent by me on the date of the encounter doing chart review, history and exam, documentation and further activities per the note

## 2025-03-12 NOTE — LETTER
3/12/2025       RE: Karlee Emery  [address]  [facility] [address]     Dear Colleague,    Thank you for referring your patient, Karlee Emery, to the [facility] E[facility] NOSE AND [facility] [place] [facility]. Please see a copy of my visit note below.      Otolaryngology Clinic  March 12, 2025    Chief Complaint:   Right tinnitus       History of Present Illness:   Karlee Emery is a 64 year old female who presents today for evaluation of right sided tinnitus.  Patient reports first noticing tinnitus approximately 6 months ago.  At first, this was intermittent but tinnitus now is constant.  Only hears tinnitus on the right side.  Reports a constant humming/static sound.  Denies any changes in hearing.  Patient does report occasional dizziness when laying down and rolling over in bed.  Patient denies any aural fullness, pain, drainage, history of ear infections or history of any ear surgeries.  Patient does have a history of noise exposure working around loud tools.  Patient now wears hearing protection but did not always wear hearing protection in the past.  Denies family history of hearing loss.    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 Re-referred to urology.      JAMES (obstructive sleep apnea) 09/03/2019     Other abnormal glucose      Perennial allergic rhinitis 06/29/2020     Tinnitus        Past Surgical History:  Past Surgical History:   Procedure Laterality Date     ABDOMEN SURGERY      appenidix     EXCISE MASS LOWER EXTREMITY  3/28/2012    Procedure:EXCISE MASS LOWER EXTREMITY; Left Ankle Mass Excision ; Surgeon:KATTY HENSLEY; Location:[facility]     SLING BLADDER SUSPENSION WITH FASCIA ROSALEE       [facility] APPENDECTOMY  1997     [facility] TMJ RECONSTRUCTION  1986       Medications:  Current Outpatient Medications   Medication Sig Dispense Refill     albuterol (PROAIR HFA/PROVENTIL HFA/VENTOLIN HFA) 108 (90 Base) MCG/ACT inhaler Inhale 2 puffs into the lungs every 6 hours as needed for shortness of breath, wheezing or cough 18 g 0     atorvastatin (LIPITOR) 40 MG tablet Take 1 tablet (40 mg) by mouth daily. 90 tablet 3     blood glucose (ACCU-CHEK GUIDE) test strip 1 strip by In Vitro route daily (Patient not taking: Reported on 11/4/2024) 100 each 0     blood glucose (NO BRAND SPECIFIED) lancets standard Use to test blood sugar 1 time daily or as directed. (Patient not taking: Reported on 11/4/2024) 50 each 3     blood glucose monitoring (NO BRAND SPECIFIED) meter device kit Use to test blood sugar 1 time daily or as directed. Blood Glucose Monitor Brands: per insurance formulary. (Patient not taking: Reported on 11/4/2024) 1 kit 0     buPROPion (WELLBUTRIN XL) 300 MG 24 hr tablet Take 1 tablet (300 mg) by mouth every morning. 90 tablet 3     loratadine (CLARITIN) 10 MG tablet Take 1 tablet by mouth daily. 90 tablet 0     metFORMIN (GLUCOPHAGE XR) 500 MG 24 hr tablet Take 2  tablets (1,000 mg) by mouth every morning. with food. 180 tablet 1     Semaglutide (RYBELSUS) 3 MG tablet Take 3 mg by mouth daily. 30 tablet 5     triamcinolone (KENALOG) 0.1 % external ointment Apply topically 2 times daily. 80 g 1     valACYclovir (VALTREX) 500 MG tablet Take 1 Tablet (500 mg) by mouth once daily. 90 tablet 3     venlafaxine (EFFEXOR XR) 150 MG 24 hr capsule Take 1 Capsule by mouth once daily with a meal. 90 capsule 3       Allergies:  Allergies   Allergen Reactions     Sulfa Antibiotics Nausea and Vomiting and Hives        Social History:  Social History     Tobacco Use     Smoking status: Former     Current packs/day: 0.00     Types: Cigarettes     Quit date: 1980     Years since quittin.2     Smokeless tobacco: Never   Vaping Use     Vaping status: Never Used   Substance Use Topics     Alcohol use: Not Currently     Alcohol/week: 2.0 standard drinks of alcohol     Drug use: No       ROS: 10 point ROS neg other than the symptoms noted above in the HPI.    Physical Exam:    LMP 03/15/2013 (LMP Unknown)      Constitutional:  The patient was unaccompanied, well-groomed, and in no acute distress.     Skin: Normal:  warm and pink without rash    Neurologic: Alert and oriented x 3.  CN's III-XII within normal limits.  Voice normal.    Psychiatric: The patient's affect was calm, cooperative, and appropriate.     Communication:  Normal; communicates verbally, normal voice quality.    Respiratory: Breathing comfortably without stridor or exertion of accessory muscles.    Ears: Pinnae and tragus non-tender.  EAC's and TM's were clear.        Audiogram: 3/12/2025 - data independently reviewed  Right ear: Normal sloping to moderately severe sensorineural hearing loss (asymmetry with right worse than left throughout all frequencies)  Left ear: Normal sloping to moderate at 4 kHz rising to normal sensorineural hearing loss  % at 60 dB  Acoustic Reflexes: Present in all  conditions  Tympanograms: type A bilaterally    Assessment and Plan:  1. Tinnitus, right  Patient with 6-month history of right tinnitus.  Audiogram today does show an asymmetric right sensorineural hearing loss.  Explained to patient that this asymmetric hearing loss may be contributing to tinnitus symptoms.  Recommend that patient proceed with an MRI to evaluate asymmetry.  Reviewed tinnitus management strategies including tinnitus masking and cognitive behavioral approaches.  Patient could also consider a right hearing aid to treat hearing loss which may improve tinnitus symptoms.  Patient will consider this but is not overly bothered by tinnitus at this time.    Patient will proceed with an MRI.  Will update patient with results once available.  Recommend that patient return to clinic in 1 year for repeat audiogram.  Sooner for any new, sudden changes in hearing or worsening of tinnitus symptoms.    Rosio Arizmendi DNP, APRN, CNP  Otolaryngology  Head & Neck Surgery  [phone]    30 minutes spent by me on the date of the encounter doing chart review, history and exam, documentation and further activities per the note      Again, thank you for allowing me to participate in the care of your patient.      Sincerely,    Radha Arizmendi, NP

## 2025-03-12 NOTE — PROGRESS NOTES
AUDIOLOGY REPORT    SUMMARY: Audiology visit completed. See audiogram for results.      RECOMMENDATIONS: Follow-up with ENT.    Bridget Chambers.  Licensed Audiologist  [place] # [id number]

## 2025-03-12 NOTE — PATIENT INSTRUCTIONS
- You were seen in the ENT Clinic today by Rosio Arizmendi NP.   - Recommend MRI for asymmetric hearing loss and tinnitus.  - Can consider a hearing aid consult.  - Follow up in 1 year with audiogram.  - Please send a [employer] message or call the ENT clinic at [phone] with any questions or concerns.

## 2025-03-12 NOTE — NURSING NOTE
"Chief Complaint   Patient presents with    Consult     Tinnitus -right ear      Blood pressure 117/74, temperature 97.7  F (36.5  C), height 1.549 m (5' 1\"), weight 75.8 kg (167 lb), last menstrual period 03/15/2013, SpO2 96%, not currently breastfeeding.  Salomón Tay LPN    "

## 2025-03-12 NOTE — LETTER
Hearing Aid Medical Clearance    Karlee LANDON Emery  March 12, 2025        This patient has received a medical examination and may be considered a suitable candidate for a right hearing aid. Can also consider a left hearing aid. Please provide hearing aid with tinnitus masking features.        Provider:__________________________________________________     Rosio Arizmendi DNP, APRN, CNP.

## 2025-03-25 ENCOUNTER — ANCILLARY PROCEDURE (OUTPATIENT)
Dept: MRI IMAGING | Facility: CLINIC | Age: 65
End: 2025-03-25
Attending: REGISTERED NURSE
Payer: COMMERCIAL

## 2025-03-25 DIAGNOSIS — H93.11 TINNITUS, RIGHT: ICD-10-CM

## 2025-03-25 DIAGNOSIS — H90.3 ASYMMETRICAL SENSORINEURAL HEARING LOSS: ICD-10-CM

## 2025-03-25 PROCEDURE — 70553 MRI BRAIN STEM W/O & W/DYE: CPT

## 2025-03-25 PROCEDURE — A9585 GADOBUTROL INJECTION: HCPCS | Performed by: REGISTERED NURSE

## 2025-03-25 PROCEDURE — 255N000002 HC RX 255 OP 636: Performed by: REGISTERED NURSE

## 2025-03-25 RX ORDER — GADOBUTROL 604.72 MG/ML
7.5 INJECTION INTRAVENOUS ONCE
Status: COMPLETED | OUTPATIENT
Start: 2025-03-25 | End: 2025-03-25

## 2025-03-25 RX ADMIN — GADOBUTROL 7.5 ML: 604.72 INJECTION INTRAVENOUS at 13:11

## 2025-03-27 ENCOUNTER — VIRTUAL VISIT (OUTPATIENT)
Dept: PSYCHOLOGY | Facility: CLINIC | Age: 65
End: 2025-03-27
Payer: COMMERCIAL

## 2025-03-27 DIAGNOSIS — F33.1 MODERATE EPISODE OF RECURRENT MAJOR DEPRESSIVE DISORDER (H): Primary | ICD-10-CM

## 2025-03-27 DIAGNOSIS — F41.1 GAD (GENERALIZED ANXIETY DISORDER): ICD-10-CM

## 2025-05-19 ENCOUNTER — MYC MEDICAL ADVICE (OUTPATIENT)
Dept: FAMILY MEDICINE | Facility: CLINIC | Age: 65
End: 2025-05-19
Payer: COMMERCIAL

## 2025-05-20 ENCOUNTER — TELEPHONE (OUTPATIENT)
Dept: FAMILY MEDICINE | Facility: CLINIC | Age: 65
End: 2025-05-20
Payer: COMMERCIAL

## 2025-05-20 NOTE — TELEPHONE ENCOUNTER
Prior Authorization Approval    Medication: RYBELSUS 3 MG PO TABS  Authorization Effective Date: 5/20/2025  Authorization Expiration Date: 5/20/2026  Approved Dose/Quantity: as written  Reference #: [id number]   Insurance Company: [employer] Non-Specialty PA's - Phone [phone] Fax [phone]  Which Pharmacy is filling the prescription: [place], [place] - [address]  Pharmacy Notified: y  Patient Notified: Instructed pharmacy to notify patient once order is ready.

## 2025-05-20 NOTE — TELEPHONE ENCOUNTER
Patient needs updated PA completed for next year of Rybelsus to be approved that  2025-she was informed of backlog-thanks!

## 2025-05-24 ENCOUNTER — HEALTH MAINTENANCE LETTER (OUTPATIENT)
Age: 65
End: 2025-05-24

## 2025-06-09 ENCOUNTER — VIRTUAL VISIT (OUTPATIENT)
Dept: PSYCHOLOGY | Facility: CLINIC | Age: 65
End: 2025-06-09
Payer: COMMERCIAL

## 2025-06-09 DIAGNOSIS — F33.1 MODERATE EPISODE OF RECURRENT MAJOR DEPRESSIVE DISORDER (H): Primary | ICD-10-CM

## 2025-06-09 DIAGNOSIS — F41.1 GAD (GENERALIZED ANXIETY DISORDER): ICD-10-CM

## 2025-06-09 PROCEDURE — 90837 PSYTX W PT 60 MINUTES: CPT | Mod: 95 | Performed by: COUNSELOR

## 2025-06-09 NOTE — PROGRESS NOTES
[facility]                                     Progress Note    Patient Name: Karlee Emery  Date: 25         Service Type: Individual      Session Start Time: 1200  Session End Time: 1300     Session Length: 60 minutes    Session #: 55  Attendees: Client attended alone    Service Modality:  Video Visit:      Provider verified identity through the following two step process.  Patient provided:  Patient  and Patient address    Telemedicine Visit: The patient's condition can be safely assessed and treated via synchronous audio and visual telemedicine encounter.      Reason for Telemedicine Visit: Services only offered telehealth    Originating Site (Patient Location): Patient's home    Distant Site (Provider Location): Provider Remote Setting- Home Office    Consent:  The patient/guardian has verbally consented to: the potential risks and benefits of telemedicine (video visit) versus in person care; bill my insurance or make self-payment for services provided; and responsibility for payment of non-covered services.     Patient would like the video invitation sent by:  My Chart    Mode of Communication:  Video Conference via [employer]    Distant Location (Provider):  Off-site    As the provider I attest to compliance with applicable laws and regulations related to telemedicine.    DATA  Interactive Complexity: No  Crisis: No  Extended Session (53+ minutes): PROLONGED SERVICE IN THE OUTPATIENT SETTING REQUIRING DIRECT (FACE-TO-FACE) PATIENT CONTACT BEYOND THE USUAL SERVICE:    - Patient's presenting concerns require more intensive intervention than could be completed within the usual service        Progress Since Last Session (Related to Symptoms / Goals / Homework):   Symptoms: Improving    Homework: Partially completed      Episode of Care Goals: Satisfactory progress - ACTION (Actively working towards change); Intervened by reinforcing change plan / affirming steps taken     Current /  Ongoing Stressors and Concerns:   Familial relationships     Treatment Objective(s) Addressed in This Session:   Identify negative self-talk and behaviors: challenge core beliefs, myths, and actions       Intervention:   DBT: GIVE and FAST - boundaries with son    Assessments completed prior to visit:  The following assessments were completed by patient for this visit:  PHQ9:       11/21/2024     1:52 PM 12/5/2024     1:39 PM 12/19/2024     1:42 PM 1/30/2025     1:44 PM 2/27/2025     1:39 PM 4/24/2025     1:22 PM 6/9/2025    11:52 AM   PHQ-9 SCORE   PHQ-9 Total Score MyChart 7 (Mild depression) 7 (Mild depression) 7 (Mild depression) 7 (Mild depression) 7 (Mild depression) 13 (Moderate depression) 9 (Mild depression)   PHQ-9 Total Score 7  7  7  7  7  13  9        Patient-reported     GAD7:       12/29/2022    12:24 PM 10/5/2023    11:38 AM 12/21/2023     1:49 PM 5/9/2024     1:07 PM 9/12/2024     1:08 PM 9/26/2024    12:45 PM 10/24/2024     1:53 PM   DANIEL-7 SCORE   Total Score 4 (minimal anxiety) 4 (minimal anxiety) 6 (mild anxiety) 9 (mild anxiety) 6 (mild anxiety) 6 (mild anxiety) 6 (mild anxiety)   Total Score 4 4 6 9    9 6    6 6    6 6        Patient-reported          10/10/2023    12:59 PM 1/10/2024     4:52 PM 1/18/2024     1:52 PM   PROMIS-10 Scores Only   Global Mental Health Score 6 9 8    8   Global Physical Health Score 14 15 13    13   PROMIS TOTAL - SUBSCORES 20 24 21    21        ASSESSMENT: Current Emotional / Mental Status (status of significant symptoms):   Risk status (Self / Other harm or suicidal ideation)   Patient denies current fears or concerns for personal safety.   Patient denies current or recent suicidal ideation or behaviors.   Patient denies current or recent homicidal ideation or behaviors.   Patient denies current or recent self injurious behavior or ideation.   Patient denies other safety concerns.   Patient reports there has been no change in risk factors since their last session.      Patient reports there has been no change in protective factors since their last session.     A safety and risk management plan has been developed including: Patient consented to co-developed safety plan on 10/12/23.  Safety and risk management plan was reviewed.   Patient agreed to use safety plan should any safety concerns arise.  A copy was made available to the patient.     Appearance:   Appropriate    Eye Contact:   Good    Psychomotor Behavior: Normal    Attitude:   Cooperative  Interested   Orientation:   All   Speech    Rate / Production: Normal/ Responsive Normal     Volume:  Normal    Mood:    Normal   Affect:    Appropriate    Thought Content:  Clear    Thought Form:  Coherent    Insight:    Good      Medication Review:   No changes to current psychiatric medication(s)     Medication Compliance:   Yes     Changes in Health Issues:   None reported     Chemical Use Review:   Substance Use: Chemical use reviewed, no active concerns identified      Tobacco Use: No current tobacco use.      Diagnosis:  1. Moderate episode of recurrent major depressive disorder (H)    2. DANIEL (generalized anxiety disorder)          Collateral Reports Completed:   Not Applicable    PLAN: (Patient Tasks / Therapist Tasks / Other)  Patient to stay present and focus on current relationships until next session.      Yelitza Schuster, [facility]                                            Individual Treatment Plan    Patient's Name: Karlee Emery  YOB: 1960    Date of Creation: 10/26/23  Date Treatment Plan Last Reviewed/Revised:9/19/24, 1/9/2025, 4/24/25    DSM5 Diagnoses: 296.32 (F33.1) Major Depressive Disorder, Recurrent Episode, Moderate _ and With anxious distress or 300.02 (F41.1) Generalized Anxiety Disorder  Psychosocial / Contextual Factors: Familial conflict  PROMIS (reviewed every 90 days): completed 1/18/24    Referral / Collaboration:  Referral to another professional/service is not indicated at this  time..    Anticipated number of session for this episode of care: 9-12 sessions  Anticipation frequency of session: Weekly  Anticipated Duration of each session: 38-52 minutes  Treatment plan will be reviewed in 90 days or when goals have been changed.       MeasurableTreatment Goal(s) related to diagnosis / functional impairment(s)  Goal 1: Patient will develop and implement strategies related to anxiety.    I will know I've met my goal when I will be able to regulate myself.      Objective #A (Patient Action)    Patient will identify 4 fears / thoughts that contribute to feeling anxious.  Status: Completed 1/25/24    Intervention(s)  Therapist will teach emotional recognition/identification.  .    Objective #B  Patient will use relaxation strategies 2 times per day to reduce the physical symptoms of anxiety.  Status: Continued 4/24/25    Intervention(s)  Therapist will teach relaxation strategies and mindfulness.    Objective #C  Patient will attend and participate in social or recreational activities  .  Status: Continued 4/24/25    Intervention(s)  Therapist will teach skills related to engagement.      Goal 2: Patient will develop and implement strategies related to depression.    I will know I've met my goal when I feel better about myself.      Objective #A     Status:Completed 5/30/24    Patient will Increase interest, engagement, and pleasure in doing things.    Intervention(s)  Therapist will teach emotional regulation skills.  .    Objective #B  Patient will Feel less tired and more energy during the day .    Status: Continued 4/24/25    Intervention(s)  Therapist will teach about healthy boundaries.  .    Objective #C  Patient will Decrease frequency and intensity of feeling down, depressed, hopeless.  Status: Continued 4/24/25    Intervention(s)  Therapist will teach distraction skills.  .      Patient has reviewed and agreed to the above plan.      Yelitza Schuster, [facility]  October 26, 2023

## 2025-07-10 ENCOUNTER — VIRTUAL VISIT (OUTPATIENT)
Dept: PSYCHOLOGY | Facility: CLINIC | Age: 65
End: 2025-07-10
Payer: COMMERCIAL

## 2025-07-10 DIAGNOSIS — F33.1 MODERATE EPISODE OF RECURRENT MAJOR DEPRESSIVE DISORDER (H): Primary | ICD-10-CM

## 2025-07-10 DIAGNOSIS — F41.1 GAD (GENERALIZED ANXIETY DISORDER): ICD-10-CM

## 2025-07-10 ASSESSMENT — ANXIETY QUESTIONNAIRES
2. NOT BEING ABLE TO STOP OR CONTROL WORRYING: MORE THAN HALF THE DAYS
7. FEELING AFRAID AS IF SOMETHING AWFUL MIGHT HAPPEN: MORE THAN HALF THE DAYS
GAD7 TOTAL SCORE: 8
IF YOU CHECKED OFF ANY PROBLEMS ON THIS QUESTIONNAIRE, HOW DIFFICULT HAVE THESE PROBLEMS MADE IT FOR YOU TO DO YOUR WORK, TAKE CARE OF THINGS AT HOME, OR GET ALONG WITH OTHER PEOPLE: VERY DIFFICULT
6. BECOMING EASILY ANNOYED OR IRRITABLE: MORE THAN HALF THE DAYS
7. FEELING AFRAID AS IF SOMETHING AWFUL MIGHT HAPPEN: MORE THAN HALF THE DAYS
3. WORRYING TOO MUCH ABOUT DIFFERENT THINGS: SEVERAL DAYS
8. IF YOU CHECKED OFF ANY PROBLEMS, HOW DIFFICULT HAVE THESE MADE IT FOR YOU TO DO YOUR WORK, TAKE CARE OF THINGS AT HOME, OR GET ALONG WITH OTHER PEOPLE?: VERY DIFFICULT
GAD7 TOTAL SCORE: 8
4. TROUBLE RELAXING: NOT AT ALL
5. BEING SO RESTLESS THAT IT IS HARD TO SIT STILL: NOT AT ALL
GAD7 TOTAL SCORE: 8
1. FEELING NERVOUS, ANXIOUS, OR ON EDGE: SEVERAL DAYS

## 2025-07-10 ASSESSMENT — PATIENT HEALTH QUESTIONNAIRE - PHQ9
10. IF YOU CHECKED OFF ANY PROBLEMS, HOW DIFFICULT HAVE THESE PROBLEMS MADE IT FOR YOU TO DO YOUR WORK, TAKE CARE OF THINGS AT HOME, OR GET ALONG WITH OTHER PEOPLE: VERY DIFFICULT
SUM OF ALL RESPONSES TO PHQ QUESTIONS 1-9: 10
SUM OF ALL RESPONSES TO PHQ QUESTIONS 1-9: 10

## 2025-07-10 NOTE — PROGRESS NOTES
[facility]                                     Progress Note    Patient Name: Karlee Emery  Date: 7/10/25         Service Type: Individual      Session Start Time: 1415  Session End Time: 1500     Session Length: 45 minutes    Session #: 56  Attendees: Client attended alone    Service Modality:  Video Visit:      Provider verified identity through the following two step process.  Patient provided:  Patient  and Patient address    Telemedicine Visit: The patient's condition can be safely assessed and treated via synchronous audio and visual telemedicine encounter.      Reason for Telemedicine Visit: Services only offered telehealth    Originating Site (Patient Location): Patient's home    Distant Site (Provider Location): Provider Remote Setting- Home Office    Consent:  The patient/guardian has verbally consented to: the potential risks and benefits of telemedicine (video visit) versus in person care; bill my insurance or make self-payment for services provided; and responsibility for payment of non-covered services.     Patient would like the video invitation sent by:  My Chart    Mode of Communication:  Video Conference via [facility]    Distant Location (Provider):  Off-site    As the provider I attest to compliance with applicable laws and regulations related to telemedicine.    DATA  Interactive Complexity: No  Crisis: No  Extended Session (53+ minutes): No        Progress Since Last Session (Related to Symptoms / Goals / Homework):   Symptoms: Improving    Homework: Partially completed      Episode of Care Goals: Satisfactory progress - ACTION (Actively working towards change); Intervened by reinforcing change plan / affirming steps taken     Current / Ongoing Stressors and Concerns:   Wife's health issues, unable to sleep, irritability     Treatment Objective(s) Addressed in This Session:   Identify negative self-talk and behaviors: challenge core beliefs, myths, and  actions       Intervention:   DBT: GIVE and FAST - boundaries with son    Assessments completed prior to visit:  The following assessments were completed by patient for this visit:  PHQ9:       12/5/2024     1:39 PM 12/19/2024     1:42 PM 1/30/2025     1:44 PM 2/27/2025     1:39 PM 4/24/2025     1:22 PM 6/9/2025    11:52 AM 7/10/2025     1:15 PM   PHQ-9 SCORE   PHQ-9 Total Score MyChart 7 (Mild depression) 7 (Mild depression) 7 (Mild depression) 7 (Mild depression) 13 (Moderate depression) 9 (Mild depression) 10 (Moderate depression)   PHQ-9 Total Score 7  7  7  7  13  9  10        Patient-reported     GAD7:       10/5/2023    11:38 AM 12/21/2023     1:49 PM 5/9/2024     1:07 PM 9/12/2024     1:08 PM 9/26/2024    12:45 PM 10/24/2024     1:53 PM 7/10/2025     1:16 PM   DANIEL-7 SCORE   Total Score 4 (minimal anxiety) 6 (mild anxiety) 9 (mild anxiety) 6 (mild anxiety) 6 (mild anxiety) 6 (mild anxiety) 8 (mild anxiety)   Total Score 4 6 9    9 6    6 6    6 6  8        Patient-reported          10/10/2023    12:59 PM 1/10/2024     4:52 PM 1/18/2024     1:52 PM   PROMIS-10 Scores Only   Global Mental Health Score 6 9 8    8   Global Physical Health Score 14 15 13    13   PROMIS TOTAL - SUBSCORES 20 24 21    21        ASSESSMENT: Current Emotional / Mental Status (status of significant symptoms):   Risk status (Self / Other harm or suicidal ideation)   Patient denies current fears or concerns for personal safety.   Patient denies current or recent suicidal ideation or behaviors.   Patient denies current or recent homicidal ideation or behaviors.   Patient denies current or recent self injurious behavior or ideation.   Patient denies other safety concerns.   Patient reports there has been no change in risk factors since their last session.     Patient reports there has been no change in protective factors since their last session.     A safety and risk management plan has been developed including: Patient consented to  co-developed safety plan on 10/12/23.  Safety and risk management plan was reviewed.   Patient agreed to use safety plan should any safety concerns arise.  A copy was made available to the patient.     Appearance:   Appropriate    Eye Contact:   Good    Psychomotor Behavior: Normal    Attitude:   Cooperative  Interested   Orientation:   All   Speech    Rate / Production: Normal/ Responsive Normal     Volume:  Normal    Mood:    Normal   Affect:    Appropriate    Thought Content:  Clear    Thought Form:  Coherent    Insight:    Good      Medication Review:   No changes to current psychiatric medication(s)     Medication Compliance:   Yes     Changes in Health Issues:   None reported     Chemical Use Review:   Substance Use: Chemical use reviewed, no active concerns identified      Tobacco Use: No current tobacco use.      Diagnosis:  1. Moderate episode of recurrent major depressive disorder (H)    2. DANIEL (generalized anxiety disorder)          Collateral Reports Completed:   Not Applicable    PLAN: (Patient Tasks / Therapist Tasks / Other)  Patient to journal thoughts at sleep time until next session.      Yelitza Schuster, [facility]                                            Individual Treatment Plan    Patient's Name: Karlee Emery  YOB: 1960    Date of Creation: 10/26/23  Date Treatment Plan Last Reviewed/Revised:9/19/24, 1/9/2025, 4/24/25    DSM5 Diagnoses: 296.32 (F33.1) Major Depressive Disorder, Recurrent Episode, Moderate _ and With anxious distress or 300.02 (F41.1) Generalized Anxiety Disorder  Psychosocial / Contextual Factors: Familial conflict  PROMIS (reviewed every 90 days): completed 1/18/24    Referral / Collaboration:  Referral to another professional/service is not indicated at this time..    Anticipated number of session for this episode of care: 9-12 sessions  Anticipation frequency of session: Weekly  Anticipated Duration of each session: 38-52 minutes  Treatment plan will be  reviewed in 90 days or when goals have been changed.       MeasurableTreatment Goal(s) related to diagnosis / functional impairment(s)  Goal 1: Patient will develop and implement strategies related to anxiety.    I will know I've met my goal when I will be able to regulate myself.      Objective #A (Patient Action)    Patient will identify 4 fears / thoughts that contribute to feeling anxious.  Status: Completed 1/25/24    Intervention(s)  Therapist will teach emotional recognition/identification.  .    Objective #B  Patient will use relaxation strategies 2 times per day to reduce the physical symptoms of anxiety.  Status: Continued 4/24/25    Intervention(s)  Therapist will teach relaxation strategies and mindfulness.    Objective #C  Patient will attend and participate in social or recreational activities  .  Status: Continued 4/24/25    Intervention(s)  Therapist will teach skills related to engagement.      Goal 2: Patient will develop and implement strategies related to depression.    I will know I've met my goal when I feel better about myself.      Objective #A     Status:Completed 5/30/24    Patient will Increase interest, engagement, and pleasure in doing things.    Intervention(s)  Therapist will teach emotional regulation skills.  .    Objective #B  Patient will Feel less tired and more energy during the day .    Status: Continued 4/24/25    Intervention(s)  Therapist will teach about healthy boundaries.  .    Objective #C  Patient will Decrease frequency and intensity of feeling down, depressed, hopeless.  Status: Continued 4/24/25    Intervention(s)  Therapist will teach distraction skills.  .      Patient has reviewed and agreed to the above plan.      Yelitza Schuster, [facility]  October 26, 2023

## 2025-07-20 DIAGNOSIS — E11.9 TYPE 2 DIABETES MELLITUS WITHOUT COMPLICATION, WITHOUT LONG-TERM CURRENT USE OF INSULIN (H): ICD-10-CM

## 2025-07-20 RX ORDER — ORAL SEMAGLUTIDE 3 MG/1
TABLET ORAL
Qty: 15 TABLET | Refills: 0 | Status: SHIPPED | OUTPATIENT
Start: 2025-07-20 | End: 2025-07-24

## 2025-07-24 ENCOUNTER — MYC MEDICAL ADVICE (OUTPATIENT)
Dept: FAMILY MEDICINE | Facility: CLINIC | Age: 65
End: 2025-07-24
Payer: COMMERCIAL

## 2025-07-24 DIAGNOSIS — E11.9 TYPE 2 DIABETES MELLITUS WITHOUT COMPLICATION, WITHOUT LONG-TERM CURRENT USE OF INSULIN (H): ICD-10-CM

## 2025-07-24 RX ORDER — ORAL SEMAGLUTIDE 3 MG/1
3 TABLET ORAL DAILY
Qty: 90 TABLET | Refills: 0 | Status: SHIPPED | OUTPATIENT
Start: 2025-07-24

## 2025-07-24 NOTE — TELEPHONE ENCOUNTER
Dr. Solis - see Enmanuel, patient wants to see you as scheduled in November. Pended health maintenance labs due as well as TSH  if you'd like those done/resulted sooner than scheduled visit. Appreciate review.    GABRIELLA Coyne, BSN, PHN, AMB-BC (she/her)  [facility] RN